# Patient Record
Sex: FEMALE | Race: WHITE | Employment: UNEMPLOYED | URBAN - METROPOLITAN AREA
[De-identification: names, ages, dates, MRNs, and addresses within clinical notes are randomized per-mention and may not be internally consistent; named-entity substitution may affect disease eponyms.]

---

## 2017-01-10 ENCOUNTER — APPOINTMENT (OUTPATIENT)
Dept: LAB | Facility: CLINIC | Age: 82
End: 2017-01-10
Payer: COMMERCIAL

## 2017-01-10 ENCOUNTER — ALLSCRIPTS OFFICE VISIT (OUTPATIENT)
Dept: OTHER | Facility: OTHER | Age: 82
End: 2017-01-10

## 2017-01-10 ENCOUNTER — TRANSCRIBE ORDERS (OUTPATIENT)
Dept: LAB | Facility: CLINIC | Age: 82
End: 2017-01-10

## 2017-01-10 ENCOUNTER — HOSPITAL ENCOUNTER (OUTPATIENT)
Dept: RADIOLOGY | Facility: CLINIC | Age: 82
Discharge: HOME | End: 2017-01-10
Attending: ORTHOPAEDIC SURGERY
Payer: COMMERCIAL

## 2017-01-10 ENCOUNTER — HOSPITAL ENCOUNTER (OUTPATIENT)
Dept: RADIOLOGY | Facility: HOSPITAL | Age: 82
Discharge: HOME | End: 2017-01-10
Attending: ORTHOPAEDIC SURGERY
Payer: COMMERCIAL

## 2017-01-10 DIAGNOSIS — S52.552D OTHER EXTRAARTICULAR FRACTURE OF LOWER END OF LEFT RADIUS, SUBSEQUENT ENCOUNTER FOR CLOSED FRACTURE WITH ROUTINE HEALING: ICD-10-CM

## 2017-01-10 DIAGNOSIS — S52.572A: ICD-10-CM

## 2017-01-10 DIAGNOSIS — Z47.89 ENCOUNTER FOR OTHER ORTHOPEDIC AFTERCARE: ICD-10-CM

## 2017-01-10 DIAGNOSIS — S62.102A CLOSED FRACTURE OF LEFT CARPAL BONE: ICD-10-CM

## 2017-01-10 DIAGNOSIS — S52.572A: Primary | ICD-10-CM

## 2017-01-10 LAB
ANION GAP SERPL CALCULATED.3IONS-SCNC: 11 MMOL/L (ref 4–13)
BASOPHILS # BLD AUTO: 0.03 THOUSANDS/ΜL (ref 0–0.1)
BASOPHILS NFR BLD AUTO: 0 % (ref 0–1)
BUN SERPL-MCNC: 22 MG/DL (ref 5–25)
CALCIUM SERPL-MCNC: 9.4 MG/DL (ref 8.3–10.1)
CHLORIDE SERPL-SCNC: 104 MMOL/L (ref 100–108)
CO2 SERPL-SCNC: 27 MMOL/L (ref 21–32)
CREAT SERPL-MCNC: 0.64 MG/DL (ref 0.6–1.3)
EOSINOPHIL # BLD AUTO: 0.05 THOUSAND/ΜL (ref 0–0.61)
EOSINOPHIL NFR BLD AUTO: 1 % (ref 0–6)
ERYTHROCYTE [DISTWIDTH] IN BLOOD BY AUTOMATED COUNT: 14.3 % (ref 11.6–15.1)
GFR SERPL CREATININE-BSD FRML MDRD: >60 ML/MIN/1.73SQ M
GLUCOSE SERPL-MCNC: 92 MG/DL (ref 65–140)
HCT VFR BLD AUTO: 41.9 % (ref 34.8–46.1)
HGB BLD-MCNC: 13.4 G/DL (ref 11.5–15.4)
LYMPHOCYTES # BLD AUTO: 1.75 THOUSANDS/ΜL (ref 0.6–4.47)
LYMPHOCYTES NFR BLD AUTO: 23 % (ref 14–44)
MCH RBC QN AUTO: 29.4 PG (ref 26.8–34.3)
MCHC RBC AUTO-ENTMCNC: 32 G/DL (ref 31.4–37.4)
MCV RBC AUTO: 92 FL (ref 82–98)
MONOCYTES # BLD AUTO: 0.71 THOUSAND/ΜL (ref 0.17–1.22)
MONOCYTES NFR BLD AUTO: 9 % (ref 4–12)
NEUTROPHILS # BLD AUTO: 5.01 THOUSANDS/ΜL (ref 1.85–7.62)
NEUTS SEG NFR BLD AUTO: 67 % (ref 43–75)
PLATELET # BLD AUTO: 344 THOUSANDS/UL (ref 149–390)
PMV BLD AUTO: 11.3 FL (ref 8.9–12.7)
POTASSIUM SERPL-SCNC: 4.6 MMOL/L (ref 3.5–5.3)
RBC # BLD AUTO: 4.56 MILLION/UL (ref 3.81–5.12)
SODIUM SERPL-SCNC: 142 MMOL/L (ref 136–145)
WBC # BLD AUTO: 7.55 THOUSAND/UL (ref 4.31–10.16)

## 2017-01-10 PROCEDURE — 93005 ELECTROCARDIOGRAM TRACING: CPT

## 2017-01-10 PROCEDURE — 85025 COMPLETE CBC W/AUTO DIFF WBC: CPT

## 2017-01-10 PROCEDURE — 73110 X-RAY EXAM OF WRIST: CPT

## 2017-01-10 PROCEDURE — 80048 BASIC METABOLIC PNL TOTAL CA: CPT

## 2017-01-10 PROCEDURE — 36415 COLL VENOUS BLD VENIPUNCTURE: CPT

## 2017-01-13 ENCOUNTER — ALLSCRIPTS OFFICE VISIT (OUTPATIENT)
Dept: OTHER | Facility: OTHER | Age: 82
End: 2017-01-13

## 2017-01-13 LAB
ATRIAL RATE: 82 BPM
P AXIS: 27 DEGREES
PR INTERVAL: 132 MS
QRS AXIS: 16 DEGREES
QRSD INTERVAL: 72 MS
QT INTERVAL: 382 MS
QTC INTERVAL: 446 MS
T WAVE AXIS: 51 DEGREES
VENTRICULAR RATE: 82 BPM

## 2017-01-16 RX ORDER — GLUCOSAMINE HCL 500 MG
TABLET ORAL
COMMUNITY
End: 2020-11-16

## 2017-01-18 ENCOUNTER — ANESTHESIA EVENT (OUTPATIENT)
Dept: PERIOP | Facility: HOSPITAL | Age: 82
End: 2017-01-18
Payer: COMMERCIAL

## 2017-01-18 ENCOUNTER — ANESTHESIA (OUTPATIENT)
Dept: PERIOP | Facility: HOSPITAL | Age: 82
End: 2017-01-18
Payer: COMMERCIAL

## 2017-01-18 ENCOUNTER — HOSPITAL ENCOUNTER (OUTPATIENT)
Facility: HOSPITAL | Age: 82
Setting detail: OUTPATIENT SURGERY
Discharge: HOME | End: 2017-01-18
Attending: ORTHOPAEDIC SURGERY | Admitting: ORTHOPAEDIC SURGERY
Payer: COMMERCIAL

## 2017-01-18 ENCOUNTER — HOSPITAL ENCOUNTER (OUTPATIENT)
Dept: RADIOLOGY | Facility: HOSPITAL | Age: 82
Setting detail: OUTPATIENT SURGERY
Discharge: HOME | End: 2017-01-18
Payer: COMMERCIAL

## 2017-01-18 VITALS
HEIGHT: 63 IN | WEIGHT: 148 LBS | OXYGEN SATURATION: 95 % | TEMPERATURE: 97.3 F | SYSTOLIC BLOOD PRESSURE: 186 MMHG | HEART RATE: 77 BPM | RESPIRATION RATE: 16 BRPM | DIASTOLIC BLOOD PRESSURE: 74 MMHG | BODY MASS INDEX: 26.22 KG/M2

## 2017-01-18 DIAGNOSIS — T14.8XXA FRACTURE: ICD-10-CM

## 2017-01-18 PROCEDURE — C1713 ANCHOR/SCREW BN/BN,TIS/BN: HCPCS | Performed by: ORTHOPAEDIC SURGERY

## 2017-01-18 PROCEDURE — 73100 X-RAY EXAM OF WRIST: CPT

## 2017-01-18 DEVICE — 2.4MM VA-LCP 2-COL DSTL RAD PL NRW 6H HD/3H SHAFT/LT
Type: IMPLANTABLE DEVICE | Site: WRIST | Status: FUNCTIONAL
Brand: VA-LCP

## 2017-01-18 DEVICE — 2.4MM CORTEX SCREW SLF-TPNG WITH T8 STARDRIVE RECESS 12MM: Type: IMPLANTABLE DEVICE | Site: WRIST | Status: FUNCTIONAL

## 2017-01-18 DEVICE — 2.4MM VA LOCKING SCREW STARDRIVE 12MM: Type: IMPLANTABLE DEVICE | Site: WRIST | Status: FUNCTIONAL

## 2017-01-18 DEVICE — 2.4MM VA LOCKING SCREW STARDRIVE 18MM: Type: IMPLANTABLE DEVICE | Site: WRIST | Status: FUNCTIONAL

## 2017-01-18 DEVICE — 2.4MM VA LOCKING SCREW STARDRIVE 20MM: Type: IMPLANTABLE DEVICE | Site: WRIST | Status: FUNCTIONAL

## 2017-01-18 DEVICE — 2.4MM CORTEX SCREW SLF-TPNG WITH T8 STARDRIVE RECESS 14MM: Type: IMPLANTABLE DEVICE | Site: WRIST | Status: FUNCTIONAL

## 2017-01-18 RX ORDER — FENTANYL CITRATE/PF 50 MCG/ML
25 SYRINGE (ML) INJECTION
Status: DISCONTINUED | OUTPATIENT
Start: 2017-01-18 | End: 2017-01-18 | Stop reason: HOSPADM

## 2017-01-18 RX ORDER — SODIUM CHLORIDE, SODIUM LACTATE, POTASSIUM CHLORIDE, CALCIUM CHLORIDE 600; 310; 30; 20 MG/100ML; MG/100ML; MG/100ML; MG/100ML
50 INJECTION, SOLUTION INTRAVENOUS CONTINUOUS
Status: DISCONTINUED | OUTPATIENT
Start: 2017-01-18 | End: 2017-01-18 | Stop reason: HOSPADM

## 2017-01-18 RX ORDER — BUPIVACAINE HYDROCHLORIDE 2.5 MG/ML
INJECTION, SOLUTION INFILTRATION; PERINEURAL AS NEEDED
Status: DISCONTINUED | OUTPATIENT
Start: 2017-01-18 | End: 2017-01-18 | Stop reason: HOSPADM

## 2017-01-18 RX ORDER — FENTANYL CITRATE 50 UG/ML
INJECTION, SOLUTION INTRAMUSCULAR; INTRAVENOUS AS NEEDED
Status: DISCONTINUED | OUTPATIENT
Start: 2017-01-18 | End: 2017-01-18 | Stop reason: SURG

## 2017-01-18 RX ORDER — HYDROCODONE BITARTRATE AND ACETAMINOPHEN 5; 325 MG/1; MG/1
TABLET ORAL
Status: COMPLETED
Start: 2017-01-18 | End: 2017-01-18

## 2017-01-18 RX ORDER — GLYCOPYRROLATE 0.2 MG/ML
INJECTION INTRAMUSCULAR; INTRAVENOUS AS NEEDED
Status: DISCONTINUED | OUTPATIENT
Start: 2017-01-18 | End: 2017-01-18 | Stop reason: SURG

## 2017-01-18 RX ORDER — HYDROCODONE BITARTRATE AND ACETAMINOPHEN 5; 325 MG/1; MG/1
1 TABLET ORAL EVERY 6 HOURS PRN
Status: DISCONTINUED | OUTPATIENT
Start: 2017-01-18 | End: 2017-01-18 | Stop reason: HOSPADM

## 2017-01-18 RX ORDER — ONDANSETRON 2 MG/ML
4 INJECTION INTRAMUSCULAR; INTRAVENOUS ONCE AS NEEDED
Status: DISCONTINUED | OUTPATIENT
Start: 2017-01-18 | End: 2017-01-18 | Stop reason: HOSPADM

## 2017-01-18 RX ORDER — PROPOFOL 10 MG/ML
INJECTION, EMULSION INTRAVENOUS AS NEEDED
Status: DISCONTINUED | OUTPATIENT
Start: 2017-01-18 | End: 2017-01-18 | Stop reason: SURG

## 2017-01-18 RX ORDER — LIDOCAINE HYDROCHLORIDE 10 MG/ML
INJECTION, SOLUTION INFILTRATION; PERINEURAL AS NEEDED
Status: DISCONTINUED | OUTPATIENT
Start: 2017-01-18 | End: 2017-01-18 | Stop reason: SURG

## 2017-01-18 RX ORDER — HYDROCODONE BITARTRATE AND ACETAMINOPHEN 5; 325 MG/1; MG/1
2 TABLET ORAL EVERY 6 HOURS PRN
Qty: 30 TABLET | Refills: 0 | Status: SHIPPED | OUTPATIENT
Start: 2017-01-18 | End: 2017-01-28

## 2017-01-18 RX ORDER — ONDANSETRON 2 MG/ML
INJECTION INTRAMUSCULAR; INTRAVENOUS AS NEEDED
Status: DISCONTINUED | OUTPATIENT
Start: 2017-01-18 | End: 2017-01-18 | Stop reason: SURG

## 2017-01-18 RX ADMIN — FENTANYL CITRATE 25 MCG: 50 INJECTION INTRAMUSCULAR; INTRAVENOUS at 15:43

## 2017-01-18 RX ADMIN — CEFAZOLIN SODIUM 2000 MG: 2 SOLUTION INTRAVENOUS at 13:57

## 2017-01-18 RX ADMIN — ONDANSETRON 4 MG: 2 INJECTION INTRAMUSCULAR; INTRAVENOUS at 14:04

## 2017-01-18 RX ADMIN — FENTANYL CITRATE 50 MCG: 50 INJECTION, SOLUTION INTRAMUSCULAR; INTRAVENOUS at 14:40

## 2017-01-18 RX ADMIN — GLYCOPYRROLATE 0.1 MG: 0.2 INJECTION INTRAMUSCULAR; INTRAVENOUS at 13:53

## 2017-01-18 RX ADMIN — FENTANYL CITRATE 25 MCG: 50 INJECTION INTRAMUSCULAR; INTRAVENOUS at 15:30

## 2017-01-18 RX ADMIN — HYDROCODONE BITARTRATE AND ACETAMINOPHEN: 5; 325 TABLET ORAL at 16:42

## 2017-01-18 RX ADMIN — PROPOFOL 150 MG: 10 INJECTION, EMULSION INTRAVENOUS at 13:51

## 2017-01-18 RX ADMIN — FENTANYL CITRATE 25 MCG: 50 INJECTION INTRAMUSCULAR; INTRAVENOUS at 15:25

## 2017-01-18 RX ADMIN — FENTANYL CITRATE 50 MCG: 50 INJECTION, SOLUTION INTRAMUSCULAR; INTRAVENOUS at 14:23

## 2017-01-18 RX ADMIN — FENTANYL CITRATE 25 MCG: 50 INJECTION, SOLUTION INTRAMUSCULAR; INTRAVENOUS at 13:51

## 2017-01-18 RX ADMIN — LIDOCAINE HYDROCHLORIDE 40 MG: 10 INJECTION, SOLUTION INFILTRATION; PERINEURAL at 13:51

## 2017-01-18 RX ADMIN — FENTANYL CITRATE 25 MCG: 50 INJECTION, SOLUTION INTRAMUSCULAR; INTRAVENOUS at 13:59

## 2017-01-18 RX ADMIN — FENTANYL CITRATE 50 MCG: 50 INJECTION, SOLUTION INTRAMUSCULAR; INTRAVENOUS at 14:15

## 2017-01-18 RX ADMIN — SODIUM CHLORIDE, SODIUM LACTATE, POTASSIUM CHLORIDE, AND CALCIUM CHLORIDE: .6; .31; .03; .02 INJECTION, SOLUTION INTRAVENOUS at 13:43

## 2017-01-18 RX ADMIN — DEXAMETHASONE SODIUM PHOSPHATE 10 MG: 10 INJECTION INTRAMUSCULAR; INTRAVENOUS at 14:04

## 2017-02-02 ENCOUNTER — ALLSCRIPTS OFFICE VISIT (OUTPATIENT)
Dept: OTHER | Facility: OTHER | Age: 82
End: 2017-02-02

## 2017-02-02 ENCOUNTER — HOSPITAL ENCOUNTER (OUTPATIENT)
Dept: RADIOLOGY | Facility: CLINIC | Age: 82
Discharge: HOME | End: 2017-02-02
Payer: COMMERCIAL

## 2017-02-02 DIAGNOSIS — S52.552D OTHER EXTRAARTICULAR FRACTURE OF LOWER END OF LEFT RADIUS, SUBSEQUENT ENCOUNTER FOR CLOSED FRACTURE WITH ROUTINE HEALING: ICD-10-CM

## 2017-02-02 DIAGNOSIS — S62.102A CLOSED FRACTURE OF LEFT CARPAL BONE: ICD-10-CM

## 2017-02-02 PROCEDURE — 73110 X-RAY EXAM OF WRIST: CPT

## 2017-03-07 ENCOUNTER — GENERIC CONVERSION - ENCOUNTER (OUTPATIENT)
Dept: OTHER | Facility: OTHER | Age: 82
End: 2017-03-07

## 2017-03-13 ENCOUNTER — GENERIC CONVERSION - ENCOUNTER (OUTPATIENT)
Dept: OTHER | Facility: OTHER | Age: 82
End: 2017-03-13

## 2017-03-15 ENCOUNTER — GENERIC CONVERSION - ENCOUNTER (OUTPATIENT)
Dept: OTHER | Facility: OTHER | Age: 82
End: 2017-03-15

## 2017-04-11 ENCOUNTER — HOSPITAL ENCOUNTER (OUTPATIENT)
Dept: RADIOLOGY | Facility: CLINIC | Age: 82
Discharge: HOME | End: 2017-04-11
Payer: COMMERCIAL

## 2017-04-11 ENCOUNTER — ALLSCRIPTS OFFICE VISIT (OUTPATIENT)
Dept: OTHER | Facility: OTHER | Age: 82
End: 2017-04-11

## 2017-04-11 DIAGNOSIS — S62.102A CLOSED FRACTURE OF LEFT CARPAL BONE: ICD-10-CM

## 2017-04-11 PROCEDURE — 73110 X-RAY EXAM OF WRIST: CPT

## 2017-04-19 ENCOUNTER — ALLSCRIPTS OFFICE VISIT (OUTPATIENT)
Dept: OTHER | Facility: OTHER | Age: 82
End: 2017-04-19

## 2017-08-22 ENCOUNTER — ALLSCRIPTS OFFICE VISIT (OUTPATIENT)
Dept: OTHER | Facility: OTHER | Age: 82
End: 2017-08-22

## 2017-11-30 ENCOUNTER — LAB CONVERSION - ENCOUNTER (OUTPATIENT)
Dept: OTHER | Facility: OTHER | Age: 82
End: 2017-11-30

## 2017-11-30 LAB
25(OH)D3 SERPL-MCNC: 31 NG/ML (ref 30–100)
A/G RATIO (HISTORICAL): 1.4 (CALC) (ref 1–2.5)
ALBUMIN SERPL BCP-MCNC: 4 G/DL (ref 3.6–5.1)
ALP SERPL-CCNC: 62 U/L (ref 33–130)
ALT SERPL W P-5'-P-CCNC: 10 U/L (ref 6–29)
AST SERPL W P-5'-P-CCNC: 16 U/L (ref 10–35)
BILIRUB SERPL-MCNC: 0.4 MG/DL (ref 0.2–1.2)
BILIRUBIN DIRECT (HISTORICAL): 0.1 MG/DL
CHOLEST SERPL-MCNC: 212 MG/DL
CHOLEST/HDLC SERPL: 3.2 (CALC)
DEPRECATED RDW RBC AUTO: 17 % (ref 11–15)
GAMMA GLOBULIN (HISTORICAL): 2.8 G/DL (CALC) (ref 1.9–3.7)
HCT VFR BLD AUTO: 36.5 % (ref 35–45)
HDLC SERPL-MCNC: 66 MG/DL
HGB BLD-MCNC: 10.7 G/DL (ref 11.7–15.5)
INDIRECT BILIRUBIN (HISTORICAL): 0.3 MG/DL (CALC) (ref 0.2–1.2)
LDL CHOLESTEROL (HISTORICAL): 124 MG/DL (CALC)
MCH RBC QN AUTO: 22.7 PG (ref 27–33)
MCHC RBC AUTO-ENTMCNC: 29.3 G/DL (ref 32–36)
MCV RBC AUTO: 77.3 FL (ref 80–100)
NON-HDL-CHOL (CHOL-HDL) (HISTORICAL): 146 MG/DL (CALC)
PLATELET # BLD AUTO: 304 THOUSAND/UL (ref 140–400)
PMV BLD AUTO: 12 FL (ref 7.5–12.5)
RBC # BLD AUTO: 4.72 MILLION/UL (ref 3.8–5.1)
TOTAL PROTEIN (HISTORICAL): 6.8 G/DL (ref 6.1–8.1)
TRIGL SERPL-MCNC: 108 MG/DL
WBC # BLD AUTO: 4.6 THOUSAND/UL (ref 3.8–10.8)

## 2017-12-05 ENCOUNTER — ALLSCRIPTS OFFICE VISIT (OUTPATIENT)
Dept: OTHER | Facility: OTHER | Age: 82
End: 2017-12-05

## 2017-12-06 NOTE — PROGRESS NOTES
Assessment    1  Benign essential hypertension (401 1) (I10)   2  Microcytic anemia (280 9) (D50 9)   3  Atherosclerosis 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 (440 21) (I70 219)   4  Vitamin D deficiency (268 9) (E55 9)    Plan  Atherosclerosis 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, Microcyticanemia    · Esomeprazole Magnesium 40 MG Oral Capsule Delayed Release; take 1 capsuledaily   · 1 - Doctor Leonor OLSEN (Vascular Surgery) Co-Management  *  Status: Active  Requestedfor: 55EQU1338  Care Summary provided  : Yes   · (1) CBC/ PLT (NO DIFF); Status:Active; Requested for:15Ctm0927;    · (1) FERRITIN; Status:Active; Requested for:41Tsz3886;    · (1) OCCULT BLOOD, FECAL IMMUNOCHEMICAL TEST; Status:Active; Requestedfor:37Xjo7548;    · Follow-up visit in 6 weeks Evaluation and Treatment  Follow-up  Status: Hold For -Scheduling  Requested for: 19QJG5471  Benign essential hypertension    · Enalapril Maleate 10 MG Oral Tablet; Take 1 tablet daily  GERD without esophagitis    · Famotidine 40 MG Oral Tablet  Hypercholesterolemia    · Simvastatin 20 MG Oral Tablet; TAKE 1 TABLET DAILY  Microcytic anemia    · Hemoccult Screening - POC; Status:Active - Perform Order; Requested for:25Lwm8681;     Discussion/Summary    New onset microcytic anemia, suspect Fe deficient - ? GI Bleed  Today stool negative  Will give take home Hemoccult slides  one of the platelets Meds be put on hold  change Famotidine to esomeprazole 40 mg weak or tired call  Hemoccult in office - Negative  Educational resources provided:   Possible side effects of new medications were reviewed with the patient/guardian today  The treatment plan was reviewed with the patient/guardian  The patient/guardian understands and agrees with the treatment plan     Self Referrals: No      Chief Complaint  pt here for 3 month follow up for hypertension and to review BW      History of Present Illness  Follow up  Review labs   H and H dropped along with MCV/MCH and MCHC  OK a stent in LLE for arterial disease  with vitamin D replacement have patient see Vascular, does patient need both Plavix and Aspirin ? Review of Systems   Constitutional: No fever, no chills, feels well, no tiredness, no recent weight gain or weight loss  Eyes: No complaints of eye pain, no red eyes, no eyesight problems, no discharge, no dry eyes, no itching of eyes  ENT: no complaints of earache, no loss of hearing, no nose bleeds, no nasal discharge, no sore throat, no hoarseness  Cardiovascular: No complaints of slow heart rate, no fast heart rate, no chest pain, no palpitations, no leg claudication, no lower extremity edema,-- no intermittent leg claudication-- and-- no lower extremity edema  Respiratory: No complaints of shortness of breath, no wheezing, no cough, no SOB on exertion, no orthopnea, no PND  Gastrointestinal: No complaints of abdominal pain, no constipation, no nausea or vomiting, no diarrhea, no bloody stools,-- no abdominal pain,-- no nausea,-- no vomiting,-- no constipation,-- no diarrhea-- and-- no blood in stools  Genitourinary: No complaints of dysuria, no incontinence, no pelvic pain, no dysmenorrhea, no vaginal discharge or bleeding  Musculoskeletal: No complaints of arthralgias, no myalgias, no joint swelling or stiffness, no limb pain or swelling  Integumentary: No complaints of skin rash or lesions, no itching, no skin wounds, no breast pain or lump  Psychiatric: Not suicidal, no sleep disturbance, no anxiety or depression, no change in personality, no emotional problems  Endocrine: No complaints of proptosis, no hot flashes, no muscle weakness, no deepening of the voice, no feelings of weakness  Hematologic/Lymphatic: No complaints of swollen glands, no swollen glands in the neck, does not bleed easily, does not bruise easily  Active Problems    1  Abrasion (919 0) (T14 8XXA)   2   Advance directive on file (V49 89) (Z78 9) 3  Aftercare following other surgery of musculoskeletal system (V58 78) (Z47 89)   4  Arm pain, left (729 5) (M79 602)   5  Atherosclerosis 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 (440 21) (I70 219)   6  Benign essential hypertension (401 1) (I10)   7  Borderline hypertension (796 2) (R03 0)   8  Breast cancer screening (V76 10) (Z12 39)   9  Carotid artery stenosis (433 10) (I65 29)   10  Cataract (366 9) (H26 9)   11  Dry skin (701 1) (L85 3)   12  Epistaxis (784 7) (R04 0)   13  GERD without esophagitis (530 81) (K21 9)   14  Glaucoma screening (V80 1) (Z13 5)   15  Hypercholesterolemia (272 0) (E78 00)   16  Hypertriglyceridemia (272 1) (E78 1)   17  Infection of toe (686 9) (L08 9)   18  Ingrown toenail (703 0) (L60 0)   19  Left shoulder pain (719 41) (M25 512)   20  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)   21  Medicare annual wellness visit, initial (V70 0) (Z00 00)   22  Medicare annual wellness visit, subsequent (V70 0) (Z00 00)   23  Need for TD vaccine (V06 5) (Z23)   24  Osteoporosis (733 00) (M81 0)   25  Other closed extra-articular fracture of distal end of left radius with routine healing,  subsequent encounter (V54 12) (S52 552D)   26  Pain, upper back (724 5) (M54 9)   27  Preoperative clearance (V72 84) (Z01 818)   28  Pseudophakia of right eye (V43 1) (Z96 1)   29  Rib pain (786 50) (R07 81)   30  Screening for depression (V79 0) (Z13 89)   31  Screening for genitourinary condition (V81 6) (Z13 89)   32  Screening for neurological condition (V80 09) (Z13 89)   33  Skin lesion of scalp (709 9) (L98 9)   34  Subungual hematoma of toe, left, initial encounter (924 3) (S90 222A)   35  Visit for screening mammogram (V76 12) (Z12 31)   36  Vitamin D deficiency (268 9) (E55 9)   37  Wax in ear (380 4) (H61 20)   38  Wears seat belt (V49 89) (Z78 9)   39  Wrist fracture, left (814 00) (S62 102A)    Past Medical History    1   History of Abdominal pain, RLQ (right lower quadrant) (789 03) (R10 31)   2  History of Ankle swelling (719 07) (M25 473)   3  History of Anorexia (783 0) (R63 0)   4  History of Cough (786 2) (R05)   5  History of Distorted taste (781 1) (R43 2)   6  History of Encounter for screening mammogram for malignant neoplasm of breast (V76 12) (Z12 31)   7  History of acute bronchitis (V12 69) (Z87 09)   8  History of constipation (V12 79) (Z87 19)   9  History of dehydration (V12 29) (Z86 39)   10  History of herpes zoster (V12 09) (Z86 19)   11  History of postmenopausal osteoporosis (V13 59) (Z87 39)   12  History of postmenopausal osteoporosis (V13 59) (Z87 39)   13  History of tendinitis (V13 59) (Z87 39)   14  History of upper respiratory infection (V12 09) (Z87 09)   15  History of urinary frequency (V13 09) (Z87 898)   16  History of urinary incontinence (V13 09) (Z87 898)   17  History of Lymphoma (V10 71)   18  History of Malignant Melanoma Of The Skin (V10 82)   19  History of Pain in ankle joint (719 47) (M25 579)   20  History of Pain of lower leg, unspecified laterality (729 5) (M79 669)   21  History of Pain of upper extremity, unspecified laterality (729 5) (M79 603)   22  History of Pelvic Fracture (808 8)   23  History of Pre-operative cardiovascular examination (V72 81) (Z01 810)   24  History of Urinary retention (788 20) (R33 9)   25  History of Urinary Tract Infection (V13 02)   26  History of Vaginal Hemorrhage (623 8)    Surgical History  1  History of Breast Surgery Enlargement Procedure   2  History of Cataract Surgery   3  History of Hysterectomy   4  History of PTA Femoral-Popliteal   5  History of PTA Femoral-Popliteal Initial Stenosis    Family History  Mother    1  Family history of myocardial infarction (V17 3) (Z82 49)  Father    2  Family history of    3  Family history of congestive heart failure (V17 49) (Z82 49)  Sister    4   Family history of Thyroid Surgery    Social History     · Advance directive on file (V49 89) (Z78 9)   · Being A Social Drinker   · Denied: History of Drug Use   · Never A Smoker  The social history was reviewed and updated today  The social history was reviewed and is unchanged  Current Meds   1  Aspirin 81 MG TABS; 1 TAB DAILY; Therapy: 42Woq6405 to (Last Luis Big)  Requested for: 45Qqq8987 Ordered   2  Calcium TABS; Therapy: (Recorded:16Jan2014) to Recorded   3  Clopidogrel Bisulfate 75 MG Oral Tablet; Take 1 tablet daily; Therapy: 21SGR1283 to (Evaluate:18Feb2018)  Requested for: 22Lye0350; Last Rx:41Dvb9426 Ordered   4  Enalapril Maleate 10 MG Oral Tablet; Take 1 tablet daily; Therapy: 01SDA8082 to (Evaluate:18Feb2018)  Requested for: 50Okn8140; Last Rx:49Eno8045 Ordered   5  Famotidine 40 MG Oral Tablet; TAKE 1 TABLET DAILY; Therapy: 89YDW1516 to (Evaluate:18Feb2018)  Requested for: 13Dpi1951; Last Rx:50Qul3175 Ordered   6  Magnesium 250 MG Oral Tablet; Therapy: 94SDO8057 to Recorded   7  Simvastatin 20 MG Oral Tablet; TAKE 1 TABLET DAILY  Requested for: 42Xpp0505; Last Rx:12Ioe6178 Ordered   8  Vitamin D3 400 UNIT Oral Capsule; Therapy: 57YED6202 to Recorded    Allergies  1  Iodine Crystals   2  Sulfa Drugs  3  IVP Dye    Vitals  Vital Signs    Recorded: 38ZSN9572 11:07AM   Temperature 97 9 F, Oral   Heart Rate 82   Systolic 285, LUE   Diastolic 72, LUE   Height 5 ft 3 5 in   Weight 152 lb    BMI Calculated 26 5   BSA Calculated 1 73   O2 Saturation 96       Physical Exam   Constitutional  General appearance: No acute distress, well appearing and well nourished  Eyes  Conjunctiva and lids: No swelling, erythema or discharge  Pupils and irises: Equal, round and reactive to light  Ears, Nose, Mouth, and Throat  External inspection of ears and nose: Normal    Otoscopic examination: Tympanic membranes translucent with normal light reflex  Canals patent without erythema  Nasal mucosa, septum, and turbinates: Normal without edema or erythema     Oropharynx: Normal with no erythema, edema, exudate or lesions  Pulmonary  Respiratory effort: No increased work of breathing or signs of respiratory distress  Auscultation of lungs: Clear to auscultation  Cardiovascular  Palpation of heart: Normal PMI, no thrills  Auscultation of heart: Normal rate and rhythm, normal S1 and S2, without murmurs  Examination of extremities for edema and/or varicosities: Normal    Carotid pulses: Normal    Abdomen  Abdomen: Non-tender, no masses  Liver and spleen: No hepatomegaly or splenomegaly  Lymphatic  Palpation of lymph nodes in neck: No lymphadenopathy  Musculoskeletal  Gait and station: Normal    Inspection/palpation of joints, bones, and muscles: Normal    Neurologic  Sensation: No sensory loss  Psychiatric  Orientation to person, place, and time: Normal    Mood and affect: Normal          Results/Data  (1) HEPATIC FUNCTION PANEL 37DYZ3107 09:48AM [employer]     Test Name Result Flag Reference   PROTEIN, TOTAL 6 8 g/dL  6 1-8 1   ALBUMIN 4 0 g/dL  3 6-5 1   GLOBULIN 2 8 g/dL (calc)  1 9-3 7   ALBUMIN/GLOBULIN RATIO 1 4 (calc)  1 0-2 5   BILIRUBIN, TOTAL 0 4 mg/dL  0 2-1 2   BILIRUBIN, DIRECT 0 1 mg/dL  < OR = 0 2   BILIRUBIN, INDIRECT 0 3 mg/dL (calc)  0 2-1 2   ALKALINE PHOSPHATASE 62 U/L     AST 16 U/L  10-35   ALT 10 U/L  6-29     (1) LIPID PANEL, FASTING 77KYD5049 09:48AM [employer]     Test Name Result Flag Reference   CHOLESTEROL, TOTAL 212 mg/dL H <200   HDL CHOLESTEROL 66 mg/dL  >49   TRIGLICERIDES 286 mg/dL  <150   LDL-CHOLESTEROL 124 mg/dL (calc) H      Reference range: <100   Desirable range <100 mg/dL for patients with CHD or diabetes and <70 mg/dL for diabetic patients with known heart disease  LDL-C is now calculated using the Tu-Wilburn  calculation, which is a validated novel method providing  better accuracy than the Friedewald equation in the  estimation of LDL-C  Jimbo Felton et al  [place]  0087;078(30): 9830-1791  (http://OpenTrust/faq/ZKA533)   CHOL/HDLC RATIO 3 2 (calc)  <5 0   NON HDL CHOLESTEROL 146 mg/dL (calc) H <130     For patients with diabetes plus 1 major ASCVD risk  factor, treating to a non-HDL-C goal of <100 mg/dL  (LDL-C of <70 mg/dL) is considered a therapeutic  option  (Q) CBC (H/H, RBC, INDICES, WBC, PLT) 06KMS9401 09:48AM Ashley Davila     Test Name Result Flag Reference   WHITE BLOOD CELL COUNT 4 6 Thousand/uL  3 8-10 8   RED BLOOD CELL COUNT 4 72 Million/uL  3 80-5 10   HEMOGLOBIN 10 7 g/dL L 11 7-15 5   HEMATOCRIT 36 5 %  35 0-45 0   MCV 77 3 fL L 80 0-100 0   MCH 22 7 pg L 27 0-33 0   MCHC 29 3 g/dL L 32 0-36 0   RDW 17 0 % H 11 0-15 0   PLATELET COUNT 022 Thousand/uL  140-400   MPV 12 0 fL  7 5-12 5     *(Q) VITAMIN D, 25-HYDROXY, LC/MS/MS 73FGZ4480 09:48AM Ashley Davila     REPORT COMMENT: FASTING:YES     Test Name Result Flag Reference   VITAMIN D, 25-OH, TOTAL 31 ng/mL       Vitamin D Status         25-OH Vitamin D:   Deficiency:                    <20 ng/mL Insufficiency:             20 - 29 ng/mL Optimal:                 > or = 30 ng/mL   For 25-OH Vitamin D testing on patients on  D2-supplementation and patients for whom quantitation  of D2 and D3 fractions is required, the QuestAssureD(TM) 25-OH VIT D, (D2,D3), LC/MS/MS is recommended: order  code 80784 (patients >2yrs)  For more information on this test, go to: http://education  Quippi/faq/IKA617 (This link is being provided for  informational/educational purposes only )       Health Management  Benign essential hypertension   (1) COMPREHENSIVE METABOLIC PANEL; every 1 year; Last 29RYL0276; Next Due: 05XQG6687; Overdue  (1) LIPID PANEL, FASTING; every 1 year; Last 07ETT2820; Next Due: 89Ugd7033; Active  EKG/ECG- POC; every 1 year; Last 91WSN8696; Next Due: 72HQQ7235; Overdue  Breast cancer screening   * MAMMO SCREENING BILATERAL W CAD; every 1 year; Last 42YNF0279; Next Due: 70WBJ3803; Overdue  Cataract   *VB - Eye Exam; every 1 year; Last 06QQR2925;  Next Due: 77YXB1030; Overdue  Glaucoma screening   *VB - Glaucoma Screen; every 1 year; Last 21BVD9026; Next Due: 27NWA6701; Overdue  Hypercholesterolemia   (1) COMPREHENSIVE METABOLIC PANEL; every 1 year; Last 93EPN0389; Next Due: 13QOW6499; Overdue  (1) LIPID PANEL, FASTING; every 1 year; Last 64MGG0282; Next Due: 29Qxn3191; Active  Osteoporosis   Dexa Scan; every 2 years; Last 77QBT2472; Next Due: 49TTC2919; Overdue  Screening for depression   *VB - Fall Risk Assessment  (Dx Z13 89 Screen for Neurologic Disorder); every 1 year; Qsai04Hil1732; Next Due: 51RFH1111; Overdue  *VB - Urinary Incontinence Screen (Dx Z13 89 Screen for UI); every 1 year; Last 67Tby4103; NextDue: 94Wpf2502; Active  *VB-Depression Screening; every 1 year; Last 04Igz0111; Next Due: 15Vcz9090; Active  Screening for genitourinary condition   *VB - Fall Risk Assessment  (Dx Z13 89 Screen for Neurologic Disorder); every 1 year; Pqul66Qyx9797; Next Due: 43PUF6026; Overdue  *VB - Urinary Incontinence Screen (Dx Z13 89 Screen for UI); every 1 year; Last 34Rxd1636; NextDue: 97Flh2411; Active  *VB-Depression Screening; every 1 year; Last 52Mlt3758; Next Due: 61Utu9354; Active  Screening for neurological condition   *VB - Fall Risk Assessment  (Dx Z13 89 Screen for Neurologic Disorder); every 1 year; Hybr29Myh3499; Next Due: 74YIP2715; Overdue  *VB - Urinary Incontinence Screen (Dx Z13 89 Screen for UI); every 1 year; Last 90Pmx5683; NextDue: 71Sob7389; Active  *VB-Depression Screening; every 1 year; Last 50Mjz5799; Next Due: 25Zwp3554; Active  Visit for screening mammogram   Digital Bilateral Screening Mammogram With CAD; every 1 year; Last 36FSQ4555; Next NZW:32CRV3335; Overdue  Health Maintenance   Medicare Annual Wellness Visit; every 1 year; Last 57Dbk2747; Next Due: 28GJU1946;  Overdue    Future Appointments    Date/Time Provider Specialty Site   01/16/2018 11:45 AM Ashwini Coreas DO [facility]       Signatures   Electronically signed by : Margarita Garcia DO; Dec  5 2017  1:04PM EST                       (Author)

## 2018-01-10 NOTE — PROGRESS NOTES
Assessment   1  Left shoulder pain (719 41) (M25 512)  2  Pain, upper back (724 5) (M54 9)  3  Rib pain (786 50) (R07 81)  4  Arm pain, left (729 5) (M79 602)  5  Benign essential hypertension (401 1) (I10)    Plan  Arm pain, left    · Follow-up visit in 1 week Evaluation and Treatment  Follow-up  Status: Hold For -  Scheduling  Requested for: 70BKS7676  Benign essential hypertension    · EKG/ECG- POC; Status:Active; Requested EDQ:29KRI5191;     Discussion/Summary    No heat  Reviewed activity  EKG was negative  Chief Complaint  c/o L arm, states it's still hurting  History of Present Illness  HPI: Throbbing burning pain came back last couple days  Same location  Discomfort started when got up one mornig  Denies any known trauma  No pain at rest, sitting, sleeping etc  Certain movements, tying shoe laces or raising arm causes pain  Tenderness reproduced with palpation of inferior part of deltoid  No worsening of discomfort with exertion  Denies chest pain or SOB  Sit: Rib #1 on left up, anterior, tender with decreased movement on palpation  Muscle energy to rib and upper thorax  Soft tissue to upper arm follow by cupping  Active Problems   1  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, unsp extrm (440 21) (I70 219)  2  Benign essential hypertension (401 1) (I10)  3  Carotid artery stenosis (433 10) (I65 29)  4  Cataract (366 9) (H26 9)  5  GERD without esophagitis (530 81) (K21 9)  6  Glaucoma screening (V80 1) (Z13 5)  7  Hypercholesterolemia (272 0) (E78 0)  8  Hypertriglyceridemia (272 1) (E78 1)  9  Left shoulder pain (719 41) (M25 512)  10  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)  11  Medicare annual wellness visit, initial (V70 0) (Z00 00)  12  Osteoporosis (733 00) (M81 0)  13  Screening for depression (V79 0) (Z13 89)  14  Screening for genitourinary condition (V81 6) (Z13 89)  15  Screening for neurological condition (V80 09) (Z13 89)  16   Visit for screening mammogram (V76 12) (Z12 31)  17  Wax in ear (380 4) (H61 20)    Past Medical History   1  History of Ankle swelling (719 07) (M25 473)  2  History of Anorexia (783 0) (R63 0)  3  History of Cough (786 2) (R05)  4  History of Distorted taste (781 1) (R43 2)  5  History of Encounter for screening mammogram for malignant neoplasm of breast   (V76 12) (Z12 31)  6  History of acute bronchitis (V12 69) (Z87 09)  7  History of constipation (V12 79) (Z87 19)  8  History of dehydration (V12 29) (Z86 39)  9  History of herpes zoster (V12 09) (Z86 19)  10  History of postmenopausal osteoporosis (V13 59) (Z87 39)  11  History of postmenopausal osteoporosis (V13 59) (Z87 39)  12  History of tendinitis (V13 59) (Z87 39)  13  History of upper respiratory infection (V12 09) (Z87 09)  14  History of urinary frequency (V13 09) (Z87 898)  15  History of urinary incontinence (V13 09) (Z87 898)  16  History of Malignant Lymphoma (V10 71)  17  History of Malignant Melanoma Of The Skin (V10 82)  18  History of Pain in ankle joint (719 47) (M25 579)  19  History of Pain of lower leg, unspecified laterality (729 5) (M79 669)  20  History of Pain of upper extremity, unspecified laterality (729 5) (M79 603)  21  History of Pelvic Fracture (808 8)  22  History of Pre-operative cardiovascular examination (V72 81) (Z01 810)  23  History of Urinary retention (788 20) (R33 9)  24  History of Urinary Tract Infection (V13 02)  25  History of Vaginal Hemorrhage (623 8)  26  History of Vitamin D deficiency (268 9) (E55 9)    Family History   1  Family history of myocardial infarction (V17 3) (Z82 49)   2  Family history of   3  Family history of congestive heart failure (V17 49) (Z82 49)   4  Family history of Thyroid Surgery    Social History    · Being A Social Drinker   · Denied: History of Drug Use   · Never A Smoker    Surgical History   1  History of Cataract Surgery  2  History of Hysterectomy  3  History of PTA Femoral-Popliteal  4   History of PTA Femoral-Popliteal Initial Stenosis    Current Meds  1  Aspirin 81 MG Oral Tablet; 1 TAB DAILY; Therapy: 79Dee5659 to (Last Raza Bucco)  Requested for: 88Uut1570 Ordered  2  Calcium TABS; Therapy: (Recorded:16Jan2014) to Recorded  3  Clopidogrel Bisulfate 75 MG Oral Tablet; Take 1 tablet daily; Therapy: 10IEB3897 to (21 234 )  Requested for: 86NVS6417; Last   Rx:13Oct2015 Ordered  4  Enalapril Maleate 10 MG Oral Tablet; Take 1 tablet daily; Therapy: 00YIZ6902 to (21 234 )  Requested for: 37TKT0156; Last   Rx:13Oct2015 Ordered  5  Famotidine 40 MG Oral Tablet; TAKE 1 TABLET DAILY; Therapy: 97QHE9625 to (Evaluate:00Dbo6756)  Requested for: 13Oct2015; Last   Rx:13Oct2015 Ordered  6  Magnesium 250 MG Oral Tablet; Therapy: 76ODI3824 to Recorded  7  Simvastatin 20 MG Oral Tablet; TAKE 1 TABLET DAILY  Requested for: 38FCU7097; Last   Rx:13Oct2015 Ordered    Allergies   1  Iodine Crystals  2  Sulfa Drugs   3  IVP Dye    Vitals   Recorded: 56WQU0004 12:35PM   Temperature 97 5 F   Heart Rate 83   Respiration 16   Systolic 245   Diastolic 78   Height 5 ft 3 5 in   Weight 145 lb    BMI Calculated 25 28   BSA Calculated 1 7   O2 Saturation 97     Physical Exam    Constitutional   General appearance: No acute distress, well appearing and well nourished  Eyes   Conjunctiva and lids: No swelling, erythema or discharge  Ears, Nose, Mouth, and Throat   External inspection of ears and nose: Normal     Musculoskeletal   Gait and station: Normal     Digits and nails: Normal without clubbing or cyanosis  Inspection/palpation of joints, bones, and muscles: Abnormal   See HPI  Skin   Skin and subcutaneous tissue: Normal without rashes or lesions  Neurologic   Sensation: No sensory loss      Psychiatric   Orientation to person, place, and time: Normal     Mood and affect: Normal          Future Appointments    Date/Time Provider Specialty Site   01/27/2016 11:45 AM DO Melisa Gonzalez [facility]   04/15/2016 09:45 AM Linda Whittington DO [facility]     Signatures   Electronically signed by : Piotr Brewster DO; Jan 20 2016  8:28AM EST                       (Author)

## 2018-01-12 VITALS
HEIGHT: 64 IN | BODY MASS INDEX: 26.29 KG/M2 | SYSTOLIC BLOOD PRESSURE: 171 MMHG | HEART RATE: 80 BPM | DIASTOLIC BLOOD PRESSURE: 83 MMHG | WEIGHT: 154 LBS

## 2018-01-12 NOTE — PROGRESS NOTES
Assessment    1  Arm pain, left (729 5) (M79 602)   2  Left shoulder pain (719 41) (M25 512)   3  Pain, upper back (724 5) (M54 9)   4  Rib pain (786 50) (R07 81)    Plan  Arm pain, left    · Follow Up if Not Better Evaluation and Treatment  Follow-up  Status: Complete  Done:  20SOM5327    Discussion/Summary    Discussed injury and overloading the musculoskeletal system  Could of been an event or fatigue etiology  Chief Complaint  1 wk f/u for arm  History of Present Illness  Much better today  Now remembers pushing a heavy cart past December down to the basement  Sit: Rib #1 feels equal  Tissue tight upper thorax and lower cervical to shoulders and deltoid on left    Abduction equal   Soft tissue to above  Remains without symptoms  Active Problems    1  Arm pain, left (729 5) (M79 602)   2  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, unsp extrm (440 21) (I70 219)   3  Benign essential hypertension (401 1) (I10)   4  Carotid artery stenosis (433 10) (I65 29)   5  Cataract (366 9) (H26 9)   6  GERD without esophagitis (530 81) (K21 9)   7  Glaucoma screening (V80 1) (Z13 5)   8  Hypercholesterolemia (272 0) (E78 0)   9  Hypertriglyceridemia (272 1) (E78 1)   10  Left shoulder pain (719 41) (M25 512)   11  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)   12  Medicare annual wellness visit, initial (V70 0) (Z00 00)   13  Osteoporosis (733 00) (M81 0)   14  Pain, upper back (724 5) (M54 9)   15  Rib pain (786 50) (R07 81)   16  Screening for depression (V79 0) (Z13 89)   17  Screening for genitourinary condition (V81 6) (Z13 89)   18  Screening for neurological condition (V80 09) (Z13 89)   19  Visit for screening mammogram (V76 12) (Z12 31)   20  Wax in ear (380 4) (H61 20)    Past Medical History    1  History of Abdominal pain, RLQ (right lower quadrant) (789 03) (R10 31)   2  History of Ankle swelling (719 07) (M25 473)   3  History of Anorexia (783 0) (R63 0)   4  History of Cough (786 2) (R05)   5  History of Distorted taste (781 1) (R43 2)   6  History of Encounter for screening mammogram for malignant neoplasm of breast   (V76 12) (Z12 31)   7  History of acute bronchitis (V12 69) (Z87 09)   8  History of constipation (V12 79) (Z87 19)   9  History of dehydration (V12 29) (Z86 39)   10  History of herpes zoster (V12 09) (Z86 19)   11  History of postmenopausal osteoporosis (V13 59) (Z87 39)   12  History of postmenopausal osteoporosis (V13 59) (Z87 39)   13  History of tendinitis (V13 59) (Z87 39)   14  History of upper respiratory infection (V12 09) (Z87 09)   15  History of urinary frequency (V13 09) (Z87 898)   16  History of urinary incontinence (V13 09) (Z87 898)   17  History of Malignant Lymphoma (V10 71)   18  History of Malignant Melanoma Of The Skin (V10 82)   19  History of Pain in ankle joint (719 47) (M25 579)   20  History of Pain of lower leg, unspecified laterality (729 5) (M79 669)   21  History of Pain of upper extremity, unspecified laterality (729 5) (M79 603)   22  History of Pelvic Fracture (808 8)   23  History of Pre-operative cardiovascular examination (V72 81) (Z01 810)   24  History of Urinary retention (788 20) (R33 9)   25  History of Urinary Tract Infection (V13 02)   26  History of Vaginal Hemorrhage (623 8)   27  History of Vitamin D deficiency (268 9) (E55 9)    Surgical History    1  History of Cataract Surgery   2  History of Hysterectomy   3  History of PTA Femoral-Popliteal   4  History of PTA Femoral-Popliteal Initial Stenosis    Family History    1  Family history of myocardial infarction (V17 3) (Z82 49)    2  Family history of    3  Family history of congestive heart failure (V17 49) (Z82 49)    4  Family history of Thyroid Surgery    Social History    · Being A Social Drinker   · Denied: History of Drug Use   · Never A Smoker    Current Meds   1  Aspirin 81 MG Oral Tablet; 1 TAB DAILY;    Therapy: 85Nal8322 to (Last Liss Telles)  Requested for: 78RAR2879 Ordered   2  Calcium TABS; Therapy: (Recorded:89Ggh9192) to Recorded   3  Clopidogrel Bisulfate 75 MG Oral Tablet; Take 1 tablet daily; Therapy: 77KUG0233 to (77 873 135)  Requested for: 56JCD3919; Last   Rx:13Oct2015 Ordered   4  Enalapril Maleate 10 MG Oral Tablet; Take 1 tablet daily; Therapy: 50PBS6517 to (57 873 135)  Requested for: 13SNC4494; Last   Rx:13Oct2015 Ordered   5  Famotidine 40 MG Oral Tablet; TAKE 1 TABLET DAILY; Therapy: 07TOJ1106 to (Evaluate:35Kmu6802)  Requested for: 13Oct2015; Last   Rx:13Oct2015 Ordered   6  Magnesium 250 MG Oral Tablet; Therapy: 06GON0399 to Recorded   7  Simvastatin 20 MG Oral Tablet; TAKE 1 TABLET DAILY  Requested for: 57HQY2633; Last   Rx:13Oct2015 Ordered    Allergies    1  Iodine Crystals   2  Sulfa Drugs    3  IVP Dye    Vitals  Vital Signs [Data Includes: Current Encounter]    Recorded: 95KCJ9301 10:05AM   Temperature 97 8 F   Heart Rate 82   Respiration 16   Systolic 775   Diastolic 78   Height 5 ft 3 5 in   Weight 146 lb    BMI Calculated 25 46   BSA Calculated 1 7     Physical Exam    Constitutional   General appearance: No acute distress, well appearing and well nourished  Eyes   Conjunctiva and lids: No swelling, erythema or discharge  Ears, Nose, Mouth, and Throat   External inspection of ears and nose: Normal     Pulmonary   Respiratory effort: No increased work of breathing or signs of respiratory distress  Musculoskeletal   Gait and station: Normal     Digits and nails: Normal without clubbing or cyanosis  Inspection/palpation of joints, bones, and muscles: Abnormal   See HPI  Skin   Skin and subcutaneous tissue: Normal without rashes or lesions  Neurologic   Sensation: No sensory loss  Psychiatric   Orientation to person, place, and time: Normal     Mood and affect: Normal          Health Management  Benign essential hypertension   (1) COMPREHENSIVE METABOLIC PANEL; every 1 year; Last 53RSH1400;  Next Due: 42MRI3250; Active  (1) LIPID PANEL, FASTING; every 1 year; Last 82HNC0660; Next Due: 14LMB8998; Active  EKG/ECG- POC; every 1 year; Last 21NZU6797; Next Due: 85JXB3970; Active  Cataract   *VB - Eye Exam; every 1 year; Last 26WOO9980; Next Due: 79KXI3795; Overdue  Glaucoma screening   *VB Glaucoma Screen; every 1 year; Last 64EMW8259; Next Due: 76MAN9708; Near Due  Hypercholesterolemia   (1) COMPREHENSIVE METABOLIC PANEL; every 1 year; Last 41ZGH7988; Next Due: 03FVW0780; Active  (1) LIPID PANEL, FASTING; every 1 year; Last 20AAX9517; Next Due: 57NNW7539; Active  Osteoporosis   Dexa Scan; every 2 years; Last 96PCF1366; Next Due: 82GPC4569; Active  Screening for depression   *VB - Fall Risk Assessment  (Dx V80 09 Screen for Neurologic Disorder); every 1 year; Last  51Uxb0573; Next Due: 59Ytj6297; Active  *VB-Depression Screening; every 1 year; Last 30Eri8034; Next Due: 09Jqt6052; Active  *VB-Urinary Incontinence Screen (Dx V81 6 Screen for UI); every 1 year; Last 46Smg1326; Next  Due: 89Dzk1004; Active  Screening for genitourinary condition   *VB - Fall Risk Assessment  (Dx V80 09 Screen for Neurologic Disorder); every 1 year; Last  94Pck5467; Next Due: 22Nja1785; Active  *VB-Depression Screening; every 1 year; Last 84Sfk2680; Next Due: 00Tjx6805; Active  *VB-Urinary Incontinence Screen (Dx V81 6 Screen for UI); every 1 year; Last 97Hxb2990; Next  Due: 42Wvf1814; Active  Screening for neurological condition   *VB - Fall Risk Assessment  (Dx V80 09 Screen for Neurologic Disorder); every 1 year; Last  38Xov0287; Next Due: 50Gdw5665; Active  *VB-Depression Screening; every 1 year; Last 59Evt6323; Next Due: 75Qje0531; Active  *VB-Urinary Incontinence Screen (Dx V81 6 Screen for UI); every 1 year; Last 95Pxi6754; Next  Due: 05Jnx2146; Active  Visit for screening mammogram   Digital Bilateral Screening Mammogram With CAD; every 1 year; Last 58ERW3598; Next Due:  84EBM5511;  Active  Health Maintenance   Medicare Annual Wellness Visit; every 1 year; Last 74Qrs5806; Next Due: 97QTB2297;  Overdue    Future Appointments    Date/Time Provider Specialty Site   04/15/2016 09:45 AM Sirena Hunt[facility] DO [facility]     Signatures   Electronically signed by : Lionel Guo DO; Feb  3 2016 11:24AM EST                       (Author)

## 2018-01-12 NOTE — MISCELLANEOUS
Message     Date: 14 Dec 2016 4:21 PM   Patient called to say she scratched a pimple on her arm and it will not stop bleeding  She said the nurse in the home she lives in is bandaging it up but patient would like to know if she can hold her plavix or aspirin for a couple days since they are blood thinners  Per Dr Essie Contreras verbal orders, patient should schedule to be seen to have arm checked  I called patient and told her Dr Essie Contreras suggestions and she scheduled for Friday 12/16/2016  VR/vfp        Active Problems    1  Arm pain, left (729 5) (M79 602)   2  Atherosclerosis 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 (440 21)   (I70 219)   3  Benign essential hypertension (401 1) (I10)   4  Borderline hypertension (796 2) (R03 0)   5  Breast cancer screening (V76 10) (Z12 39)   6  Carotid artery stenosis (433 10) (I65 29)   7  Cataract (366 9) (H26 9)   8  Dry skin (701 1) (L85 3)   9  Epistaxis (784 7) (R04 0)   10  GERD without esophagitis (530 81) (K21 9)   11  Glaucoma screening (V80 1) (Z13 5)   12  Hypercholesterolemia (272 0) (E78 00)   13  Hypertriglyceridemia (272 1) (E78 1)   14  Infection of toe (686 9) (L08 9)   15  Ingrown toenail (703 0) (L60 0)   16  Left shoulder pain (719 41) (M25 512)   17  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) (Z85 72)   18  Medicare annual wellness visit, initial (V70 0) (Z00 00)   19  Osteoporosis (733 00) (M81 0)   20  Pain, upper back (724 5) (M54 9)   21  Rib pain (786 50) (R07 81)   22  Screening for depression (V79 0) (Z13 89)   23  Screening for genitourinary condition (V81 6) (Z13 89)   24  Screening for neurological condition (V80 09) (Z13 89)   25  Subungual hematoma of toe, left, initial encounter (924 3) (S90 222A)   26  Visit for screening mammogram (V76 12) (Z12 31)   27  Vitamin D deficiency (268 9) (E55 9)   28  Wax in ear (380 4) (H61 20)    Current Meds   1  Aspirin 81 MG TABS; 1 TAB DAILY;    Therapy: 02Aug2011 to (Last Rx:02Aug2011)  Requested for: 70XJT5545 Ordered   2  Calcium TABS; Therapy: (Recorded:16Jan2014) to Recorded   3  Clopidogrel Bisulfate 75 MG Oral Tablet; Take 1 tablet daily; Therapy: 87FAT5186 to (([phone])  Requested for: 19IAI1342; Last   Rx:11Oct2016 Ordered   4  Enalapril Maleate 10 MG Oral Tablet; Take 1 tablet daily; Therapy: 94RES5443 to (Carie Abraham)  Requested for: 85HDE0396; Last   Rx:13Oct2015 Ordered   5  Famotidine 40 MG Oral Tablet; TAKE 1 TABLET DAILY; Therapy: 50RWO5887 to (Evaluate:49Rhm9844)  Requested for: 88ERA5199; Last   Rx:22Eip0223 Ordered   6  Magnesium 250 MG Oral Tablet; Therapy: 85JIL5348 to Recorded   7  Simvastatin 20 MG Oral Tablet; TAKE 1 TABLET DAILY  Requested for: 95ARM5177; Last   Rx:11Oct2016 Ordered    Allergies    1  Iodine Crystals   2  Sulfa Drugs    3   IVP Dye    Signatures   Electronically signed by : Liya Culp DO; Dec 14 2016  5:19PM EST                       (Author)

## 2018-01-13 VITALS
SYSTOLIC BLOOD PRESSURE: 132 MMHG | HEIGHT: 64 IN | BODY MASS INDEX: 25.44 KG/M2 | HEART RATE: 97 BPM | OXYGEN SATURATION: 96 % | DIASTOLIC BLOOD PRESSURE: 64 MMHG | WEIGHT: 149 LBS | TEMPERATURE: 98.2 F

## 2018-01-13 VITALS
BODY MASS INDEX: 25.1 KG/M2 | HEART RATE: 108 BPM | SYSTOLIC BLOOD PRESSURE: 165 MMHG | HEIGHT: 64 IN | DIASTOLIC BLOOD PRESSURE: 77 MMHG | WEIGHT: 147 LBS

## 2018-01-13 VITALS
TEMPERATURE: 97.9 F | DIASTOLIC BLOOD PRESSURE: 70 MMHG | HEART RATE: 79 BPM | SYSTOLIC BLOOD PRESSURE: 140 MMHG | HEIGHT: 64 IN | BODY MASS INDEX: 25.61 KG/M2 | WEIGHT: 150 LBS

## 2018-01-14 VITALS
SYSTOLIC BLOOD PRESSURE: 130 MMHG | DIASTOLIC BLOOD PRESSURE: 66 MMHG | WEIGHT: 150 LBS | HEIGHT: 64 IN | OXYGEN SATURATION: 96 % | RESPIRATION RATE: 16 BRPM | TEMPERATURE: 97.7 F | HEART RATE: 86 BPM | BODY MASS INDEX: 25.61 KG/M2

## 2018-01-14 VITALS
SYSTOLIC BLOOD PRESSURE: 159 MMHG | DIASTOLIC BLOOD PRESSURE: 81 MMHG | HEART RATE: 120 BPM | HEIGHT: 64 IN | WEIGHT: 150 LBS | BODY MASS INDEX: 25.61 KG/M2

## 2018-01-16 ENCOUNTER — LAB CONVERSION - ENCOUNTER (OUTPATIENT)
Dept: OTHER | Facility: OTHER | Age: 83
End: 2018-01-16

## 2018-01-16 ENCOUNTER — ALLSCRIPTS OFFICE VISIT (OUTPATIENT)
Dept: OTHER | Facility: OTHER | Age: 83
End: 2018-01-16

## 2018-01-16 LAB
DEPRECATED RDW RBC AUTO: 16.5 % (ref 11–15)
FERRITIN SERPL-MCNC: 8 NG/ML (ref 20–288)
HCT VFR BLD AUTO: 36.2 % (ref 35–45)
HGB BLD-MCNC: 10.7 G/DL (ref 11.7–15.5)
MCH RBC QN AUTO: 22.4 PG (ref 27–33)
MCHC RBC AUTO-ENTMCNC: 29.6 G/DL (ref 32–36)
MCV RBC AUTO: 75.7 FL (ref 80–100)
PLATELET # BLD AUTO: 399 THOUSAND/UL (ref 140–400)
PMV BLD AUTO: 11.7 FL (ref 7.5–12.5)
RBC # BLD AUTO: 4.78 MILLION/UL (ref 3.8–5.1)
WBC # BLD AUTO: 5.5 THOUSAND/UL (ref 3.8–10.8)

## 2018-01-17 NOTE — PROGRESS NOTES
Assessment   1  Iron deficiency anemia (280 9) (D50 9)   2  Benign essential hypertension (401 1) (I10)   3  Atherosclerosis 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 (440 21)     (I70 219)   4  GERD without esophagitis (530 81) (K21 9)    Plan   Iron deficiency anemia    · (1) CBC/ PLT (NO DIFF); Status:Active; Requested BFK:[id number];    · Follow-up visit in 3 months Evaluation and Treatment  Follow-up  Status: Hold For -    Scheduling  Requested for: 22MWT6903    Discussion/Summary      Start Fe supplement daily  Will turn stool black  If constipated go to QOD and can add a stool softener  nutrition, fruits, vegetables, fiber, stool softeners  need to get and OTC Fe supplement  Possible side effects of new medications were reviewed with the patient/guardian today  The treatment plan was reviewed with the patient/guardian  The patient/guardian understands and agrees with the treatment plan       Self Referrals: No      Chief Complaint   pt here for 6 week follow up to review Fit test and Review BW      History of Present Illness   Follow up anemia, new onset  Review labs, H/H stable, Ferritin low  Suspect a leak somewhere in GI tract  On Plavix and Aspirin for PVD  Recent started on PPI in place of H2 blocker, Famotidine  appt with Vascular early February  feels fine  start an Fe OTC supplement  any claudication with walking  now will cut dosing to QOD for each Aspirin and Plavix, eg: Aspirin - MWFS, Plavix - T,Th, Saturday  Any claudication symptoms call or RTO  Review of Systems        Constitutional: No fever, no chills, feels well, no tiredness, no recent weight gain or weight loss  Eyes: No complaints of eye pain, no red eyes, no eyesight problems, no discharge, no dry eyes, no itching of eyes  ENT: no complaints of earache, no loss of hearing, no nose bleeds, no nasal discharge, no sore throat, no hoarseness        Cardiovascular: No complaints of slow heart rate, no fast heart rate, no chest pain, no palpitations, no leg claudication, no lower extremity edema  Respiratory: No complaints of shortness of breath, no wheezing, no cough, no SOB on exertion, no orthopnea, no PND  Gastrointestinal: No complaints of abdominal pain, no constipation, no nausea or vomiting, no diarrhea, no bloody stools  Genitourinary: No complaints of dysuria, no incontinence, no pelvic pain, no dysmenorrhea, no vaginal discharge or bleeding  Musculoskeletal: No complaints of arthralgias, no myalgias, no joint swelling or stiffness, no limb pain or swelling  Integumentary: No complaints of skin rash or lesions, no itching, no skin wounds, no breast pain or lump  Neurological: No complaints of headache, no confusion, no convulsions, no numbness, no dizziness or fainting, no tingling, no limb weakness, no difficulty walking  Psychiatric: Not suicidal, no sleep disturbance, no anxiety or depression, no change in personality, no emotional problems  Endocrine: No complaints of proptosis, no hot flashes, no muscle weakness, no deepening of the voice, no feelings of weakness  Hematologic/Lymphatic: No complaints of swollen glands, no swollen glands in the neck, does not bleed easily, does not bruise easily  Active Problems   1  Abrasion (919 0) (T14 8XXA)   2  Advance directive on file (V49 89) (Z78 9)   3  Aftercare following other surgery of musculoskeletal system (V58 78) (Z47 89)   4  Arm pain, left (729 5) (M79 602)   5  Atherosclerosis 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 (440 21)     (I70 219)   6  Benign essential hypertension (401 1) (I10)   7  Borderline hypertension (796 2) (R03 0)   8  Breast cancer screening (V76 10) (Z12 39)   9  Carotid artery stenosis (433 10) (I65 29)   10  Cataract (366 9) (H26 9)   11  Dry skin (701 1) (L85 3)   12  Epistaxis (784 7) (R04 0)   13  GERD without esophagitis (530 81) (K21 9)   14   Glaucoma screening (V80 1) (Z13 5)   15  Hypercholesterolemia (272 0) (E78 00)   16  Hypertriglyceridemia (272 1) (E78 1)   17  Infection of toe (686 9) (L08 9)   18  Ingrown toenail (703 0) (L60 0)   19  Left shoulder pain (719 41) (M25 512)   20  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)   21  Medicare annual wellness visit, initial (V70 0) (Z00 00)   22  Medicare annual wellness visit, subsequent (V70 0) (Z00 00)   23  Microcytic anemia (280 9) (D50 9)   24  Need for TD vaccine (V06 5) (Z23)   25  Osteoporosis (733 00) (M81 0)   26  Other closed extra-articular fracture of distal end of left radius with routine healing,      subsequent encounter (V54 12) (S52 552D)   27  Pain, upper back (724 5) (M54 9)   28  Preoperative clearance (V72 84) (Z01 818)   29  Pseudophakia of right eye (V43 1) (Z96 1)   30  Rib pain (786 50) (R07 81)   31  Screening for depression (V79 0) (Z13 89)   32  Screening for genitourinary condition (V81 6) (Z13 89)   33  Screening for neurological condition (V80 09) (Z13 89)   34  Skin lesion of scalp (709 9) (L98 9)   35  Subungual hematoma of toe, left, initial encounter (924 3) (S90 222A)   36  Visit for screening mammogram (V76 12) (Z12 31)   37  Vitamin D deficiency (268 9) (E55 9)   38  Wax in ear (380 4) (H61 20)   39  Wears seat belt (V49 89) (Z78 9)   40  Wrist fracture, left (814 00) (S62 102A)    Past Medical History   1  History of Abdominal pain, RLQ (right lower quadrant) (789 03) (R10 31)   2  History of Ankle swelling (719 07) (M25 473)   3  History of Anorexia (783 0) (R63 0)   4  History of Cough (786 2) (R05)   5  History of Distorted taste (781 1) (R43 2)   6  History of Encounter for screening mammogram for malignant neoplasm of breast     (V76 12) (Z12 31)   7  History of acute bronchitis (V12 69) (Z87 09)   8  History of constipation (V12 79) (Z87 19)   9  History of dehydration (V12 29) (Z86 39)   10  History of herpes zoster (V12 09) (Z86 19)   11   History of postmenopausal osteoporosis (V13 59) (Z87 39)   12  History of postmenopausal osteoporosis (V13 59) (Z87 39)   13  History of tendinitis (V13 59) (Z87 39)   14  History of upper respiratory infection (V12 09) (Z87 09)   15  History of urinary frequency (V13 09) (Z87 898)   16  History of urinary incontinence (V13 09) (Z87 898)   17  History of Lymphoma (V10 71)   18  History of Malignant Melanoma Of The Skin (V10 82)   19  History of Pain in ankle joint (719 47) (M25 579)   20  History of Pain of lower leg, unspecified laterality (729 5) (M79 669)   21  History of Pain of upper extremity, unspecified laterality (729 5) (M79 603)   22  History of Pelvic Fracture (808 8)   23  History of Pre-operative cardiovascular examination (V72 81) (Z01 810)   24  History of Urinary retention (788 20) (R33 9)   25  History of Urinary Tract Infection (V13 02)   26  History of Vaginal Hemorrhage (623 8)    Surgical History   1  History of Breast Surgery Enlargement Procedure   2  History of Cataract Surgery   3  History of Hysterectomy   4  History of PTA Femoral-Popliteal   5  History of PTA Femoral-Popliteal Initial Stenosis    Family History   Mother    1  Family history of myocardial infarction (V17 3) (Z82 49)  Father    2  Family history of    3  Family history of congestive heart failure (V17 49) (Z82 49)  Sister    4  Family history of Thyroid Surgery    Social History    · Advance directive on file (X60 76) (Z73 8)   · Being A Social Drinker   · Denied: History of Drug Use   · Never A Smoker    Current Meds    1  Aspirin 81 MG TABS; 1 TAB DAILY; Therapy: 23Cxe0437 to (Last Aida Sake)  Requested for: 50Ato6414 Ordered   2  Calcium TABS; Therapy: (Recorded:2014) to Recorded   3  Clopidogrel Bisulfate 75 MG Oral Tablet; Take 1 tablet daily; Therapy: 03JPO4332 to (Evaluate:2018)  Requested for: 94Rbj0646; Last     Rx:41Eot4754 Ordered   4  Enalapril Maleate 10 MG Oral Tablet; Take 1 tablet daily;      Therapy: 41FGB1015 to (Evaluate:03Jun2018)  Requested for: 21XWB7250; Last     Rx:06Fxo2552 Ordered   5  Esomeprazole Magnesium 40 MG Oral Capsule Delayed Release; take 1 capsule daily; Therapy: 14YPA6697 to (Samuel Andino)  Requested for: 36LGX1984; Last     Rx:21Vms7549 Ordered   6  Ferralet 90 90-1 MG Oral Tablet; Take 1 tablet daily; Therapy: 38XDF5154 to (Evaluate:75Cgz1245) Recorded   7  Magnesium 250 MG Oral Tablet; Therapy: 26CWR9949 to Recorded   8  Simvastatin 20 MG Oral Tablet; TAKE 1 TABLET DAILY  Requested for: 47SBL4664; Last     Rx:58Upl8426 Ordered   9  Vitamin D3 400 UNIT Oral Capsule; Therapy: 60ZVR9487 to Recorded     The medication list was reviewed and updated today  Allergies   1  Iodine Crystals   2  Sulfa Drugs  3  IVP Dye    Vitals   Vital Signs    Recorded: 03HZR3274 11:52AM   Temperature 98 F, Oral   Heart Rate 991   Systolic 443, LUE   Diastolic 80, LUE   Height 5 ft 3 5 in   Weight 151 lb    BMI Calculated 26 33   BSA Calculated 1 73   O2 Saturation 96     Physical Exam        Constitutional      General appearance: No acute distress, well appearing and well nourished  Eyes      Conjunctiva and lids: No swelling, erythema or discharge  -- good color  Pupils and irises: Equal, round and reactive to light  Ears, Nose, Mouth, and Throat      External inspection of ears and nose: Normal        Otoscopic examination: Tympanic membranes translucent with normal light reflex  Canals patent without erythema  Nasal mucosa, septum, and turbinates: Normal without edema or erythema  Oropharynx: Normal with no erythema, edema, exudate or lesions  Pulmonary      Respiratory effort: No increased work of breathing or signs of respiratory distress  Auscultation of lungs: Clear to auscultation  Cardiovascular      Auscultation of heart: Normal rate and rhythm, normal S1 and S2, without murmurs         Examination of extremities for edema and/or varicosities: Normal        Lymphatic      Palpation of lymph nodes in neck: No lymphadenopathy  Musculoskeletal      Gait and station: Normal        Digits and nails: Normal without clubbing or cyanosis  Neurologic      Sensation: No sensory loss  Psychiatric      Orientation to person, place, and time: Normal        Mood and affect: Normal           Results/Data   (Q) CBC (H/H, RBC, INDICES, WBC, PLT) 19MSS3812 10:24AM Ioana Nam      Test Name Result Flag Reference   WHITE BLOOD CELL COUNT 5 5 Thousand/uL  3 8-10 8   RED BLOOD CELL COUNT 4 78 Million/uL  3 80-5 10   HEMOGLOBIN 10 7 g/dL L 11 7-15 5   HEMATOCRIT 36 2 %  35 0-45 0   MCV 75 7 fL L 80 0-100 0   MCH 22 4 pg L 27 0-33 0   MCHC 29 6 g/dL L 32 0-36 0   RDW 16 5 % H 11 0-15 0   PLATELET COUNT 848 Thousand/uL  140-400   MPV 11 7 fL  7 5-12 5      (1) OP MARY FERRITIN 02JIH1846 10:24AM Ioana Nam   REPORT COMMENT:     COLLECTION KIT GIVEN TO PATIENT  PATIENT ADVISED TO RETURN  Test Name Result Flag Reference   FERRITIN 8 ng/mL L       Health Management   Benign essential hypertension   (1) COMPREHENSIVE METABOLIC PANEL; every 1 year; Last 75UVB3072; Next Due: 06KIL6124; Overdue  (1) LIPID PANEL, FASTING; every 1 year; Last 50JZE2295; Next Due: 29Nov2018; Active  EKG/ECG- POC; every 1 year; Last 08PFD4112; Next Due: 55GNC1156; Overdue  Breast cancer screening   * MAMMO SCREENING BILATERAL W CAD; every 1 year; Last 16WTZ5060; Next Due: 65OIU9624; Overdue  Cataract   *VB - Eye Exam; every 1 year; Last 97AGI0909; Next Due: 54HUV6890; Overdue  Glaucoma screening   *VB - Glaucoma Screen; every 1 year; Last 92FAD9932; Next Due: 96MWQ7975; Overdue  Hypercholesterolemia   (1) COMPREHENSIVE METABOLIC PANEL; every 1 year; Last 55DGD7099; Next Due: 53INT0576; Overdue  (1) LIPID PANEL, FASTING; every 1 year; Last 67IYV6069; Next Due: 60Ebe4854; Active  Osteoporosis   Dexa Scan; every 2 years; Last 58CZD2087;  Next Due: 53ERX0091; Overdue  Screening for depression   *VB - Fall Risk Assessment  (Dx Z13 89 Screen for Neurologic Disorder); every 1 year; Last    14Apr2015; Next Due: 77SOE8454; Overdue  *VB - Urinary Incontinence Screen (Dx Z13 89 Screen for UI); every 1 year; Last 22Aug2017; Next    Due: 30Mqu1232; Active  *VB-Depression Screening; every 1 year; Last 14Apr2015; Next Due: 41Doq6079; Active  Screening for genitourinary condition   *VB - Fall Risk Assessment  (Dx Z13 89 Screen for Neurologic Disorder); every 1 year; Last    14Apr2015; Next Due: 31HCB4999; Overdue  *VB - Urinary Incontinence Screen (Dx Z13 89 Screen for UI); every 1 year; Last 22Aug2017; Next    Due: 69Wrl6457; Active  *VB-Depression Screening; every 1 year; Last 14Apr2015; Next Due: 38Krz3443; Active  Screening for neurological condition   *VB - Fall Risk Assessment  (Dx Z13 89 Screen for Neurologic Disorder); every 1 year; Last    14Apr2015; Next Due: 81WMN8926; Overdue  *VB - Urinary Incontinence Screen (Dx Z13 89 Screen for UI); every 1 year; Last 22Aug2017; Next    Due: 50Tfm1921; Active  *VB-Depression Screening; every 1 year; Last 14Apr2015; Next Due: 09Mvz7022; Active  Visit for screening mammogram   Digital Bilateral Screening Mammogram With CAD; every 1 year; Last 01ILQ8618; Next Due:    08CCT3553; Overdue  Health Maintenance   Medicare Annual Wellness Visit; every 1 year; Last 35Zjh8352; Next Due: 90FAP3434;  Overdue    Future Appointments      Date/Time Provider Specialty Site   02/01/2018 11:15 AM Doctor, Reynaldo Ying MD Vascular Surgery [address] [place] [address]     Signatures    Electronically signed by : Yolanda Gillespie DO; Jan 16 2018  1:47PM EST                       (Author)

## 2018-01-22 VITALS
TEMPERATURE: 97.9 F | WEIGHT: 152 LBS | SYSTOLIC BLOOD PRESSURE: 148 MMHG | DIASTOLIC BLOOD PRESSURE: 72 MMHG | HEIGHT: 64 IN | BODY MASS INDEX: 25.95 KG/M2 | HEART RATE: 82 BPM | OXYGEN SATURATION: 96 %

## 2018-01-23 VITALS
BODY MASS INDEX: 25.78 KG/M2 | DIASTOLIC BLOOD PRESSURE: 80 MMHG | SYSTOLIC BLOOD PRESSURE: 146 MMHG | HEIGHT: 64 IN | WEIGHT: 151 LBS | HEART RATE: 107 BPM | TEMPERATURE: 98 F | OXYGEN SATURATION: 96 %

## 2018-02-01 ENCOUNTER — OFFICE VISIT (OUTPATIENT)
Dept: VASCULAR SURGERY | Facility: CLINIC | Age: 83
End: 2018-02-01
Payer: COMMERCIAL

## 2018-02-01 VITALS
DIASTOLIC BLOOD PRESSURE: 90 MMHG | BODY MASS INDEX: 25.78 KG/M2 | SYSTOLIC BLOOD PRESSURE: 160 MMHG | WEIGHT: 151 LBS | HEART RATE: 80 BPM | RESPIRATION RATE: 16 BRPM | HEIGHT: 64 IN

## 2018-02-01 DIAGNOSIS — I65.23 ASYMPTOMATIC BILATERAL CAROTID ARTERY STENOSIS: ICD-10-CM

## 2018-02-01 DIAGNOSIS — I10 BENIGN ESSENTIAL HYPERTENSION: ICD-10-CM

## 2018-02-01 DIAGNOSIS — I65.23 ASYMPTOMATIC CAROTID ARTERY STENOSIS, BILATERAL: ICD-10-CM

## 2018-02-01 DIAGNOSIS — E78.00 HYPERCHOLESTEROLEMIA: ICD-10-CM

## 2018-02-01 DIAGNOSIS — I70.213 ATHEROSCLEROSIS OF NATIVE ARTERY OF BOTH LOWER EXTREMITIES WITH INTERMITTENT CLAUDICATION (HCC): Primary | ICD-10-CM

## 2018-02-01 PROBLEM — D50.9 IRON DEFICIENCY ANEMIA: Status: ACTIVE | Noted: 2018-01-16

## 2018-02-01 PROBLEM — D50.9 MICROCYTIC ANEMIA: Status: ACTIVE | Noted: 2017-12-05

## 2018-02-01 PROBLEM — S62.102A WRIST FRACTURE, LEFT: Status: ACTIVE | Noted: 2017-01-04

## 2018-02-01 PROBLEM — S62.102A WRIST FRACTURE, LEFT: Status: RESOLVED | Noted: 2017-01-04 | Resolved: 2018-02-01

## 2018-02-01 PROCEDURE — 99214 OFFICE O/P EST MOD 30 MIN: CPT | Performed by: SURGERY

## 2018-02-01 RX ORDER — BIMATOPROST 0.01 %
1 DROPS OPHTHALMIC (EYE)
COMMUNITY
Start: 2018-01-09 | End: 2020-11-16

## 2018-02-01 RX ORDER — SIMVASTATIN 20 MG
1 TABLET ORAL DAILY
COMMUNITY
End: 2018-06-18 | Stop reason: SDUPTHER

## 2018-02-01 RX ORDER — CLOPIDOGREL BISULFATE 75 MG/1
1 TABLET ORAL DAILY
COMMUNITY
Start: 2014-10-28 | End: 2018-02-01 | Stop reason: HOSPADM

## 2018-02-01 RX ORDER — IRON, FOLIC ACID, CYANOCOBALAMIN, ASCORBIC ACID, AND DOCUSATE SODIUM 90; 1; 12; 120; 50 MG/1; MG/1; UG/1; MG/1; MG/1
1 TABLET, FILM COATED ORAL DAILY
COMMUNITY
Start: 2018-01-16 | End: 2018-08-21

## 2018-02-01 RX ORDER — FLUTICASONE PROPIONATE 50 MCG
SPRAY, SUSPENSION (ML) NASAL
COMMUNITY
Start: 2017-12-23 | End: 2020-11-16

## 2018-02-01 RX ORDER — FAMOTIDINE 40 MG/1
1 TABLET, FILM COATED ORAL DAILY
COMMUNITY
Start: 2013-08-09 | End: 2018-07-02 | Stop reason: SDUPTHER

## 2018-02-01 RX ORDER — ENALAPRIL MALEATE 10 MG/1
1 TABLET ORAL DAILY
COMMUNITY
Start: 2011-11-16 | End: 2018-08-17 | Stop reason: SDUPTHER

## 2018-02-01 NOTE — PROGRESS NOTES
Assessment/Plan:     Problem List Items Addressed This Visit        Cardiovascular and Mediastinum    Atherosclerosis 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 ([facility] [address] ) - Primary     -Hx L SFA stent (2012), mild-moderate in-stent stenosis on CATINA 11/2016, no recent imaging    -Will check CATINA  Denies limiting claudication/rest pain  -No hx of cardiac stents or CVA  Patient instructed to stop Plavix  Take aspirin 81mg daily   -Continue statin  -If claudication symptoms remain unchanged will plan for annual CATINA  -Remain physically active with regular walking  -f/u in 1 year         Relevant Orders    VAS lower limb arterial duplex, complete bilateral                Asymptomatic bilateral carotid artery stenosis     -Stop Plavix, ASA 81mg daily  Continue statin therapy  -Blood pressure within normal limits  -No recent imaging will check CV duplex now  -Routine monitoring with annual CV duplex  -f/u in 1 year                              Other Visit Diagnoses     Asymptomatic carotid artery stenosis, bilateral        Relevant Orders    VAS carotid complete study   Benign essential hypertension   Relevant Medications   enalapril (VASOTEC) 10 mg tablet  Other   Hypercholesterolemia   Relevant Medications   simvastatin (ZOCOR) 20 mg tablet               Subjective: "My doctor told me to come here "     Patient ID: Nikolai Hines is a 80 y o  female  HPI   Patient has hx of PAD w/ L SFA stent and SIMÓN, no recent testing  CV duplex and CATINA done 5/2016  She returns to the office today for recheck at urging of her PCP, but is unsure why as there has been no change in her symptoms  PCP note from Dec 2017 reviewed with note of anemia, referred back to vascular office to discuss discontinuation of aspirin or Plavix  She denies any lifestyle limiting pain to the legs with ambulation  Denies any rest pain  She lives at [facility] and remains active, participating in planned activities    She denies any TIA/CVA symptoms, no amaurosis fugax, lateralizing weakness or speech disturbances  She has no hx of coronary stents or CVA; at her prior OV in 2016 it was advised that she discuss need for Plavix with her PCP, as not necessary from vascular standpoint  She reports that in December 2017 her PCP advised her to begin alternating aspirin and Plavix, as opposed to taking them both daily  Patient Active Problem List   Diagnosis    Atherosclerosis 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 (HCC)    Benign essential hypertension    Asymptomatic bilateral carotid artery stenosis    Cataract    GERD without esophagitis    Hypercholesterolemia    Hypertriglyceridemia    Iron deficiency anemia    Lymphoma in remission ([facility] [address] )    Microcytic anemia    Osteoporosis    Vitamin D deficiency       Past Surgical History:   Procedure Laterality Date    HI OPEN RX DISTAL RADIUS FX, INTRA-ARTICULAR, 3+ FRAG Left 1/18/2017    Procedure: DISTAL RADIUS OPEN REDUCTION W/ INTERNAL FIXATION ;  Surgeon: Brielle Saavedra MD;  Location:  MAIN OR;  Service: Orthopedics       No family history on file  Social History     Social History    Marital status:      Spouse name: N/A    Number of children: N/A    Years of education: N/A     Occupational History    Not on file       Social History Main Topics    Smoking status: Never Smoker    Smokeless tobacco: Never Used    Alcohol use No    Drug use: No    Sexual activity: Not on file     Other Topics Concern    Not on file     Social History Narrative    No narrative on file       Allergies   Allergen Reactions    Contrast  [Iodinated Diagnostic Agents]      Other reaction(s): "I get very cold"    Iodine      Other reaction(s): Chills  Reaction Date: 02Aug2011;     Omnipaque [Iohexol]     Sulfa Antibiotics Vomiting         Current Outpatient Prescriptions:     aspirin 81 MG tablet, Take 1 tablet by mouth daily, Disp: , Rfl:    calcium-vitamin D 250-100 MG-UNIT per tablet, Take 1 tablet by mouth daily, Disp: , Rfl:     Cholecalciferol (VITAMIN D3) 3000 UNITS TABS, Take by mouth, Disp: , Rfl:     clopidogrel (PLAVIX) 75 mg tablet, Take 1 tablet by mouth daily, Disp: , Rfl:     enalapril (VASOTEC) 10 mg tablet, Take 1 tablet by mouth daily, Disp: , Rfl:     famotidine (PEPCID) 40 MG tablet, Take 1 tablet by mouth daily, Disp: , Rfl:     Fe Cbn-Fe Gluc-FA-B12-C-DSS (FERRALET 90) 90-1 MG TABS, Take 1 tablet by mouth daily, Disp: , Rfl:     fluticasone (FLONASE) 50 mcg/act nasal spray, , Disp: , Rfl:     LUMIGAN 0 01 % ophthalmic drops, , Disp: , Rfl:     Magnesium Oxide 250 MG TABS, Take 1 tablet by mouth daily, Disp: , Rfl:     simvastatin (ZOCOR) 20 mg tablet, Take 20 mg by mouth daily at bedtime, Disp: , Rfl:     aspirin 81 MG tablet, Take 81 mg by mouth daily, Disp: , Rfl:     clopidogrel (PLAVIX) 75 mg tablet, Take 75 mg by mouth daily, Disp: , Rfl:     enalapril (VASOTEC) 10 mg tablet, Take 10 mg by mouth daily, Disp: , Rfl:     famotidine (PEPCID) 40 MG tablet, Take 40 mg by mouth daily, Disp: , Rfl:     naproxen (NAPROSYN) 500 mg tablet, Take 1 tablet by mouth 2 (two) times a day with meals for 7 days, Disp: 14 tablet, Rfl: 0    simvastatin (ZOCOR) 20 mg tablet, Take 1 tablet by mouth daily, Disp: , Rfl:     The following portions of the patient's history were reviewed and updated as appropriate: allergies, current medications, past family history, past medical history, past social history, past surgical history and problem list     Review of Systems   Constitutional: Negative for chills, fatigue and fever  HENT: Negative for hearing loss  Eyes: Negative for pain  Cardiovascular: Negative for chest pain and leg swelling  Gastrointestinal: Negative for diarrhea, nausea and vomiting  Musculoskeletal: Negative for back pain and joint swelling     Neurological: Negative for dizziness, syncope, facial asymmetry, speech difficulty, weakness, light-headedness and headaches  All other systems reviewed and are negative  Objective:     Physical Exam     Physical Exam   Constitutional: Appears well-developed and well-nourished  HENT:   Head: Normocephalic and atraumatic  Eyes: Conjunctivae are normal    Neck: Neck supple  No carotid bruit heard  Cardiovascular:   RRR, S1, S2 normal, no murmur appreciated  L ankle +1 edema, no edema RLE  Pulmonary/Chest: Effort normal    Abdominal: Soft, non-tender, no masses  Neurological: A&Ox3  Skin: Skin is warm and dry  Psychiatric: She has a normal mood and affect       Pulse exam:  Femoral: Right: 2+ Left: 2+  Popliteal: Right: 1+ Left: non-palpable  DP: Right: non-palpable Left: non-palpable  PT: Right: non-palpable Left: non-palpable    GEORGIA Kenyon  2/1/2018  [facility]: [phone]

## 2018-02-01 NOTE — ASSESSMENT & PLAN NOTE
-Stop Plavix, ASA 81mg daily    Continue statin therapy  -Blood pressure within normal limits  -No recent imaging will check CV duplex now  -Routine monitoring with annual CV duplex  -f/u in 1 year

## 2018-02-01 NOTE — PATIENT INSTRUCTIONS
1  Peripheral arterial disease with history of left leg stent  -We will check an arterial study of your legs   -Stop Plavix, take a baby aspirin daily  -Remain physically active and walk regularly  -If you develop pain to the legs notify the office, otherwise we will plan for a repeat arterial study of your legs in 1 year and see you back in the office at that time    2   Carotid artery stenosis  -CV duplex now  -Continue aspirin and statin  -Followup in 1 year

## 2018-02-01 NOTE — ASSESSMENT & PLAN NOTE
-Hx L SFA stent (2012), mild-moderate in-stent stenosis on CATINA 11/2016, no recent imaging    -Will check CATINA  Denies limiting claudication/rest pain  -No hx of cardiac stents or CVA  Patient instructed to stop Plavix    Take aspirin 81mg daily  -Continue statin  -If claudication symptoms remain unchanged will plan for annual CATINA  -Remain physically active with regular walking  -f/u in 1 year

## 2018-02-06 ENCOUNTER — HOSPITAL ENCOUNTER (OUTPATIENT)
Dept: NON INVASIVE DIAGNOSTICS | Facility: CLINIC | Age: 83
Discharge: HOME | End: 2018-02-06
Payer: COMMERCIAL

## 2018-02-06 DIAGNOSIS — I70.213 ATHEROSCLEROSIS OF NATIVE ARTERY OF BOTH LOWER EXTREMITIES WITH INTERMITTENT CLAUDICATION (HCC): ICD-10-CM

## 2018-02-06 DIAGNOSIS — I65.23 ASYMPTOMATIC CAROTID ARTERY STENOSIS, BILATERAL: ICD-10-CM

## 2018-02-06 PROCEDURE — 93925 LOWER EXTREMITY STUDY: CPT | Performed by: SURGERY

## 2018-02-06 PROCEDURE — 93923 UPR/LXTR ART STDY 3+ LVLS: CPT

## 2018-02-06 PROCEDURE — 93880 EXTRACRANIAL BILAT STUDY: CPT | Performed by: SURGERY

## 2018-02-06 PROCEDURE — 93925 LOWER EXTREMITY STUDY: CPT

## 2018-02-06 PROCEDURE — 93922 UPR/L XTREMITY ART 2 LEVELS: CPT | Performed by: SURGERY

## 2018-02-06 PROCEDURE — 93880 EXTRACRANIAL BILAT STUDY: CPT

## 2018-04-17 ENCOUNTER — OFFICE VISIT (OUTPATIENT)
Dept: FAMILY MEDICINE CLINIC | Facility: CLINIC | Age: 83
End: 2018-04-17
Payer: COMMERCIAL

## 2018-04-17 VITALS
TEMPERATURE: 98 F | OXYGEN SATURATION: 97 % | SYSTOLIC BLOOD PRESSURE: 132 MMHG | HEART RATE: 92 BPM | HEIGHT: 64 IN | BODY MASS INDEX: 26.5 KG/M2 | DIASTOLIC BLOOD PRESSURE: 76 MMHG | WEIGHT: 155.2 LBS

## 2018-04-17 DIAGNOSIS — I10 ESSENTIAL HYPERTENSION: ICD-10-CM

## 2018-04-17 DIAGNOSIS — D50.0 IRON DEFICIENCY ANEMIA DUE TO CHRONIC BLOOD LOSS: Primary | ICD-10-CM

## 2018-04-17 DIAGNOSIS — E78.00 HYPERCHOLESTEREMIA: ICD-10-CM

## 2018-04-17 DIAGNOSIS — I70.219 ATHEROSCLEROSIS OF NATIVE ARTERY OF EXTREMITY WITH INTERMITTENT CLAUDICATION, UNSPECIFIED EXTREMITY (HCC): ICD-10-CM

## 2018-04-17 PROCEDURE — 99214 OFFICE O/P EST MOD 30 MIN: CPT | Performed by: FAMILY MEDICINE

## 2018-04-17 NOTE — PROGRESS NOTES
Assessment/Plan: patient here today for follow up for hypertensions     No problem-specific Assessment & Plan notes found for this encounter  1  Iron deficiency anemia due to chronic blood loss  CBC and Platelet    Ferritin    Basic metabolic panel   2  Atherosclerosis 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, unspecified extremity ([address] )     3  Essential hypertension     4  Hypercholesteremia            There are no diagnoses linked to this encounter  Subjective:      Patient ID: Juju Galan is a 80 y o  female  Follow up  No complaints  Legs feeling good  Continues with alternating Aspirin and Plavix  Easy bruising of skin has not been occurring  Has appointment with eye Dr next week  Legs feel good with present activity level, no changes  The following portions of the patient's history were reviewed and updated as appropriate: allergies, current medications, past family history, past medical history, past social history, past surgical history and problem list     Review of Systems   Constitutional: Negative  HENT: Negative  Respiratory: Negative  Cardiovascular: Negative  Gastrointestinal: Negative  Genitourinary: Negative  Musculoskeletal: Positive for arthralgias  Skin: Negative  Neurological: Negative  Psychiatric/Behavioral: Negative  Objective:      /76 (BP Location: Left arm, Patient Position: Sitting, Cuff Size: Standard)   Pulse 92   Temp 98 °F (36 7 °C) (Oral)   Ht 5' 4" (1 626 m)   Wt 70 4 kg (155 lb 3 2 oz)   LMP  (LMP Unknown)   SpO2 97%   BMI 26 64 kg/m²          Physical Exam   Constitutional: She is oriented to person, place, and time  She appears well-developed and well-nourished  HENT:   Head: Normocephalic and atraumatic  Right Ear: External ear normal    Left Ear: External ear normal    Eyes: Conjunctivae are normal    Cardiovascular: Normal rate, regular rhythm and normal heart sounds  Pulmonary/Chest: Effort normal and breath sounds normal    Neurological: She is alert and oriented to person, place, and time  Skin: Skin is warm and dry  Psychiatric: She has a normal mood and affect   Her behavior is normal  Judgment and thought content normal

## 2018-05-05 LAB
BUN SERPL-MCNC: 19 MG/DL (ref 7–25)
BUN/CREAT SERPL: NORMAL (CALC) (ref 6–22)
CALCIUM SERPL-MCNC: 9.4 MG/DL (ref 8.6–10.4)
CHLORIDE SERPL-SCNC: 104 MMOL/L (ref 98–110)
CO2 SERPL-SCNC: 27 MMOL/L (ref 20–31)
CREAT SERPL-MCNC: 0.69 MG/DL (ref 0.6–0.88)
ERYTHROCYTE [DISTWIDTH] IN BLOOD BY AUTOMATED COUNT: 16.3 % (ref 11–15)
FERRITIN SERPL-MCNC: 8 NG/ML (ref 20–288)
GLUCOSE SERPL-MCNC: 76 MG/DL (ref 65–139)
HCT VFR BLD AUTO: 36.8 % (ref 35–45)
HGB BLD-MCNC: 11.1 G/DL (ref 11.7–15.5)
MCH RBC QN AUTO: 23.8 PG (ref 27–33)
MCHC RBC AUTO-ENTMCNC: 30.2 G/DL (ref 32–36)
MCV RBC AUTO: 78.8 FL (ref 80–100)
PLATELET # BLD AUTO: 319 THOUSAND/UL (ref 140–400)
PMV BLD REES-ECKER: 10.9 FL (ref 7.5–12.5)
POTASSIUM SERPL-SCNC: 4.5 MMOL/L (ref 3.5–5.3)
RBC # BLD AUTO: 4.67 MILLION/UL (ref 3.8–5.1)
SL AMB EGFR AFRICAN AMERICAN: 89 ML/MIN/1.73M2
SL AMB EGFR NON AFRICAN AMERICAN: 77 ML/MIN/1.73M2
SODIUM SERPL-SCNC: 141 MMOL/L (ref 135–146)
WBC # BLD AUTO: 5.3 THOUSAND/UL (ref 3.8–10.8)

## 2018-05-16 ENCOUNTER — OFFICE VISIT (OUTPATIENT)
Dept: FAMILY MEDICINE CLINIC | Facility: CLINIC | Age: 83
End: 2018-05-16
Payer: COMMERCIAL

## 2018-05-16 VITALS
TEMPERATURE: 98.4 F | HEART RATE: 78 BPM | BODY MASS INDEX: 26.29 KG/M2 | HEIGHT: 64 IN | DIASTOLIC BLOOD PRESSURE: 62 MMHG | OXYGEN SATURATION: 97 % | WEIGHT: 154 LBS | SYSTOLIC BLOOD PRESSURE: 134 MMHG

## 2018-05-16 DIAGNOSIS — S46.919A MUSCLE STRAIN OF UPPER EXTREMITY, INITIAL ENCOUNTER: ICD-10-CM

## 2018-05-16 DIAGNOSIS — D50.0 IRON DEFICIENCY ANEMIA DUE TO CHRONIC BLOOD LOSS: ICD-10-CM

## 2018-05-16 DIAGNOSIS — M54.9 UPPER BACK PAIN ON RIGHT SIDE: ICD-10-CM

## 2018-05-16 DIAGNOSIS — H81.10 BENIGN PAROXYSMAL POSITIONAL VERTIGO, UNSPECIFIED LATERALITY: ICD-10-CM

## 2018-05-16 DIAGNOSIS — I10 BENIGN ESSENTIAL HYPERTENSION: Primary | ICD-10-CM

## 2018-05-16 DIAGNOSIS — M99.08 SEGMENTAL AND SOMATIC DYSFUNCTION OF RIB CAGE: ICD-10-CM

## 2018-05-16 DIAGNOSIS — M25.511 ACUTE PAIN OF RIGHT SHOULDER: ICD-10-CM

## 2018-05-16 DIAGNOSIS — M99.02 SOMATIC DYSFUNCTION OF THORACIC REGION: ICD-10-CM

## 2018-05-16 DIAGNOSIS — S23.41XA RIB SPRAIN, INITIAL ENCOUNTER: ICD-10-CM

## 2018-05-16 DIAGNOSIS — M99.07 UPPER EXTREMITY SOMATIC DYSFUNCTION: ICD-10-CM

## 2018-05-16 DIAGNOSIS — S29.012A STRAIN OF MUSCLE AND TENDON OF BACK WALL OF THORAX, INITIAL ENCOUNTER: ICD-10-CM

## 2018-05-16 PROCEDURE — 99214 OFFICE O/P EST MOD 30 MIN: CPT | Performed by: FAMILY MEDICINE

## 2018-05-16 PROCEDURE — 98926 OSTEOPATH MANJ 3-4 REGIONS: CPT | Performed by: FAMILY MEDICINE

## 2018-05-16 NOTE — PATIENT INSTRUCTIONS
Unfold upper thorax, first rib and stretched right UE  Recheck abduction, better and equal   Start Multiple vitamin with Fe  No heat to arm  Water for hydration

## 2018-05-16 NOTE — PROGRESS NOTES
Assessment/Plan: patient here today for follow up for Iron deficiency     No problem-specific Assessment & Plan notes found for this encounter  1  Benign essential hypertension     2  Iron deficiency anemia due to chronic blood loss     3  Benign paroxysmal positional vertigo, unspecified laterality     4  Acute pain of right shoulder     5  Upper back pain on right side     6  Rib sprain, initial encounter     7  Segmental and somatic dysfunction of rib cage     8  Somatic dysfunction of thoracic region     9  Strain of muscle and tendon of back wall of thorax, initial encounter     10  Muscle strain of upper extremity, initial encounter     11  Upper extremity somatic dysfunction          There are no diagnoses linked to this encounter  Subjective:      Patient ID: Gina Villafuerte is a 80 y o  female  Follow up review labs  CBC improving, MCV and Hemoglobin  Alternates Plavix and baby aspirin different days  Ambulation has been good, walks daily  Reflux controlled  Continues with BP med  Not taking Fe supplement  Right arm limited abduction to 90 degrees, deltoid discomfort restricts  Tight right CT junction  The following portions of the patient's history were reviewed and updated as appropriate: allergies, current medications, past family history, past medical history, past social history, past surgical history and problem list     Review of Systems   Constitutional: Negative  HENT: Negative  Respiratory: Negative  Cardiovascular: Negative  Gastrointestinal: Negative  Musculoskeletal: Positive for neck stiffness  Right lateral deltoid hurts, leeps on left side only  n Abduction right arm < left arm  Skin: Negative  Neurological: Positive for dizziness  Vertigo when lays on right side  Psychiatric/Behavioral: Negative            Objective:      /62 (BP Location: Left arm, Patient Position: Sitting, Cuff Size: Standard)   Pulse 78   Temp 98 4 °F (36 9 °C) (Oral)   Ht 5' 4" (1 626 m)   Wt 69 9 kg (154 lb)   LMP  (LMP Unknown)   SpO2 97%   BMI 26 43 kg/m²          Physical Exam   Constitutional: She is oriented to person, place, and time  She appears well-developed  HENT:   Head: Normocephalic and atraumatic  Right Ear: External ear normal    Left Ear: External ear normal    Mouth/Throat: Oropharynx is clear and moist    Eyes: Conjunctivae are normal  Pupils are equal, round, and reactive to light  Cardiovascular: Normal rate and regular rhythm  Pulmonary/Chest: Effort normal and breath sounds normal    Musculoskeletal:   Rib #1 right down and anterior  Tender lateral right upper arm  T1 SB right, rotated left  Neurological: She is alert and oriented to person, place, and time  Skin: Skin is warm and dry  Psychiatric: She has a normal mood and affect   Her behavior is normal  Judgment and thought content normal

## 2018-06-08 DIAGNOSIS — R12 HEART BURN: Primary | ICD-10-CM

## 2018-06-08 RX ORDER — ESOMEPRAZOLE MAGNESIUM 40 MG/1
40 CAPSULE, DELAYED RELEASE ORAL DAILY
Qty: 30 CAPSULE | Refills: 5 | Status: SHIPPED | OUTPATIENT
Start: 2018-06-08 | End: 2018-12-10 | Stop reason: SDUPTHER

## 2018-06-08 RX ORDER — ESOMEPRAZOLE MAGNESIUM 40 MG/1
40 CAPSULE, DELAYED RELEASE ORAL DAILY
Refills: 5 | COMMUNITY
Start: 2018-05-04 | End: 2018-06-08 | Stop reason: SDUPTHER

## 2018-06-12 ENCOUNTER — OFFICE VISIT (OUTPATIENT)
Dept: FAMILY MEDICINE CLINIC | Facility: CLINIC | Age: 83
End: 2018-06-12
Payer: COMMERCIAL

## 2018-06-12 VITALS
HEIGHT: 64 IN | DIASTOLIC BLOOD PRESSURE: 86 MMHG | OXYGEN SATURATION: 97 % | HEART RATE: 88 BPM | SYSTOLIC BLOOD PRESSURE: 142 MMHG | WEIGHT: 155.4 LBS | BODY MASS INDEX: 26.53 KG/M2 | TEMPERATURE: 98 F

## 2018-06-12 DIAGNOSIS — K21.9 GERD WITHOUT ESOPHAGITIS: ICD-10-CM

## 2018-06-12 DIAGNOSIS — D50.9 MICROCYTIC ANEMIA: ICD-10-CM

## 2018-06-12 DIAGNOSIS — E78.00 HYPERCHOLESTEROLEMIA: ICD-10-CM

## 2018-06-12 DIAGNOSIS — I10 BENIGN ESSENTIAL HYPERTENSION: Primary | ICD-10-CM

## 2018-06-12 PROCEDURE — 99214 OFFICE O/P EST MOD 30 MIN: CPT | Performed by: FAMILY MEDICINE

## 2018-06-12 NOTE — PROGRESS NOTES
Assessment/Plan: patient here today for follow up for hypertension     No problem-specific Assessment & Plan notes found for this encounter  1  Benign essential hypertension     2  Microcytic anemia  CBC and Platelet   3  Hypercholesterolemia     4  GERD without esophagitis            There are no diagnoses linked to this encounter  Subjective:      Patient ID: Alexis Cali is a 80 y o  female  Follow up, doing very well  Swims regularly  Walks daily without difficulty  No reflux, continues with chol Med  The following portions of the patient's history were reviewed and updated as appropriate: allergies, current medications, past family history, past medical history, past social history, past surgical history and problem list     Review of Systems   Constitutional: Negative  HENT: Negative  Respiratory: Negative  Cardiovascular: Negative  Gastrointestinal: Negative  Genitourinary: Negative  Musculoskeletal: Negative  Skin: Negative  Neurological: Negative  Psychiatric/Behavioral: Negative  Objective:      /86 (BP Location: Left arm, Patient Position: Sitting, Cuff Size: Standard)   Pulse 88   Temp 98 °F (36 7 °C) (Oral)   Ht 5' 4" (1 626 m)   Wt 70 5 kg (155 lb 6 4 oz)   SpO2 97%   BMI 26 67 kg/m²          Physical Exam   Constitutional: She appears well-developed and well-nourished  HENT:   Head: Normocephalic and atraumatic  Cardiovascular: Normal rate, regular rhythm and normal heart sounds  Pulmonary/Chest: Effort normal and breath sounds normal    Neurological: She is alert  Skin: Skin is warm and dry  Psychiatric: She has a normal mood and affect   Her behavior is normal  Judgment and thought content normal

## 2018-06-18 DIAGNOSIS — E78.00 HYPERCHOLESTEREMIA: Primary | ICD-10-CM

## 2018-06-18 RX ORDER — SIMVASTATIN 20 MG
20 TABLET ORAL DAILY
Qty: 90 TABLET | Refills: 1 | Status: SHIPPED | OUTPATIENT
Start: 2018-06-18 | End: 2018-12-10 | Stop reason: SDUPTHER

## 2018-06-29 ENCOUNTER — TELEPHONE (OUTPATIENT)
Dept: FAMILY MEDICINE CLINIC | Facility: CLINIC | Age: 83
End: 2018-06-29

## 2018-06-29 NOTE — TELEPHONE ENCOUNTER
Patient called and asked for refill for Famotidine but her chart shows that Nexium was refilled on 6/8/18  It seems like patient has been taking both prescriptions  Please verify this

## 2018-07-02 DIAGNOSIS — R12 HEART BURN: ICD-10-CM

## 2018-07-02 RX ORDER — FAMOTIDINE 40 MG/1
40 TABLET, FILM COATED ORAL DAILY
Qty: 30 TABLET | Refills: 5 | Status: SHIPPED | OUTPATIENT
Start: 2018-07-02 | End: 2018-12-10 | Stop reason: SDUPTHER

## 2018-08-09 LAB
BASOPHILS # BLD AUTO: 43 CELLS/UL (ref 0–200)
BASOPHILS NFR BLD AUTO: 0.6 %
EOSINOPHIL # BLD AUTO: 94 CELLS/UL (ref 15–500)
EOSINOPHIL NFR BLD AUTO: 1.3 %
ERYTHROCYTE [DISTWIDTH] IN BLOOD BY AUTOMATED COUNT: 16.6 % (ref 11–15)
HCT VFR BLD AUTO: 39.8 % (ref 35–45)
HGB BLD-MCNC: 12 G/DL (ref 11.7–15.5)
LYMPHOCYTES # BLD AUTO: 1217 CELLS/UL (ref 850–3900)
LYMPHOCYTES NFR BLD AUTO: 16.9 %
MCH RBC QN AUTO: 24.8 PG (ref 27–33)
MCHC RBC AUTO-ENTMCNC: 30.2 G/DL (ref 32–36)
MCV RBC AUTO: 82.2 FL (ref 80–100)
MONOCYTES # BLD AUTO: 922 CELLS/UL (ref 200–950)
MONOCYTES NFR BLD AUTO: 12.8 %
NEUTROPHILS # BLD AUTO: 4925 CELLS/UL (ref 1500–7800)
NEUTROPHILS NFR BLD AUTO: 68.4 %
PLATELET # BLD AUTO: 326 THOUSAND/UL (ref 140–400)
PMV BLD REES-ECKER: 10.8 FL (ref 7.5–12.5)
RBC # BLD AUTO: 4.84 MILLION/UL (ref 3.8–5.1)
WBC # BLD AUTO: 7.2 THOUSAND/UL (ref 3.8–10.8)

## 2018-08-17 DIAGNOSIS — I10 ESSENTIAL HYPERTENSION: Primary | ICD-10-CM

## 2018-08-17 RX ORDER — ENALAPRIL MALEATE 10 MG/1
10 TABLET ORAL DAILY
Qty: 30 TABLET | Refills: 5 | Status: SHIPPED | OUTPATIENT
Start: 2018-08-17 | End: 2018-12-10 | Stop reason: SDUPTHER

## 2018-08-21 ENCOUNTER — OFFICE VISIT (OUTPATIENT)
Dept: FAMILY MEDICINE CLINIC | Facility: CLINIC | Age: 83
End: 2018-08-21
Payer: COMMERCIAL

## 2018-08-21 VITALS
TEMPERATURE: 98.2 F | OXYGEN SATURATION: 96 % | SYSTOLIC BLOOD PRESSURE: 136 MMHG | DIASTOLIC BLOOD PRESSURE: 78 MMHG | BODY MASS INDEX: 26.67 KG/M2 | HEART RATE: 80 BPM | WEIGHT: 156.2 LBS | HEIGHT: 64 IN

## 2018-08-21 DIAGNOSIS — K21.9 GERD WITHOUT ESOPHAGITIS: Primary | ICD-10-CM

## 2018-08-21 DIAGNOSIS — I10 BENIGN ESSENTIAL HYPERTENSION: ICD-10-CM

## 2018-08-21 DIAGNOSIS — I70.219 ATHEROSCLEROSIS OF NATIVE ARTERY OF EXTREMITY WITH INTERMITTENT CLAUDICATION, UNSPECIFIED EXTREMITY (HCC): ICD-10-CM

## 2018-08-21 DIAGNOSIS — D50.9 MICROCYTIC ANEMIA: ICD-10-CM

## 2018-08-21 PROCEDURE — 99214 OFFICE O/P EST MOD 30 MIN: CPT | Performed by: FAMILY MEDICINE

## 2018-08-21 PROCEDURE — 1160F RVW MEDS BY RX/DR IN RCRD: CPT | Performed by: FAMILY MEDICINE

## 2018-08-21 PROCEDURE — 1036F TOBACCO NON-USER: CPT | Performed by: FAMILY MEDICINE

## 2018-08-21 PROCEDURE — 3725F SCREEN DEPRESSION PERFORMED: CPT | Performed by: FAMILY MEDICINE

## 2018-08-21 NOTE — PROGRESS NOTES
Assessment/Plan: patient here today for follow up for hypertension     No problem-specific Assessment & Plan notes found for this encounter  Diagnoses and all orders for this visit:    GERD without esophagitis    Atherosclerosis 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, unspecified extremity ([address] )    Benign essential hypertension    Microcytic anemia          Subjective:      Patient ID: Romi Peterson is a 80 y o  female  Follow up, review labs  H and H improving  Feelin g good  No NSAID's  Start Multi vitamin with Fe, take 3 times a week  No reflux or leg pains  Gets daily walks  The following portions of the patient's history were reviewed and updated as appropriate: allergies, current medications, past family history, past medical history, past social history, past surgical history and problem list     Review of Systems   Constitutional: Negative  HENT: Negative  Eyes: Negative  Respiratory: Negative  Cardiovascular: Negative  Gastrointestinal: Negative  Genitourinary: Negative  Musculoskeletal: Negative  Skin: Negative  Neurological: Negative  Psychiatric/Behavioral: Negative  Objective:      /78 (BP Location: Left arm, Patient Position: Sitting, Cuff Size: Standard)   Pulse 80   Temp 98 2 °F (36 8 °C) (Oral)   Ht 5' 4" (1 626 m)   Wt 70 9 kg (156 lb 3 2 oz)   SpO2 96%   BMI 26 81 kg/m²          Physical Exam   Constitutional: She is oriented to person, place, and time  She appears well-developed and well-nourished  HENT:   Head: Normocephalic and atraumatic  Right Ear: External ear normal    Left Ear: External ear normal    Nose: Nose normal    Mouth/Throat: Oropharynx is clear and moist    Eyes: Conjunctivae and EOM are normal  Pupils are equal, round, and reactive to light  Neck: Normal range of motion  Neck supple  Cardiovascular: Normal rate, regular rhythm, normal heart sounds and intact distal pulses  Pulmonary/Chest: Effort normal and breath sounds normal    Abdominal: Soft  Bowel sounds are normal    Neurological: She is alert and oriented to person, place, and time  Skin: Skin is warm and dry  Psychiatric: She has a normal mood and affect   Her behavior is normal  Judgment and thought content normal

## 2018-12-10 ENCOUNTER — OFFICE VISIT (OUTPATIENT)
Dept: FAMILY MEDICINE CLINIC | Facility: CLINIC | Age: 83
End: 2018-12-10
Payer: COMMERCIAL

## 2018-12-10 VITALS
HEIGHT: 64 IN | HEART RATE: 95 BPM | TEMPERATURE: 98.6 F | OXYGEN SATURATION: 96 % | SYSTOLIC BLOOD PRESSURE: 136 MMHG | WEIGHT: 158.2 LBS | DIASTOLIC BLOOD PRESSURE: 78 MMHG | BODY MASS INDEX: 27.01 KG/M2

## 2018-12-10 DIAGNOSIS — I70.219 ATHEROSCLEROSIS OF NATIVE ARTERY OF EXTREMITY WITH INTERMITTENT CLAUDICATION, UNSPECIFIED EXTREMITY (HCC): ICD-10-CM

## 2018-12-10 DIAGNOSIS — I10 ESSENTIAL HYPERTENSION: ICD-10-CM

## 2018-12-10 DIAGNOSIS — M81.0 AGE-RELATED OSTEOPOROSIS WITHOUT CURRENT PATHOLOGICAL FRACTURE: ICD-10-CM

## 2018-12-10 DIAGNOSIS — R12 HEART BURN: ICD-10-CM

## 2018-12-10 DIAGNOSIS — E78.00 HYPERCHOLESTEREMIA: ICD-10-CM

## 2018-12-10 DIAGNOSIS — D50.0 IRON DEFICIENCY ANEMIA DUE TO CHRONIC BLOOD LOSS: Primary | ICD-10-CM

## 2018-12-10 PROCEDURE — 1036F TOBACCO NON-USER: CPT | Performed by: FAMILY MEDICINE

## 2018-12-10 PROCEDURE — 99214 OFFICE O/P EST MOD 30 MIN: CPT | Performed by: FAMILY MEDICINE

## 2018-12-10 PROCEDURE — 1160F RVW MEDS BY RX/DR IN RCRD: CPT | Performed by: FAMILY MEDICINE

## 2018-12-10 RX ORDER — ENALAPRIL MALEATE 10 MG/1
10 TABLET ORAL DAILY
Qty: 30 TABLET | Refills: 5 | Status: SHIPPED | OUTPATIENT
Start: 2018-12-10 | End: 2019-04-15 | Stop reason: SDUPTHER

## 2018-12-10 RX ORDER — FAMOTIDINE 40 MG/1
40 TABLET, FILM COATED ORAL DAILY
Qty: 30 TABLET | Refills: 5 | Status: SHIPPED | OUTPATIENT
Start: 2018-12-10 | End: 2019-04-15 | Stop reason: SDUPTHER

## 2018-12-10 RX ORDER — SIMVASTATIN 20 MG
20 TABLET ORAL DAILY
Qty: 30 TABLET | Refills: 5 | Status: SHIPPED | OUTPATIENT
Start: 2018-12-10 | End: 2019-04-15 | Stop reason: SDUPTHER

## 2018-12-10 RX ORDER — ESOMEPRAZOLE MAGNESIUM 40 MG/1
40 CAPSULE, DELAYED RELEASE ORAL DAILY
Qty: 30 CAPSULE | Refills: 5 | Status: SHIPPED | OUTPATIENT
Start: 2018-12-10 | End: 2019-11-13

## 2018-12-10 NOTE — PATIENT INSTRUCTIONS
Check and make sure has Fe in multi Vit  Discussed nutrition and activity level over the winter months  Daily walking

## 2018-12-10 NOTE — PROGRESS NOTES
Assessment/Plan: patient here today for follow up for hypertension     No problem-specific Assessment & Plan notes found for this encounter  There are no diagnoses linked to this encounter  Subjective:      Patient ID: Mendel Portal is a 80 y o  female  Follow up  Not taking Plavix  Continues with baby aspirin  History of Fe deficient anemia  Continues with Multiple vitamin  Not taking any NSAID's  Diet has kiki consistent  Weight up 3 lbs since past summer  The following portions of the patient's history were reviewed and updated as appropriate: allergies, current medications, past family history, past medical history, past social history, past surgical history and problem list     Current Outpatient Prescriptions:     aspirin 81 MG tablet, Take 1 tablet by mouth daily, Disp: , Rfl:     calcium-vitamin D 250-100 MG-UNIT per tablet, Take 1 tablet by mouth daily, Disp: , Rfl:     Cholecalciferol (VITAMIN D3) 3000 UNITS TABS, Take by mouth, Disp: , Rfl:     enalapril (VASOTEC) 10 mg tablet, Take 1 tablet (10 mg total) by mouth daily for 180 days, Disp: 30 tablet, Rfl: 5    esomeprazole (NexIUM) 40 MG capsule, Take 1 capsule (40 mg total) by mouth daily for 30 days, Disp: 30 capsule, Rfl: 5    famotidine (PEPCID) 40 MG tablet, Take 1 tablet (40 mg total) by mouth daily, Disp: 30 tablet, Rfl: 5    LUMIGAN 0 01 % ophthalmic drops, , Disp: , Rfl:     Magnesium Oxide 250 MG TABS, Take 1 tablet by mouth daily, Disp: , Rfl:     simvastatin (ZOCOR) 20 mg tablet, Take 1 tablet (20 mg total) by mouth daily, Disp: 90 tablet, Rfl: 1    fluticasone (FLONASE) 50 mcg/act nasal spray, , Disp: , Rfl:    Allergies   Allergen Reactions    Contrast  [Iodinated Diagnostic Agents]      Other reaction(s): "I get very cold"    Iodine      Other reaction(s): Chills  Reaction Date: 02Aug2011;     Omnipaque [Iohexol]     Sulfa Antibiotics Vomiting       Review of Systems   Constitutional: Negative  HENT: Negative  Eyes: Negative  Respiratory: Negative  Cardiovascular: Negative  Gastrointestinal: Negative  Genitourinary: Negative  Musculoskeletal: Negative  Skin: Negative  Neurological: Negative  Psychiatric/Behavioral: Negative  Objective:      /78 (BP Location: Left arm, Patient Position: Sitting, Cuff Size: Standard)   Pulse 95   Temp 98 6 °F (37 °C) (Oral)   Ht 5' 4" (1 626 m)   Wt 71 8 kg (158 lb 3 2 oz)   SpO2 96%   BMI 27 15 kg/m²          Physical Exam   Constitutional: She is oriented to person, place, and time  She appears well-developed and well-nourished  HENT:   Head: Normocephalic and atraumatic  Right Ear: External ear normal    Left Ear: External ear normal    Nose: Nose normal    Mouth/Throat: Oropharynx is clear and moist    Eyes: Pupils are equal, round, and reactive to light  Conjunctivae and EOM are normal    Neck: Normal range of motion  Neck supple  Cardiovascular: Normal rate, regular rhythm and normal heart sounds  Pulmonary/Chest: Effort normal and breath sounds normal    Abdominal: Soft  Neurological: She is alert and oriented to person, place, and time  Skin: Skin is warm and dry  Psychiatric: She has a normal mood and affect   Her behavior is normal  Judgment and thought content normal

## 2019-01-12 LAB
BUN SERPL-MCNC: 21 MG/DL (ref 7–25)
BUN/CREAT SERPL: NORMAL (CALC) (ref 6–22)
CALCIUM SERPL-MCNC: 10 MG/DL (ref 8.6–10.4)
CHLORIDE SERPL-SCNC: 103 MMOL/L (ref 98–110)
CO2 SERPL-SCNC: 32 MMOL/L (ref 20–32)
CREAT SERPL-MCNC: 0.66 MG/DL (ref 0.6–0.88)
ERYTHROCYTE [DISTWIDTH] IN BLOOD BY AUTOMATED COUNT: 13.5 % (ref 11–15)
GLUCOSE SERPL-MCNC: 90 MG/DL (ref 65–139)
HCT VFR BLD AUTO: 42 % (ref 35–45)
HGB BLD-MCNC: 13.9 G/DL (ref 11.7–15.5)
MCH RBC QN AUTO: 29.1 PG (ref 27–33)
MCHC RBC AUTO-ENTMCNC: 33.1 G/DL (ref 32–36)
MCV RBC AUTO: 87.9 FL (ref 80–100)
PLATELET # BLD AUTO: 278 THOUSAND/UL (ref 140–400)
PMV BLD REES-ECKER: 11.5 FL (ref 7.5–12.5)
POTASSIUM SERPL-SCNC: 4.8 MMOL/L (ref 3.5–5.3)
RBC # BLD AUTO: 4.78 MILLION/UL (ref 3.8–5.1)
SL AMB EGFR AFRICAN AMERICAN: 90 ML/MIN/1.73M2
SL AMB EGFR NON AFRICAN AMERICAN: 78 ML/MIN/1.73M2
SODIUM SERPL-SCNC: 143 MMOL/L (ref 135–146)
WBC # BLD AUTO: 5.6 THOUSAND/UL (ref 3.8–10.8)

## 2019-01-16 ENCOUNTER — TELEPHONE (OUTPATIENT)
Dept: FAMILY MEDICINE CLINIC | Facility: CLINIC | Age: 84
End: 2019-01-16

## 2019-01-16 NOTE — TELEPHONE ENCOUNTER
Per Dr Kurtis Obregon instructions, patient told levels of lab results done 01/11/2019 are improved from previous lab test  She stated she understood

## 2019-01-24 ENCOUNTER — HOSPITAL ENCOUNTER (OUTPATIENT)
Dept: RADIOLOGY | Age: 84
Discharge: HOME | End: 2019-01-24
Payer: COMMERCIAL

## 2019-01-24 DIAGNOSIS — M81.0 AGE-RELATED OSTEOPOROSIS WITHOUT CURRENT PATHOLOGICAL FRACTURE: ICD-10-CM

## 2019-01-24 PROCEDURE — 77080 DXA BONE DENSITY AXIAL: CPT

## 2019-02-27 DIAGNOSIS — I65.23 BILATERAL CAROTID ARTERY STENOSIS: Primary | ICD-10-CM

## 2019-03-14 ENCOUNTER — HOSPITAL ENCOUNTER (OUTPATIENT)
Dept: NON INVASIVE DIAGNOSTICS | Facility: CLINIC | Age: 84
Discharge: HOME | End: 2019-03-14
Payer: COMMERCIAL

## 2019-03-14 DIAGNOSIS — I65.23 BILATERAL CAROTID ARTERY STENOSIS: ICD-10-CM

## 2019-03-14 PROCEDURE — 93880 EXTRACRANIAL BILAT STUDY: CPT

## 2019-03-14 PROCEDURE — 93880 EXTRACRANIAL BILAT STUDY: CPT | Performed by: SURGERY

## 2019-04-15 ENCOUNTER — OFFICE VISIT (OUTPATIENT)
Dept: FAMILY MEDICINE CLINIC | Facility: CLINIC | Age: 84
End: 2019-04-15
Payer: COMMERCIAL

## 2019-04-15 VITALS
TEMPERATURE: 97.7 F | BODY MASS INDEX: 27.11 KG/M2 | HEIGHT: 64 IN | OXYGEN SATURATION: 95 % | SYSTOLIC BLOOD PRESSURE: 132 MMHG | DIASTOLIC BLOOD PRESSURE: 74 MMHG | WEIGHT: 158.8 LBS | HEART RATE: 94 BPM

## 2019-04-15 DIAGNOSIS — E78.00 HYPERCHOLESTEREMIA: ICD-10-CM

## 2019-04-15 DIAGNOSIS — I10 ESSENTIAL HYPERTENSION: ICD-10-CM

## 2019-04-15 DIAGNOSIS — R12 HEART BURN: ICD-10-CM

## 2019-04-15 DIAGNOSIS — I73.9 PVD (PERIPHERAL VASCULAR DISEASE) (HCC): ICD-10-CM

## 2019-04-15 DIAGNOSIS — Z00.00 MEDICARE ANNUAL WELLNESS VISIT, SUBSEQUENT: Primary | ICD-10-CM

## 2019-04-15 PROCEDURE — 99214 OFFICE O/P EST MOD 30 MIN: CPT | Performed by: FAMILY MEDICINE

## 2019-04-15 PROCEDURE — G0439 PPPS, SUBSEQ VISIT: HCPCS | Performed by: FAMILY MEDICINE

## 2019-04-15 RX ORDER — ENALAPRIL MALEATE 10 MG/1
10 TABLET ORAL DAILY
Qty: 30 TABLET | Refills: 5 | Status: SHIPPED | OUTPATIENT
Start: 2019-04-15 | End: 2019-10-21

## 2019-04-15 RX ORDER — FAMOTIDINE 40 MG/1
40 TABLET, FILM COATED ORAL DAILY
Qty: 30 TABLET | Refills: 5 | Status: SHIPPED | OUTPATIENT
Start: 2019-04-15 | End: 2020-07-27 | Stop reason: SDDI

## 2019-04-15 RX ORDER — SIMVASTATIN 20 MG
20 TABLET ORAL DAILY
Qty: 30 TABLET | Refills: 5 | Status: SHIPPED | OUTPATIENT
Start: 2019-04-15 | End: 2020-07-27 | Stop reason: SDUPTHER

## 2019-04-25 ENCOUNTER — OFFICE VISIT (OUTPATIENT)
Dept: VASCULAR SURGERY | Facility: CLINIC | Age: 84
End: 2019-04-25
Payer: COMMERCIAL

## 2019-04-25 VITALS
SYSTOLIC BLOOD PRESSURE: 118 MMHG | WEIGHT: 160 LBS | HEIGHT: 63 IN | TEMPERATURE: 96.7 F | HEART RATE: 77 BPM | BODY MASS INDEX: 28.35 KG/M2 | DIASTOLIC BLOOD PRESSURE: 70 MMHG

## 2019-04-25 DIAGNOSIS — I73.9 PAD (PERIPHERAL ARTERY DISEASE) (HCC): Primary | ICD-10-CM

## 2019-04-25 PROCEDURE — 99213 OFFICE O/P EST LOW 20 MIN: CPT | Performed by: NURSE PRACTITIONER

## 2019-05-09 ENCOUNTER — HOSPITAL ENCOUNTER (OUTPATIENT)
Dept: NON INVASIVE DIAGNOSTICS | Facility: CLINIC | Age: 84
Discharge: HOME | End: 2019-05-09
Payer: COMMERCIAL

## 2019-05-09 DIAGNOSIS — I73.9 PAD (PERIPHERAL ARTERY DISEASE) (HCC): ICD-10-CM

## 2019-05-09 PROCEDURE — 93922 UPR/L XTREMITY ART 2 LEVELS: CPT | Performed by: SURGERY

## 2019-05-09 PROCEDURE — 93925 LOWER EXTREMITY STUDY: CPT | Performed by: SURGERY

## 2019-05-09 PROCEDURE — 93923 UPR/LXTR ART STDY 3+ LVLS: CPT

## 2019-05-09 PROCEDURE — 93925 LOWER EXTREMITY STUDY: CPT

## 2019-05-10 ENCOUNTER — TELEPHONE (OUTPATIENT)
Dept: VASCULAR SURGERY | Facility: CLINIC | Age: 84
End: 2019-05-10

## 2019-05-20 ENCOUNTER — APPOINTMENT (EMERGENCY)
Dept: RADIOLOGY | Facility: HOSPITAL | Age: 84
DRG: 494 | End: 2019-05-20
Payer: COMMERCIAL

## 2019-05-20 ENCOUNTER — HOSPITAL ENCOUNTER (INPATIENT)
Facility: HOSPITAL | Age: 84
LOS: 3 days | Discharge: NURSING, REHAB OR LONG-TERM CARE | DRG: 494 | End: 2019-05-23
Attending: EMERGENCY MEDICINE | Admitting: INTERNAL MEDICINE
Payer: COMMERCIAL

## 2019-05-20 ENCOUNTER — ANESTHESIA EVENT (INPATIENT)
Dept: PERIOP | Facility: HOSPITAL | Age: 84
DRG: 494 | End: 2019-05-20
Payer: COMMERCIAL

## 2019-05-20 DIAGNOSIS — S82.891A CLOSED FRACTURE DISLOCATION OF RIGHT ANKLE, INITIAL ENCOUNTER: Primary | ICD-10-CM
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
## 2019-05-20 DIAGNOSIS — S82.891A: ICD-10-CM

## 2019-05-20 LAB
ABO GROUP BLD: NORMAL
ANION GAP SERPL CALCULATED.3IONS-SCNC: 6 MMOL/L (ref 4–13)
APTT PPP: 25 SECONDS (ref 26–38)
BASOPHILS # BLD AUTO: 0.03 THOUSANDS/ΜL (ref 0–0.1)
BASOPHILS NFR BLD AUTO: 0 % (ref 0–1)
BLD GP AB SCN SERPL QL: NEGATIVE
BUN SERPL-MCNC: 25 MG/DL (ref 5–25)
CALCIUM SERPL-MCNC: 8.8 MG/DL (ref 8.3–10.1)
CHLORIDE SERPL-SCNC: 107 MMOL/L (ref 100–108)
CO2 SERPL-SCNC: 25 MMOL/L (ref 21–32)
CREAT SERPL-MCNC: 0.87 MG/DL (ref 0.6–1.3)
EOSINOPHIL # BLD AUTO: 0.08 THOUSAND/ΜL (ref 0–0.61)
EOSINOPHIL NFR BLD AUTO: 1 % (ref 0–6)
ERYTHROCYTE [DISTWIDTH] IN BLOOD BY AUTOMATED COUNT: 14.6 % (ref 11.6–15.1)
GFR SERPL CREATININE-BSD FRML MDRD: 58 ML/MIN/1.73SQ M
GLUCOSE SERPL-MCNC: 124 MG/DL (ref 65–140)
HCT VFR BLD AUTO: 42.1 % (ref 34.8–46.1)
HGB BLD-MCNC: 13.5 G/DL (ref 11.5–15.4)
IMM GRANULOCYTES # BLD AUTO: 0.03 THOUSAND/UL (ref 0–0.2)
IMM GRANULOCYTES NFR BLD AUTO: 0 % (ref 0–2)
INR PPP: 0.92 (ref 0.86–1.17)
LYMPHOCYTES # BLD AUTO: 1.28 THOUSANDS/ΜL (ref 0.6–4.47)
LYMPHOCYTES NFR BLD AUTO: 19 % (ref 14–44)
MCH RBC QN AUTO: 28.7 PG (ref 26.8–34.3)
MCHC RBC AUTO-ENTMCNC: 32.1 G/DL (ref 31.4–37.4)
MCV RBC AUTO: 90 FL (ref 82–98)
MONOCYTES # BLD AUTO: 0.78 THOUSAND/ΜL (ref 0.17–1.22)
MONOCYTES NFR BLD AUTO: 11 % (ref 4–12)
NEUTROPHILS # BLD AUTO: 4.69 THOUSANDS/ΜL (ref 1.85–7.62)
NEUTS SEG NFR BLD AUTO: 69 % (ref 43–75)
NRBC BLD AUTO-RTO: 0 /100 WBCS
PLATELET # BLD AUTO: 276 THOUSANDS/UL (ref 149–390)
PMV BLD AUTO: 11 FL (ref 8.9–12.7)
POTASSIUM SERPL-SCNC: 4.2 MMOL/L (ref 3.5–5.3)
PROTHROMBIN TIME: 12.5 SECONDS (ref 11.8–14.2)
RBC # BLD AUTO: 4.7 MILLION/UL (ref 3.81–5.12)
RH BLD: POSITIVE
SODIUM SERPL-SCNC: 138 MMOL/L (ref 136–145)
SPECIMEN EXPIRATION DATE: NORMAL
WBC # BLD AUTO: 6.89 THOUSAND/UL (ref 4.31–10.16)

## 2019-05-20 PROCEDURE — 27840 TREAT ANKLE DISLOCATION: CPT | Performed by: EMERGENCY MEDICINE

## 2019-05-20 PROCEDURE — 0QSJXZZ REPOSITION RIGHT FIBULA, EXTERNAL APPROACH: ICD-10-PCS | Performed by: EMERGENCY MEDICINE

## 2019-05-20 PROCEDURE — 73590 X-RAY EXAM OF LOWER LEG: CPT

## 2019-05-20 PROCEDURE — 85610 PROTHROMBIN TIME: CPT | Performed by: ORTHOPAEDIC SURGERY

## 2019-05-20 PROCEDURE — 86850 RBC ANTIBODY SCREEN: CPT | Performed by: ORTHOPAEDIC SURGERY

## 2019-05-20 PROCEDURE — 85025 COMPLETE CBC W/AUTO DIFF WBC: CPT | Performed by: EMERGENCY MEDICINE

## 2019-05-20 PROCEDURE — 99223 1ST HOSP IP/OBS HIGH 75: CPT | Performed by: INTERNAL MEDICINE

## 2019-05-20 PROCEDURE — 71045 X-RAY EXAM CHEST 1 VIEW: CPT

## 2019-05-20 PROCEDURE — 80048 BASIC METABOLIC PNL TOTAL CA: CPT | Performed by: EMERGENCY MEDICINE

## 2019-05-20 PROCEDURE — 73610 X-RAY EXAM OF ANKLE: CPT

## 2019-05-20 PROCEDURE — 86901 BLOOD TYPING SEROLOGIC RH(D): CPT | Performed by: ORTHOPAEDIC SURGERY

## 2019-05-20 PROCEDURE — 36415 COLL VENOUS BLD VENIPUNCTURE: CPT | Performed by: EMERGENCY MEDICINE

## 2019-05-20 PROCEDURE — 86900 BLOOD TYPING SEROLOGIC ABO: CPT | Performed by: ORTHOPAEDIC SURGERY

## 2019-05-20 PROCEDURE — 99285 EMERGENCY DEPT VISIT HI MDM: CPT

## 2019-05-20 PROCEDURE — 93005 ELECTROCARDIOGRAM TRACING: CPT

## 2019-05-20 PROCEDURE — 99285 EMERGENCY DEPT VISIT HI MDM: CPT | Performed by: EMERGENCY MEDICINE

## 2019-05-20 PROCEDURE — 85730 THROMBOPLASTIN TIME PARTIAL: CPT | Performed by: ORTHOPAEDIC SURGERY

## 2019-05-20 PROCEDURE — 70450 CT HEAD/BRAIN W/O DYE: CPT

## 2019-05-20 PROCEDURE — 96374 THER/PROPH/DIAG INJ IV PUSH: CPT

## 2019-05-20 RX ORDER — LIDOCAINE HYDROCHLORIDE 10 MG/ML
20 INJECTION, SOLUTION EPIDURAL; INFILTRATION; INTRACAUDAL; PERINEURAL ONCE
Status: COMPLETED | OUTPATIENT
Start: 2019-05-20 | End: 2019-05-20

## 2019-05-20 RX ORDER — ACETAMINOPHEN 325 MG/1
650 TABLET ORAL ONCE
Status: COMPLETED | OUTPATIENT
Start: 2019-05-20 | End: 2019-05-20

## 2019-05-20 RX ORDER — ACETAMINOPHEN 325 MG/1
975 TABLET ORAL EVERY 8 HOURS SCHEDULED
Status: DISCONTINUED | OUTPATIENT
Start: 2019-05-20 | End: 2019-05-23 | Stop reason: HOSPADM

## 2019-05-20 RX ORDER — FAMOTIDINE 20 MG/1
40 TABLET, FILM COATED ORAL DAILY
Status: DISCONTINUED | OUTPATIENT
Start: 2019-05-21 | End: 2019-05-23 | Stop reason: HOSPADM

## 2019-05-20 RX ORDER — MELATONIN
3000 DAILY
Status: DISCONTINUED | OUTPATIENT
Start: 2019-05-21 | End: 2019-05-23 | Stop reason: HOSPADM

## 2019-05-20 RX ORDER — OXYCODONE HYDROCHLORIDE 5 MG/1
5 TABLET ORAL EVERY 4 HOURS PRN
Status: DISCONTINUED | OUTPATIENT
Start: 2019-05-20 | End: 2019-05-23 | Stop reason: HOSPADM

## 2019-05-20 RX ORDER — OXYCODONE HYDROCHLORIDE 5 MG/1
2.5 TABLET ORAL EVERY 4 HOURS PRN
Status: DISCONTINUED | OUTPATIENT
Start: 2019-05-20 | End: 2019-05-23 | Stop reason: HOSPADM

## 2019-05-20 RX ORDER — PRAVASTATIN SODIUM 40 MG
40 TABLET ORAL
Status: DISCONTINUED | OUTPATIENT
Start: 2019-05-21 | End: 2019-05-23 | Stop reason: HOSPADM

## 2019-05-20 RX ORDER — FENTANYL CITRATE 50 UG/ML
1 INJECTION, SOLUTION INTRAMUSCULAR; INTRAVENOUS ONCE
Status: COMPLETED | OUTPATIENT
Start: 2019-05-20 | End: 2019-05-20

## 2019-05-20 RX ORDER — FENTANYL CITRATE 50 UG/ML
25 INJECTION, SOLUTION INTRAMUSCULAR; INTRAVENOUS ONCE
Status: COMPLETED | OUTPATIENT
Start: 2019-05-20 | End: 2019-05-20

## 2019-05-20 RX ORDER — CLOPIDOGREL BISULFATE 75 MG/1
75 TABLET ORAL DAILY
COMMUNITY
End: 2020-11-16

## 2019-05-20 RX ORDER — HYDROMORPHONE HCL/PF 1 MG/ML
0.2 SYRINGE (ML) INJECTION EVERY 4 HOURS PRN
Status: DISCONTINUED | OUTPATIENT
Start: 2019-05-20 | End: 2019-05-23 | Stop reason: HOSPADM

## 2019-05-20 RX ORDER — ASPIRIN 81 MG/1
81 TABLET, CHEWABLE ORAL 3 TIMES WEEKLY
Status: DISCONTINUED | OUTPATIENT
Start: 2019-05-22 | End: 2019-05-23 | Stop reason: HOSPADM

## 2019-05-20 RX ORDER — B-COMPLEX WITH VITAMIN C
1 TABLET ORAL
Status: DISCONTINUED | OUTPATIENT
Start: 2019-05-21 | End: 2019-05-23 | Stop reason: HOSPADM

## 2019-05-20 RX ORDER — ONDANSETRON 2 MG/ML
4 INJECTION INTRAMUSCULAR; INTRAVENOUS EVERY 6 HOURS PRN
Status: DISCONTINUED | OUTPATIENT
Start: 2019-05-20 | End: 2019-05-21

## 2019-05-20 RX ADMIN — ACETAMINOPHEN 975 MG: 325 TABLET ORAL at 22:34

## 2019-05-20 RX ADMIN — ACETAMINOPHEN 650 MG: 325 TABLET ORAL at 18:39

## 2019-05-20 RX ADMIN — LIDOCAINE HYDROCHLORIDE 20 ML: 10 INJECTION, SOLUTION EPIDURAL; INFILTRATION; INTRACAUDAL; PERINEURAL at 18:39

## 2019-05-20 RX ADMIN — FENTANYL CITRATE 25 MCG: 50 INJECTION, SOLUTION INTRAMUSCULAR; INTRAVENOUS at 19:18

## 2019-05-20 NOTE — ED PROVIDER NOTES
History  Chief Complaint   Patient presents with   Riley Thorne Fall     pt presents to ED via EMS after fall in apartment  pt states "i got up in a hurry because someone knocked at my door and I was half asleep, my ankle just twisted " obvious deformity noted to RLE ankle  no other injuries noted  pt states she fell on her bottom, denies any other pain    Ankle Injury - Major     Patient is a 26-year-old female with a past medical history significant for hypertension, hyperlipidemia on aspirin and Plavix who presents after a trip and fall resulting in deformity of the right ankle  Patient reports that she got up too quickly and accidentally mis-stepped resulting in rolling her right ankle  Fell onto right foot and knee  Denies head strike/ LOC  Complains of pain to the right ankle, 10 out of 10 in intensity, non-radiating, constant, sharp in nature, worse with attempting to move it  Denies neck pain, back pain, numbness, tingling, loss of bowel/bladder control  Prior to Admission Medications   Prescriptions Last Dose Informant Patient Reported? Taking?    Cholecalciferol (VITAMIN D3) 3000 UNITS TABS  Self Yes Yes   Sig: Take by mouth   LUMIGAN 0 01 % ophthalmic drops  Self Yes No   Magnesium Oxide 250 MG TABS  Self Yes Yes   Sig: Take 1 tablet by mouth daily   aspirin 81 MG tablet  Self No Yes   Sig: Take 1 tablet (81 mg total) by mouth 3 (three) times a week   calcium-vitamin D 250-100 MG-UNIT per tablet  Self Yes Yes   Sig: Take 1 tablet by mouth daily   clopidogrel (PLAVIX) 75 mg tablet   Yes Yes   Sig: Take 75 mg by mouth daily   enalapril (VASOTEC) 10 mg tablet  Self No Yes   Sig: Take 1 tablet (10 mg total) by mouth daily for 180 days   esomeprazole (NexIUM) 40 MG capsule  Self No No   Sig: Take 1 capsule (40 mg total) by mouth daily for 30 days   famotidine (PEPCID) 40 MG tablet  Self No Yes   Sig: Take 1 tablet (40 mg total) by mouth daily   fluticasone (FLONASE) 50 mcg/act nasal spray  Self Yes No simvastatin (ZOCOR) 20 mg tablet  Self No Yes   Sig: Take 1 tablet (20 mg total) by mouth daily      Facility-Administered Medications: None       Past Medical History:   Diagnosis Date    Anorexia     last assessed: 8/9/2013    GERD (gastroesophageal reflux disease)     Herpes zoster     last assessed: 9/11/2013    Hypertension     Lymphoma ([facility] [address] )     resolved: 1997    Malignant melanoma of skin ([facility] [address] )     resolved: 2015    [facility] )        Past Surgical History:   Procedure Laterality Date    BREAST SURGERY      enlargement procedure    BYPASS FEMORAL-POPLITEAL Left     onset: 8/31/2012    CATARACT EXTRACTION Right 06/19/2013    HYSTERECTOMY      resolved: 1970    OTHER SURGICAL HISTORY  05/30/2013    PTA Femoral-popliteal initial stenosis left Description: with mechanical thrombectomy, PTA and stent     NE OPEN RX DISTAL RADIUS FX, INTRA-ARTICULAR, 3+ FRAG Left 1/18/2017    Procedure: DISTAL RADIUS OPEN REDUCTION W/ INTERNAL FIXATION ;  Surgeon: Mackenzie Short MD;  Location: [facility];  Service: Orthopedics       Family History   Problem Relation Age of Onset    Heart attack Mother     Heart failure Father     Other Sister         thyroid surgery     I have reviewed and agree with the history as documented  Social History     Tobacco Use    Smoking status: Never Smoker    Smokeless tobacco: Never Used   Substance Use Topics    Alcohol use: No     Comment: Per allscripts - social drinker     Drug use: No        Review of Systems   Constitutional: Negative for chills and fever  HENT: Negative for congestion and rhinorrhea  Eyes: Negative for photophobia and visual disturbance  Respiratory: Negative for cough and shortness of breath  Cardiovascular: Negative for chest pain and palpitations  Gastrointestinal: Negative for abdominal pain, constipation, diarrhea, nausea and vomiting  Genitourinary: Negative for dysuria, flank pain and hematuria     Musculoskeletal: Positive for gait problem and joint swelling  Negative for back pain and neck pain  Right ankle pain   Skin: Negative for rash and wound  Neurological: Negative for dizziness, syncope, weakness, light-headedness, numbness and headaches  Physical Exam  ED Triage Vitals [05/20/19 1726]   Temperature Pulse Respirations Blood Pressure SpO2   97 6 °F (36 4 °C) 84 18 162/79 94 %      Temp Source Heart Rate Source Patient Position - Orthostatic VS BP Location FiO2 (%)   Oral Monitor Lying Right arm --      Pain Score       Worst Possible Pain             Orthostatic Vital Signs  Vitals:    05/20/19 1726 05/20/19 1843 05/20/19 1915 05/20/19 2015   BP: 162/79 (!) 161/101 156/80 149/83   Pulse: 84 82 80 82   Patient Position - Orthostatic VS: Lying Lying Lying Lying       Physical Exam   Constitutional: She is oriented to person, place, and time  She appears well-developed and well-nourished  No distress  HENT:   Head: Normocephalic and atraumatic  Right Ear: External ear normal    Left Ear: External ear normal    Nose: Nose normal    Mouth/Throat: Oropharynx is clear and moist    Eyes: Pupils are equal, round, and reactive to light  Conjunctivae and EOM are normal    Neck: Normal range of motion  Neck supple  Cardiovascular: Normal rate, regular rhythm, normal heart sounds and intact distal pulses  Exam reveals no gallop and no friction rub  No murmur heard  Pulmonary/Chest: Effort normal and breath sounds normal  No stridor  No respiratory distress  She has no wheezes  She has no rales  She exhibits no tenderness  Abdominal: Soft  Bowel sounds are normal  She exhibits no distension  There is no tenderness  There is no rebound and no guarding  Genitourinary:   Genitourinary Comments: No perianal hematoma   Musculoskeletal: She exhibits no edema  Right hip: Normal  She exhibits normal range of motion, normal strength, no tenderness, no bony tenderness and no swelling          Right knee: Normal  She exhibits normal range of motion, no swelling, no effusion, no ecchymosis, no deformity, no laceration, no erythema and normal alignment  No tenderness found  No medial joint line, no lateral joint line, no MCL and no LCL tenderness noted  Right ankle: She exhibits decreased range of motion, swelling, ecchymosis and deformity  She exhibits no laceration and normal pulse  Tenderness  Medial malleolus tenderness found  No lateral malleolus, no head of 5th metatarsal and no proximal fibula tenderness found  Achilles tendon normal         Right foot: There is decreased range of motion and tenderness  There is no bony tenderness, no swelling, no deformity and no laceration  No c-t-l-spine tenderness, step-offs, deformities   Pelvis stable    Neurological: She is alert and oriented to person, place, and time  GCS eye subscore is 4  GCS verbal subscore is 5  GCS motor subscore is 6  Skin: Skin is warm and dry  No rash noted  She is not diaphoretic  No erythema  No pallor  Psychiatric: She has a normal mood and affect  Her behavior is normal    Nursing note and vitals reviewed        ED Medications  Medications   fentanyl citrate (PF) (FOR EMS ONLY) 100 mcg/2 mL injection 100 mcg (0 mcg Does not apply Given to EMS 5/20/19 1751)   acetaminophen (TYLENOL) tablet 650 mg (650 mg Oral Given 5/20/19 1839)   lidocaine (PF) (XYLOCAINE-MPF) 1 % injection 20 mL (20 mL Infiltration Given by Other 5/20/19 1839)   fentanyl citrate (PF) 100 MCG/2ML 25 mcg (25 mcg Intravenous Given 5/20/19 1918)       Diagnostic Studies  Results Reviewed     Procedure Component Value Units Date/Time    APTT [[id number]]  (Abnormal) Collected:  05/20/19 2033    Lab Status:  Final result Specimen:  Blood from Arm, Left Updated:  05/20/19 2058     PTT 25 seconds     Protime-INR [[id number]]  (Normal) Collected:  05/20/19 2033    Lab Status:  Final result Specimen:  Blood from Arm, Left Updated:  05/20/19 2058     Protime 12 5 seconds INR 3 99    Basic metabolic panel [[id number]] Collected:  05/20/19 1801    Lab Status:  Final result Specimen:  Blood from Hand, Right Updated:  05/20/19 1841     Sodium 138 mmol/L      Potassium 4 2 mmol/L      Chloride 107 mmol/L      CO2 25 mmol/L      ANION GAP 6 mmol/L      BUN 25 mg/dL      Creatinine 0 87 mg/dL      Glucose 124 mg/dL      Calcium 8 8 mg/dL      eGFR 58 ml/min/1 73sq m     Narrative:       [place] guidelines for Chronic Kidney Disease (CKD):     Stage 1 with normal or high GFR (GFR > 90 mL/min/1 73 square meters)    Stage 2 Mild CKD (GFR = 60-89 mL/min/1 73 square meters)    Stage 3A Moderate CKD (GFR = 45-59 mL/min/1 73 square meters)    Stage 3B Moderate CKD (GFR = 30-44 mL/min/1 73 square meters)    Stage 4 Severe CKD (GFR = 15-29 mL/min/1 73 square meters)    Stage 5 End Stage CKD (GFR <15 mL/min/1 73 square meters)  Note: GFR calculation is accurate only with a steady state creatinine    CBC and differential [[id number]] Collected:  05/20/19 1801    Lab Status:  Final result Specimen:  Blood from Hand, Right Updated:  05/20/19 1812     WBC 6 89 Thousand/uL      RBC 4 70 Million/uL      Hemoglobin 13 5 g/dL      Hematocrit 42 1 %      MCV 90 fL      MCH 28 7 pg      MCHC 32 1 g/dL      RDW 14 6 %      MPV 11 0 fL      Platelets 304 Thousands/uL      nRBC 0 /100 WBCs      Neutrophils Relative 69 %      Immat GRANS % 0 %      Lymphocytes Relative 19 %      Monocytes Relative 11 %      Eosinophils Relative 1 %      Basophils Relative 0 %      Neutrophils Absolute 4 69 Thousands/µL      Immature Grans Absolute 0 03 Thousand/uL      Lymphocytes Absolute 1 28 Thousands/µL      Monocytes Absolute 0 78 Thousand/µL      Eosinophils Absolute 0 08 Thousand/µL      Basophils Absolute 0 03 Thousands/µL                  XR ankle 3+ vw right   ED Interpretation by Elon Merlin, DO (05/20 2121)   Post reduction alignment much improved as interpreted by me independently CT head without contrast   Final Result by Flor Temple MD (05/20 7424)      No acute intracranial abnormality                    Workstation performed: [id number]         XR ankle 3+ views RIGHT   ED Interpretation by Mike Almanza DO (05/20 2129)   Abnormal   Distal tibia and fibula comminuted impacted fracture and dislocation as interpreted by me independently       XR tibia fibula 2 views RIGHT   ED Interpretation by Mike Almanza DO (05/20 2129)   Abnormal   Distal tibia and fibula comminuted impacted fracture and dislocation as interpreted by me independently       XR chest 1 view    (Results Pending)         Procedures  Orthopedic Injury  Date/Time: 5/20/2019 7:29 PM  Performed by: Mike Almanza DO  Authorized by: Mike Almanza DO     Patient Location:  ED  Other Assisting Provider: Yes (comment) (Dr Rosa Herr)    Verbal consent obtained?: Yes    Written consent obtained?: Yes    Risks and benefits: Risks, benefits and alternatives were discussed    Consent given by:  Patient  Patient states understanding of procedure being performed: Yes    Patient's understanding of procedure matches consent: Yes    Patient identity confirmed:  Verbally with patient  Time out: Immediately prior to the procedure a time out was called    Injury location:  Ankle  Location details:  Right ankle  Injury type:  Fracture-dislocation  Neurovascular status: Neurovascularly intact    Distal perfusion: normal    Neurological function: normal    Range of motion: reduced    Local anesthesia used?: Yes    General anesthesia used?: No    Anesthesia:  Hematoma block  Local anesthetic:  Lidocaine 1% without epinephrine  Anesthetic total (ml):  10  Manipulation performed?: Yes    Skeletal traction used?: Yes    Reduction successful?: Yes    Confirmation: Reduction confirmed by x-ray    Immobilization:  Splint  Splint type:  Short leg  Supplies used:  Ortho-Glass  Neurovascular status: Neurovascularly intact    Distal perfusion: normal    Neurological function: normal    Range of motion: improved    Patient tolerance:  Patient tolerated the procedure well with no immediate complications          Phone Consults  ED Phone Contact    ED Course                               MDM  Number of Diagnoses or Management Options  Closed fracture dislocation of right ankle, initial encounter:   Fall from standing, initial encounter:   Diagnosis management comments: Assessment and plan:   Mechanical fall with isolated right ankle deformity  XR to assess for fracture, CTH to rule out intracranial hemorrhage, pain control  Reassess  Disposition  Final diagnoses:   Closed fracture dislocation of right ankle, initial encounter   Fall from standing, initial encounter     Time reflects when diagnosis was documented in both MDM as applicable and the Disposition within this note     Time User Action Codes Description Comment    5/20/2019  8:06 PM Rogelio Weathers [[id number]] Closed fracture dislocation of right ankle, initial encounter     5/20/2019  8:58 PM Renae Ball [[id number]] Fall from standing, initial encounter       ED Disposition     ED Disposition Condition Date/Time Comment    Admit Stable Mon May 20, 2019  8:58 PM Case was discussed with Dr Magen Mendoza and the patient's admission status was agreed to be Admission Status: inpatient status to the service of Dr Magen Mendoza   Follow-up Information    None         Patient's Medications   Discharge Prescriptions    No medications on file     No discharge procedures on file  ED Provider  Attending physically available and evaluated Angela Nam I managed the patient along with the ED Attending      Electronically Signed by         Lenin Cavazos DO  05/20/19 6757

## 2019-05-20 NOTE — ED ATTENDING ATTESTATION
Marzena Simeon MD, saw and evaluated the patient  I have discussed the patient with the resident/non-physician practitioner and agree with the resident's/non-physician practitioner's findings, Plan of Care, and MDM as documented in the resident's/non-physician practitioner's note, except where noted  All available labs and Radiology studies were reviewed  I was present for key portions of any procedure(s) performed by the resident/non-physician practitioner and I was immediately available to provide assistance  At this point I agree with the current assessment done in the Emergency Department  I have conducted an independent evaluation of this patient a history and physical is as follows:    OA: 81 y/o f with R ankle pain  Pt states that she was running to grab the door when she tripped and fell onto her R side and buttock  Patient denies hitting her head or LOC  However she is on aspirin and Plavix given she has a cardiac history  No neck pain or back pain  No chest pain or shortness of breath  Denies any preceding syncopal symptoms and simply stated that she slipped on the way to the door  She was unable to ambulate up on her own and had to use her Life Alert button for assistance  On arrival to the emergency department patient is hypertensive but otherwise vital signs stable  HEENT is normocephalic and atraumatic  Pupils equal round reactive light  No hemotympanum  No midline tenderness to palpation over CT or L-spine  Heart is regular rate  Lungs clear  Abdomen is soft  Nontender  Intact distal pulses  Patient with obvious deformity of right lower extremity with ecchymosis and swelling and tenderness to palpation most significantly over medial malleolus  Patient is able to wiggle her toes  She is able to feel me when I touch her foot  She has intact pulses and capillary refill is intact  Awake alert oriented and appropriate  Assessment and plan concern for ankle fracture  X-ray findings car patient has a fracture dislocation  Will also CT head given patient is on anticoagulationGiven age and comorbidities will attempt to do a hematoma block  Patient was given a small amount of pain medication in association with this  Will check basic blood work  Ortho consult  Pt lives alone, will require admission    Portions of the record may have been created with voice recognition software  Occasional wrong word or sound-a-like" substitutions may have occurred due to the inherent limitations of voice recognition software  Review chart carefully and recognize, using context, where substitutions have occurred    Critical Care Time  Procedures

## 2019-05-21 ENCOUNTER — ANESTHESIA (INPATIENT)
Dept: PERIOP | Facility: HOSPITAL | Age: 84
DRG: 494 | End: 2019-05-21
Payer: COMMERCIAL

## 2019-05-21 ENCOUNTER — APPOINTMENT (INPATIENT)
Dept: RADIOLOGY | Facility: HOSPITAL | Age: 84
DRG: 494 | End: 2019-05-21
Payer: COMMERCIAL

## 2019-05-21 LAB
ANION GAP SERPL CALCULATED.3IONS-SCNC: 8 MMOL/L (ref 4–13)
ATRIAL RATE: 81 BPM
BUN SERPL-MCNC: 24 MG/DL (ref 5–25)
CALCIUM SERPL-MCNC: 8.7 MG/DL (ref 8.3–10.1)
CHLORIDE SERPL-SCNC: 108 MMOL/L (ref 100–108)
CO2 SERPL-SCNC: 26 MMOL/L (ref 21–32)
CREAT SERPL-MCNC: 0.62 MG/DL (ref 0.6–1.3)
ERYTHROCYTE [DISTWIDTH] IN BLOOD BY AUTOMATED COUNT: 14.9 % (ref 11.6–15.1)
GFR SERPL CREATININE-BSD FRML MDRD: 79 ML/MIN/1.73SQ M
GLUCOSE SERPL-MCNC: 105 MG/DL (ref 65–140)
HCT VFR BLD AUTO: 38.4 % (ref 34.8–46.1)
HGB BLD-MCNC: 12.4 G/DL (ref 11.5–15.4)
MCH RBC QN AUTO: 29.2 PG (ref 26.8–34.3)
MCHC RBC AUTO-ENTMCNC: 32.3 G/DL (ref 31.4–37.4)
MCV RBC AUTO: 91 FL (ref 82–98)
P AXIS: 57 DEGREES
PLATELET # BLD AUTO: 234 THOUSANDS/UL (ref 149–390)
PMV BLD AUTO: 11.5 FL (ref 8.9–12.7)
POTASSIUM SERPL-SCNC: 3.8 MMOL/L (ref 3.5–5.3)
PR INTERVAL: 148 MS
PTH-INTACT SERPL-MCNC: 48 PG/ML (ref 18.4–80.1)
QRS AXIS: 48 DEGREES
QRSD INTERVAL: 76 MS
QT INTERVAL: 360 MS
QTC INTERVAL: 418 MS
RBC # BLD AUTO: 4.24 MILLION/UL (ref 3.81–5.12)
SODIUM SERPL-SCNC: 142 MMOL/L (ref 136–145)
T WAVE AXIS: 70 DEGREES
TSH SERPL DL<=0.05 MIU/L-ACNC: 1.19 UIU/ML (ref 0.36–3.74)
VENTRICULAR RATE: 81 BPM
WBC # BLD AUTO: 7.49 THOUSAND/UL (ref 4.31–10.16)

## 2019-05-21 PROCEDURE — C1713 ANCHOR/SCREW BN/BN,TIS/BN: HCPCS | Performed by: ORTHOPAEDIC SURGERY

## 2019-05-21 PROCEDURE — 84165 PROTEIN E-PHORESIS SERUM: CPT | Performed by: INTERNAL MEDICINE

## 2019-05-21 PROCEDURE — 0QSG04Z REPOSITION RIGHT TIBIA WITH INTERNAL FIXATION DEVICE, OPEN APPROACH: ICD-10-PCS | Performed by: ORTHOPAEDIC SURGERY

## 2019-05-21 PROCEDURE — 84165 PROTEIN E-PHORESIS SERUM: CPT | Performed by: PATHOLOGY

## 2019-05-21 PROCEDURE — 73610 X-RAY EXAM OF ANKLE: CPT

## 2019-05-21 PROCEDURE — 93010 ELECTROCARDIOGRAM REPORT: CPT | Performed by: INTERNAL MEDICINE

## 2019-05-21 PROCEDURE — 85027 COMPLETE CBC AUTOMATED: CPT | Performed by: INTERNAL MEDICINE

## 2019-05-21 PROCEDURE — 80048 BASIC METABOLIC PNL TOTAL CA: CPT | Performed by: INTERNAL MEDICINE

## 2019-05-21 PROCEDURE — 82306 VITAMIN D 25 HYDROXY: CPT | Performed by: INTERNAL MEDICINE

## 2019-05-21 PROCEDURE — 99232 SBSQ HOSP IP/OBS MODERATE 35: CPT | Performed by: PHYSICIAN ASSISTANT

## 2019-05-21 PROCEDURE — 83970 ASSAY OF PARATHORMONE: CPT | Performed by: INTERNAL MEDICINE

## 2019-05-21 PROCEDURE — NC001 PR NO CHARGE: Performed by: ORTHOPAEDIC SURGERY

## 2019-05-21 PROCEDURE — 84166 PROTEIN E-PHORESIS/URINE/CSF: CPT | Performed by: INTERNAL MEDICINE

## 2019-05-21 PROCEDURE — 99223 1ST HOSP IP/OBS HIGH 75: CPT | Performed by: ORTHOPAEDIC SURGERY

## 2019-05-21 PROCEDURE — 0QSJ04Z REPOSITION RIGHT FIBULA WITH INTERNAL FIXATION DEVICE, OPEN APPROACH: ICD-10-PCS | Performed by: ORTHOPAEDIC SURGERY

## 2019-05-21 PROCEDURE — 84166 PROTEIN E-PHORESIS/URINE/CSF: CPT | Performed by: PATHOLOGY

## 2019-05-21 PROCEDURE — 84443 ASSAY THYROID STIM HORMONE: CPT | Performed by: INTERNAL MEDICINE

## 2019-05-21 PROCEDURE — 27814 TREATMENT OF ANKLE FRACTURE: CPT | Performed by: ORTHOPAEDIC SURGERY

## 2019-05-21 DEVICE — 4.0MM CANNULATED SCREW SHORT THREAD/46MM
Type: IMPLANTABLE DEVICE | Site: ANKLE | Status: NON-FUNCTIONAL
Removed: 2019-06-04

## 2019-05-21 DEVICE — 2.7MM VA-LCP LATERAL DISTAL FIBULA PLATE/5 HOLES/RIGHT
Type: IMPLANTABLE DEVICE | Site: ANKLE | Status: FUNCTIONAL
Brand: VA-LCP

## 2019-05-21 DEVICE — 2.7MM CORTEX SCREW SLF-TPNG WITH T8 STARDRIVE RECESS 14MM: Type: IMPLANTABLE DEVICE | Site: ANKLE | Status: FUNCTIONAL

## 2019-05-21 DEVICE — 2.7MM VA LCKNG SCREW SLF-TPNG WITH T8 STARDRIVE RECESS 16MM: Type: IMPLANTABLE DEVICE | Site: ANKLE | Status: FUNCTIONAL

## 2019-05-21 DEVICE — 2.7MM VA LCKNG SCREW SLF-TPNG WITH T8 STARDRIVE RECESS 18MM: Type: IMPLANTABLE DEVICE | Site: ANKLE | Status: FUNCTIONAL

## 2019-05-21 RX ORDER — FENTANYL CITRATE 50 UG/ML
INJECTION, SOLUTION INTRAMUSCULAR; INTRAVENOUS AS NEEDED
Status: DISCONTINUED | OUTPATIENT
Start: 2019-05-21 | End: 2019-05-21 | Stop reason: SURG

## 2019-05-21 RX ORDER — ONDANSETRON 2 MG/ML
4 INJECTION INTRAMUSCULAR; INTRAVENOUS ONCE AS NEEDED
Status: DISCONTINUED | OUTPATIENT
Start: 2019-05-21 | End: 2019-05-21 | Stop reason: HOSPADM

## 2019-05-21 RX ORDER — DEXAMETHASONE SODIUM PHOSPHATE 10 MG/ML
INJECTION, SOLUTION INTRAMUSCULAR; INTRAVENOUS AS NEEDED
Status: DISCONTINUED | OUTPATIENT
Start: 2019-05-21 | End: 2019-05-21 | Stop reason: SURG

## 2019-05-21 RX ORDER — LABETALOL 20 MG/4 ML (5 MG/ML) INTRAVENOUS SYRINGE
AS NEEDED
Status: DISCONTINUED | OUTPATIENT
Start: 2019-05-21 | End: 2019-05-21 | Stop reason: SURG

## 2019-05-21 RX ORDER — LISINOPRIL 10 MG/1
10 TABLET ORAL DAILY
Status: DISCONTINUED | OUTPATIENT
Start: 2019-05-22 | End: 2019-05-23 | Stop reason: HOSPADM

## 2019-05-21 RX ORDER — CEFAZOLIN SODIUM 2 G/50ML
2000 SOLUTION INTRAVENOUS ONCE
Status: COMPLETED | OUTPATIENT
Start: 2019-05-21 | End: 2019-05-21

## 2019-05-21 RX ORDER — HYDROMORPHONE HCL/PF 1 MG/ML
0.5 SYRINGE (ML) INJECTION
Status: DISCONTINUED | OUTPATIENT
Start: 2019-05-21 | End: 2019-05-21 | Stop reason: HOSPADM

## 2019-05-21 RX ORDER — PROPOFOL 10 MG/ML
INJECTION, EMULSION INTRAVENOUS CONTINUOUS PRN
Status: DISCONTINUED | OUTPATIENT
Start: 2019-05-21 | End: 2019-05-21 | Stop reason: SURG

## 2019-05-21 RX ORDER — CEFAZOLIN SODIUM 2 G/50ML
2000 SOLUTION INTRAVENOUS EVERY 8 HOURS
Status: COMPLETED | OUTPATIENT
Start: 2019-05-21 | End: 2019-05-22

## 2019-05-21 RX ORDER — HYDRALAZINE HYDROCHLORIDE 20 MG/ML
5 INJECTION INTRAMUSCULAR; INTRAVENOUS
Status: DISCONTINUED | OUTPATIENT
Start: 2019-05-21 | End: 2019-05-21 | Stop reason: HOSPADM

## 2019-05-21 RX ORDER — OXYCODONE HYDROCHLORIDE 5 MG/1
2.5 TABLET ORAL EVERY 6 HOURS PRN
Qty: 20 TABLET | Refills: 0 | Status: SHIPPED | OUTPATIENT
Start: 2019-05-21 | End: 2019-05-23 | Stop reason: SDUPTHER

## 2019-05-21 RX ORDER — HYDRALAZINE HYDROCHLORIDE 20 MG/ML
5 INJECTION INTRAMUSCULAR; INTRAVENOUS EVERY 6 HOURS PRN
Status: DISCONTINUED | OUTPATIENT
Start: 2019-05-21 | End: 2019-05-23 | Stop reason: HOSPADM

## 2019-05-21 RX ORDER — LIDOCAINE HYDROCHLORIDE 10 MG/ML
INJECTION, SOLUTION INFILTRATION; PERINEURAL AS NEEDED
Status: DISCONTINUED | OUTPATIENT
Start: 2019-05-21 | End: 2019-05-21 | Stop reason: SURG

## 2019-05-21 RX ORDER — SODIUM CHLORIDE, SODIUM LACTATE, POTASSIUM CHLORIDE, CALCIUM CHLORIDE 600; 310; 30; 20 MG/100ML; MG/100ML; MG/100ML; MG/100ML
INJECTION, SOLUTION INTRAVENOUS CONTINUOUS PRN
Status: DISCONTINUED | OUTPATIENT
Start: 2019-05-21 | End: 2019-05-21 | Stop reason: SURG

## 2019-05-21 RX ORDER — ONDANSETRON 2 MG/ML
INJECTION INTRAMUSCULAR; INTRAVENOUS AS NEEDED
Status: DISCONTINUED | OUTPATIENT
Start: 2019-05-21 | End: 2019-05-21 | Stop reason: SURG

## 2019-05-21 RX ORDER — PROPOFOL 10 MG/ML
INJECTION, EMULSION INTRAVENOUS AS NEEDED
Status: DISCONTINUED | OUTPATIENT
Start: 2019-05-21 | End: 2019-05-21 | Stop reason: SURG

## 2019-05-21 RX ORDER — ONDANSETRON 2 MG/ML
4 INJECTION INTRAMUSCULAR; INTRAVENOUS EVERY 6 HOURS PRN
Status: DISCONTINUED | OUTPATIENT
Start: 2019-05-21 | End: 2019-05-23 | Stop reason: HOSPADM

## 2019-05-21 RX ADMIN — LABETALOL HYDROCHLORIDE 10 MG: 5 INJECTION, SOLUTION INTRAVENOUS at 13:15

## 2019-05-21 RX ADMIN — FENTANYL CITRATE 50 MCG: 50 INJECTION, SOLUTION INTRAMUSCULAR; INTRAVENOUS at 13:49

## 2019-05-21 RX ADMIN — PROPOFOL 50 MG: 10 INJECTION, EMULSION INTRAVENOUS at 11:47

## 2019-05-21 RX ADMIN — FENTANYL CITRATE 25 MCG: 50 INJECTION, SOLUTION INTRAMUSCULAR; INTRAVENOUS at 11:46

## 2019-05-21 RX ADMIN — FENTANYL CITRATE 25 MCG: 50 INJECTION, SOLUTION INTRAMUSCULAR; INTRAVENOUS at 12:54

## 2019-05-21 RX ADMIN — SODIUM CHLORIDE, SODIUM LACTATE, POTASSIUM CHLORIDE, AND CALCIUM CHLORIDE: .6; .31; .03; .02 INJECTION, SOLUTION INTRAVENOUS at 11:31

## 2019-05-21 RX ADMIN — PROPOFOL 50 MG: 10 INJECTION, EMULSION INTRAVENOUS at 11:48

## 2019-05-21 RX ADMIN — OXYCODONE HYDROCHLORIDE 5 MG: 5 TABLET ORAL at 15:21

## 2019-05-21 RX ADMIN — ONDANSETRON 4 MG: 2 INJECTION INTRAMUSCULAR; INTRAVENOUS at 12:08

## 2019-05-21 RX ADMIN — HYDROMORPHONE HYDROCHLORIDE 0.5 MG: 1 INJECTION, SOLUTION INTRAMUSCULAR; INTRAVENOUS; SUBCUTANEOUS at 14:10

## 2019-05-21 RX ADMIN — FENTANYL CITRATE 25 MCG: 50 INJECTION, SOLUTION INTRAMUSCULAR; INTRAVENOUS at 13:07

## 2019-05-21 RX ADMIN — ACETAMINOPHEN 975 MG: 325 TABLET ORAL at 21:25

## 2019-05-21 RX ADMIN — FENTANYL CITRATE 25 MCG: 50 INJECTION, SOLUTION INTRAMUSCULAR; INTRAVENOUS at 11:36

## 2019-05-21 RX ADMIN — DEXAMETHASONE SODIUM PHOSPHATE 4 MG: 10 INJECTION, SOLUTION INTRAMUSCULAR; INTRAVENOUS at 12:08

## 2019-05-21 RX ADMIN — ACETAMINOPHEN 975 MG: 325 TABLET ORAL at 04:51

## 2019-05-21 RX ADMIN — HYDRALAZINE HYDROCHLORIDE 5 MG: 20 INJECTION INTRAMUSCULAR; INTRAVENOUS at 14:10

## 2019-05-21 RX ADMIN — LIDOCAINE HYDROCHLORIDE 50 MG: 10 INJECTION, SOLUTION INFILTRATION; PERINEURAL at 11:46

## 2019-05-21 RX ADMIN — FENTANYL CITRATE 50 MCG: 50 INJECTION, SOLUTION INTRAMUSCULAR; INTRAVENOUS at 13:39

## 2019-05-21 RX ADMIN — BIMATOPROST 1 DROP: 0.1 SOLUTION/ DROPS OPHTHALMIC at 21:26

## 2019-05-21 RX ADMIN — CEFAZOLIN SODIUM 2000 MG: 2 SOLUTION INTRAVENOUS at 11:45

## 2019-05-21 RX ADMIN — FENTANYL CITRATE 25 MCG: 50 INJECTION, SOLUTION INTRAMUSCULAR; INTRAVENOUS at 12:05

## 2019-05-21 RX ADMIN — FENTANYL CITRATE 25 MCG: 50 INJECTION, SOLUTION INTRAMUSCULAR; INTRAVENOUS at 12:01

## 2019-05-21 RX ADMIN — FENTANYL CITRATE 50 MCG: 50 INJECTION, SOLUTION INTRAMUSCULAR; INTRAVENOUS at 12:18

## 2019-05-21 RX ADMIN — CEFAZOLIN SODIUM 2000 MG: 2 SOLUTION INTRAVENOUS at 21:25

## 2019-05-21 RX ADMIN — PROPOFOL 100 MG: 10 INJECTION, EMULSION INTRAVENOUS at 11:46

## 2019-05-21 RX ADMIN — HYDRALAZINE HYDROCHLORIDE 5 MG: 20 INJECTION INTRAMUSCULAR; INTRAVENOUS at 08:26

## 2019-05-21 RX ADMIN — PROPOFOL 100 MCG/KG/MIN: 10 INJECTION, EMULSION INTRAVENOUS at 11:49

## 2019-05-21 RX ADMIN — PRAVASTATIN SODIUM 40 MG: 40 TABLET ORAL at 17:22

## 2019-05-21 NOTE — UTILIZATION REVIEW
Initial Clinical Review    Admission: Date/Time/Statement: 5/20/19 @ 2100   Orders Placed This Encounter   Procedures    Inpatient Admission     Standing Status:   Standing     Number of Occurrences:   1     Order Specific Question:   Admitting Physician     Answer:   Piyush Rodriguez [[id number]]     Order Specific Question:   Level of Care     Answer:   Med Surg [[id number]]     Order Specific Question:   Estimated length of stay     Answer:   More than 2 Midnights     Order Specific Question:   Certification     Answer:   I certify that inpatient services are medically necessary for this patient for a duration of greater than two midnights  See H&P and MD Progress Notes for additional information about the patient's course of treatment  ED: Date/Time/Mode of Arrival:   ED Arrival Information     Expected Arrival Acuity Means of Arrival Escorted By Service Admission Type    - 5/20/2019 17:22 Emergent Ambulance [facility] Hospitalist Emergency    Arrival Complaint    -        Chief Complaint:   Chief Complaint   Patient presents with   Lexie Rideau Fall     pt presents to ED via EMS after fall in apartment  pt states "i got up in a hurry because someone knocked at my door and I was half asleep, my ankle just twisted " obvious deformity noted to RLE ankle  no other injuries noted  pt states she fell on her bottom, denies any other pain    Ankle Injury - Major     Assessment/Plan:   89y Female to ED presents with right ankle fracture S/P mechanical fall  Today she tripped and fell while attempting to answer the door at independent living facility  facility  Denied loss of consciousness, presyncope/syncope, or dizziness prior to the fall  Reported immediate pain and apparent deformity to right ankle, and was found by visitor to home    Found during ED evaluation to have right bimalleolar ankle fracture dislocation and underwent closed reduction and splinting in   Past medical history significant for hypertension, PAD, hyperlipidemia, osteoporosis, GERD    Pt is being admitted with Closed fracture dislocation of right ankle joint/ Hypertension  For ORIF right ankle tentatively tomorrow, NPO at midnight  Pain control  Orthopedic Surgery consult  Hold enalapril in anticipation for OR  ED Vital Signs:   ED Triage Vitals [05/20/19 1726]   Temperature Pulse Respirations Blood Pressure SpO2   97 6 °F (36 4 °C) 84 18 162/79 94 %      Temp Source Heart Rate Source Patient Position - Orthostatic VS BP Location FiO2 (%)   Oral Monitor Lying Right arm --      Pain Score       Worst Possible Pain        Wt Readings from Last 1 Encounters:   05/20/19 61 2 kg (135 lb)     Vital Signs (abnormal):   Date/Time  Temp  Pulse  Resp  BP  SpO2  O2 Device  Patient Position - Orthostatic VS   05/21/19 0927    96    169/77  94 %    Lying   05/21/19 0707  97 5 °F (36 4 °C)  92  18  196/83  Abnormal   95 %       05/21/19 0214        174/88  Abnormal          05/21/19 0212  98 1 °F (36 7 °C)  78  18    95 %       05/20/19 2218    88    180/94  Abnormal   97 %       05/20/19 2200            None (Room air)     05/20/19 2015    82  18  149/83  96 %  Nasal cannula  Lying     Pertinent Labs/Diagnostic Test Results:   Xray Right Ankle - Comminuted fracture dislocation at the ankle mortise involving the malleoli    CT Head - No acute intracranial abnormality  CXR - No acute cardiopulmonary disease  Ref   Range 5/20/2019 18:01 5/20/2019 20:33 5/21/2019 04:40   Sodium Latest Ref Range: 136 - 145 mmol/L 138  142   Potassium Latest Ref Range: 3 5 - 5 3 mmol/L 4 2  3 8   Chloride Latest Ref Range: 100 - 108 mmol/L 107  108   CO2 Latest Ref Range: 21 - 32 mmol/L 25  26   Anion Gap Latest Ref Range: 4 - 13 mmol/L 6  8   BUN Latest Ref Range: 5 - 25 mg/dL 25  24   Creatinine Latest Ref Range: 0 60 - 1 30 mg/dL 0 87  0 62   Glucose, Random Latest Ref Range: 65 - 140 mg/dL 124  105   Calcium Latest Ref Range: 8 3 - 10 1 mg/dL 8 8  8 7 eGFR Latest Units: ml/min/1 73sq m 58  79   WBC Latest Ref Range: 4 31 - 10 16 Thousand/uL 6 89  7 49   Red Blood Cell Count Latest Ref Range: 3 81 - 5 12 Million/uL 4 70  4 24   Hemoglobin Latest Ref Range: 11 5 - 15 4 g/dL 13 5  12 4   HCT Latest Ref Range: 34 8 - 46 1 % 42 1  38 4   Platelet Count Latest Ref Range: 149 - 390 Thousands/uL 276  234   Protime Latest Ref Range: 11 8 - 14 2 seconds  12 5    INR Latest Ref Range: 0 86 - 1 17   0 92    PTT Latest Ref Range: 26 - 38 seconds  25 (L)    PARATHYROID HORMONE Latest Ref Range: 18 4 - 80 1 pg/mL   48 0   TSH 3RD GENERATON Latest Ref Range: 0 358 - 3 740 uIU/mL   1 190   ABO Grouping Unknown  O    Rh Factor Unknown  Positive    Antibody Screen Unknown  Negative    Specimen Expiration Date Unknown  [id number]      ED Treatment:   Medication Administration from 05/20/2019 1722 to 05/20/2019 2202       Date/Time Order Dose Route Action     05/20/2019 1839 acetaminophen (TYLENOL) tablet 650 mg 650 mg Oral Given     05/20/2019 1839 lidocaine (PF) (XYLOCAINE-MPF) 1 % injection 20 mL 20 mL Infiltration Given by Other     05/20/2019 1918 fentanyl citrate (PF) 100 MCG/2ML 25 mcg 25 mcg Intravenous Given        Past Medical/Surgical History:    Active Ambulatory Problems     Diagnosis Date Noted    Atherosclerosis 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 ([facility] ) 04/24/2013    Benign essential hypertension 06/27/2012    Asymptomatic bilateral carotid artery stenosis 07/17/2014    Cataract 03/26/2013    GERD without esophagitis 08/09/2013    Hypercholesterolemia 09/21/2012    Hypertriglyceridemia 10/13/2015    Iron deficiency anemia 01/16/2018    [facility] 02/25/2014    Microcytic anemia 12/05/2017    Osteoporosis 06/27/2012    Vitamin D deficiency 07/23/2013    PAD (peripheral artery disease) ([facility] ) 04/25/2019     Resolved Ambulatory Problems     Diagnosis Date Noted    Wrist fracture, left 01/04/2017     Past Medical History:   Diagnosis Date    Anorexia     GERD (gastroesophageal reflux disease)     Herpes zoster     Hypertension     [facility] )     Malignant melanoma of skin ([facility] )     Pelvic fracture (HCC)      Admitting Diagnosis: Injury, unspecified, initial encounter [T14 90XA]  Unspecified multiple injuries, initial encounter [T07  XXXA]  Closed fracture dislocation of right ankle, initial encounter [S82 891A]     Age/Sex: 80 y o  female     Admission Orders:  NPO; Sips with meds  5/21 OR -  OPEN REDUCTION W/ INTERNAL FIXATION (ORIF) ANKLE (Right Ankle)    Orthopedic Surgery cons  PT/OT eval and trat    Scheduled Meds:   Current Facility-Administered Medications:  [MAR Hold] acetaminophen 975 mg Oral Q8H [address]   [START ON 5/22/2019] aspirin 81 mg Oral Once per day on Mon Wed Fri   bimatoprost 1 drop Both Eyes HS   calcium carbonate-vitamin D 1 tablet Oral Daily With Breakfast   cholecalciferol 3,000 Units Oral Daily   famotidine 40 mg Oral Daily   magnesium oxide 400 mg Oral Daily   pravastatin 40 mg Oral Daily With Dinner     Continuous Infusions:     None    PRN Meds:  Hydralazine Iv x1

## 2019-05-21 NOTE — PROGRESS NOTES
Progress Note - Power Greenwood 4/10/1928, 80 y o  female MRN: [id number]  Unit/Bed#: CW3 339-01 Encounter: [id number]  Primary Care Provider: Dejuan Stinson DO   Date and time admitted to hospital: 5/20/2019  5:22 PM    * Closed fracture dislocation of right ankle joint  Assessment & Plan  · Post op day #0 reduction-fixation bilateral malleolar fracture, left-sided  · Patient appears to be tolerating procedure well  No chest pain or difficulty breathing  She is maintained on nasal cannula at this time with pure wick in place  No bowel movement yet  States her pain is somewhat well controlled but does have pain of lower extremity  · Will monitor CBC, O2 sat, vital signs  · Medical history reviewed, CXR appears without acute cardiopulmonary disease, EKG nonischemic  Overall patient is low risk for low-moderate risk procedure  Patient is acceptable risk to proceed to OR at the discretion of Orthopedic surgery service  · Advise correction of postoperative anemia as indicated  · Pain control as per geriatric pain management order set  · Remainder of management as per Orthopedic surgery service    Osteoporosis  Assessment & Plan  · Check vitamin-D panel, TSH, PTH, SPEP/UPEP  · Continue vitamin-D and calcium supplementation    GERD without esophagitis  Assessment & Plan  · Controlled, continue Pepcid    Benign essential hypertension  Assessment & Plan  · Elevated, but suspect pain playing a role into this  · Pain control as above  · Can restart enalapril which was held in anticipation of OR  · Will add p r n  IV medication if needed      VTE Pharmacologic Prophylaxis:   Pharmacologic: Enoxaparin (Lovenox)  Mechanical VTE Prophylaxis in Place: Yes    Patient Centered Rounds: I have performed bedside rounds with nursing staff today  Discussions with Specialists or Other Care Team Provider:  Nursing    Education and Discussions with Family / Patient:  Discussed with patient    Time Spent for Care: 20 minutes  More than 50% of total time spent on counseling and coordination of care as described above  Current Length of Stay: 1 day(s)    Current Patient Status: Inpatient   Certification Statement: The patient will continue to require additional inpatient hospital stay due to Postop day 0, pending postop course    Discharge Plan:  Pending postop course, hopefully within 48 hours    Code Status: Level 2 - DNAR: but accepts endotracheal intubation      Subjective:   Patient desires stress  She denies cough, shortness breath, palpitations, difficulty breathing  She denies abdominal pain  Has not had bowel movement however just came from surgery  Believes the procedure went well  Mild pain of lower extremity    Objective:     Vitals:   Temp (24hrs), Av 5 °F (36 4 °C), Min:96 8 °F (36 °C), Max:98 1 °F (36 7 °C)    Temp:  [96 8 °F (36 °C)-98 1 °F (36 7 °C)] 97 5 °F (36 4 °C)  HR:  [68-96] 79  Resp:  [12-18] 12  BP: (149-196)/() 165/77  SpO2:  [90 %-98 %] 97 %  Body mass index is 23 17 kg/m²  Input and Output Summary (last 24 hours): Intake/Output Summary (Last 24 hours) at 2019 1638  Last data filed at 2019 1345  Gross per 24 hour   Intake 1020 ml   Output 475 ml   Net 545 ml       Physical Exam:     Physical Exam   Constitutional: She is oriented to person, place, and time  She appears well-developed and well-nourished  No distress  Pleasant, fully conversational    HENT:   Head: Normocephalic and atraumatic  Eyes: Right eye exhibits no discharge  Left eye exhibits no discharge  No scleral icterus  Cardiovascular: Normal rate and regular rhythm  Exam reveals no gallop and no friction rub  No murmur heard  Pulmonary/Chest: Effort normal and breath sounds normal  No respiratory distress  She has no wheezes  She has no rales  Abdominal: Soft  Bowel sounds are normal  She exhibits no distension  There is no tenderness     Genitourinary:   Genitourinary Comments: Pure wick in place draining clear urine   Neurological: She is alert and oriented to person, place, and time  Skin: Skin is warm and dry  Nursing note and vitals reviewed  Additional Data:     Labs:    Results from last 7 days   Lab Units 05/21/19  0440 05/20/19  1801   WBC Thousand/uL 7 49 6 89   HEMOGLOBIN g/dL 12 4 13 5   HEMATOCRIT % 38 4 42 1   PLATELETS Thousands/uL 234 276   NEUTROS PCT %  --  69   LYMPHS PCT %  --  19   MONOS PCT %  --  11   EOS PCT %  --  1     Results from last 7 days   Lab Units 05/21/19  0440   SODIUM mmol/L 142   POTASSIUM mmol/L 3 8   CHLORIDE mmol/L 108   CO2 mmol/L 26   BUN mg/dL 24   CREATININE mg/dL 0 62   ANION GAP mmol/L 8   CALCIUM mg/dL 8 7   GLUCOSE RANDOM mg/dL 105     Results from last 7 days   Lab Units 05/20/19  2033   INR  0 92                       * I Have Reviewed All Lab Data Listed Above  * Additional Pertinent Lab Tests Reviewed:  All [facility] Admission Reviewed      Recent Cultures (last 7 days):           Last 24 Hours Medication List:     Current Facility-Administered Medications:  acetaminophen 975 mg Oral [place], [place]   [START ON 5/22/2019] aspirin 81 mg Oral Once per day on Mon Wed Fri Rabia Hernandez PA-C   bimatoprost 1 drop Both Eyes HS Rabia Hernandez PA-C   calcium carbonate-vitamin D 1 tablet Oral Daily With Breakfast Rabia Hernandez PA-C   cefazolin 2,000 mg Intravenous [address] [place], [place]   cholecalciferol 3,000 Units Oral Daily [place], [place]   [START ON 5/22/2019] enoxaparin 40 mg Subcutaneous Daily Rabia Hernandez PA-C   famotidine 40 mg Oral Daily [place], [place]   hydrALAZINE 5 mg Intravenous Q6H PRN Rabia Hernandez PA-C   HYDROmorphone 0 2 mg Intravenous Q4H PRN Rabia Hernandez PA-C   lactated ringers 500 mL Intravenous Once PRN Rabia Hernandez PA-C   And       lactated ringers 500 mL Intravenous Once PRN Rabia Hernandez PA-C   magnesium oxide 400 mg Oral Daily [place], [place] ondansetron 4 mg Intravenous Q6H PRN Krystyna Park, NATALIE   oxyCODONE 2 5 mg Oral Q4H PRN Krystyna Park, NATALIE   oxyCODONE 5 mg Oral Q4H PRN Krystyna Park, ANTALIE   pravastatin 40 mg Oral Daily With [employer], NATALIE   sodium chloride 500 mL Intravenous Once PRN Krystyna Park PA-C   And       sodium chloride 500 mL Intravenous Once PRN Krystyna Park, NATALIE        Today, Patient Was Seen By: Brigitte Givens PA-C    ** Please Note: Dictation voice to text software may have been used in the creation of this document   **

## 2019-05-21 NOTE — CONSULTS
Orthopedics   Angela Nam 80 y o  female MRN: [id number]  Unit/Bed#: X ray      Chief Complaint:   right ankle pain    HPI:  80 y  o female status post mechanical fall complaining of right ankle pain and inability to bear weight  Patient tripped and fell at her independent living facility  She felt immediate pain and deformity to her right ankle  Pain is well localized to right ankle, made worse with direct palpation attempted ambulation, improves at rest   Denies any numbness or tingling  Review Of Systems:   · Skin:  Closed  · Neuro: See HPI  · Musculoskeletal: See HPI  · 14 point review of systems negative except as stated above     Past Medical History:   Past Medical History:   Diagnosis Date    Anorexia     last assessed: 8/9/2013    GERD (gastroesophageal reflux disease)     Herpes zoster     last assessed: 9/11/2013    Hypertension     [facility] )     resolved: 1997    Malignant melanoma of skin ([facility] )     resolved: 2015    [facility]        Past Surgical History:   Past Surgical History:   Procedure Laterality Date    BREAST SURGERY      enlargement procedure    BYPASS FEMORAL-POPLITEAL Left     onset: 8/31/2012    CATARACT EXTRACTION Right 06/19/2013    HYSTERECTOMY      resolved: 1970    OTHER SURGICAL HISTORY  05/30/2013    PTA Femoral-popliteal initial stenosis left Description: with mechanical thrombectomy, PTA and stent     TN OPEN RX DISTAL RADIUS FX, INTRA-ARTICULAR, 3+ FRAG Left 1/18/2017    Procedure: DISTAL RADIUS OPEN REDUCTION W/ INTERNAL FIXATION ;  Surgeon: Jamir Zurita MD;  Location: [facility];  Service: Orthopedics       Family History:  Family history reviewed and non-contributory  Family History   Problem Relation Age of Onset    Heart attack Mother     Heart failure Father     Other Sister         thyroid surgery       Social History:  Social History     Socioeconomic History    Marital status:       Spouse name: None    Number of children: None    Years of education: None    Highest education level: None   Occupational History    None   Social Needs    Financial resource strain: None    Food insecurity:     Worry: None     Inability: None    Transportation needs:     Medical: None     Non-medical: None   Tobacco Use    Smoking status: Never Smoker    Smokeless tobacco: Never Used   Substance and Sexual Activity    Alcohol use: No     Comment: Per allscripts - social drinker     Drug use: No    Sexual activity: None   Lifestyle    Physical activity:     Days per week: None     Minutes per session: None    Stress: None   Relationships    Social connections:     Talks on phone: None     Gets together: None     Attends [employer] service: None     Active member of club or organization: None     Attends meetings of clubs or organizations: None     Relationship status: None    Intimate partner violence:     Fear of current or ex partner: None     Emotionally abused: None     Physically abused: None     Forced sexual activity: None   Other Topics Concern    None   Social History Narrative    Advance directive on file        Allergies:    Allergies   Allergen Reactions    Contrast  [Iodinated Diagnostic Agents]      Other reaction(s): "I get very cold"    Iodine      Other reaction(s): Chills  Reaction Date: 02Aug2011;     Omnipaque [Iohexol]     Sulfa Antibiotics Vomiting           Labs:  0   Lab Value Date/Time    HCT 42 1 05/20/2019 1801    HCT 42 0 01/11/2019 1052    HCT 39 8 08/08/2018 1059    HCT 36 8 05/04/2018 1047    HCT 36 2 01/15/2018 1024    HCT 36 5 11/29/2017 0948    HCT 41 9 01/10/2017 1646    HCT 39 3 10/14/2016 0859    HGB 13 5 05/20/2019 1801    HGB 13 9 01/11/2019 1052    HGB 12 0 08/08/2018 1059    HGB 11 1 (L) 05/04/2018 1047    HGB 10 7 (L) 01/15/2018 1024    HGB 10 7 (L) 11/29/2017 0948    HGB 13 4 01/10/2017 1646    HGB 12 8 10/14/2016 0859    INR 0 86 12/29/2016 1639    WBC 6 89 05/20/2019 1801    WBC 5 6 01/11/2019 1052    WBC 7 2 08/08/2018 1059    WBC 5 3 05/04/2018 1047    WBC 5 5 01/15/2018 1024    WBC 4 6 11/29/2017 0948    WBC 7 55 01/10/2017 1646    WBC 4 6 10/14/2016 0859       Meds:  No current facility-administered medications for this encounter  Current Outpatient Medications:     aspirin 81 MG tablet, Take 1 tablet (81 mg total) by mouth 3 (three) times a week, Disp: , Rfl:     calcium-vitamin D 250-100 MG-UNIT per tablet, Take 1 tablet by mouth daily, Disp: , Rfl:     Cholecalciferol (VITAMIN D3) 3000 UNITS TABS, Take by mouth, Disp: , Rfl:     clopidogrel (PLAVIX) 75 mg tablet, Take 75 mg by mouth daily, Disp: , Rfl:     enalapril (VASOTEC) 10 mg tablet, Take 1 tablet (10 mg total) by mouth daily for 180 days, Disp: 30 tablet, Rfl: 5    famotidine (PEPCID) 40 MG tablet, Take 1 tablet (40 mg total) by mouth daily, Disp: 30 tablet, Rfl: 5    Magnesium Oxide 250 MG TABS, Take 1 tablet by mouth daily, Disp: , Rfl:     simvastatin (ZOCOR) 20 mg tablet, Take 1 tablet (20 mg total) by mouth daily, Disp: 30 tablet, Rfl: 5    esomeprazole (NexIUM) 40 MG capsule, Take 1 capsule (40 mg total) by mouth daily for 30 days, Disp: 30 capsule, Rfl: 5    fluticasone (FLONASE) 50 mcg/act nasal spray, , Disp: , Rfl:     LUMIGAN 0 01 % ophthalmic drops, , Disp: , Rfl:     Blood Culture:   No results found for: BLOODCX    Wound Culture:   No results found for: WOUNDCULT    Ins and Outs:  No intake/output data recorded  Physical Exam:   /80 (BP Location: Right arm)   Pulse 80   Temp 97 6 °F (36 4 °C) (Oral)   Resp 18   Ht 5' 4" (1 626 m)   Wt 61 2 kg (135 lb)   SpO2 94%   BMI 23 17 kg/m²   Gen: Alert and oriented to person, place, time  HEENT: EOMI, eyes clear, moist mucus membranes, hearing intact  Respiratory: Bilateral chest rise   No audible wheezing found  Cardiovascular:  No palpable arrhythmia  Abdomen: soft nontender/nondistended  Musculoskeletal: right lower extremity  · Skin intact, no erythema, no ecchymosis  · Tenderness palpation over medial and lateral ankle  · Positive EHL, FHL  · Painful ankle range of motion  · Obvious deformity to ankle  · Toes warm and pink x5  · Palpable dorsalis pedis    Radiology:   I personally reviewed the films  X-rays of right ankle reveal displaced ankle fracture dislocation bimalleolar ankle fracture    Procedure: Ankle reduction and splint application    A hematoma block was given with 20cc on 1% lidocaine without epi  Once adequate anesthesia was obtained a gentle closed reduction maneuver was performed and pt was placed in a well padded AO splint  Pt tolerated the procedure well and was neurovascularly intact both pre and post procedure  Post reduction orthogonal x rays showed appropriate reduction of the talus in the ankle mortise      _*_*_*_*_*_*_*_*_*_*_*_*_*_*_*_*_*_*_*_*_*_*_*_*_*_*_*_*_*_*_*_*_*_*_*_*_*_*_*_*_*    Assessment:  80 y  o female with right bimalleolar ankle fracture dislocation status post closed reduction and splinting    Plan:   · NWB right lower extremity in AO splint  · To OR for ORIF of right ankle fracture  · Analgesics for pain  · Informed consent obtained  · NPO at midnight  · Pre op labs in ED  · Medicine team for clearance to OR  · Dispo: Ortho will follow      Jh Rivas MD

## 2019-05-21 NOTE — SOCIAL WORK
Pt was in the OR today  CM called dtr, Deborah Habermann (cell [phone]) to complete opening assessment  DtrNaomi reported the following:    Emergency Contact: Daughter, Deborah Habermann  POA/LW: Patrice Shaffer  Level of assist with ADL's PTA: IND at  apartments  House or Apt: Apt  HEAVEN to enter: None  BATH on 1st floor: Yes  DME: Cane  VNA: Hx however dtr unable to recall the agency  SNF/Rehab: None    Transportation: [facility] per daughter  Help at home: Dtr stated [facility] can provide assist if needed  Dtr lives one hour away  Pharmacy/Rx Coverage: [facility]  Name of PCP: Dr Danial Goldsmith tx for MH/SA: None    Pets or Dependents in the home: None    Anticipated D/C Plan: Dtr anticipates pt to return to  [facility]  CM reviewed d/c planning process including the following: identifying help at home, patient preference for d/c planning needs, Discharge Lounge, [facility] Meds to Bed program, availability of treatment team to discuss questions or concerns patient and/or family may have regarding understanding medications and recognizing signs and symptoms once discharged  CM also encouraged patient to follow up with all recommended appointments after discharge  Patient advised of importance for patient and family to participate in managing patients medical well being

## 2019-05-21 NOTE — OP NOTE
OPERATIVE REPORT  PATIENT NAME: Hussein Pelletier    :  4/10/1928  MRN: [id number]  Pt Location:  OR ROOM 18    SURGERY DATE: 2019    Surgeon(s) and Role:     * Jose Arceo MD - Primary     * Donnie Hernandez MD - [address]orm[address], NATALIE - Assisting    Preop Diagnosis:  Closed fracture dislocation of right ankle, initial encounter [I94 351A]    Post-Op Diagnosis Codes:     * Closed fracture dislocation of right ankle, initial encounter [C52 901A]    Procedure:  Open reduction fixation bimalleolar ankle fracture    Specimen(s):  * No specimens in log *    Estimated Blood Loss:   100 mL    Drains:  * No LDAs found *    Anesthesia Type:   General    Operative Indications:  Closed fracture dislocation of right ankle, initial encounter [F24 705X]      Operative Findings:  Unstable fracture dislocation right ankle    Complications:   None    Procedure and Technique:  Open reduction internal bimalleolar fixation, right ankle  Patient was correctly identified by both the anesthesia department and myself  The right lower extremity was marked  She was taken to the operating room with general anesthesia was induced via LMA  She was positioned supine onto radiolucent table ensuring that all bony prominences and neurovascular structures were adequately padded and protected  Tourniquet was applied to the right lower extremity  A bump was placed underneath the right hip  IV antibiotics were administered preoperatively  SCD was attached the contralateral lower extremity  The right leg was then prepped and draped in the usual sterile fashion  A time-out was performed  The lower extremity was elevated and exsanguinated with the use of an Esmarch  The tourniquet was inflated to a pressure of 300 mm of mercury  A 10 blade was then used to create a 6 cm longitudinal incision overlying the lateral malleolus  Distally dissection was carried sharply to the level of the bone    Proximally the soft tissues were dissected with the use of Metzenbaum scissors  The superficial peroneal nerve was not identified within the wound exposure  The fracture site was exposed  Self-retaining retractors were placed  A bone reduction forceps was then used to hold the fracture in place in the correct alignment and position  The bone was found to be osteoporotic  Lag screw was drilled from anterior to posterior and a 22 mm lag screw was applied  A Slingbox distal fibular locking plate 5 hole was selected  This was provisionally held in place with the use of K-wires  A total of 4 locking screws were placed distal to the fracture site and a total of 4 nonlocking cortical screws were placed proximal to the fracture site  AP and lateral imaging demonstrated appropriate length and reduction/fixation of the fibula  Attention was then paid to the medial mallet  A 2 cm curvilinear incision was created over the medial malleolus  The saphenous vein was protected  Fracture site was identified  Reduction was performed and held in place with a bone hook  A K-wire was passed through the fracture site  Measurement was taken  A 44 mm short thread cannulated screw 4 0 was then passed over the K-wire  Final AP and lateral imaging demonstrated appropriate bimalleolar fixation  Stress view was performed and demonstrated no significant clear space widening or diastasis of the syndesmosis  Copious irrigation was performed to the entire wound  The subcutaneous layers were closed over the hardware with the use of 0 Vicryl followed by 2 Vicryl sutures  The skin layers were closed with 3 O nylon suture in interrupted fashion  Tourniquet was let down  Sterile dressings were applied to the wound  A well-molded AO splint was applied  Anesthesia was reversed  Patient was taken to recovery room       I was present for the entire procedure    Patient Disposition:  PACU      Implants: Synthes Distal Fibula 5 hole locking plate, 4 0 cannulated short thread screw 44mm    SIGNATURE: Crow Jain MD  DATE: May 21, 2019  TIME: 1:31 PM

## 2019-05-21 NOTE — ASSESSMENT & PLAN NOTE
· Elevated, but suspect pain playing a role into this  · Pain control as above  · Can restart enalapril which was held in anticipation of OR  · Will add p r n  IV medication if needed

## 2019-05-21 NOTE — OCCUPATIONAL THERAPY NOTE
Occupational Therapy         Patient Name: Tamiko Milian  USXSS'Z Date: 5/21/2019    Pt for OR today for ORIF of ankle fx - will defer and address OT needs post op    Vince Alvarez, OT

## 2019-05-21 NOTE — ASSESSMENT & PLAN NOTE
· Elevated, but suspect pain playing a role into this  · Pain control as above  · Hold enalapril in anticipation for OR  · Will add p r n  IV medication if needed

## 2019-05-21 NOTE — ASSESSMENT & PLAN NOTE
· Post op day #0 reduction-fixation bilateral malleolar fracture, left-sided  · Patient appears to be tolerating procedure well  No chest pain or difficulty breathing  She is maintained on nasal cannula at this time with pure wick in place  No bowel movement yet  States her pain is somewhat well controlled but does have pain of lower extremity  · Will monitor CBC, O2 sat, vital signs  · Medical history reviewed, CXR appears without acute cardiopulmonary disease, EKG nonischemic  Overall patient is low risk for low-moderate risk procedure  Patient is acceptable risk to proceed to OR at the discretion of Orthopedic surgery service      · Advise correction of postoperative anemia as indicated  · Pain control as per geriatric pain management order set  · Remainder of management as per Orthopedic surgery service

## 2019-05-21 NOTE — PLAN OF CARE
Problem: Potential for Falls  Goal: Patient will remain free of falls  Description  INTERVENTIONS:  - Assess patient frequently for physical needs  -  Identify cognitive and physical deficits and behaviors that affect risk of falls  -  [place] fall precautions as indicated by assessment   - Educate patient/family on patient safety including physical limitations  - Instruct patient to call for assistance with activity based on assessment  - Modify environment to reduce risk of injury  - Consider OT/PT consult to assist with strengthening/mobility  Outcome: Progressing     Problem: Nutrition/Hydration-ADULT  Goal: Nutrient/Hydration intake appropriate for improving, restoring or maintaining nutritional needs  Description  Monitor and assess patient's nutrition/hydration status for malnutrition (ex- brittle hair, bruises, dry skin, pale skin and conjunctiva, muscle wasting, smooth red tongue, and disorientation)  Collaborate with interdisciplinary team and initiate plan and interventions as ordered  Monitor patient's weight and dietary intake as ordered or per policy  Utilize nutrition screening tool and intervene per policy  Determine patient's food preferences and provide high-protein, high-caloric foods as appropriate       INTERVENTIONS:  - Monitor oral intake, urinary output, labs, and treatment plans  - Assess nutrition and hydration status and recommend course of action  - Evaluate amount of meals eaten  - Assist patient with eating if necessary   - Allow adequate time for meals  - Recommend/ encourage appropriate diets, oral nutritional supplements, and vitamin/mineral supplements  - Order, calculate, and assess calorie counts as needed  - Recommend, monitor, and adjust tube feedings and TPN/PPN based on assessed needs  - Assess need for intravenous fluids  - Provide specific nutrition/hydration education as appropriate  - Include patient/family/caregiver in decisions related to nutrition  Outcome: Progressing     Problem: PAIN - ADULT  Goal: Verbalizes/displays adequate comfort level or baseline comfort level  Description  Interventions:  - Encourage patient to monitor pain and request assistance  - Assess pain using appropriate pain scale  - Administer analgesics based on type and severity of pain and evaluate response  - Implement non-pharmacological measures as appropriate and evaluate response  - Consider cultural and social influences on pain and pain management  - Notify physician/advanced practitioner if interventions unsuccessful or patient reports new pain  Outcome: Progressing     Problem: INFECTION - ADULT  Goal: Absence or prevention of progression during hospitalization  Description  INTERVENTIONS:  - Assess and monitor for signs and symptoms of infection  - Monitor lab/diagnostic results  - Monitor all insertion sites, i e  indwelling lines, tubes, and drains  - Monitor endotracheal (as able) and nasal secretions for changes in amount and color  - [place] appropriate cooling/warming therapies per order  - Administer medications as ordered  - Instruct and encourage patient and family to use good hand hygiene technique  - Identify and instruct in appropriate isolation precautions for identified infection/condition  Outcome: Progressing  Goal: Absence of fever/infection during neutropenic period  Description  INTERVENTIONS:  - Monitor WBC  - Implement neutropenic guidelines  Outcome: Progressing     Problem: SAFETY ADULT  Goal: Patient will remain free of falls  Description  INTERVENTIONS:  - Assess patient frequently for physical needs  -  Identify cognitive and physical deficits and behaviors that affect risk of falls    -  [place] fall precautions as indicated by assessment   - Educate patient/family on patient safety including physical limitations  - Instruct patient to call for assistance with activity based on assessment  - Modify environment to reduce risk of injury  - Consider OT/PT consult to assist with strengthening/mobility  Outcome: Progressing  Goal: Maintain or return to baseline ADL function  Description  INTERVENTIONS:  -  Assess patient's ability to carry out ADLs; assess patient's baseline for ADL function and identify physical deficits which impact ability to perform ADLs (bathing, care of mouth/teeth, toileting, grooming, dressing, etc )  - Assess/evaluate cause of self-care deficits   - Assess range of motion  - Assess patient's mobility; develop plan if impaired  - Assess patient's need for assistive devices and provide as appropriate  - Encourage maximum independence but intervene and supervise when necessary  ¯ Involve family in performance of ADLs  ¯ Assess for home care needs following discharge   ¯ Request OT consult to assist with ADL evaluation and planning for discharge  ¯ Provide patient education as appropriate  Outcome: Progressing  Goal: Maintain or return mobility status to optimal level  Description  INTERVENTIONS:  - Assess patient's baseline mobility status (ambulation, transfers, stairs, etc )    - Identify cognitive and physical deficits and behaviors that affect mobility  - Identify mobility aids required to assist with transfers and/or ambulation (gait belt, sit-to-stand, lift, walker, cane, etc )  - [place] fall precautions as indicated by assessment  - Record patient progress and toleration of activity level on Mobility SBAR; progress patient to next Phase/Stage  - Instruct patient to call for assistance with activity based on assessment  - Request Rehabilitation consult to assist with strengthening/weightbearing, etc   Outcome: Progressing     Problem: DISCHARGE PLANNING  Goal: Discharge to home or other facility with appropriate resources  Description  INTERVENTIONS:  - Identify barriers to discharge w/patient and caregiver  - Arrange for needed discharge resources and transportation as appropriate  - Identify discharge learning needs (meds, wound care, etc )  - Arrange for interpretive services to assist at discharge as needed  - Refer to Case Management Department for coordinating discharge planning if the patient needs post-hospital services based on physician/advanced practitioner order or complex needs related to functional status, cognitive ability, or social support system  Outcome: Progressing     Problem: Knowledge Deficit  Goal: Patient/family/caregiver demonstrates understanding of disease process, treatment plan, medications, and discharge instructions  Description  Complete learning assessment and assess knowledge base    Interventions:  - Provide teaching at level of understanding  - Provide teaching via preferred learning methods  Outcome: Progressing

## 2019-05-21 NOTE — ASSESSMENT & PLAN NOTE
· In setting of mechanical fall, closed reduction completed in ED  · For ORIF right ankle tentatively tomorrow, NPO at midnight  · Medical history reviewed, CXR appears without acute cardiopulmonary disease, EKG nonischemic  Overall patient is low risk for low-moderate risk procedure  Patient is acceptable risk to proceed to OR at the discretion of Orthopedic surgery service      · Advise correction of postoperative anemia as indicated  · Pain control as per geriatric pain management order set  · Remainder of management as per Orthopedic surgery service

## 2019-05-21 NOTE — PROGRESS NOTES
Subjective: No acute events overnight  No acute distress    Pain well controlled    Right lower extremity  Splint opened, skin intact   Motor & sensation grossly intact to foot and toes   Foot warm and well perfused     Assessment: 81 yo F with right displaced bimalleolar ankle fracture    Plan:  NWB RLE in AO splint  To OR today for ORIF of right ankle  NPO  Pain control  DVT ppx: Mechanical  Hold chemoPPx for OR  PT/OT post-op  Dispo: OR today     Objective:  Lab Results   Component Value Date/Time    WBC 7 49 05/21/2019 04:40 AM    WBC 5 5 01/15/2018 10:24 AM    HGB 12 4 05/21/2019 04:40 AM    HGB 10 7 (L) 01/15/2018 10:24 AM       Vitals:    05/21/19 0214   BP: (!) 174/88   Pulse:    Resp:    Temp:    SpO2:

## 2019-05-21 NOTE — PLAN OF CARE
Problem: Potential for Falls  Goal: Patient will remain free of falls  Description  INTERVENTIONS:  - Assess patient frequently for physical needs  -  Identify cognitive and physical deficits and behaviors that affect risk of falls  -  [place] fall precautions as indicated by assessment   - Educate patient/family on patient safety including physical limitations  - Instruct patient to call for assistance with activity based on assessment  - Modify environment to reduce risk of injury  - Consider OT/PT consult to assist with strengthening/mobility  Outcome: Progressing     Problem: Nutrition/Hydration-ADULT  Goal: Nutrient/Hydration intake appropriate for improving, restoring or maintaining nutritional needs  Description  Monitor and assess patient's nutrition/hydration status for malnutrition (ex- brittle hair, bruises, dry skin, pale skin and conjunctiva, muscle wasting, smooth red tongue, and disorientation)  Collaborate with interdisciplinary team and initiate plan and interventions as ordered  Monitor patient's weight and dietary intake as ordered or per policy  Utilize nutrition screening tool and intervene per policy  Determine patient's food preferences and provide high-protein, high-caloric foods as appropriate       INTERVENTIONS:  - Monitor oral intake, urinary output, labs, and treatment plans  - Assess nutrition and hydration status and recommend course of action  - Evaluate amount of meals eaten  - Assist patient with eating if necessary   - Allow adequate time for meals  - Recommend/ encourage appropriate diets, oral nutritional supplements, and vitamin/mineral supplements  - Order, calculate, and assess calorie counts as needed  - Recommend, monitor, and adjust tube feedings and TPN/PPN based on assessed needs  - Assess need for intravenous fluids  - Provide specific nutrition/hydration education as appropriate  - Include patient/family/caregiver in decisions related to nutrition  Outcome: Progressing     Problem: PAIN - ADULT  Goal: Verbalizes/displays adequate comfort level or baseline comfort level  Description  Interventions:  - Encourage patient to monitor pain and request assistance  - Assess pain using appropriate pain scale  - Administer analgesics based on type and severity of pain and evaluate response  - Implement non-pharmacological measures as appropriate and evaluate response  - Consider cultural and social influences on pain and pain management  - Notify physician/advanced practitioner if interventions unsuccessful or patient reports new pain  Outcome: Progressing     Problem: INFECTION - ADULT  Goal: Absence or prevention of progression during hospitalization  Description  INTERVENTIONS:  - Assess and monitor for signs and symptoms of infection  - Monitor lab/diagnostic results  - Monitor all insertion sites, i e  indwelling lines, tubes, and drains  - Monitor endotracheal (as able) and nasal secretions for changes in amount and color  - [place] appropriate cooling/warming therapies per order  - Administer medications as ordered  - Instruct and encourage patient and family to use good hand hygiene technique  - Identify and instruct in appropriate isolation precautions for identified infection/condition  Outcome: Progressing  Goal: Absence of fever/infection during neutropenic period  Description  INTERVENTIONS:  - Monitor WBC  - Implement neutropenic guidelines  Outcome: Progressing     Problem: SAFETY ADULT  Goal: Patient will remain free of falls  Description  INTERVENTIONS:  - Assess patient frequently for physical needs  -  Identify cognitive and physical deficits and behaviors that affect risk of falls    -  [place] fall precautions as indicated by assessment   - Educate patient/family on patient safety including physical limitations  - Instruct patient to call for assistance with activity based on assessment  - Modify environment to reduce risk of injury  - Consider OT/PT consult to assist with strengthening/mobility  Outcome: Progressing  Goal: Maintain or return to baseline ADL function  Description  INTERVENTIONS:  -  Assess patient's ability to carry out ADLs; assess patient's baseline for ADL function and identify physical deficits which impact ability to perform ADLs (bathing, care of mouth/teeth, toileting, grooming, dressing, etc )  - Assess/evaluate cause of self-care deficits   - Assess range of motion  - Assess patient's mobility; develop plan if impaired  - Assess patient's need for assistive devices and provide as appropriate  - Encourage maximum independence but intervene and supervise when necessary  ¯ Involve family in performance of ADLs  ¯ Assess for home care needs following discharge   ¯ Request OT consult to assist with ADL evaluation and planning for discharge  ¯ Provide patient education as appropriate  Outcome: Progressing  Goal: Maintain or return mobility status to optimal level  Description  INTERVENTIONS:  - Assess patient's baseline mobility status (ambulation, transfers, stairs, etc )    - Identify cognitive and physical deficits and behaviors that affect mobility  - Identify mobility aids required to assist with transfers and/or ambulation (gait belt, sit-to-stand, lift, walker, cane, etc )  - [place] fall precautions as indicated by assessment  - Record patient progress and toleration of activity level on Mobility SBAR; progress patient to next Phase/Stage  - Instruct patient to call for assistance with activity based on assessment  - Request Rehabilitation consult to assist with strengthening/weightbearing, etc   Outcome: Progressing     Problem: DISCHARGE PLANNING  Goal: Discharge to home or other facility with appropriate resources  Description  INTERVENTIONS:  - Identify barriers to discharge w/patient and caregiver  - Arrange for needed discharge resources and transportation as appropriate  - Identify discharge learning needs (meds, wound care, etc )  - Arrange for interpretive services to assist at discharge as needed  - Refer to Case Management Department for coordinating discharge planning if the patient needs post-hospital services based on physician/advanced practitioner order or complex needs related to functional status, cognitive ability, or social support system  Outcome: Progressing     Problem: Knowledge Deficit  Goal: Patient/family/caregiver demonstrates understanding of disease process, treatment plan, medications, and discharge instructions  Description  Complete learning assessment and assess knowledge base    Interventions:  - Provide teaching at level of understanding  - Provide teaching via preferred learning methods  Outcome: Progressing     Problem: Prexisting or High Potential for Compromised Skin Integrity  Goal: Skin integrity is maintained or improved  Description  INTERVENTIONS:  - Identify patients at risk for skin breakdown  - Assess and monitor skin integrity  - Assess and monitor nutrition and hydration status  - Monitor labs (i e  albumin)  - Assess for incontinence   - Turn and reposition patient  - Assist with mobility/ambulation  - Relieve pressure over bony prominences  - Avoid friction and shearing  - Provide appropriate hygiene as needed including keeping skin clean and dry  - Evaluate need for skin moisturizer/barrier cream  - Collaborate with interdisciplinary team (i e  Nutrition, Rehabilitation, etc )   - Patient/family teaching  Outcome: Progressing

## 2019-05-21 NOTE — H&P
H&P- Deb Lund 4/10/1928, 80 y o  female MRN: [id number]    Unit/Bed#: 3 339-01 Encounter: [id number]    Primary Care Provider: Yvette Galo DO   Date and time admitted to hospital: 5/20/2019  5:22 PM        * Closed fracture dislocation of right ankle joint  Assessment & Plan  · In setting of mechanical fall, closed reduction completed in ED  · For ORIF right ankle tentatively tomorrow, NPO at midnight  · Medical history reviewed, CXR appears without acute cardiopulmonary disease, EKG nonischemic  Overall patient is low risk for low-moderate risk procedure  Patient is acceptable risk to proceed to OR at the discretion of Orthopedic surgery service  · Advise correction of postoperative anemia as indicated  · Pain control as per geriatric pain management order set  · Remainder of management as per Orthopedic surgery service    Benign essential hypertension  Assessment & Plan  · Elevated, but suspect pain playing a role into this  · Pain control as above  · Hold enalapril in anticipation for OR  · Will add p r n  IV medication if needed    Osteoporosis  Assessment & Plan  · Check vitamin-D panel, TSH, PTH, SPEP/UPEP  · Continue vitamin-D and calcium supplementation    GERD without esophagitis  Assessment & Plan  · Controlled, continue Pepcid        VTE Prophylaxis: Pharmacologic VTE Prophylaxis contraindicated due to Pending surgical procedure  / sequential compression device   Code Status: Level 2 - DNAR: but accepts endotracheal intubation   POLST: POLST form is not discussed and not completed at this time  Anticipated Length of Stay:  Patient will be admitted on an Inpatient basis with an anticipated length of stay of  greater than 2 midnights  Justification for Hospital Stay: Please see detailed plans noted above  Chief Complaint:     Right ankle fracture  History of Present Illness:  Deb Lund is a 80 y o  female who presents with right ankle fracture S/P mechanical fall    She has a past medical history significant for hypertension, PAD, hyperlipidemia, osteoporosis, GERD  Was in her normal state of health until today when patient tripped and fell while attempting to answer the door at independent living facility  Denied loss of consciousness, presyncope/syncope, or dizziness prior to the fall  Reported immediate pain and apparent deformity to right ankle, and was found by visitor to home  Found during ED evaluation to have right bimalleolar ankle fracture dislocation and underwent closed reduction and splinting in ED  Currently reports ankle pain 8/10, denies any numbness/tingling or weakness  Also denies any fevers/chills, chest pain/pressure, shortness of breath, abdominal pain/nausea, or diaphoresis      Review of Systems:    Constitutional:  Denies fever or chills   Eyes:  Denies change in visual acuity   HENT:  Denies nasal congestion or sore throat   Respiratory:  Denies cough or shortness of breath   Cardiovascular:  Denies chest pain or edema   GI:  Denies abdominal pain, nausea, vomiting, bloody stools or diarrhea   :  Denies dysuria   Musculoskeletal:  Denies back pain but endorses right ankle pain  Integument:  Denies rash   Neurologic:  Denies headache, focal weakness or sensory changes   Endocrine:  Denies polyuria or polydipsia   Lymphatic:  Denies swollen glands   Psychiatric:  Denies depression or anxiety     Past Medical and Surgical History:   Past Medical History:   Diagnosis Date    Anorexia     last assessed: 8/9/2013    GERD (gastroesophageal reflux disease)     Herpes zoster     last assessed: 9/11/2013    Hypertension     Lymphoma ([facility] [address] )     resolved: 1997    Malignant melanoma of skin ([facility] [address] )     resolved: 2015    Pelvic fracture ([facility] [address] )      Past Surgical History:   Procedure Laterality Date    BREAST SURGERY      enlargement procedure    BYPASS FEMORAL-POPLITEAL Left     onset: 8/31/2012    CATARACT EXTRACTION Right 06/19/2013    HYSTERECTOMY resolved: 1970    OTHER SURGICAL HISTORY  05/30/2013    PTA Femoral-popliteal initial stenosis left Description: with mechanical thrombectomy, PTA and stent     SD OPEN RX DISTAL RADIUS FX, INTRA-ARTICULAR, 3+ FRAG Left 1/18/2017    Procedure: DISTAL RADIUS OPEN REDUCTION W/ INTERNAL FIXATION ;  Surgeon: Nataliya Dos Santos MD;  [facility];  Service: Orthopedics       Meds/Allergies:  Medications Prior to Admission   Medication    aspirin 81 MG tablet    calcium-vitamin D 250-100 MG-UNIT per tablet    Cholecalciferol (VITAMIN D3) 3000 UNITS TABS    clopidogrel (PLAVIX) 75 mg tablet    enalapril (VASOTEC) 10 mg tablet    simvastatin (ZOCOR) 20 mg tablet    esomeprazole (NexIUM) 40 MG capsule    famotidine (PEPCID) 40 MG tablet    fluticasone (FLONASE) 50 mcg/act nasal spray    LUMIGAN 0 01 % ophthalmic drops    Magnesium Oxide 250 MG TABS       Allergies: Allergies   Allergen Reactions    Contrast  [Iodinated Diagnostic Agents]      Other reaction(s): "I get very cold"    Iodine      Other reaction(s): Chills  Reaction Date: 02Aug2011;     Omnipaque [Iohexol]     Sulfa Antibiotics Vomiting     History:  Marital Status:       Substance Use History:   Social History     Substance and Sexual Activity   Alcohol Use Yes    Alcohol/week: 0 6 oz    Types: 1 Glasses of wine per week    Frequency: 2-4 times a month    Drinks per session: 1 or 2    Binge frequency: Never    Comment: Per allscripts - social drinker      Social History     Tobacco Use   Smoking Status Never Smoker   Smokeless Tobacco Never Used     Social History     Substance and Sexual Activity   Drug Use No       Family History:  Family History   Problem Relation Age of Onset    Heart attack Mother     Heart failure Father     Other Sister         thyroid surgery       Physical Exam:     Vitals:   Blood Pressure: (!) 180/94 (05/20/19 2218)  Pulse: 88 (05/20/19 2218)  Temperature: 97 5 °F (36 4 °C) (05/20/19 2300)  Temp Source: Oral (05/20/19 2300)  Respirations: 18 (05/20/19 2015)  Height: 5' 4" (162 6 cm) (05/20/19 2234)  Weight - Scale: 61 2 kg (135 lb) (05/20/19 2234)  SpO2: 97 % (05/20/19 2218)    Constitutional:  Well developed, well nourished, no acute distress, non-toxic appearance   Eyes:  PERRL, conjunctiva normal   HENT:  Atraumatic, external ears normal, nose normal, oropharynx moist, no pharyngeal exudates  Neck- normal range of motion, no tenderness, supple   Respiratory:  No respiratory distress, normal breath sounds, no rales, no wheezing   Cardiovascular:  Normal rate, normal rhythm, no murmurs, no gallops, no rubs   GI:  Soft, nondistended, normal bowel sounds, nontender, no organomegaly, no mass, no rebound, no guarding   :  No costovertebral angle tenderness   Musculoskeletal:  No edema, right ankle in splint  Back- no tenderness  Integument:  Well hydrated, no rash   Lymphatic:  No lymphadenopathy noted   Neurologic:  Alert &awake, communicative, CN 2-12 normal, normal motor function, normal sensory function, no focal deficits noted   Psychiatric:  Speech and behavior appropriate       Lab Results: I have personally reviewed pertinent reports  Results from last 7 days   Lab Units 05/20/19  1801   WBC Thousand/uL 6 89   HEMOGLOBIN g/dL 13 5   HEMATOCRIT % 42 1   PLATELETS Thousands/uL 276   NEUTROS PCT % 69   LYMPHS PCT % 19   MONOS PCT % 11   EOS PCT % 1     Results from last 7 days   Lab Units 05/20/19  1801   POTASSIUM mmol/L 4 2   CHLORIDE mmol/L 107   CO2 mmol/L 25   BUN mg/dL 25   CREATININE mg/dL 0 87   CALCIUM mg/dL 8 8     Results from last 7 days   Lab Units 05/20/19  2033   INR  0 92       EKG: Normal sinus rhythm    Imaging: I have personally reviewed pertinent reports  and I have personally reviewed pertinent films in PACS    Ct Head Without Contrast    Result Date: 5/20/2019  Narrative: CT BRAIN - WITHOUT CONTRAST INDICATION:   trauma/ fall   COMPARISON:  CT brain 12/29/2016 TECHNIQUE:  CT examination of the brain was performed  In addition to axial images, coronal 2D reformatted images were created and submitted for interpretation  Radiation dose length product (DLP) for this visit:  967 mGy-cm   This examination, like all CT scans performed in the [facility], was performed utilizing techniques to minimize radiation dose exposure, including the use of iterative reconstruction and automated exposure control  IMAGE QUALITY:  Diagnostic  FINDINGS: PARENCHYMA: Decreased attenuation is noted in periventricular and subcortical white matter demonstrating an appearance that is statistically most likely to represent mild microangiopathic change; this appearance is similar when compared to most recent prior examination  No CT signs of acute infarction  No intracranial mass, mass effect or midline shift  No acute parenchymal hemorrhage  VENTRICLES AND EXTRA-AXIAL SPACES:  Normal for the patient's age  VISUALIZED ORBITS AND PARANASAL SINUSES:  Unremarkable  CALVARIUM AND EXTRACRANIAL SOFT TISSUES:  Minimal frontal scalp soft tissue swelling  No fracture  Impression: No acute intracranial abnormality  Workstation performed: [id number]     CXR:  Personally reviewed, no acute cardiopulmonary disease visualized; no pulmonary congestion, effusions, infiltrate, or pneumothorax visualized  Formal radiologist read is pending  ** Please Note: Dragon 360 Dictation voice to text software was used in the creation of this document   **

## 2019-05-22 LAB
ALBUMIN SERPL ELPH-MCNC: 3.33 G/DL (ref 3.5–5)
ALBUMIN SERPL ELPH-MCNC: 54.6 % (ref 52–65)
ALBUMIN UR ELPH-MCNC: 100 %
ALPHA1 GLOB MFR UR ELPH: 0 %
ALPHA1 GLOB SERPL ELPH-MCNC: 0.33 G/DL (ref 0.1–0.4)
ALPHA1 GLOB SERPL ELPH-MCNC: 5.4 % (ref 2.5–5)
ALPHA2 GLOB MFR UR ELPH: 0 %
ALPHA2 GLOB SERPL ELPH-MCNC: 0.88 G/DL (ref 0.4–1.2)
ALPHA2 GLOB SERPL ELPH-MCNC: 14.5 % (ref 7–13)
ANION GAP SERPL CALCULATED.3IONS-SCNC: 4 MMOL/L (ref 4–13)
B-GLOBULIN MFR UR ELPH: 0 %
BETA GLOB ABNORMAL SERPL ELPH-MCNC: 0.46 G/DL (ref 0.4–0.8)
BETA1 GLOB SERPL ELPH-MCNC: 7.6 % (ref 5–13)
BETA2 GLOB SERPL ELPH-MCNC: 6 % (ref 2–8)
BETA2+GAMMA GLOB SERPL ELPH-MCNC: 0.37 G/DL (ref 0.2–0.5)
BUN SERPL-MCNC: 19 MG/DL (ref 5–25)
CALCIUM SERPL-MCNC: 8.9 MG/DL (ref 8.3–10.1)
CHLORIDE SERPL-SCNC: 105 MMOL/L (ref 100–108)
CO2 SERPL-SCNC: 28 MMOL/L (ref 21–32)
CREAT SERPL-MCNC: 0.56 MG/DL (ref 0.6–1.3)
ERYTHROCYTE [DISTWIDTH] IN BLOOD BY AUTOMATED COUNT: 15 % (ref 11.6–15.1)
GAMMA GLOB ABNORMAL SERPL ELPH-MCNC: 0.73 G/DL (ref 0.5–1.6)
GAMMA GLOB MFR UR ELPH: 0 %
GAMMA GLOB SERPL ELPH-MCNC: 11.9 % (ref 12–22)
GFR SERPL CREATININE-BSD FRML MDRD: 82 ML/MIN/1.73SQ M
GLUCOSE SERPL-MCNC: 123 MG/DL (ref 65–140)
HCT VFR BLD AUTO: 40.3 % (ref 34.8–46.1)
HGB BLD-MCNC: 12.7 G/DL (ref 11.5–15.4)
IGG/ALB SER: 1.2 {RATIO} (ref 1.1–1.8)
MCH RBC QN AUTO: 28.6 PG (ref 26.8–34.3)
MCHC RBC AUTO-ENTMCNC: 31.5 G/DL (ref 31.4–37.4)
MCV RBC AUTO: 91 FL (ref 82–98)
PLATELET # BLD AUTO: 267 THOUSANDS/UL (ref 149–390)
PMV BLD AUTO: 11.4 FL (ref 8.9–12.7)
POTASSIUM SERPL-SCNC: 4.8 MMOL/L (ref 3.5–5.3)
PROT PATTERN SERPL ELPH-IMP: ABNORMAL
PROT PATTERN UR ELPH-IMP: ABNORMAL
PROT SERPL-MCNC: 6.1 G/DL (ref 6.4–8.2)
PROT UR-MCNC: 22 MG/DL
RBC # BLD AUTO: 4.44 MILLION/UL (ref 3.81–5.12)
SODIUM SERPL-SCNC: 137 MMOL/L (ref 136–145)
WBC # BLD AUTO: 7.98 THOUSAND/UL (ref 4.31–10.16)

## 2019-05-22 PROCEDURE — 97163 PT EVAL HIGH COMPLEX 45 MIN: CPT

## 2019-05-22 PROCEDURE — 85027 COMPLETE CBC AUTOMATED: CPT | Performed by: PHYSICIAN ASSISTANT

## 2019-05-22 PROCEDURE — 97535 SELF CARE MNGMENT TRAINING: CPT

## 2019-05-22 PROCEDURE — G8988 SELF CARE GOAL STATUS: HCPCS

## 2019-05-22 PROCEDURE — G8987 SELF CARE CURRENT STATUS: HCPCS

## 2019-05-22 PROCEDURE — 97167 OT EVAL HIGH COMPLEX 60 MIN: CPT

## 2019-05-22 PROCEDURE — G8978 MOBILITY CURRENT STATUS: HCPCS

## 2019-05-22 PROCEDURE — 99024 POSTOP FOLLOW-UP VISIT: CPT | Performed by: PHYSICIAN ASSISTANT

## 2019-05-22 PROCEDURE — 80048 BASIC METABOLIC PNL TOTAL CA: CPT | Performed by: PHYSICIAN ASSISTANT

## 2019-05-22 PROCEDURE — 99232 SBSQ HOSP IP/OBS MODERATE 35: CPT | Performed by: PHYSICIAN ASSISTANT

## 2019-05-22 PROCEDURE — G8979 MOBILITY GOAL STATUS: HCPCS

## 2019-05-22 RX ORDER — POLYETHYLENE GLYCOL 3350 17 G/17G
17 POWDER, FOR SOLUTION ORAL DAILY
Status: DISCONTINUED | OUTPATIENT
Start: 2019-05-22 | End: 2019-05-23 | Stop reason: HOSPADM

## 2019-05-22 RX ADMIN — ACETAMINOPHEN 975 MG: 325 TABLET ORAL at 16:06

## 2019-05-22 RX ADMIN — VITAMIN D, TAB 1000IU (100/BT) 3000 UNITS: 25 TAB at 08:31

## 2019-05-22 RX ADMIN — BIMATOPROST 1 DROP: 0.1 SOLUTION/ DROPS OPHTHALMIC at 21:50

## 2019-05-22 RX ADMIN — FAMOTIDINE 40 MG: 20 TABLET ORAL at 08:31

## 2019-05-22 RX ADMIN — ACETAMINOPHEN 975 MG: 325 TABLET ORAL at 05:36

## 2019-05-22 RX ADMIN — CEFAZOLIN SODIUM 2000 MG: 2 SOLUTION INTRAVENOUS at 05:36

## 2019-05-22 RX ADMIN — CALCIUM CARBONATE-VITAMIN D TAB 500 MG-200 UNIT 1 TABLET: 500-200 TAB at 08:31

## 2019-05-22 RX ADMIN — ASPIRIN 81 MG 81 MG: 81 TABLET ORAL at 08:31

## 2019-05-22 RX ADMIN — ENOXAPARIN SODIUM 40 MG: 40 INJECTION SUBCUTANEOUS at 08:32

## 2019-05-22 RX ADMIN — LISINOPRIL 10 MG: 10 TABLET ORAL at 08:32

## 2019-05-22 RX ADMIN — POLYETHYLENE GLYCOL 3350 17 G: 17 POWDER, FOR SOLUTION ORAL at 12:54

## 2019-05-22 RX ADMIN — ACETAMINOPHEN 975 MG: 325 TABLET ORAL at 21:50

## 2019-05-22 RX ADMIN — MAGNESIUM OXIDE TAB 400 MG (241.3 MG ELEMENTAL MG) 400 MG: 400 (241.3 MG) TAB at 08:32

## 2019-05-22 RX ADMIN — PRAVASTATIN SODIUM 40 MG: 40 TABLET ORAL at 16:06

## 2019-05-22 NOTE — PROGRESS NOTES
Progress Note - Esthela Eugene 4/10/1928, 80 y o  female MRN: [id number]  Unit/Bed#: CW3 339-01 Encounter: [id number]  Primary Care Provider: Refugio Pastor DO   Date and time admitted to hospital: 5/20/2019  5:22 PM      * Closed fracture dislocation of right ankle joint  Assessment & Plan  · Post op day #1 reduction-fixation bilateral malleolar fracture, left-sided  · Patient appears to be tolerating procedure well  No chest pain or difficulty breathing  She is maintained on nasal cannula at this time with pure wick in place  No bowel movement yet  States her pain is somewhat well controlled but does have pain of lower extremity  · Will monitor CBC, O2 sat, vital signs  · Medical history reviewed, CXR appears without acute cardiopulmonary disease, EKG nonischemic  Overall patient is low risk for low-moderate risk procedure  Patient is acceptable risk to proceed to OR at the discretion of Orthopedic surgery service  · Advise correction of postoperative anemia as indicated  · Pain control as per geriatric pain management order set  · Remainder of management as per Orthopedic surgery service  · Hope to discharge to  pending auth    Osteoporosis  Assessment & Plan  · Check vitamin-D panel, TSH, PTH, SPEP/UPEP  · Continue vitamin-D and calcium supplementation    GERD without esophagitis  Assessment & Plan  · Controlled, continue Pepcid    Benign essential hypertension  Assessment & Plan  · Elevated, but suspect pain playing a role into this  · Pain control as above  · Can restart enalapril which was held in anticipation of OR  · Will add p r n  IV medication if needed      VTE Pharmacologic Prophylaxis:   Pharmacologic: Enoxaparin (Lovenox)  Mechanical VTE Prophylaxis in Place: Yes    Patient Centered Rounds: I have performed bedside rounds with nursing staff today      Discussions with Specialists or Other Care Team Provider:  Case management, nursing    Education and Discussions with Family / Patient: Discussed with patient    Time Spent for Care: 30 minutes  More than 50% of total time spent on counseling and coordination of care as described above  Current Length of Stay: 2 day(s)    Current Patient Status: Inpatient   Certification Statement: The patient will continue to require additional inpatient hospital stay due to auth to return to     Discharge Plan:  Hopefully within 24 hours    Code Status: Level 2 - DNAR: but accepts endotracheal intubation      Subjective:   Patient feels well, no complaints, appear to return home  She has not had a bowel movement yet however is willing to try MiraLax  No abdominal pain  No difficulty breathing  Objective:     Vitals:   Temp (24hrs), Av 5 °F (36 4 °C), Min:97 3 °F (36 3 °C), Max:97 9 °F (36 6 °C)    Temp:  [97 3 °F (36 3 °C)-97 9 °F (36 6 °C)] 97 9 °F (36 6 °C)  HR:  [79-96] 82  Resp:  [12-18] 18  BP: (132-165)/(61-85) 151/66  SpO2:  [96 %-97 %] 96 %  Body mass index is 23 17 kg/m²  Input and Output Summary (last 24 hours): Intake/Output Summary (Last 24 hours) at 2019 1554  Last data filed at 2019 1255  Gross per 24 hour   Intake 516 ml   Output 450 ml   Net 66 ml       Physical Exam:     Physical Exam   Constitutional: She is oriented to person, place, and time  She appears well-developed and well-nourished  No distress  HENT:   Head: Normocephalic and atraumatic  Eyes: Right eye exhibits no discharge  Left eye exhibits no discharge  No scleral icterus  Cardiovascular: Normal rate and regular rhythm  Exam reveals no gallop and no friction rub  No murmur heard  Pulmonary/Chest: Effort normal and breath sounds normal  No respiratory distress  She has no wheezes  She has no rales  Abdominal: Soft  Bowel sounds are normal  She exhibits no distension  There is no tenderness  Neurological: She is alert and oriented to person, place, and time  Skin: Skin is warm and dry     Splint to right lower extremity   Nursing note and vitals reviewed  Additional Data:     Labs:    Results from last 7 days   Lab Units 05/22/19  0455  05/20/19  1801   WBC Thousand/uL 7 98   < > 6 89   HEMOGLOBIN g/dL 12 7   < > 13 5   HEMATOCRIT % 40 3   < > 42 1   PLATELETS Thousands/uL 267   < > 276   NEUTROS PCT %  --   --  69   LYMPHS PCT %  --   --  19   MONOS PCT %  --   --  11   EOS PCT %  --   --  1    < > = values in this interval not displayed  Results from last 7 days   Lab Units 05/22/19  0455   SODIUM mmol/L 137   POTASSIUM mmol/L 4 8   CHLORIDE mmol/L 105   CO2 mmol/L 28   BUN mg/dL 19   CREATININE mg/dL 0 56*   ANION GAP mmol/L 4   CALCIUM mg/dL 8 9   GLUCOSE RANDOM mg/dL 123     Results from last 7 days   Lab Units 05/20/19  2033   INR  0 92                       * I Have Reviewed All Lab Data Listed Above  * Additional Pertinent Lab Tests Reviewed:  [facility]      Recent Cultures (last 7 days):           Last 24 Hours Medication List:     Current Facility-Administered Medications:  acetaminophen 975 mg Oral [facility] [employer], Eve Chew   aspirin 81 mg Oral Once per day on Mon Wed Fri [employer], PA-C   bimatoprost 1 drop Both Eyes HS [employer], PA-C   calcium carbonate-vitamin D 1 tablet Oral Daily With Breakfast [employer], Eve Chew   cholecalciferol 3,000 Units Oral Daily Deanne Hanson, PA-C   enoxaparin 40 mg Subcutaneous Daily [employer], PA-C   famotidine 40 mg Oral Daily [employer], PA-C   hydrALAZINE 5 mg Intravenous Q6H PRN [employer], PA-C   HYDROmorphone 0 2 mg Intravenous Q4H PRN [employer], PA-C   lisinopril 10 mg Oral Daily Brit Teran, PA-BARON   magnesium oxide 400 mg Oral Daily [employer], PA-C   ondansetron 4 mg Intravenous Q6H PRN [employer], PA-C   oxyCODONE 2 5 mg Oral Q4H PRN [employer], PA-C   oxyCODONE 5 mg Oral Q4H PRN [employer], PA-C   polyethylene glycol 17 g Oral Daily Brit Teran PA-C pravastatin 40 mg Oral Daily With [address] NATALIE Howell        Today, Patient Was Seen By: Anyi Cordero PA-C    ** Please Note: Dictation voice to text software may have been used in the creation of this document   **

## 2019-05-22 NOTE — SOCIAL WORK
Therapy recommended rehab and pt chose [facility] since she resides at [facility]  CM made referral   [facility] will evaluate today and submit for auth if approved  [facility] stated that they need PT/OT eval notes prior to submitting for auth  CM notified PT/OT

## 2019-05-22 NOTE — PHYSICAL THERAPY NOTE
Physical Therapy Evaluation     Patient's Name: Piotr Bullock    Admitting Diagnosis  Injury, unspecified, initial encounter Jennifer Crew  Unspecified multiple injuries, initial encounter [T07  XXXA]  Closed fracture dislocation of right ankle, initial encounter [[id number]]    Problem List  Patient Active Problem List   Diagnosis    Atherosclerosis 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 (HCC)    Benign essential hypertension    Asymptomatic bilateral carotid artery stenosis    Cataract    GERD without esophagitis    Hypercholesterolemia    Hypertriglyceridemia    Iron deficiency anemia    [facility] [address]    Microcytic anemia    Osteoporosis    Vitamin D deficiency    PAD (peripheral artery disease) ([facility] [address]    Closed fracture dislocation of right ankle joint       Past Medical History  Past Medical History:   Diagnosis Date    Anorexia     last assessed: 8/9/2013    GERD (gastroesophageal reflux disease)     Herpes zoster     last assessed: 9/11/2013    Hypertension     Lymphoma ([facility] [address] )     resolved: 1997    Malignant melanoma of skin ([facility] [address] )     resolved: 2015    Pelvic fracture ([facility] [address] )        Past Surgical History  Past Surgical History:   Procedure Laterality Date    BREAST SURGERY      enlargement procedure    BYPASS FEMORAL-POPLITEAL Left     onset: 8/31/2012    CATARACT EXTRACTION Right 06/19/2013    HYSTERECTOMY      resolved: 1970    ORIF TIBIA & FIBULA FRACTURES Right 5/21/2019    Procedure: OPEN REDUCTION W/ INTERNAL FIXATION (ORIF) ANKLE;  Surgeon: Crow Jain MD;  [facility];  Service: Orthopedics    OTHER SURGICAL HISTORY  05/30/2013    PTA Femoral-popliteal initial stenosis left Description: with mechanical thrombectomy, PTA and stent     ND OPEN RX DISTAL RADIUS FX, INTRA-ARTICULAR, 3+ FRAG Left 1/18/2017    Procedure: DISTAL RADIUS OPEN REDUCTION W/ INTERNAL FIXATION ;  Surgeon: Wallace Mcdowell MD;  [facility];  Service: Orthopedics          05/22/19 1400   Note Type   Note type Eval/Treat   Pain Assessment   Pain Assessment 0-10   Pain Score 3   Pain Type Acute pain   Pain Location Ankle; Foot   Pain Orientation Right   Hospital Pain Intervention(s) Ambulation/increased activity;Repositioned   Response to Interventions tolerated   Home Living   Type of Home Other (Comment)  (I living at )   Home Layout One level;Performs ADLs on one level   [address]   Prior Function   Level of [place] Independent with ADLs and functional mobility   Lives With Family   ADL Assistance Independent   IADLs Needs assistance   Falls in the last 6 months 1 to 4   Vocational Retired   Restrictions/Precautions   Wells Susy Bearing Precautions Per Order Yes   RLE Wells Susy Bearing Per Order NWB   Braces or Orthoses Splint   Other Precautions Cognitive; Chair Alarm; Bed Alarm;WBS; Telemetry;Pain; Fall Risk   General   Family/Caregiver Present Yes   Cognition   Orientation Level Oriented X4   RLE Assessment   RLE Assessment   (grossly 3+/5 at hip and knee, ankle splinted post sx)   LLE Assessment   LLE Assessment WFL   Coordination   Movements are Fluid and Coordinated 0   Coordination and Movement Description increased anxiety with movement   Bed Mobility   Supine to Sit 3  Moderate assistance   Additional items Increased time required;Assist x 1   Transfers   Sit to Stand 3  Moderate assistance   Additional items Assist x 2   Stand to Sit 3  Moderate assistance   Additional items Assist x 2   Ambulation/Elevation   Gait pattern Not appropriate; Not tested   Balance   Static Sitting Fair   Dynamic Standing Poor   Ambulatory Poor +   Endurance Deficit   Endurance Deficit Yes   Endurance Deficit Description limited by pain, fatigue, and NWB restrictions   Activity Tolerance   Activity Tolerance Patient limited by fatigue;Patient limited by pain   Medical Staff Made Aware Spoke to OT for D/C planning   Assessment   Prognosis Good   Problem List Decreased strength;Decreased endurance; Impaired balance;Decreased mobility; Decreased coordination;Decreased safety awareness;Orthopedic restrictions;Pain   Assessment Pt is 80 y o  female seen for PT evaluation s/p admit to [facility] on 5/20/2019 w/ Closed fracture dislocation of right ankle joint  PT consulted to assess pt's functional mobility and d/c needs  Order placed for PT eval and tx, w/ NWB R LE order  Comorbidities affecting pt's physical performance at time of assessment include: pain  PTA, pt was ambulates community distances and elevations and lives in I living facility  Personal factors affecting pt at time of IE include: ambulating w/ assistive device, positive fall history, anxiety, unable to perform physical activity, inability to perform IADLs and inability to perform ADLs  Please find objective findings from PT assessment regarding body systems outlined above with impairments and limitations including weakness, impaired balance, decreased endurance, gait deviations, pain, decreased activity tolerance, decreased functional mobility tolerance, fall risk and orthopedic restrictions  Pt required increased time and A to complete bed mobility with NWB painful RLE  Tolerated sitting EOB with fair balance  REquired multiple instructions for hand placement and technique for sit<>stand transfers  Tolerated standing aprox  25sec prior to fatigue  Demonstrated good UE strength to complete 3 lateral scoots EOB for pre slide board activity  The following objective measures performed on IE also reveal limitations: Barthel Index: 40/100  Pt's clinical presentation is currently unstable/unpredictable seen in pt's presentation of pain  Pt to benefit from continued PT tx to address deficits as defined above and maximize level of functional independent mobility and consistency  From PT/mobility standpoint, recommendation at time of d/c would be IP rehab pending progress in order to facilitate return to PLOF     Goals Patient Goals To move better   [facility] Expiration Date 06/03/19   Short Term Goal #1 1  Complete bed mobility I to decrease need for caregiver support in apartment  2  Complete slide board transfer Mod I to decrease risk of fall while progressing stand pivot tolerance  3  Complete sit<>stand with S and appropriate hand placement to decrease risk of falls  4  Complete stand pivot with S and NWB precautions  5  I with W/C mobility x 150' to complete community distance mobility with S     Plan   Treatment/Interventions Spoke to nursing;Bed mobility;Gait training;Patient/family training;LE strengthening/ROM; Functional transfer training;OT   PT Frequency   (4-6x/wk)   Recommendation   Recommendation Other (Comment)  (inpt rehab)   Equipment Recommended   (TBD)   PT - OK to Discharge Yes  (to rehab when medically stable )   Barthel Index   Feeding 10   Bathing 0   Grooming Score 0   Dressing Score 5   Bladder Score 5   Bowels Score 10   Toilet Use Score 5   Transfers (Bed/Chair) Score 5   Mobility (Level Surface) Score 0   Stairs Score 0   Barthel Index Score 40         Danyell Rausch, PT

## 2019-05-22 NOTE — ASSESSMENT & PLAN NOTE
· Post op day #1 reduction-fixation bilateral malleolar fracture, left-sided  · Patient appears to be tolerating procedure well  No chest pain or difficulty breathing  She is maintained on nasal cannula at this time with pure wick in place  No bowel movement yet  States her pain is somewhat well controlled but does have pain of lower extremity  · Will monitor CBC, O2 sat, vital signs  · Medical history reviewed, CXR appears without acute cardiopulmonary disease, EKG nonischemic  Overall patient is low risk for low-moderate risk procedure  Patient is acceptable risk to proceed to OR at the discretion of Orthopedic surgery service      · Advise correction of postoperative anemia as indicated  · Pain control as per geriatric pain management order set  · Remainder of management as per Orthopedic surgery service  · Hope to discharge to  pending auth

## 2019-05-22 NOTE — OCCUPATIONAL THERAPY NOTE
Occupational Therapy Evaluation      Juju Galan    5/22/2019    Patient Active Problem List   Diagnosis    Atherosclerosis 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 (HCC)    Benign essential hypertension    Asymptomatic bilateral carotid artery stenosis    Cataract    GERD without esophagitis    Hypercholesterolemia    Hypertriglyceridemia    Iron deficiency anemia    [facility]oma in remission ([facility]    Microcytic anemia    Osteoporosis    Vitamin D deficiency    PAD (peripheral artery disease) ([facility]    Closed fracture dislocation of right ankle joint       Past Medical History:   Diagnosis Date    Anorexia     last assessed: 8/9/2013    GERD (gastroesophageal reflux disease)     Herpes zoster     last assessed: 9/11/2013    Hypertension     Lymphoma ([facility] )     resolved: 1997    Malignant melanoma of skin ([facility] )     resolved: 2015    Pelvic fracture ([facility] )        Past Surgical History:   Procedure Laterality Date    BREAST SURGERY      enlargement procedure    BYPASS FEMORAL-POPLITEAL Left     onset: 8/31/2012    CATARACT EXTRACTION Right 06/19/2013    HYSTERECTOMY      resolved: 1970    ORIF TIBIA & FIBULA FRACTURES Right 5/21/2019    Procedure: OPEN REDUCTION W/ INTERNAL FIXATION (ORIF) ANKLE;  Surgeon: Freddy Smith MD;  [facility];  Service: Orthopedics    OTHER SURGICAL HISTORY  05/30/2013    PTA Femoral-popliteal initial stenosis left Description: with mechanical thrombectomy, PTA and stent     HI OPEN RX DISTAL RADIUS FX, INTRA-ARTICULAR, 3+ FRAG Left 1/18/2017    Procedure: DISTAL RADIUS OPEN REDUCTION W/ INTERNAL FIXATION ;  Surgeon: Lisa Lima MD;  [facility];  Service: Orthopedics        05/22/19 0830   Note Type   Note type Eval/Treat   Restrictions/Precautions   Weight Bearing Precautions Per Order Yes   RLE Weight Bearing Per Order NWB  (per ortho)   Braces or Orthoses Splint  (RLE)   Other Precautions Cognitive; Chair Alarm;Multiple lines;Telemetry; Fall Risk;Pain   Pain Assessment   Pain Assessment 0-10   Pain Score 3   Pain Type Acute pain;Surgical pain   Pain Location Ankle; Foot   Pain Orientation Right   Patient's Stated Pain Goal No pain   Hospital Pain Intervention(s) Ambulation/increased activity; Distraction; Emotional support;Repositioned   Response to Interventions Tolerated   Home Living   Type of Home Other (Comment)  ([address] [address] )   Home Layout One level;Performs ADLs on one level   Bathroom Shower/Tub Tub/shower unit   Bathroom Toilet Standard   Bathroom Equipment   (Denies)   [address] [facility]   Additional Comments Pt reports utilizing SPC as primary AD for mobility    Prior Function   Level of [place] Independent with ADLs and functional mobility; Needs assistance with IADLs   Lives With Facility staff   Receives Help From Other (Comment)  (facility staff)   ADL Assistance Independent   IADLs Needs assistance   Falls in the last 6 months 1 to 4  (1 fall leading to current admit, per Pt)   Vocational Retired   Lifestyle   Autonomy Pt reports being I w/ all ADLS and requires A from facility staff w/ IADLS and (-) drives PTA   Reciprocal Relationships Pt lives alone in her apartment  Receives A from facility staff  Service to Others Pt is retired   Intrinsic Gratification Pt reports enjoying staying [address] ([address]) X   Patient Behaviors/Mood Anxious; Fearful;Cooperative;Pleasant   Subjective   Subjective "I'm so scared to stand"   ADL   Where Assessed Edge of bed   Eating Assistance 5  Supervision/Setup   Grooming Assistance 5  Supervision/Setup   UB Bathing Assistance 4  Minimal Assistance   LB Bathing Assistance 3  Moderate Assistance   [address] 4  [address] 1  Total [address]  1  Total Assistance   Bed Mobility   Supine to Sit 3  Moderate assistance Additional items Assist x 1; Increased time required;LE management;Verbal cues; Bedrails   Sit to Supine Unable to assess   Additional Comments Pt laying supine in bed upon OT arrival  Pt seated in chair w/ chair alarm on and all needs in reach s/p OT session  Transfers   Sit to Stand 3  Moderate assistance   Additional items Assist x 1; Increased time required;Verbal cues;Armrests   Stand to Sit 3  Moderate assistance   Additional items Assist x 1; Increased time required;Verbal cues;Armrests   Stand pivot 3  Moderate assistance   Additional items Assist x 1; Increased time required;Verbal cues  (EOB>chair w/ RW)   Additional Comments Transfers completed w/ RW  VC and TC required for safe/proper hand placement and to maintain RLE NWB during transfers  Functional Mobility   Additional Comments Unable/unsafe to assess at this time  Balance   Static Sitting Fair   Dynamic Sitting Poor +   Static Standing Poor   Dynamic Standing Poor   Ambulatory Poor   Activity Tolerance   Activity Tolerance Patient limited by fatigue;Patient limited by pain;Treatment limited secondary to medical complications (Comment)  (anxiety)   Medical Staff Made Aware MIKALA Moy; CM   Nurse Made Aware MIKALA Moy cleared Pt for OT eval   RUE Assessment   RUE Assessment WFL   LUE Assessment   LUE Assessment WFL   Hand Function   Gross Motor Coordination Functional   Fine Motor Coordination Functional   Sensation   Light Touch No apparent deficits   Vision-Basic Assessment   Current Vision Wears glasses all the time   Cognition   Overall Cognitive Status [facility]   Arousal/Participation Alert; Cooperative;Lethargic   Attention Attends with cues to redirect   Orientation Level Oriented X4   Memory Decreased recall of precautions   Following Commands Follows one step commands with increased time or repetition   Comments Pt is pleasant and cooperative to participate in therapy this day  VC required for safety awareness during functional tasks   VC and TC required to maintain NWB RLE during transfers and mobility  Pt highly anxious t/o therapy session, reporting she is fearful of standing  Extensive emotional support provided and educated on diaphragmatic breathing techniques to decrease anxiety  Assessment   Limitation Decreased ADL status; Decreased UE strength;Decreased Safe judgement during ADL;Decreased endurance;Decreased high-level ADLs   Prognosis Fair   Assessment Pt is a 79 yo female seen for OT eval s/p adm to [facility] w/ R ankle fracture s/p mechanical fall dx'd w/ closed fracture dislocation of R ankle joint  Pt s/p ORIF R ankle on 5/21/19 w/ NWB RLE orders, per ortho  Comorbidities include a h/o HTN, GERD, osteoporosis, atherosclerosis of native artery of extremity w/ intermittent claudication, asymptomatic bilateral carotid artery stenosis, cataract, hypercholesterolemia, hypertriglyceridemia, lymphoma in remission, PAD  Pt with active OT orders and up with assistance  orders  Pt is retired and resides at [facility]  Pt was I w/  ADLS and required A from facility staff w/ IADLS, does not drive, & required use of DME PTA, including SPC as primary AD  Pt is currently demonstrating the following occupational deficits: Min A UB bathing/dressing, Mod A LB bathing/dressing and toileting, Mod A sit<>stand and Mod A SPT w/ RW  These deficits that are impacting pt's baseline areas of occupation are a result of the following impairments: pain, endurance, activity tolerance, functional mobility, balance, functional standing tolerance, unsupportive home environment, decreased I w/ ADLS/IADLS, strength and decreased safety awareness  The following Occupational Performance Areas to address include: grooming, bathing/shower, toilet hygiene, dressing, health maintenance, functional mobility and clothing management  Pt scored overall 40/100 on the Barthel Index   Based on the aforementioned OT evaluation, functional performance deficits, and assessments, pt has been identified as a high complexity evaluation  Recommend STR upon D/C  Pt to continue to benefit from acute immediate OT services to address the following goals 3-5x/week to  w/in 7-10 days:    Goals   Patient Goals To increase mobility    LTG Time Frame 7-10   Long Term Goal #1 Refer to goals below   Plan   Treatment Interventions ADL retraining;Functional transfer training;UE strengthening/ROM; Endurance training;Patient/family training; Compensatory technique education; Activityengagement; Energy conservation   Goal Expiration Date 19   Treatment Day 1   OT Frequency 3-5x/wk   Additional Treatment Session   Start Time 815   End Time 830   Treatment Assessment Patient participated in Skilled OT session this date with interventions consisting of Energy Conservation techniques, deep breathing technique, safety awareness and fall prevention techniques and  Pt education and emotional support*   Patient agreeable to OT treatment session, upon arrival patient was found supine in bed  Pt expressed that she is fearful and anxious of falling  OT provided extensive emotional support and Pt education for diaphragmatic breathing techniques to decrease anxiety  Patient requiring verbal cues for safety, verbal cues for correct technique, verbal cues for pacing thru activity steps, cognitive assistance to anticipate next step and frequent rest periods  Patient continues to be functioning below baseline level, occupational performance remains limited secondary to factors listed above and increased risk for falls and injury  From OT standpoint, recommendation at time of d/c would be Short Term Rehab  Patient to benefit from continued Occupational Therapy treatment while in the hospital to address deficits as defined above and maximize level of functional independence with ADLs and functional mobility      Recommendation   OT Discharge Recommendation Short Term Rehab   OT - OK to Discharge Yes  (when medically cleared)   Barthel Index   Feeding 10   Bathing 0   Grooming Score 0   Dressing Score 5   Bladder Score 5   Bowels Score 10   Toilet Use Score 5   Transfers (Bed/Chair) Score 5   Mobility (Level Surface) Score 0   Stairs Score 0   Barthel Index Score 40   Modified [place] Scale   Modified Finn Scale 4       GOALS    1) Pt will improve activity tolerance to G for min 30 min txment sessions for increase engagement in functional tasks    2) Pt will complete UB/LB dressing/self care w/ mod I using adaptive device and DME as needed    3) Pt will complete bathing w/ Mod I w/ use of AE and DME as needed    4) Pt will complete toileting w/ mod I w/ G hygiene/thoroughness using DME as needed    5) Pt will improve functional transfers to Mod I on/off all surfaces using DME as needed w/ G balance/safety     6) Pt will improve functional mobility during ADL/IADL/leisure tasks to Mod I using DME as needed w/ G balance/safety     7) Pt will participate in simulated IADL management task to increase independence to Mod I w/ G safety and endurance    8) Pt will be attentive 100% of the time during ongoing cognitive assessment w/ G participation to assist w/ safe d/c planning/recommendations    9) Pt will demonstrate G carryover of pt/caregiver education and training as appropriate w/o cues w/ good tolerance to increase safety during functional tasks    10) Pt will demonstrate 100% carryover of energy conservation techniques t/o functional I/ADL/leisure tasks w/o cues s/p skilled education to increase endurance during functional tasks         Trell Sparks MS, OTR/L

## 2019-05-22 NOTE — PROGRESS NOTES
Orthopedics   Jeremy Figueroa 80 y o  female MRN: [id number]  Unit/Bed#: CW3 339-01    Subjective:  80 y  o female post operative day 1 status post right Ankle ORIF  Pt doing well  Pain controlled      Labs:  0   Lab Value Date/Time    HCT 40 3 05/22/2019 0455    HCT 38 4 05/21/2019 0440    HCT 42 1 05/20/2019 1801    HCT 36 2 01/15/2018 1024    HCT 36 5 11/29/2017 0948    HCT 39 3 10/14/2016 0859    HGB 12 7 05/22/2019 0455    HGB 12 4 05/21/2019 0440    HGB 13 5 05/20/2019 1801    HGB 10 7 (L) 01/15/2018 1024    HGB 10 7 (L) 11/29/2017 0948    HGB 12 8 10/14/2016 0859    INR 0 92 05/20/2019 2033    WBC 7 98 05/22/2019 0455    WBC 7 49 05/21/2019 0440    WBC 6 89 05/20/2019 1801    WBC 5 5 01/15/2018 1024    WBC 4 6 11/29/2017 0948    WBC 4 6 10/14/2016 0859       Meds:    Current Facility-Administered Medications:     acetaminophen (TYLENOL) tablet 975 mg, 975 mg, Oral, Q8H [address], Catarina Madison PA-C, 975 mg at 05/22/19 0537    aspirin chewable tablet 81 mg, 81 mg, Oral, Once per day on Mon Wed Fri, Catarina Madison PA-C    bimatoprost (LUMIGAN) 0 01 % ophthalmic solution 1 drop, 1 drop, Both Eyes, HS, Catarina Madison PA-C, 1 drop at 05/21/19 2126    calcium carbonate-vitamin D (OSCAL-D) 500 mg-200 units per tablet 1 tablet, 1 tablet, Oral, Daily With Breakfast, Catarina Madison PA-C, Stopped at 05/21/19 5636    cholecalciferol (VITAMIN D3) tablet 3,000 Units, 3,000 Units, Oral, Daily, Catarina Madison PA-C, Stopped at 05/21/19 6132    enoxaparin (LOVENOX) subcutaneous injection 40 mg, 40 mg, Subcutaneous, Daily, Catarina Media, PA-C    famotidine (PEPCID) tablet 40 mg, 40 mg, Oral, Daily, Catarina Madison PA-C, Stopped at 05/21/19 0121    hydrALAZINE (APRESOLINE) injection 5 mg, 5 mg, Intravenous, Q6H PRN, Catarina Madison PA-C, 5 mg at 05/21/19 9987    HYDROmorphone (DILAUDID) injection 0 2 mg, 0 2 mg, Intravenous, Q4H PRN, Catarina Madison PA-C    lactated ringers bolus 500 mL, 500 mL, Intravenous, Once PRN **AND** lactated ringers bolus 500 mL, 500 mL, Intravenous, Once PRN, Mary Ann Fragmin, PA-C    lisinopril (ZESTRIL) tablet 10 mg, 10 mg, Oral, Daily, Brit Teran PA-C    magnesium oxide (MAG-OX) tablet 400 mg, 400 mg, Oral, Daily, Mary Ann Fragmarii, PA-C, Stopped at 05/21/19 1832    ondansetron [facility] injection 4 mg, 4 mg, Intravenous, Q6H PRN, Mary Ann Fragmin, PA-C    oxyCODONE (ROXICODONE) IR tablet 2 5 mg, 2 5 mg, Oral, Q4H PRN, Mary Ann Fragmin, PA-C    oxyCODONE (ROXICODONE) IR tablet 5 mg, 5 mg, Oral, Q4H PRN, Mary Ann Fragmin, PA-C, 5 mg at 05/21/19 1521    pravastatin (PRAVACHOL) tablet 40 mg, 40 mg, Oral, Daily With Jossy Deck, PA-C, 40 mg at 05/21/19 1722    sodium chloride 0 9 % bolus 500 mL, 500 mL, Intravenous, Once PRN **AND** sodium chloride 0 9 % bolus 500 mL, 500 mL, Intravenous, Once PRN, Mary Ann Fragmin, PA-C    Blood Culture:   No results found for: BLOODCX    Wound Culture:   No results found for: WOUNDCULT    Ins and Outs:  I/O last 24 hours: In: 1120 [P O :220; I V :900]  Out: 625 [Urine:525; Blood:100]          Physical:  Vitals:    05/21/19 2352   BP: 132/61   Pulse: 90   Resp: 18   Temp: (!) 97 3 °F (36 3 °C)   SpO2: 96%     right lower extremity  · Dressings clean Dry Intact  · 4/5 strength to EHL/FHL  · Sensation intact L1-S1  · +2 DP Pulse    _*_*_*_*_*_*_*_*_*_*_*_*_*_*_*_*_*_*_*_*_*_*_*_*_*_*_*_*_*_*_*_*_*_*_*_*_*_*_*_*_*    Assessment:   Post operative day 1 status post right Ankle ORIF  Doing well      Plan:  · Nonweight bearing right lower extremity  · Up out of bed  · Ice, Elevation to affected extremity  · Analgesics  · DVT prophylaxis  · Dispo: [facility] for discharge from ortho perspective   · Patient lives at [address]  · Will continue to assess for acute blood loss anemia    Chucky Momin PA-C

## 2019-05-22 NOTE — PLAN OF CARE
Problem: PHYSICAL THERAPY ADULT  Goal: Performs mobility at highest level of function for planned discharge setting  See evaluation for individualized goals  Description  Treatment/Interventions: Spoke to nursing, Bed mobility, Gait training, Patient/family training, LE strengthening/ROM, Functional transfer training, OT  Equipment Recommended: (TBD)       See flowsheet documentation for full assessment, interventions and recommendations  Note:   Prognosis: Good  Problem List: Decreased strength, Decreased endurance, Impaired balance, Decreased mobility, Decreased coordination, Decreased safety awareness, Orthopedic restrictions, Pain  Assessment: Pt is 80 y o  female seen for PT evaluation s/p admit to [facility] on 5/20/2019 w/ Closed fracture dislocation of right ankle joint  PT consulted to assess pt's functional mobility and d/c needs  Order placed for PT eval and tx, w/ NWB R LE order  Comorbidities affecting pt's physical performance at time of assessment include: pain  PTA, pt was ambulates community distances and elevations and lives in I living facility  Personal factors affecting pt at time of IE include: ambulating w/ assistive device, positive fall history, anxiety, unable to perform physical activity, inability to perform IADLs and inability to perform ADLs  Please find objective findings from PT assessment regarding body systems outlined above with impairments and limitations including weakness, impaired balance, decreased endurance, gait deviations, pain, decreased activity tolerance, decreased functional mobility tolerance, fall risk and orthopedic restrictions  Pt required increased time and A to complete bed mobility with NWB painful RLE  Tolerated sitting EOB with fair balance  REquired multiple instructions for hand placement and technique for sit<>stand transfers  Tolerated standing aprox  25sec prior to fatigue   Demonstrated good UE strength to complete 3 lateral scoots EOB for pre slide board activity  The following objective measures performed on IE also reveal limitations: Barthel Index: 40/100  Pt's clinical presentation is currently unstable/unpredictable seen in pt's presentation of pain  Pt to benefit from continued PT tx to address deficits as defined above and maximize level of functional independent mobility and consistency  From PT/mobility standpoint, recommendation at time of d/c would be IP rehab pending progress in order to facilitate return to PLOF  Recommendation: Other (Comment)(inpt rehab)     PT - OK to Discharge: Yes(to rehab when medically stable )    See flowsheet documentation for full assessment

## 2019-05-22 NOTE — PLAN OF CARE
Problem: OCCUPATIONAL THERAPY ADULT  Goal: Performs self-care activities at highest level of function for planned discharge setting  See evaluation for individualized goals  Description  Treatment Interventions: ADL retraining, Functional transfer training, UE strengthening/ROM, Endurance training, Patient/family training, Compensatory technique education, Activityengagement, Energy conservation          See flowsheet documentation for full assessment, interventions and recommendations  Outcome: Progressing  Note:   Limitation: Decreased ADL status, Decreased UE strength, Decreased Safe judgement during ADL, Decreased endurance, Decreased high-level ADLs  Prognosis: Fair  Assessment: Pt is a 79 yo female seen for OT eval s/p adm to [facility] w/ R ankle fracture s/p mechanical fall dx'd w/ closed fracture dislocation of R ankle joint  Pt s/p ORIF R ankle on 5/21/19 w/ NWB RLE orders, per ortho  Comorbidities include a h/o HTN, GERD, osteoporosis, atherosclerosis of native artery of extremity w/ intermittent claudication, asymptomatic bilateral carotid artery stenosis, cataract, hypercholesterolemia, hypertriglyceridemia, lymphoma in remission, PAD  Pt with active OT orders and up with assistance  orders  Pt is retired and resides at [facility]  Pt was I w/  ADLS and required A from facility staff w/ IADLS, does not drive, & required use of DME PTA, including SPC as primary AD  Pt is currently demonstrating the following occupational deficits: Min A UB bathing/dressing, Mod A LB bathing/dressing and toileting, Mod A sit<>stand and Mod A SPT w/ RW  These deficits that are impacting pt's baseline areas of occupation are a result of the following impairments: pain, endurance, activity tolerance, functional mobility, balance, functional standing tolerance, unsupportive home environment, decreased I w/ ADLS/IADLS, strength and decreased safety awareness   The following Occupational Performance Areas to address include: grooming, bathing/shower, toilet hygiene, dressing, health maintenance, functional mobility and clothing management  Pt scored overall 40/100 on the Barthel Index  Based on the aforementioned OT evaluation, functional performance deficits, and assessments, pt has been identified as a high complexity evaluation  Recommend STR upon D/C   Pt to continue to benefit from acute immediate OT services to address the following goals 3-5x/week to  w/in 7-10 days:      OT Discharge Recommendation: Short Term Rehab  OT - OK to Discharge: Yes(when medically cleared)

## 2019-05-23 VITALS
OXYGEN SATURATION: 96 % | DIASTOLIC BLOOD PRESSURE: 76 MMHG | WEIGHT: 135 LBS | HEART RATE: 86 BPM | TEMPERATURE: 98.2 F | HEIGHT: 64 IN | RESPIRATION RATE: 18 BRPM | SYSTOLIC BLOOD PRESSURE: 159 MMHG | BODY MASS INDEX: 23.05 KG/M2

## 2019-05-23 LAB
ANION GAP SERPL CALCULATED.3IONS-SCNC: 2 MMOL/L (ref 4–13)
BUN SERPL-MCNC: 30 MG/DL (ref 5–25)
CALCIUM SERPL-MCNC: 8.4 MG/DL (ref 8.3–10.1)
CHLORIDE SERPL-SCNC: 107 MMOL/L (ref 100–108)
CO2 SERPL-SCNC: 28 MMOL/L (ref 21–32)
CREAT SERPL-MCNC: 0.78 MG/DL (ref 0.6–1.3)
ERYTHROCYTE [DISTWIDTH] IN BLOOD BY AUTOMATED COUNT: 15.2 % (ref 11.6–15.1)
GFR SERPL CREATININE-BSD FRML MDRD: 67 ML/MIN/1.73SQ M
GLUCOSE SERPL-MCNC: 103 MG/DL (ref 65–140)
HCT VFR BLD AUTO: 36.8 % (ref 34.8–46.1)
HGB BLD-MCNC: 11.8 G/DL (ref 11.5–15.4)
MCH RBC QN AUTO: 29 PG (ref 26.8–34.3)
MCHC RBC AUTO-ENTMCNC: 32.1 G/DL (ref 31.4–37.4)
MCV RBC AUTO: 90 FL (ref 82–98)
PLATELET # BLD AUTO: 249 THOUSANDS/UL (ref 149–390)
PMV BLD AUTO: 11.2 FL (ref 8.9–12.7)
POTASSIUM SERPL-SCNC: 4 MMOL/L (ref 3.5–5.3)
RBC # BLD AUTO: 4.07 MILLION/UL (ref 3.81–5.12)
SODIUM SERPL-SCNC: 137 MMOL/L (ref 136–145)
WBC # BLD AUTO: 8.81 THOUSAND/UL (ref 4.31–10.16)

## 2019-05-23 PROCEDURE — 97530 THERAPEUTIC ACTIVITIES: CPT

## 2019-05-23 PROCEDURE — 99024 POSTOP FOLLOW-UP VISIT: CPT | Performed by: PHYSICIAN ASSISTANT

## 2019-05-23 PROCEDURE — 80048 BASIC METABOLIC PNL TOTAL CA: CPT | Performed by: PHYSICIAN ASSISTANT

## 2019-05-23 PROCEDURE — 99239 HOSP IP/OBS DSCHRG MGMT >30: CPT | Performed by: PHYSICIAN ASSISTANT

## 2019-05-23 PROCEDURE — 85027 COMPLETE CBC AUTOMATED: CPT | Performed by: PHYSICIAN ASSISTANT

## 2019-05-23 PROCEDURE — 97535 SELF CARE MNGMENT TRAINING: CPT

## 2019-05-23 RX ORDER — OXYCODONE HYDROCHLORIDE 5 MG/1
2.5 TABLET ORAL EVERY 6 HOURS PRN
Qty: 20 TABLET | Refills: 0 | Status: SHIPPED | OUTPATIENT
Start: 2019-05-23 | End: 2019-06-07 | Stop reason: HOSPADM

## 2019-05-23 RX ADMIN — ACETAMINOPHEN 975 MG: 325 TABLET ORAL at 05:30

## 2019-05-23 RX ADMIN — CALCIUM CARBONATE-VITAMIN D TAB 500 MG-200 UNIT 1 TABLET: 500-200 TAB at 08:49

## 2019-05-23 RX ADMIN — MAGNESIUM OXIDE TAB 400 MG (241.3 MG ELEMENTAL MG) 400 MG: 400 (241.3 MG) TAB at 08:51

## 2019-05-23 RX ADMIN — LISINOPRIL 10 MG: 10 TABLET ORAL at 08:50

## 2019-05-23 RX ADMIN — POLYETHYLENE GLYCOL 3350 17 G: 17 POWDER, FOR SOLUTION ORAL at 08:50

## 2019-05-23 RX ADMIN — VITAMIN D, TAB 1000IU (100/BT) 3000 UNITS: 25 TAB at 08:49

## 2019-05-23 RX ADMIN — FAMOTIDINE 40 MG: 20 TABLET ORAL at 08:50

## 2019-05-23 RX ADMIN — ENOXAPARIN SODIUM 40 MG: 40 INJECTION SUBCUTANEOUS at 08:50

## 2019-05-23 NOTE — PLAN OF CARE
Problem: OCCUPATIONAL THERAPY ADULT  Goal: Performs self-care activities at highest level of function for planned discharge setting  See evaluation for individualized goals  Description  Treatment Interventions: ADL retraining, Functional transfer training, UE strengthening/ROM, Endurance training, Patient/family training, Compensatory technique education, Activityengagement, Energy conservation          See flowsheet documentation for full assessment, interventions and recommendations  Outcome: Progressing  Note:   Limitation: Decreased ADL status, Decreased UE strength, Decreased Safe judgement during ADL, Decreased endurance, Decreased high-level ADLs  Prognosis: Fair  Assessment: Pt was seen this date for OT tx session focusign on self care tasks, transfer training, toileting, energy conservation techniques, coping skills, and overall activity tolerance  Pt presents seated in chair, compeltes sit to stand pregression, tranfers at documented asssit level above  Continues to requires significant assist with all self care tasks  Emotional support and increased eduaction provided throguhout tx session to decrease anxious like behaviors and maximze safety during fucntonal tranfers  Pt is able to maintain NWB throguhout however with very little standing toelrance and c o  increased pain fatigue and discomfort  Pt seated in chair at end of session with all needs in reach and chair alarm on  Would benefit from continued OT tx to imrpove overall funcotinal abilities  Continue to follow with current POC       OT Discharge Recommendation: Short Term Rehab  OT - OK to Discharge: Yes(when medically cleared)  DAMON Nelson

## 2019-05-23 NOTE — ASSESSMENT & PLAN NOTE
· Post op day #2 reduction-fixation bilateral malleolar fracture, left-sided  · Patient appears to be tolerating procedure well  No chest pain or difficulty breathing  She is maintained on nasal cannula at this time with pure wick in place  Moving bowels  States her pain is well controlled  · Will monitor CBC, O2 sat, vital signs  · Medical history reviewed, CXR appears without acute cardiopulmonary disease, EKG nonischemic  Overall patient is low risk for low-moderate risk procedure  Patient is acceptable risk to proceed to OR at the discretion of Orthopedic surgery service      · Advise correction of postoperative anemia as indicated  · Pain control as per geriatric pain management order set  · Scripts provided by Orthopedic service for DVT prophylaxis and pain control  · Patient to follow up  · Discharge to  for continued care

## 2019-05-23 NOTE — ASSESSMENT & PLAN NOTE
· Elevated, but suspect pain playing a role into this  · Pain control as above  · Can restart enalapril which was held in anticipation of OR

## 2019-05-23 NOTE — SOCIAL WORK
[facility] provided AUTH# [id number] and can accept pt today  CM notified pt's nurse and physician  CM spoke with pt and she chose [facility] for transport and is aware of costs  CM spoke with Linda [place] from [facility] to arrange [address] at 5:45pm   All parties aware and in agreement  Sari Miranda from [facility] requested earlier transport and arranged [address] for 12noon  CM notified pt's nurse and physician  CM also called pt's dtr, Chirag Spring and was able to update son-in-law regarding discharge  All parties in agreement and CM canceled [facility] [address]  CM completed Facility Contacts

## 2019-05-23 NOTE — NURSING NOTE
Report has been called into receiving SNF  Two prescriptions have been included in discharge packet

## 2019-05-23 NOTE — OCCUPATIONAL THERAPY NOTE
Occupational Therapy Treatment Note:     05/23/19 5397   Restrictions/Precautions   Weight Bearing Precautions Per Order Yes   RLE Weight Bearing Per Order NWB   Braces or Orthoses Splint  (RLE)   Other Precautions Cognitive; Chair Alarm;WBS;Fall Risk;Pain   Pain Assessment   Pain Score 2   Pain Type Acute pain   Pain Location Ankle   Pain Orientation Right   ADL   Where Assessed Chair   Grooming Assistance 5  Supervision/Setup   Grooming Deficit Wash/dry hands; Wash/dry face;Setup; Increased time to complete;Brushing hair   Grooming Comments in seated   UB Bathing Comments declined   LB Bathing Comments declined   UB Dressing Assistance 5  Supervision/Setup   UB Dressing Deficit Pull around back;Pull down in back   UB Dressing Comments dons sweater   LB Dressing Assistance 2  Maximal Assistance   LB Dressing Deficit Don/doff L sock   LB Dressing Comments attempts seaed on BSC, unsuccessful attempt requires max A for same   Toileting Assistance  3  Moderate Assistance   Toileting Deficit Clothing management up   Toileting Comments Due to NWB RLE pt requires mod A for clothing managment in stance pre and post toileting, able to complete hygiene while seated on commode   Functional Standing Tolerance   Time < 1 min   Activity static standing   Comments RW level   Bed Mobility   Additional Comments Pt seated OOB upon presentation and at end of session    Transfers   Sit to Stand 3  Moderate assistance   Additional items Assist x 1; Increased time required;Verbal cues   Stand to Sit 3  Moderate assistance   Additional items Assist x 1; Increased time required;Verbal cues   Stand pivot 3  Moderate assistance   Additional items Assist x 1; Increased time required;Verbal cues   Additional Comments RW level , increased time and VC required noted difficulity with pivoting on LLE, pt c o  increased pain and discomfort and fatigue   Functional Mobility   Additional Comments not appropriate at thsi time   Toilet Transfers   Toilet Transfer From Rolling walker   Toilet Transfer Type To and from   Toilet Transfer to Standard bedside commode   Toilet Transfer Technique Stand pivot   Toilet Transfers Moderate assistance   Toilet Transfers Comments increased time, pt expresses fear of falling, increased VC and encouragment required throughout   Cognition   Overall Cognitive Status [facility]   Arousal/Participation Alert; Cooperative;Lethargic   Attention Attends with cues to redirect   Orientation Level Oriented X4   Memory Decreased recall of precautions   Following Commands Follows one step commands with increased time or repetition   Comments Pt with anxious like behaviors during tranfer tasks requires increased emotional support throughout, poor carryover of tranfer taining skills continues to require increased VC and TC for safe tranfer techniques  Activity Tolerance   Activity Tolerance Patient limited by pain   Medical Staff Made Aware NSG aware   Assessment   Assessment Pt was seen this date for OT tx session focusign on self care tasks, transfer training, toileting, energy conservation techniques, coping skills, and overall activity tolerance  Pt presents seated in chair, compeltes sit to stand pregression, tranfers at documented asssit level above  Continues to requires significant assist with all self care tasks  Emotional support and increased eduaction provided throguhout tx session to decrease anxious like behaviors and maximze safety during fucntonal tranfers  Pt is able to maintain NWB throguhout however with very little standing toelrance and c o  increased pain fatigue and discomfort  Pt seated in chair at end of session with all needs in reach and chair alarm on  Would benefit from continued OT tx to imrpove overall funcotinal abilities  Continue to follow with current POC     Plan   Treatment Interventions ADL retraining   Goal Expiration Date 06/01/19   Treatment Day 2   OT Frequency 3-5x/wk   Recommendation   OT Discharge Recommendation Short Term Rehab       [facility]

## 2019-05-23 NOTE — DISCHARGE SUMMARY
Discharge- Tamiko [facility] 4/10/1928, 80 y o  female MRN: [id number]  Unit/Bed#: CW3 339-01 Encounter: [id number]  Primary Care Provider: Julianna Olmos DO   Date and time admitted to hospital: 5/20/2019  5:22 PM    * Closed fracture dislocation of right ankle joint  Assessment & Plan  · Post op day #2 reduction-fixation bilateral malleolar fracture, left-sided  · Patient appears to be tolerating procedure well  No chest pain or difficulty breathing  She is maintained on nasal cannula at this time with pure wick in place  Moving bowels  States her pain is well controlled  · Will monitor CBC, O2 sat, vital signs  · Medical history reviewed, CXR appears without acute cardiopulmonary disease, EKG nonischemic  Overall patient is low risk for low-moderate risk procedure  Patient is acceptable risk to proceed to OR at the discretion of Orthopedic surgery service      · Advise correction of postoperative anemia as indicated  · Pain control as per geriatric pain management order set  · Scripts provided by Orthopedic service for DVT prophylaxis and pain control  · Patient to follow up  · Discharge to  for continued care    Osteoporosis  Assessment & Plan  · Continue vitamin-D and calcium supplementation    GERD without esophagitis  Assessment & Plan  · Controlled, continue Pepcid    Benign essential hypertension  Assessment & Plan  · Elevated, but suspect pain playing a role into this  · Pain control as above  · Can restart enalapril which was held in anticipation of OR        Discharging Physician / Practitioner: Gema Key PA-C  PCP: Julianna Olmos DO  Admission Date:   Admission Orders (From admission, onward)    Ordered        05/20/19 2100  Inpatient Admission  Once             Discharge Date: 05/23/19    Resolved Problems  Date Reviewed: 5/23/2019    None          Consultations During Hospital Stay:  · Orthopedics    Procedures Performed:   · Left-sided malleolar ORIF    Significant Findings / Test Results:   · None    Incidental Findings:   · None     Test Results Pending at Discharge (will require follow up): · None     Outpatient Tests Requested:  · None    Complications:  None    Reason for Admission: Right ankle fracture     Hospital Course:     Gina Villafuerte is a 80 y o  female patient with HTN and osteoporosis who originally presented to the hospital on 5/20/2019 due to right ankle pain  Patient reported that she tripped and fell at her independent living facility and could not rise due to ankle pain  She denied loss of consciousness, syncope, dizziness prior to the fall  Emergency department x-rays performed finding right bimalleolar ankle fracture dislocation  She received closed reduction and splinting in the ED and was admitted to the hospital for orthopedics consult  Orthopedics assessed patient, planned for ORIF which was performed successfully on 5/21  Patient maintain hemoglobin levels after the procedure  Her vital signs were stable throughout  She progressed well with physical therapy and occupational therapy who deemed her appropriate for discharge to skilled nursing facility versus independent living  MS assessed patient finding her appropriate to return  Please see above list of diagnoses and related plan for additional information  Condition at Discharge: good     Discharge Day Visit / Exam:     Subjective:  Patient feels well  Believes her right lower extremity pain is well controlled  Denies difficulty breathing, chest pain, cough  Denies abdominal pain  His with bowel movements well  Has good appetite  Brigid Travis to return to [facility]      Vitals: Blood Pressure: 159/76 (05/23/19 0721)  Pulse: 86 (05/23/19 0721)  Temperature: 98 2 °F (36 8 °C) (05/23/19 0721)  Temp Source: Oral (05/21/19 1605)  Respirations: 18 (05/23/19 0721)  Height: 5' 4" (162 6 cm) (05/20/19 2234)  Weight - Scale: 61 2 kg (135 lb) (05/20/19 2234)  SpO2: 96 % (05/21/19 2352)  Exam: Physical Exam   Constitutional: She is oriented to person, place, and time  She appears well-developed and well-nourished  No distress  Pleasant, alert and oriented  Fully conversational   No acute distress  HENT:   Head: Normocephalic and atraumatic  Eyes: Right eye exhibits no discharge  Left eye exhibits no discharge  No scleral icterus  Cardiovascular: Normal rate and regular rhythm  Exam reveals no gallop and no friction rub  No murmur heard  Pulmonary/Chest: Effort normal and breath sounds normal  No respiratory distress  She has no wheezes  She has no rales  Abdominal: Soft  Bowel sounds are normal  She exhibits no distension  There is no tenderness  Neurological: She is alert and oriented to person, place, and time  Skin: Skin is warm and dry  Cast applied to right lower extremity   Nursing note and vitals reviewed  Discussion with Family:  Discussed with patient    Discharge instructions/Information to patient and family:   See after visit summary for information provided to patient and family  Provisions for Follow-Up Care:  See after visit summary for information related to follow-up care and any pertinent home health orders  Disposition:     Assisted Living Facility at [address] [place] [address] SNF:   · Not Applicable to this Patient - Not Applicable to this Patient    Planned Readmission:  None     Discharge Statement:  I spent 40 minutes discharging the patient  This time was spent on the day of discharge  I had direct contact with the patient on the day of discharge  Greater than 50% of the total time was spent examining patient, answering all patient questions, arranging and discussing plan of care with patient as well as directly providing post-discharge instructions  Additional time then spent on discharge activities      Discharge Medications:  See after visit summary for reconciled discharge medications provided to patient and family        ** Please Note: This note has been constructed using a voice recognition system **

## 2019-05-24 ENCOUNTER — NURSING HOME VISIT (OUTPATIENT)
Dept: GERIATRICS | Facility: OTHER | Age: 84
End: 2019-05-24
Payer: COMMERCIAL

## 2019-05-24 DIAGNOSIS — S82.891S CLOSED FRACTURE DISLOCATION OF RIGHT ANKLE, SEQUELA: ICD-10-CM

## 2019-05-24 DIAGNOSIS — J30.9 ALLERGIC RHINITIS, UNSPECIFIED SEASONALITY, UNSPECIFIED TRIGGER: ICD-10-CM

## 2019-05-24 DIAGNOSIS — I73.9 PAD (PERIPHERAL ARTERY DISEASE) (HCC): Primary | ICD-10-CM

## 2019-05-24 DIAGNOSIS — R71.0 HEMOGLOBIN DROP: ICD-10-CM

## 2019-05-24 DIAGNOSIS — K21.9 GERD WITHOUT ESOPHAGITIS: ICD-10-CM

## 2019-05-24 DIAGNOSIS — R26.2 AMBULATORY DYSFUNCTION: ICD-10-CM

## 2019-05-24 DIAGNOSIS — I10 BENIGN ESSENTIAL HYPERTENSION: ICD-10-CM

## 2019-05-24 DIAGNOSIS — I73.9 PAD (PERIPHERAL ARTERY DISEASE) (HCC): ICD-10-CM

## 2019-05-24 DIAGNOSIS — M80.00XS AGE-RELATED OSTEOPOROSIS WITH CURRENT PATHOLOGICAL FRACTURE, SEQUELA: ICD-10-CM

## 2019-05-24 DIAGNOSIS — R53.81 DEBILITY: Primary | ICD-10-CM

## 2019-05-24 PROCEDURE — 99306 1ST NF CARE HIGH MDM 50: CPT | Performed by: FAMILY MEDICINE

## 2019-05-25 LAB
25(OH)D2 SERPL-MCNC: <1 NG/ML
25(OH)D3 SERPL-MCNC: 31 NG/ML
25(OH)D3+25(OH)D2 SERPL-MCNC: 31 NG/ML

## 2019-05-27 ENCOUNTER — NURSING HOME VISIT (OUTPATIENT)
Dept: GERIATRICS | Facility: OTHER | Age: 84
End: 2019-05-27
Payer: COMMERCIAL

## 2019-05-27 DIAGNOSIS — R71.0 HEMOGLOBIN DROP: ICD-10-CM

## 2019-05-27 DIAGNOSIS — R26.2 AMBULATORY DYSFUNCTION: ICD-10-CM

## 2019-05-27 DIAGNOSIS — S82.891S CLOSED FRACTURE DISLOCATION OF RIGHT ANKLE, SEQUELA: ICD-10-CM

## 2019-05-27 DIAGNOSIS — R53.81 DEBILITY: ICD-10-CM

## 2019-05-27 DIAGNOSIS — I10 BENIGN ESSENTIAL HYPERTENSION: ICD-10-CM

## 2019-05-27 DIAGNOSIS — R53.81 PHYSICAL DECONDITIONING: Primary | ICD-10-CM

## 2019-05-27 PROCEDURE — 99309 SBSQ NF CARE MODERATE MDM 30: CPT | Performed by: NURSE PRACTITIONER

## 2019-05-30 ENCOUNTER — NURSING HOME VISIT (OUTPATIENT)
Dept: GERIATRICS | Facility: OTHER | Age: 84
End: 2019-05-30
Payer: COMMERCIAL

## 2019-05-30 DIAGNOSIS — R26.2 AMBULATORY DYSFUNCTION: ICD-10-CM

## 2019-05-30 DIAGNOSIS — R53.81 PHYSICAL DECONDITIONING: Primary | ICD-10-CM

## 2019-05-30 DIAGNOSIS — R71.0 HEMOGLOBIN DROP: ICD-10-CM

## 2019-05-30 DIAGNOSIS — S82.891S CLOSED FRACTURE DISLOCATION OF RIGHT ANKLE, SEQUELA: ICD-10-CM

## 2019-05-30 PROCEDURE — 99309 SBSQ NF CARE MODERATE MDM 30: CPT | Performed by: NURSE PRACTITIONER

## 2019-06-03 ENCOUNTER — TELEPHONE (OUTPATIENT)
Dept: OBGYN CLINIC | Facility: HOSPITAL | Age: 84
End: 2019-06-03

## 2019-06-03 ENCOUNTER — ANESTHESIA EVENT (INPATIENT)
Dept: PERIOP | Facility: HOSPITAL | Age: 84
DRG: 496 | End: 2019-06-03
Payer: COMMERCIAL

## 2019-06-03 ENCOUNTER — HOSPITAL ENCOUNTER (INPATIENT)
Facility: HOSPITAL | Age: 84
LOS: 4 days | Discharge: NURSING, REHAB OR LONG-TERM CARE | DRG: 496 | End: 2019-06-07
Attending: ORTHOPAEDIC SURGERY | Admitting: ORTHOPAEDIC SURGERY
Payer: COMMERCIAL

## 2019-06-03 ENCOUNTER — APPOINTMENT (INPATIENT)
Dept: RADIOLOGY | Facility: HOSPITAL | Age: 84
DRG: 496 | End: 2019-06-03
Payer: COMMERCIAL

## 2019-06-03 ENCOUNTER — OFFICE VISIT (OUTPATIENT)
Dept: OBGYN CLINIC | Facility: HOSPITAL | Age: 84
End: 2019-06-03

## 2019-06-03 ENCOUNTER — HOSPITAL ENCOUNTER (OUTPATIENT)
Dept: RADIOLOGY | Facility: HOSPITAL | Age: 84
Discharge: HOME | DRG: 496 | End: 2019-06-03
Payer: COMMERCIAL

## 2019-06-03 VITALS
SYSTOLIC BLOOD PRESSURE: 147 MMHG | DIASTOLIC BLOOD PRESSURE: 80 MMHG | HEART RATE: 88 BPM | HEIGHT: 64 IN | BODY MASS INDEX: 23.17 KG/M2

## 2019-06-03 DIAGNOSIS — Z97.8 ORTHOPEDIC HARDWARE PRESENT: ICD-10-CM

## 2019-06-03 DIAGNOSIS — Z48.89 AFTERCARE FOLLOWING SURGERY: Primary | ICD-10-CM

## 2019-06-03 DIAGNOSIS — R26.2 AMBULATORY DYSFUNCTION: ICD-10-CM

## 2019-06-03 DIAGNOSIS — S82.891S CLOSED FRACTURE DISLOCATION OF RIGHT ANKLE, SEQUELA: ICD-10-CM

## 2019-06-03 DIAGNOSIS — Z48.89 AFTERCARE FOLLOWING SURGERY: ICD-10-CM

## 2019-06-03 DIAGNOSIS — Z97.8 ORTHOPEDIC HARDWARE PRESENT: Primary | ICD-10-CM

## 2019-06-03 DIAGNOSIS — Z01.818 PREOPERATIVE CLEARANCE: ICD-10-CM

## 2019-06-03 LAB
ABO GROUP BLD: NORMAL
ALBUMIN SERPL BCP-MCNC: 3.4 G/DL (ref 3.5–5)
ALP SERPL-CCNC: 94 U/L (ref 46–116)
ALT SERPL W P-5'-P-CCNC: 29 U/L (ref 12–78)
ANION GAP SERPL CALCULATED.3IONS-SCNC: 6 MMOL/L (ref 4–13)
APTT PPP: 28 SECONDS (ref 26–38)
AST SERPL W P-5'-P-CCNC: 21 U/L (ref 5–45)
BILIRUB SERPL-MCNC: 0.5 MG/DL (ref 0.2–1)
BLD GP AB SCN SERPL QL: NEGATIVE
BUN SERPL-MCNC: 19 MG/DL (ref 5–25)
CALCIUM SERPL-MCNC: 9.1 MG/DL (ref 8.3–10.1)
CHLORIDE SERPL-SCNC: 103 MMOL/L (ref 100–108)
CO2 SERPL-SCNC: 26 MMOL/L (ref 21–32)
CREAT SERPL-MCNC: 0.58 MG/DL (ref 0.6–1.3)
ERYTHROCYTE [DISTWIDTH] IN BLOOD BY AUTOMATED COUNT: 15.2 % (ref 11.6–15.1)
GFR SERPL CREATININE-BSD FRML MDRD: 81 ML/MIN/1.73SQ M
GLUCOSE SERPL-MCNC: 97 MG/DL (ref 65–140)
HCT VFR BLD AUTO: 40.5 % (ref 34.8–46.1)
HGB BLD-MCNC: 12.9 G/DL (ref 11.5–15.4)
INR PPP: 0.93 (ref 0.86–1.17)
MCH RBC QN AUTO: 29.3 PG (ref 26.8–34.3)
MCHC RBC AUTO-ENTMCNC: 31.9 G/DL (ref 31.4–37.4)
MCV RBC AUTO: 92 FL (ref 82–98)
PLATELET # BLD AUTO: 412 THOUSANDS/UL (ref 149–390)
PMV BLD AUTO: 11 FL (ref 8.9–12.7)
POTASSIUM SERPL-SCNC: 4.3 MMOL/L (ref 3.5–5.3)
PROT SERPL-MCNC: 7.9 G/DL (ref 6.4–8.2)
PROTHROMBIN TIME: 12.6 SECONDS (ref 11.8–14.2)
RBC # BLD AUTO: 4.41 MILLION/UL (ref 3.81–5.12)
RH BLD: POSITIVE
SODIUM SERPL-SCNC: 135 MMOL/L (ref 136–145)
SPECIMEN EXPIRATION DATE: NORMAL
WBC # BLD AUTO: 8.54 THOUSAND/UL (ref 4.31–10.16)

## 2019-06-03 PROCEDURE — 86850 RBC ANTIBODY SCREEN: CPT | Performed by: ORTHOPAEDIC SURGERY

## 2019-06-03 PROCEDURE — 80053 COMPREHEN METABOLIC PANEL: CPT | Performed by: PHYSICIAN ASSISTANT

## 2019-06-03 PROCEDURE — NC001 PR NO CHARGE: Performed by: PHYSICIAN ASSISTANT

## 2019-06-03 PROCEDURE — 73610 X-RAY EXAM OF ANKLE: CPT

## 2019-06-03 PROCEDURE — 99024 POSTOP FOLLOW-UP VISIT: CPT | Performed by: PHYSICIAN ASSISTANT

## 2019-06-03 PROCEDURE — 85027 COMPLETE CBC AUTOMATED: CPT | Performed by: PHYSICIAN ASSISTANT

## 2019-06-03 PROCEDURE — 86900 BLOOD TYPING SEROLOGIC ABO: CPT | Performed by: ORTHOPAEDIC SURGERY

## 2019-06-03 PROCEDURE — 85610 PROTHROMBIN TIME: CPT | Performed by: PHYSICIAN ASSISTANT

## 2019-06-03 PROCEDURE — 86901 BLOOD TYPING SEROLOGIC RH(D): CPT | Performed by: ORTHOPAEDIC SURGERY

## 2019-06-03 PROCEDURE — 85730 THROMBOPLASTIN TIME PARTIAL: CPT | Performed by: PHYSICIAN ASSISTANT

## 2019-06-03 PROCEDURE — 71046 X-RAY EXAM CHEST 2 VIEWS: CPT

## 2019-06-03 RX ORDER — SODIUM CHLORIDE, SODIUM LACTATE, POTASSIUM CHLORIDE, CALCIUM CHLORIDE 600; 310; 30; 20 MG/100ML; MG/100ML; MG/100ML; MG/100ML
75 INJECTION, SOLUTION INTRAVENOUS CONTINUOUS
Status: DISCONTINUED | OUTPATIENT
Start: 2019-06-04 | End: 2019-06-07 | Stop reason: HOSPADM

## 2019-06-03 RX ORDER — CEFAZOLIN SODIUM 2 G/50ML
2000 SOLUTION INTRAVENOUS ONCE
Status: DISCONTINUED | OUTPATIENT
Start: 2019-06-03 | End: 2019-06-03

## 2019-06-03 RX ORDER — CHLORHEXIDINE GLUCONATE 0.12 MG/ML
15 RINSE ORAL ONCE
Status: CANCELLED | OUTPATIENT
Start: 2019-06-03 | End: 2019-06-03

## 2019-06-03 RX ORDER — LISINOPRIL 20 MG/1
20 TABLET ORAL DAILY
Status: DISCONTINUED | OUTPATIENT
Start: 2019-06-04 | End: 2019-06-07 | Stop reason: HOSPADM

## 2019-06-03 RX ORDER — OXYCODONE HYDROCHLORIDE 5 MG/1
5 TABLET ORAL EVERY 4 HOURS PRN
Status: DISCONTINUED | OUTPATIENT
Start: 2019-06-03 | End: 2019-06-07 | Stop reason: HOSPADM

## 2019-06-03 RX ORDER — CHLORHEXIDINE GLUCONATE 0.12 MG/ML
15 RINSE ORAL ONCE
Status: DISCONTINUED | OUTPATIENT
Start: 2019-06-03 | End: 2019-06-04 | Stop reason: HOSPADM

## 2019-06-03 RX ORDER — ACETAMINOPHEN 325 MG/1
650 TABLET ORAL EVERY 6 HOURS PRN
Status: DISCONTINUED | OUTPATIENT
Start: 2019-06-03 | End: 2019-06-07 | Stop reason: HOSPADM

## 2019-06-03 RX ORDER — TRAMADOL HYDROCHLORIDE 50 MG/1
50 TABLET ORAL EVERY 6 HOURS PRN
Status: DISCONTINUED | OUTPATIENT
Start: 2019-06-03 | End: 2019-06-07 | Stop reason: HOSPADM

## 2019-06-03 RX ORDER — FLUTICASONE PROPIONATE 50 MCG
1 SPRAY, SUSPENSION (ML) NASAL DAILY
Status: DISCONTINUED | OUTPATIENT
Start: 2019-06-04 | End: 2019-06-07 | Stop reason: HOSPADM

## 2019-06-03 RX ORDER — PANTOPRAZOLE SODIUM 40 MG/1
40 TABLET, DELAYED RELEASE ORAL
Status: DISCONTINUED | OUTPATIENT
Start: 2019-06-04 | End: 2019-06-07 | Stop reason: HOSPADM

## 2019-06-03 RX ORDER — PRAVASTATIN SODIUM 40 MG
40 TABLET ORAL
Status: DISCONTINUED | OUTPATIENT
Start: 2019-06-03 | End: 2019-06-07 | Stop reason: HOSPADM

## 2019-06-03 RX ORDER — CEFAZOLIN SODIUM 2 G/50ML
2000 SOLUTION INTRAVENOUS ONCE
Status: CANCELLED | OUTPATIENT
Start: 2019-06-03 | End: 2019-06-03

## 2019-06-03 RX ORDER — ONDANSETRON 2 MG/ML
4 INJECTION INTRAMUSCULAR; INTRAVENOUS EVERY 6 HOURS PRN
Status: DISCONTINUED | OUTPATIENT
Start: 2019-06-03 | End: 2019-06-07 | Stop reason: HOSPADM

## 2019-06-03 RX ORDER — CEFAZOLIN SODIUM 2 G/50ML
2000 SOLUTION INTRAVENOUS ONCE
Status: DISCONTINUED | OUTPATIENT
Start: 2019-06-04 | End: 2019-06-04 | Stop reason: SDUPTHER

## 2019-06-03 RX ADMIN — PRAVASTATIN SODIUM 40 MG: 40 TABLET ORAL at 17:18

## 2019-06-03 RX ADMIN — OXYCODONE HYDROCHLORIDE 5 MG: 5 TABLET ORAL at 22:40

## 2019-06-03 RX ADMIN — BIMATOPROST 1 DROP: 0.1 SOLUTION/ DROPS OPHTHALMIC at 21:34

## 2019-06-03 RX ADMIN — ACETAMINOPHEN 650 MG: 325 TABLET ORAL at 22:40

## 2019-06-04 ENCOUNTER — APPOINTMENT (INPATIENT)
Dept: RADIOLOGY | Facility: HOSPITAL | Age: 84
DRG: 496 | End: 2019-06-04
Payer: COMMERCIAL

## 2019-06-04 ENCOUNTER — ANESTHESIA (INPATIENT)
Dept: PERIOP | Facility: HOSPITAL | Age: 84
DRG: 496 | End: 2019-06-04
Payer: COMMERCIAL

## 2019-06-04 PROCEDURE — 27829 TREAT LOWER LEG JOINT: CPT | Performed by: ORTHOPAEDIC SURGERY

## 2019-06-04 PROCEDURE — 27829 TREAT LOWER LEG JOINT: CPT | Performed by: PHYSICIAN ASSISTANT

## 2019-06-04 PROCEDURE — C1713 ANCHOR/SCREW BN/BN,TIS/BN: HCPCS | Performed by: ORTHOPAEDIC SURGERY

## 2019-06-04 PROCEDURE — 99024 POSTOP FOLLOW-UP VISIT: CPT | Performed by: PHYSICIAN ASSISTANT

## 2019-06-04 PROCEDURE — 0QWG04Z REVISION OF INTERNAL FIXATION DEVICE IN RIGHT TIBIA, OPEN APPROACH: ICD-10-PCS | Performed by: ORTHOPAEDIC SURGERY

## 2019-06-04 PROCEDURE — 27814 TREATMENT OF ANKLE FRACTURE: CPT | Performed by: ORTHOPAEDIC SURGERY

## 2019-06-04 PROCEDURE — 73610 X-RAY EXAM OF ANKLE: CPT

## 2019-06-04 PROCEDURE — 27814 TREATMENT OF ANKLE FRACTURE: CPT | Performed by: PHYSICIAN ASSISTANT

## 2019-06-04 DEVICE — 3.5MM CORTEX SCREW SELF-TAPPING 28MM: Type: IMPLANTABLE DEVICE | Site: ANKLE | Status: FUNCTIONAL

## 2019-06-04 DEVICE — 3.5MM LCP HOOK PLATE 3 HOLE STERILE
Type: IMPLANTABLE DEVICE | Site: ANKLE | Status: FUNCTIONAL
Brand: LCP

## 2019-06-04 DEVICE — 3.5MM CORTEX SCREW SELF-TAPPING 38MM: Type: IMPLANTABLE DEVICE | Site: ANKLE | Status: FUNCTIONAL

## 2019-06-04 DEVICE — 4.0MM CANCELLOUS BONE SCREW FULLY THREADED/50MM: Type: IMPLANTABLE DEVICE | Site: ANKLE | Status: FUNCTIONAL

## 2019-06-04 DEVICE — 3.5MM CORTEX SCREW SELF-TAPPING 14MM: Type: IMPLANTABLE DEVICE | Site: ANKLE | Status: FUNCTIONAL

## 2019-06-04 DEVICE — 3.5MM CORTEX SCREW SELF-TAPPING 48MM: Type: IMPLANTABLE DEVICE | Site: ANKLE | Status: FUNCTIONAL

## 2019-06-04 RX ORDER — HYDROMORPHONE HCL/PF 1 MG/ML
0.5 SYRINGE (ML) INJECTION
Status: DISCONTINUED | OUTPATIENT
Start: 2019-06-04 | End: 2019-06-04 | Stop reason: HOSPADM

## 2019-06-04 RX ORDER — FENTANYL CITRATE/PF 50 MCG/ML
25 SYRINGE (ML) INJECTION
Status: DISCONTINUED | OUTPATIENT
Start: 2019-06-04 | End: 2019-06-04 | Stop reason: HOSPADM

## 2019-06-04 RX ORDER — CEFAZOLIN SODIUM 2 G/50ML
2000 SOLUTION INTRAVENOUS
Status: DISCONTINUED | OUTPATIENT
Start: 2019-06-04 | End: 2019-06-04 | Stop reason: SDUPTHER

## 2019-06-04 RX ORDER — PROPOFOL 10 MG/ML
INJECTION, EMULSION INTRAVENOUS AS NEEDED
Status: DISCONTINUED | OUTPATIENT
Start: 2019-06-04 | End: 2019-06-04 | Stop reason: SURG

## 2019-06-04 RX ORDER — MAGNESIUM HYDROXIDE 1200 MG/15ML
LIQUID ORAL AS NEEDED
Status: DISCONTINUED | OUTPATIENT
Start: 2019-06-04 | End: 2019-06-04 | Stop reason: HOSPADM

## 2019-06-04 RX ORDER — CEFAZOLIN SODIUM 2 G/50ML
2000 SOLUTION INTRAVENOUS EVERY 8 HOURS
Status: COMPLETED | OUTPATIENT
Start: 2019-06-05 | End: 2019-06-06

## 2019-06-04 RX ORDER — OXYCODONE HYDROCHLORIDE 5 MG/1
2.5 TABLET ORAL EVERY 6 HOURS PRN
Qty: 15 TABLET | Refills: 0
Start: 2019-06-04 | End: 2019-11-13

## 2019-06-04 RX ORDER — GLYCOPYRROLATE 0.2 MG/ML
INJECTION INTRAMUSCULAR; INTRAVENOUS AS NEEDED
Status: DISCONTINUED | OUTPATIENT
Start: 2019-06-04 | End: 2019-06-04 | Stop reason: SURG

## 2019-06-04 RX ORDER — CALCIUM CARBONATE/VITAMIN D3 250-3.125
1 TABLET ORAL DAILY
Status: DISCONTINUED | OUTPATIENT
Start: 2019-06-04 | End: 2019-06-07 | Stop reason: HOSPADM

## 2019-06-04 RX ORDER — FENTANYL CITRATE 50 UG/ML
INJECTION, SOLUTION INTRAMUSCULAR; INTRAVENOUS AS NEEDED
Status: DISCONTINUED | OUTPATIENT
Start: 2019-06-04 | End: 2019-06-04 | Stop reason: SURG

## 2019-06-04 RX ORDER — SODIUM CHLORIDE, SODIUM LACTATE, POTASSIUM CHLORIDE, CALCIUM CHLORIDE 600; 310; 30; 20 MG/100ML; MG/100ML; MG/100ML; MG/100ML
75 INJECTION, SOLUTION INTRAVENOUS CONTINUOUS
Status: DISPENSED | OUTPATIENT
Start: 2019-06-04 | End: 2019-06-05

## 2019-06-04 RX ORDER — CLOPIDOGREL BISULFATE 75 MG/1
75 TABLET ORAL DAILY
Status: DISCONTINUED | OUTPATIENT
Start: 2019-06-05 | End: 2019-06-07 | Stop reason: HOSPADM

## 2019-06-04 RX ORDER — ASPIRIN 81 MG/1
81 TABLET, CHEWABLE ORAL DAILY
Status: DISCONTINUED | OUTPATIENT
Start: 2019-06-05 | End: 2019-06-07 | Stop reason: HOSPADM

## 2019-06-04 RX ORDER — ONDANSETRON 2 MG/ML
INJECTION INTRAMUSCULAR; INTRAVENOUS AS NEEDED
Status: DISCONTINUED | OUTPATIENT
Start: 2019-06-04 | End: 2019-06-04 | Stop reason: SURG

## 2019-06-04 RX ORDER — LIDOCAINE HYDROCHLORIDE 10 MG/ML
INJECTION, SOLUTION INFILTRATION; PERINEURAL AS NEEDED
Status: DISCONTINUED | OUTPATIENT
Start: 2019-06-04 | End: 2019-06-04 | Stop reason: SURG

## 2019-06-04 RX ORDER — CEFAZOLIN SODIUM 2 G/50ML
SOLUTION INTRAVENOUS AS NEEDED
Status: DISCONTINUED | OUTPATIENT
Start: 2019-06-04 | End: 2019-06-04 | Stop reason: SURG

## 2019-06-04 RX ORDER — SODIUM CHLORIDE, SODIUM LACTATE, POTASSIUM CHLORIDE, CALCIUM CHLORIDE 600; 310; 30; 20 MG/100ML; MG/100ML; MG/100ML; MG/100ML
100 INJECTION, SOLUTION INTRAVENOUS CONTINUOUS
Status: DISCONTINUED | OUTPATIENT
Start: 2019-06-04 | End: 2019-06-07 | Stop reason: HOSPADM

## 2019-06-04 RX ORDER — KETAMINE HCL IN NACL, ISO-OSM 100MG/10ML
SYRINGE (ML) INJECTION AS NEEDED
Status: DISCONTINUED | OUTPATIENT
Start: 2019-06-04 | End: 2019-06-04 | Stop reason: SURG

## 2019-06-04 RX ORDER — HYDROMORPHONE HCL/PF 1 MG/ML
SYRINGE (ML) INJECTION AS NEEDED
Status: DISCONTINUED | OUTPATIENT
Start: 2019-06-04 | End: 2019-06-04 | Stop reason: SURG

## 2019-06-04 RX ORDER — ONDANSETRON 2 MG/ML
4 INJECTION INTRAMUSCULAR; INTRAVENOUS ONCE AS NEEDED
Status: DISCONTINUED | OUTPATIENT
Start: 2019-06-04 | End: 2019-06-04 | Stop reason: HOSPADM

## 2019-06-04 RX ORDER — METOCLOPRAMIDE HYDROCHLORIDE 5 MG/ML
10 INJECTION INTRAMUSCULAR; INTRAVENOUS ONCE AS NEEDED
Status: DISCONTINUED | OUTPATIENT
Start: 2019-06-04 | End: 2019-06-04 | Stop reason: HOSPADM

## 2019-06-04 RX ADMIN — SODIUM CHLORIDE, SODIUM LACTATE, POTASSIUM CHLORIDE, AND CALCIUM CHLORIDE 75 ML/HR: .6; .31; .03; .02 INJECTION, SOLUTION INTRAVENOUS at 00:04

## 2019-06-04 RX ADMIN — CEFAZOLIN SODIUM 2000 MG: 2 SOLUTION INTRAVENOUS at 23:38

## 2019-06-04 RX ADMIN — FENTANYL CITRATE 25 MCG: 50 INJECTION, SOLUTION INTRAMUSCULAR; INTRAVENOUS at 16:13

## 2019-06-04 RX ADMIN — FENTANYL CITRATE 50 MCG: 50 INJECTION, SOLUTION INTRAMUSCULAR; INTRAVENOUS at 15:50

## 2019-06-04 RX ADMIN — FENTANYL CITRATE 25 MCG: 50 INJECTION, SOLUTION INTRAMUSCULAR; INTRAVENOUS at 17:20

## 2019-06-04 RX ADMIN — GLYCOPYRROLATE 0.1 MG: 0.2 INJECTION, SOLUTION INTRAMUSCULAR; INTRAVENOUS at 15:58

## 2019-06-04 RX ADMIN — FENTANYL CITRATE 25 MCG: 50 INJECTION, SOLUTION INTRAMUSCULAR; INTRAVENOUS at 17:38

## 2019-06-04 RX ADMIN — ONDANSETRON 4 MG: 2 INJECTION INTRAMUSCULAR; INTRAVENOUS at 15:50

## 2019-06-04 RX ADMIN — PANTOPRAZOLE SODIUM 40 MG: 40 TABLET, DELAYED RELEASE ORAL at 06:08

## 2019-06-04 RX ADMIN — MAGNESIUM OXIDE TAB 400 MG (241.3 MG ELEMENTAL MG) 200 MG: 400 (241.3 MG) TAB at 08:07

## 2019-06-04 RX ADMIN — FENTANYL CITRATE 25 MCG: 50 INJECTION, SOLUTION INTRAMUSCULAR; INTRAVENOUS at 16:07

## 2019-06-04 RX ADMIN — HYDROMORPHONE HYDROCHLORIDE 0.5 MG: 1 INJECTION, SOLUTION INTRAMUSCULAR; INTRAVENOUS; SUBCUTANEOUS at 17:06

## 2019-06-04 RX ADMIN — CEFAZOLIN SODIUM 2000 MG: 2 SOLUTION INTRAVENOUS at 15:48

## 2019-06-04 RX ADMIN — LIDOCAINE HYDROCHLORIDE 40 MG: 10 INJECTION, SOLUTION INFILTRATION; PERINEURAL at 15:50

## 2019-06-04 RX ADMIN — BIMATOPROST 1 DROP: 0.1 SOLUTION/ DROPS OPHTHALMIC at 21:25

## 2019-06-04 RX ADMIN — OXYCODONE HYDROCHLORIDE 5 MG: 5 TABLET ORAL at 21:25

## 2019-06-04 RX ADMIN — ACETAMINOPHEN 650 MG: 325 TABLET ORAL at 21:25

## 2019-06-04 RX ADMIN — PROPOFOL 50 MG: 10 INJECTION, EMULSION INTRAVENOUS at 15:52

## 2019-06-04 RX ADMIN — TRAMADOL HYDROCHLORIDE 50 MG: 50 TABLET, COATED ORAL at 19:23

## 2019-06-04 RX ADMIN — Medication 30 MG: at 15:50

## 2019-06-04 RX ADMIN — SODIUM CHLORIDE, SODIUM LACTATE, POTASSIUM CHLORIDE, AND CALCIUM CHLORIDE 75 ML/HR: .6; .31; .03; .02 INJECTION, SOLUTION INTRAVENOUS at 17:15

## 2019-06-04 RX ADMIN — LISINOPRIL 20 MG: 20 TABLET ORAL at 08:07

## 2019-06-04 RX ADMIN — PROPOFOL 100 MG: 10 INJECTION, EMULSION INTRAVENOUS at 15:50

## 2019-06-04 RX ADMIN — SODIUM CHLORIDE, SODIUM LACTATE, POTASSIUM CHLORIDE, AND CALCIUM CHLORIDE: .6; .31; .03; .02 INJECTION, SOLUTION INTRAVENOUS at 15:33

## 2019-06-04 RX ADMIN — SODIUM CHLORIDE, SODIUM LACTATE, POTASSIUM CHLORIDE, AND CALCIUM CHLORIDE 100 ML/HR: .6; .31; .03; .02 INJECTION, SOLUTION INTRAVENOUS at 18:16

## 2019-06-04 RX ADMIN — PHENYLEPHRINE HYDROCHLORIDE 20 MCG/MIN: 10 INJECTION INTRAVENOUS at 15:59

## 2019-06-05 LAB
ANION GAP SERPL CALCULATED.3IONS-SCNC: 5 MMOL/L (ref 4–13)
BUN SERPL-MCNC: 13 MG/DL (ref 5–25)
CALCIUM SERPL-MCNC: 8.3 MG/DL (ref 8.3–10.1)
CHLORIDE SERPL-SCNC: 106 MMOL/L (ref 100–108)
CO2 SERPL-SCNC: 29 MMOL/L (ref 21–32)
CREAT SERPL-MCNC: 0.5 MG/DL (ref 0.6–1.3)
ERYTHROCYTE [DISTWIDTH] IN BLOOD BY AUTOMATED COUNT: 14.9 % (ref 11.6–15.1)
GFR SERPL CREATININE-BSD FRML MDRD: 85 ML/MIN/1.73SQ M
GLUCOSE SERPL-MCNC: 89 MG/DL (ref 65–140)
HCT VFR BLD AUTO: 34.9 % (ref 34.8–46.1)
HGB BLD-MCNC: 10.9 G/DL (ref 11.5–15.4)
MCH RBC QN AUTO: 29 PG (ref 26.8–34.3)
MCHC RBC AUTO-ENTMCNC: 31.2 G/DL (ref 31.4–37.4)
MCV RBC AUTO: 93 FL (ref 82–98)
PLATELET # BLD AUTO: 305 THOUSANDS/UL (ref 149–390)
PMV BLD AUTO: 11 FL (ref 8.9–12.7)
POTASSIUM SERPL-SCNC: 3.9 MMOL/L (ref 3.5–5.3)
RBC # BLD AUTO: 3.76 MILLION/UL (ref 3.81–5.12)
SODIUM SERPL-SCNC: 140 MMOL/L (ref 136–145)
WBC # BLD AUTO: 7.3 THOUSAND/UL (ref 4.31–10.16)

## 2019-06-05 PROCEDURE — 97163 PT EVAL HIGH COMPLEX 45 MIN: CPT

## 2019-06-05 PROCEDURE — G8987 SELF CARE CURRENT STATUS: HCPCS

## 2019-06-05 PROCEDURE — G8988 SELF CARE GOAL STATUS: HCPCS

## 2019-06-05 PROCEDURE — 85027 COMPLETE CBC AUTOMATED: CPT | Performed by: PHYSICIAN ASSISTANT

## 2019-06-05 PROCEDURE — G8978 MOBILITY CURRENT STATUS: HCPCS

## 2019-06-05 PROCEDURE — 99024 POSTOP FOLLOW-UP VISIT: CPT | Performed by: PHYSICIAN ASSISTANT

## 2019-06-05 PROCEDURE — 97167 OT EVAL HIGH COMPLEX 60 MIN: CPT

## 2019-06-05 PROCEDURE — G8979 MOBILITY GOAL STATUS: HCPCS

## 2019-06-05 PROCEDURE — 80048 BASIC METABOLIC PNL TOTAL CA: CPT | Performed by: PHYSICIAN ASSISTANT

## 2019-06-05 RX ADMIN — CALCIUM CARBONATE-CHOLECALCIFEROL TAB 250 MG-125 UNIT 1 TABLET: 250-125 TAB at 09:29

## 2019-06-05 RX ADMIN — BIMATOPROST 1 DROP: 0.1 SOLUTION/ DROPS OPHTHALMIC at 21:13

## 2019-06-05 RX ADMIN — CLOPIDOGREL BISULFATE 75 MG: 75 TABLET ORAL at 10:26

## 2019-06-05 RX ADMIN — LISINOPRIL 20 MG: 20 TABLET ORAL at 10:26

## 2019-06-05 RX ADMIN — FLUTICASONE PROPIONATE 1 SPRAY: 50 SPRAY, METERED NASAL at 09:30

## 2019-06-05 RX ADMIN — OXYCODONE HYDROCHLORIDE 5 MG: 5 TABLET ORAL at 10:26

## 2019-06-05 RX ADMIN — OXYCODONE HYDROCHLORIDE 5 MG: 5 TABLET ORAL at 03:46

## 2019-06-05 RX ADMIN — MAGNESIUM OXIDE TAB 400 MG (241.3 MG ELEMENTAL MG) 200 MG: 400 (241.3 MG) TAB at 09:29

## 2019-06-05 RX ADMIN — SODIUM CHLORIDE, SODIUM LACTATE, POTASSIUM CHLORIDE, AND CALCIUM CHLORIDE 100 ML/HR: .6; .31; .03; .02 INJECTION, SOLUTION INTRAVENOUS at 02:12

## 2019-06-05 RX ADMIN — ACETAMINOPHEN 650 MG: 325 TABLET ORAL at 12:45

## 2019-06-05 RX ADMIN — PANTOPRAZOLE SODIUM 40 MG: 40 TABLET, DELAYED RELEASE ORAL at 05:59

## 2019-06-05 RX ADMIN — ASPIRIN 81 MG 81 MG: 81 TABLET ORAL at 10:26

## 2019-06-05 RX ADMIN — OXYCODONE HYDROCHLORIDE 5 MG: 5 TABLET ORAL at 15:13

## 2019-06-05 RX ADMIN — CEFAZOLIN SODIUM 2000 MG: 2 SOLUTION INTRAVENOUS at 10:26

## 2019-06-05 RX ADMIN — ENOXAPARIN SODIUM 40 MG: 40 INJECTION SUBCUTANEOUS at 10:27

## 2019-06-05 RX ADMIN — TRAMADOL HYDROCHLORIDE 50 MG: 50 TABLET, COATED ORAL at 12:44

## 2019-06-05 RX ADMIN — ACETAMINOPHEN 650 MG: 325 TABLET ORAL at 21:13

## 2019-06-06 LAB
ANION GAP SERPL CALCULATED.3IONS-SCNC: 6 MMOL/L (ref 4–13)
BUN SERPL-MCNC: 16 MG/DL (ref 5–25)
CALCIUM SERPL-MCNC: 8.4 MG/DL (ref 8.3–10.1)
CHLORIDE SERPL-SCNC: 105 MMOL/L (ref 100–108)
CO2 SERPL-SCNC: 29 MMOL/L (ref 21–32)
CREAT SERPL-MCNC: 0.5 MG/DL (ref 0.6–1.3)
ERYTHROCYTE [DISTWIDTH] IN BLOOD BY AUTOMATED COUNT: 14.8 % (ref 11.6–15.1)
GFR SERPL CREATININE-BSD FRML MDRD: 85 ML/MIN/1.73SQ M
GLUCOSE SERPL-MCNC: 97 MG/DL (ref 65–140)
HCT VFR BLD AUTO: 33 % (ref 34.8–46.1)
HGB BLD-MCNC: 10.3 G/DL (ref 11.5–15.4)
MCH RBC QN AUTO: 28.9 PG (ref 26.8–34.3)
MCHC RBC AUTO-ENTMCNC: 31.2 G/DL (ref 31.4–37.4)
MCV RBC AUTO: 92 FL (ref 82–98)
PLATELET # BLD AUTO: 316 THOUSANDS/UL (ref 149–390)
PMV BLD AUTO: 11.3 FL (ref 8.9–12.7)
POTASSIUM SERPL-SCNC: 3.7 MMOL/L (ref 3.5–5.3)
RBC # BLD AUTO: 3.57 MILLION/UL (ref 3.81–5.12)
SODIUM SERPL-SCNC: 140 MMOL/L (ref 136–145)
WBC # BLD AUTO: 6.19 THOUSAND/UL (ref 4.31–10.16)

## 2019-06-06 PROCEDURE — 97530 THERAPEUTIC ACTIVITIES: CPT

## 2019-06-06 PROCEDURE — 85027 COMPLETE CBC AUTOMATED: CPT | Performed by: PHYSICIAN ASSISTANT

## 2019-06-06 PROCEDURE — 80048 BASIC METABOLIC PNL TOTAL CA: CPT | Performed by: PHYSICIAN ASSISTANT

## 2019-06-06 PROCEDURE — NC001 PR NO CHARGE: Performed by: ORTHOPAEDIC SURGERY

## 2019-06-06 PROCEDURE — 97535 SELF CARE MNGMENT TRAINING: CPT

## 2019-06-06 PROCEDURE — 99024 POSTOP FOLLOW-UP VISIT: CPT | Performed by: PHYSICIAN ASSISTANT

## 2019-06-06 RX ORDER — POLYETHYLENE GLYCOL 3350 17 G/17G
17 POWDER, FOR SOLUTION ORAL
Status: DISCONTINUED | OUTPATIENT
Start: 2019-06-06 | End: 2019-06-07

## 2019-06-06 RX ADMIN — MAGNESIUM OXIDE TAB 400 MG (241.3 MG ELEMENTAL MG) 200 MG: 400 (241.3 MG) TAB at 08:26

## 2019-06-06 RX ADMIN — TRAMADOL HYDROCHLORIDE 50 MG: 50 TABLET, COATED ORAL at 21:49

## 2019-06-06 RX ADMIN — POLYETHYLENE GLYCOL 3350 17 G: 17 POWDER, FOR SOLUTION ORAL at 13:00

## 2019-06-06 RX ADMIN — POLYETHYLENE GLYCOL 3350 17 G: 17 POWDER, FOR SOLUTION ORAL at 21:25

## 2019-06-06 RX ADMIN — POLYETHYLENE GLYCOL 3350 17 G: 17 POWDER, FOR SOLUTION ORAL at 17:34

## 2019-06-06 RX ADMIN — ACETAMINOPHEN 650 MG: 325 TABLET ORAL at 21:49

## 2019-06-06 RX ADMIN — CLOPIDOGREL BISULFATE 75 MG: 75 TABLET ORAL at 08:25

## 2019-06-06 RX ADMIN — PANTOPRAZOLE SODIUM 40 MG: 40 TABLET, DELAYED RELEASE ORAL at 06:01

## 2019-06-06 RX ADMIN — ASPIRIN 81 MG 81 MG: 81 TABLET ORAL at 08:25

## 2019-06-06 RX ADMIN — ENOXAPARIN SODIUM 40 MG: 40 INJECTION SUBCUTANEOUS at 08:25

## 2019-06-06 RX ADMIN — LISINOPRIL 20 MG: 20 TABLET ORAL at 08:25

## 2019-06-06 RX ADMIN — PRAVASTATIN SODIUM 40 MG: 40 TABLET ORAL at 17:00

## 2019-06-06 RX ADMIN — CALCIUM CARBONATE-CHOLECALCIFEROL TAB 250 MG-125 UNIT 1 TABLET: 250-125 TAB at 08:26

## 2019-06-06 RX ADMIN — FLUTICASONE PROPIONATE 1 SPRAY: 50 SPRAY, METERED NASAL at 08:26

## 2019-06-06 RX ADMIN — POLYETHYLENE GLYCOL 3350 17 G: 17 POWDER, FOR SOLUTION ORAL at 15:08

## 2019-06-06 RX ADMIN — BIMATOPROST 1 DROP: 0.1 SOLUTION/ DROPS OPHTHALMIC at 21:25

## 2019-06-07 VITALS
HEART RATE: 76 BPM | HEIGHT: 64 IN | TEMPERATURE: 97.3 F | SYSTOLIC BLOOD PRESSURE: 120 MMHG | DIASTOLIC BLOOD PRESSURE: 83 MMHG | WEIGHT: 158.51 LBS | BODY MASS INDEX: 27.06 KG/M2 | OXYGEN SATURATION: 96 % | RESPIRATION RATE: 18 BRPM

## 2019-06-07 LAB
ANION GAP SERPL CALCULATED.3IONS-SCNC: 3 MMOL/L (ref 4–13)
BUN SERPL-MCNC: 15 MG/DL (ref 5–25)
CALCIUM SERPL-MCNC: 8.7 MG/DL (ref 8.3–10.1)
CHLORIDE SERPL-SCNC: 105 MMOL/L (ref 100–108)
CO2 SERPL-SCNC: 31 MMOL/L (ref 21–32)
CREAT SERPL-MCNC: 0.53 MG/DL (ref 0.6–1.3)
ERYTHROCYTE [DISTWIDTH] IN BLOOD BY AUTOMATED COUNT: 14.9 % (ref 11.6–15.1)
GFR SERPL CREATININE-BSD FRML MDRD: 83 ML/MIN/1.73SQ M
GLUCOSE SERPL-MCNC: 87 MG/DL (ref 65–140)
HCT VFR BLD AUTO: 34.4 % (ref 34.8–46.1)
HGB BLD-MCNC: 10.7 G/DL (ref 11.5–15.4)
MCH RBC QN AUTO: 28.8 PG (ref 26.8–34.3)
MCHC RBC AUTO-ENTMCNC: 31.1 G/DL (ref 31.4–37.4)
MCV RBC AUTO: 93 FL (ref 82–98)
PLATELET # BLD AUTO: 304 THOUSANDS/UL (ref 149–390)
PMV BLD AUTO: 11.2 FL (ref 8.9–12.7)
POTASSIUM SERPL-SCNC: 3.7 MMOL/L (ref 3.5–5.3)
RBC # BLD AUTO: 3.71 MILLION/UL (ref 3.81–5.12)
SODIUM SERPL-SCNC: 139 MMOL/L (ref 136–145)
WBC # BLD AUTO: 5.24 THOUSAND/UL (ref 4.31–10.16)

## 2019-06-07 PROCEDURE — 99024 POSTOP FOLLOW-UP VISIT: CPT | Performed by: PHYSICIAN ASSISTANT

## 2019-06-07 PROCEDURE — 85027 COMPLETE CBC AUTOMATED: CPT | Performed by: PHYSICIAN ASSISTANT

## 2019-06-07 PROCEDURE — 80048 BASIC METABOLIC PNL TOTAL CA: CPT | Performed by: PHYSICIAN ASSISTANT

## 2019-06-07 RX ADMIN — CLOPIDOGREL BISULFATE 75 MG: 75 TABLET ORAL at 08:20

## 2019-06-07 RX ADMIN — ENOXAPARIN SODIUM 40 MG: 40 INJECTION SUBCUTANEOUS at 08:19

## 2019-06-07 RX ADMIN — FLUTICASONE PROPIONATE 1 SPRAY: 50 SPRAY, METERED NASAL at 08:21

## 2019-06-07 RX ADMIN — LISINOPRIL 20 MG: 20 TABLET ORAL at 08:20

## 2019-06-07 RX ADMIN — POLYETHYLENE GLYCOL 3350 17 G: 17 POWDER, FOR SOLUTION ORAL at 03:18

## 2019-06-07 RX ADMIN — CALCIUM CARBONATE-CHOLECALCIFEROL TAB 250 MG-125 UNIT 1 TABLET: 250-125 TAB at 08:20

## 2019-06-07 RX ADMIN — PANTOPRAZOLE SODIUM 40 MG: 40 TABLET, DELAYED RELEASE ORAL at 06:24

## 2019-06-07 RX ADMIN — POLYETHYLENE GLYCOL 3350 17 G: 17 POWDER, FOR SOLUTION ORAL at 01:00

## 2019-06-07 RX ADMIN — ASPIRIN 81 MG 81 MG: 81 TABLET ORAL at 08:20

## 2019-06-07 RX ADMIN — MAGNESIUM OXIDE TAB 400 MG (241.3 MG ELEMENTAL MG) 200 MG: 400 (241.3 MG) TAB at 08:20

## 2019-06-10 ENCOUNTER — NURSING HOME VISIT (OUTPATIENT)
Dept: GERIATRICS | Facility: OTHER | Age: 84
End: 2019-06-10
Payer: COMMERCIAL

## 2019-06-10 ENCOUNTER — TRANSITIONAL CARE MANAGEMENT (OUTPATIENT)
Dept: FAMILY MEDICINE CLINIC | Facility: CLINIC | Age: 84
End: 2019-06-10

## 2019-06-10 DIAGNOSIS — S82.891S CLOSED FRACTURE DISLOCATION OF RIGHT ANKLE, SEQUELA: Primary | ICD-10-CM

## 2019-06-10 DIAGNOSIS — R26.2 AMBULATORY DYSFUNCTION: ICD-10-CM

## 2019-06-10 DIAGNOSIS — I73.9 PAD (PERIPHERAL ARTERY DISEASE) (HCC): ICD-10-CM

## 2019-06-10 DIAGNOSIS — I10 HTN (HYPERTENSION): ICD-10-CM

## 2019-06-10 DIAGNOSIS — R53.81 PHYSICAL DECONDITIONING: ICD-10-CM

## 2019-06-10 PROCEDURE — 99309 SBSQ NF CARE MODERATE MDM 30: CPT | Performed by: NURSE PRACTITIONER

## 2019-06-12 ENCOUNTER — NURSING HOME VISIT (OUTPATIENT)
Dept: GERIATRICS | Facility: OTHER | Age: 84
End: 2019-06-12
Payer: COMMERCIAL

## 2019-06-12 DIAGNOSIS — R52 ACUTE PAIN: ICD-10-CM

## 2019-06-12 DIAGNOSIS — I10 ESSENTIAL HYPERTENSION: ICD-10-CM

## 2019-06-12 DIAGNOSIS — I73.9 PAD (PERIPHERAL ARTERY DISEASE) (HCC): ICD-10-CM

## 2019-06-12 DIAGNOSIS — R26.2 AMBULATORY DYSFUNCTION: ICD-10-CM

## 2019-06-12 DIAGNOSIS — R53.81 PHYSICAL DECONDITIONING: Primary | ICD-10-CM

## 2019-06-12 DIAGNOSIS — S82.891D CLOSED FRACTURE DISLOCATION OF RIGHT ANKLE WITH ROUTINE HEALING, SUBSEQUENT ENCOUNTER: ICD-10-CM

## 2019-06-12 PROCEDURE — 99309 SBSQ NF CARE MODERATE MDM 30: CPT | Performed by: NURSE PRACTITIONER

## 2019-06-18 ENCOUNTER — NURSING HOME VISIT (OUTPATIENT)
Dept: GERIATRICS | Facility: OTHER | Age: 84
End: 2019-06-18
Payer: COMMERCIAL

## 2019-06-18 DIAGNOSIS — Z48.89 AFTERCARE FOLLOWING SURGERY: ICD-10-CM

## 2019-06-18 DIAGNOSIS — S82.891D CLOSED FRACTURE DISLOCATION OF RIGHT ANKLE WITH ROUTINE HEALING, SUBSEQUENT ENCOUNTER: ICD-10-CM

## 2019-06-18 DIAGNOSIS — R53.81 PHYSICAL DECONDITIONING: Primary | ICD-10-CM

## 2019-06-18 DIAGNOSIS — I73.9 PAD (PERIPHERAL ARTERY DISEASE) (HCC): ICD-10-CM

## 2019-06-18 DIAGNOSIS — R26.2 AMBULATORY DYSFUNCTION: ICD-10-CM

## 2019-06-18 DIAGNOSIS — I10 ESSENTIAL HYPERTENSION: ICD-10-CM

## 2019-06-18 PROCEDURE — 99309 SBSQ NF CARE MODERATE MDM 30: CPT | Performed by: NURSE PRACTITIONER

## 2019-06-19 ENCOUNTER — APPOINTMENT (OUTPATIENT)
Dept: RADIOLOGY | Facility: AMBULARY SURGERY CENTER | Age: 84
End: 2019-06-19
Payer: COMMERCIAL

## 2019-06-19 ENCOUNTER — OFFICE VISIT (OUTPATIENT)
Dept: OBGYN CLINIC | Facility: CLINIC | Age: 84
End: 2019-06-19

## 2019-06-19 ENCOUNTER — NURSING HOME VISIT (OUTPATIENT)
Dept: GERIATRICS | Facility: OTHER | Age: 84
End: 2019-06-19
Payer: COMMERCIAL

## 2019-06-19 VITALS
SYSTOLIC BLOOD PRESSURE: 132 MMHG | HEART RATE: 81 BPM | WEIGHT: 158 LBS | DIASTOLIC BLOOD PRESSURE: 79 MMHG | BODY MASS INDEX: 26.98 KG/M2 | HEIGHT: 64 IN

## 2019-06-19 DIAGNOSIS — Z48.89 AFTERCARE FOLLOWING SURGERY: ICD-10-CM

## 2019-06-19 DIAGNOSIS — R53.81 PHYSICAL DECONDITIONING: Primary | ICD-10-CM

## 2019-06-19 DIAGNOSIS — I10 ESSENTIAL HYPERTENSION: ICD-10-CM

## 2019-06-19 DIAGNOSIS — Z98.890 S/P ORIF (OPEN REDUCTION INTERNAL FIXATION) FRACTURE: Primary | ICD-10-CM

## 2019-06-19 DIAGNOSIS — Z87.81 S/P ORIF (OPEN REDUCTION INTERNAL FIXATION) FRACTURE: Primary | ICD-10-CM

## 2019-06-19 DIAGNOSIS — I73.9 PAD (PERIPHERAL ARTERY DISEASE) (HCC): ICD-10-CM

## 2019-06-19 DIAGNOSIS — R26.2 AMBULATORY DYSFUNCTION: ICD-10-CM

## 2019-06-19 DIAGNOSIS — S82.891D CLOSED FRACTURE DISLOCATION OF RIGHT ANKLE WITH ROUTINE HEALING, SUBSEQUENT ENCOUNTER: ICD-10-CM

## 2019-06-19 DIAGNOSIS — R52 ACUTE PAIN: ICD-10-CM

## 2019-06-19 PROCEDURE — 99309 SBSQ NF CARE MODERATE MDM 30: CPT | Performed by: NURSE PRACTITIONER

## 2019-06-19 PROCEDURE — 73610 X-RAY EXAM OF ANKLE: CPT

## 2019-06-19 PROCEDURE — 99024 POSTOP FOLLOW-UP VISIT: CPT | Performed by: ORTHOPAEDIC SURGERY

## 2019-06-19 RX ORDER — CEPHALEXIN 500 MG/1
500 CAPSULE ORAL EVERY 6 HOURS SCHEDULED
Qty: 40 CAPSULE | Refills: 0 | Status: SHIPPED | OUTPATIENT
Start: 2019-06-19 | End: 2019-06-29

## 2019-06-24 ENCOUNTER — NURSING HOME VISIT (OUTPATIENT)
Dept: GERIATRICS | Facility: OTHER | Age: 84
End: 2019-06-24
Payer: COMMERCIAL

## 2019-06-24 DIAGNOSIS — R53.81 PHYSICAL DECONDITIONING: Primary | ICD-10-CM

## 2019-06-24 DIAGNOSIS — Z87.81 STATUS POST ORIF OF FRACTURE OF ANKLE: ICD-10-CM

## 2019-06-24 DIAGNOSIS — Z48.89 AFTERCARE FOLLOWING SURGERY: ICD-10-CM

## 2019-06-24 DIAGNOSIS — Z98.890 STATUS POST ORIF OF FRACTURE OF ANKLE: ICD-10-CM

## 2019-06-24 DIAGNOSIS — I10 ESSENTIAL HYPERTENSION: ICD-10-CM

## 2019-06-24 DIAGNOSIS — R26.2 AMBULATORY DYSFUNCTION: ICD-10-CM

## 2019-06-24 PROCEDURE — 99309 SBSQ NF CARE MODERATE MDM 30: CPT | Performed by: NURSE PRACTITIONER

## 2019-06-26 ENCOUNTER — NURSING HOME VISIT (OUTPATIENT)
Dept: GERIATRICS | Facility: OTHER | Age: 84
End: 2019-06-26
Payer: COMMERCIAL

## 2019-06-26 DIAGNOSIS — S82.891D CLOSED FRACTURE DISLOCATION OF RIGHT ANKLE WITH ROUTINE HEALING, SUBSEQUENT ENCOUNTER: ICD-10-CM

## 2019-06-26 DIAGNOSIS — R52 ACUTE PAIN: ICD-10-CM

## 2019-06-26 DIAGNOSIS — R53.81 PHYSICAL DECONDITIONING: Primary | ICD-10-CM

## 2019-06-26 DIAGNOSIS — R26.2 AMBULATORY DYSFUNCTION: ICD-10-CM

## 2019-06-26 DIAGNOSIS — I10 ESSENTIAL HYPERTENSION: ICD-10-CM

## 2019-06-26 DIAGNOSIS — D50.0 IRON DEFICIENCY ANEMIA DUE TO CHRONIC BLOOD LOSS: ICD-10-CM

## 2019-06-26 DIAGNOSIS — I73.9 PAD (PERIPHERAL ARTERY DISEASE) (HCC): ICD-10-CM

## 2019-06-26 PROCEDURE — 99309 SBSQ NF CARE MODERATE MDM 30: CPT | Performed by: NURSE PRACTITIONER

## 2019-07-03 ENCOUNTER — APPOINTMENT (OUTPATIENT)
Dept: RADIOLOGY | Facility: AMBULARY SURGERY CENTER | Age: 84
End: 2019-07-03
Payer: COMMERCIAL

## 2019-07-03 ENCOUNTER — NURSING HOME VISIT (OUTPATIENT)
Dept: GERIATRICS | Facility: OTHER | Age: 84
End: 2019-07-03
Payer: COMMERCIAL

## 2019-07-03 ENCOUNTER — HOSPITAL ENCOUNTER (OUTPATIENT)
Dept: RADIOLOGY | Facility: HOSPITAL | Age: 84
Discharge: HOME | End: 2019-07-03
Attending: ORTHOPAEDIC SURGERY

## 2019-07-03 ENCOUNTER — OFFICE VISIT (OUTPATIENT)
Dept: OBGYN CLINIC | Facility: CLINIC | Age: 84
End: 2019-07-03

## 2019-07-03 VITALS
DIASTOLIC BLOOD PRESSURE: 84 MMHG | SYSTOLIC BLOOD PRESSURE: 136 MMHG | HEIGHT: 64 IN | BODY MASS INDEX: 26.98 KG/M2 | HEART RATE: 76 BPM | WEIGHT: 158 LBS

## 2019-07-03 DIAGNOSIS — I10 HTN (HYPERTENSION): ICD-10-CM

## 2019-07-03 DIAGNOSIS — R26.2 AMBULATORY DYSFUNCTION: ICD-10-CM

## 2019-07-03 DIAGNOSIS — R53.81 PHYSICAL DECONDITIONING: Primary | ICD-10-CM

## 2019-07-03 DIAGNOSIS — S82.891D CLOSED FRACTURE DISLOCATION OF RIGHT ANKLE WITH ROUTINE HEALING, SUBSEQUENT ENCOUNTER: ICD-10-CM

## 2019-07-03 DIAGNOSIS — R53.81 DEBILITY: ICD-10-CM

## 2019-07-03 DIAGNOSIS — Z48.89 AFTERCARE FOLLOWING SURGERY: ICD-10-CM

## 2019-07-03 DIAGNOSIS — S82.891D CLOSED FRACTURE DISLOCATION OF RIGHT ANKLE WITH ROUTINE HEALING, SUBSEQUENT ENCOUNTER: Primary | ICD-10-CM

## 2019-07-03 DIAGNOSIS — I73.9 PAD (PERIPHERAL ARTERY DISEASE) (HCC): ICD-10-CM

## 2019-07-03 PROCEDURE — 99024 POSTOP FOLLOW-UP VISIT: CPT | Performed by: ORTHOPAEDIC SURGERY

## 2019-07-03 PROCEDURE — 73610 X-RAY EXAM OF ANKLE: CPT

## 2019-07-03 PROCEDURE — 99309 SBSQ NF CARE MODERATE MDM 30: CPT | Performed by: NURSE PRACTITIONER

## 2019-07-03 NOTE — PROGRESS NOTES
Assessment/Plan:      Patient seen and examined with Dr Shaneka Ayers  She is roughly one month status post revision ORIF right ankle surgery on 6/4/2019  Medial incision is without signs of infection to dehiscence  X-rays today demonstrate stable alignment compared to previous  She is to maintain nonweightbearing status to the right lower extremity and Cam boot  Continue gentle range-of-motion exercises ankle joint  No further dressings needed for her lower extremity wounds  Follow-up in four weeks for re-evaluation new x-rays of the right ankle  Problem List Items Addressed This Visit        Musculoskeletal and Integument    Closed fracture dislocation of right ankle joint - Primary       Other    Aftercare following surgery            Subjective:      Patient ID: Ana María Lopez is a 80 y o  female  HPI     The patient is a 80-year-old female presents for postop visit  She is roughly one month status post revision ORIF right ankle surgery on 6/4/2019  Today, she states overall she is doing well and notes no new issues since her last visit  She has been compliant with nonweightbearing status  She denies any pain her right ankle with range of motion exercises  She denies any fever chills or issues with her medial or lateral incisions  No numbness or tingling  She offers no complaints at today's visit  The following portions of the patient's history were reviewed and updated as appropriate: allergies, current medications, past family history, past medical history, past social history, past surgical history and problem list     Review of Systems      Objective:      /84   Pulse 76   Ht 5' 4" (1 626 m)   BMI 27 12 kg/m²          Physical Exam      No acute distress  Patient examined in the seated position in wheelchair  Lateral incision is clean dry intact, no surrounding erythema wound dehiscence or drainage  There is scabbing evident    Medial incision is clean dry intact with minimal erythema, no drainage, no vadim wound dehiscence  There is scabbing evident  There is minimal edema of the right lower extremity  No calf tenderness    She is able to plantar and dorsiflex the ankle joint  +EHL/FHL

## 2019-07-03 NOTE — PROGRESS NOTES
[facility]   [address] [place] [address], [place], [address]  STR Note    Chief Complaint/Reason for visit: STR Follow up- Deconditioning; Ambulatory dysfunction; Post revision ORIF medial malleolus on the right 06/04; HTN; PAD  History of Present Illness: 79-year-old female seen and examined for follow up  She was in bed at time of visit in no distress  She states that she is feeling fine without any pain and appetite is good  Remains NWB RLE as per Dr Migel Ritchie  Denies SOB, CP, H/A, dizziness, abd pain, N/V/D or constipation  Past Medical History: unchanged from history and physical  Past Medical History:   Diagnosis Date    Anorexia     last assessed: 8/9/2013    GERD (gastroesophageal reflux disease)     Herpes zoster     last assessed: 9/11/2013    Hypertension     [facility] )     resolved: 1997    Malignant melanoma of skin ([facility] )     resolved: 2015    Pelvic fracture ([facility] )      Family History: unchanged from history and physical  Social History: unchanged from history and physical  Resident Since: 05/23/2019  Review of systems:   Constitutional: Negative for appetite change, chills, diaphoresis and fatigue  HENT: Negative     Eyes: Negative     Respiratory: Negative     Cardiovascular: Negative     Gastrointestinal: Negative     Genitourinary: Negative     Musculoskeletal: Positive for gait problem  Neurological: Negative for dizziness, syncope, weakness, light-headedness and headaches  Psychiatric/Behavioral: Negative  All other systems reviewed and are negative      Medications: No changes made  Allergies: Reviewed and unchanged  Consults reviewed:PT, OT and Other  Labs/Diagnostics (reviewed by this provider): Copy in Chart    Imaging Reviewed: XR results from Right ankle from Dr Raiza Gannon notes    Physical Exam    Weight:  Temp: 97 1 BP: 167/96 Pulse: 76 Resp: 14 O2 Sat: 96% RA  Constitutional: Well-nourished and Normocephalic  Orientation:Person, Place, Day, Date, Month and Year     Physical Exam:  Constitutional: She is oriented to person, place, and time  She appears well-developed and well-nourished  No distress  HENT:   Head: Atraumatic  Mouth/Throat: Oropharynx is clear and moist  No oropharyngeal exudate  Eyes: Pupils are equal, round, and reactive to light  EOM are normal  Right eye exhibits no discharge  Left eye exhibits no discharge  Neck: Normal range of motion  Neck supple  No JVD present  No tracheal deviation present  Cardiovascular: Normal rate, regular rhythm, normal heart sounds and intact distal pulses  Exam reveals no gallop and no friction rub  Trace non-pitting edema noted LLE  No murmur heard  Pulmonary/Chest: Effort normal and breath sounds normal  No respiratory distress  Abdominal: Soft  Bowel sounds are normal  She exhibits no distension  There is no tenderness  There is no guarding  Musculoskeletal: Able to move all extemitites  NWB RLE  Right cam boot on  Neurovascular status intact  Able to move all toes  Lymphadenopathy:     She has no cervical adenopathy  Neurological: She is alert and oriented to person, place, and time  Skin: Skin is warm and dry  She is not diaphoretic  Psychiatric: She has a normal mood and affect  Her behavior is normal  Thought content normal    Nursing notes ad vital signs reviewed  Assessment/Plan:  80year-old with:     Post revision ORIF medial malleolus on the right 06/04  -was placed on keflex po by Dr Tom Freire due to medial erythema until 06/29-completed  -was seen by Dr Tom Freire this am and X-rays today demonstrate stable alignment compared to previous  Surgical wound without ss infection or dehiscence  -no further dressings required  -patient is to continue nonweightbearing status to the right lower extremity and Cam boot   Placed order for nursing to remove Cam boot Q S to assess skin integrity  -continue gentle range-of-motion exercises ankle joint  -follow-up in four weeks for re-evaluation new x-rays of the right ankle     Deconditioning/Debility  -continue care and support at STR with ALDs, PT, OT  -fall precatuions  -provide nutritional support     Ambulatory dysfunction  -continue plan as above  -patient has a fear of falling per PT during ambulation     Benign essential hypertension  -BPs reviewed and trending 120s-150s with an occasional isolated SBP of 160s  -continue enalapril 10 mg po daily     PAD  -stable  -continue ASA and clopidogrel  -f/u with vascular     Alter Wall 79, CRNP  7/3/283319:58 AM

## 2019-07-10 ENCOUNTER — NURSING HOME VISIT (OUTPATIENT)
Dept: GERIATRICS | Facility: OTHER | Age: 84
End: 2019-07-10
Payer: COMMERCIAL

## 2019-07-10 ENCOUNTER — TELEPHONE (OUTPATIENT)
Dept: OBGYN CLINIC | Facility: HOSPITAL | Age: 84
End: 2019-07-10

## 2019-07-10 DIAGNOSIS — S82.891D CLOSED FRACTURE DISLOCATION OF RIGHT ANKLE WITH ROUTINE HEALING, SUBSEQUENT ENCOUNTER: ICD-10-CM

## 2019-07-10 DIAGNOSIS — R53.81 DEBILITY: Primary | ICD-10-CM

## 2019-07-10 DIAGNOSIS — I10 ESSENTIAL HYPERTENSION: ICD-10-CM

## 2019-07-10 DIAGNOSIS — R26.2 AMBULATORY DYSFUNCTION: ICD-10-CM

## 2019-07-10 PROCEDURE — 99309 SBSQ NF CARE MODERATE MDM 30: CPT | Performed by: NURSE PRACTITIONER

## 2019-07-10 NOTE — PROGRESS NOTES
[facility]   [address] [facility], [facility] [address]  STR Note    Chief Complaint/Reason for visit: STR Follow up- Debility; Post revision ORIF medial malleolus on the right 06/04; Ambulatory dysfunction; HTN  History of Present Illness: 19-year-old female seen and examined for follow up  She was OOB in chair in no distress  Voiced no concerns  Patient has NWB order RLE but tends to bear weight as per PT  Appetite is good  Denies SOB, CP, H/A, dizziness, abd pain, N/V/D or constipation  Past Medical History: unchanged from history and physical  Past Medical History:   Diagnosis Date    Anorexia     last assessed: 8/9/2013    GERD (gastroesophageal reflux disease)     Herpes zoster     last assessed: 9/11/2013    Hypertension     Lymphoma ([facility] )     resolved: 1997    Malignant melanoma of skin ([facility] )     resolved: 2015    [facility]      Family History: unchanged from history and physical  Social History: unchanged from history and physical  Resident Since: 05/23/2019  Review of systems:   Constitutional: Negative for appetite change, chills, diaphoresis and fatigue  HENT: Negative     Eyes: Negative     Respiratory: Negative     Cardiovascular: Negative     Gastrointestinal: Negative     Genitourinary: Negative     Musculoskeletal: Positive for gait problem  Neurological: Negative for dizziness, syncope, weakness, light-headedness and headaches  Psychiatric/Behavioral: Negative  All other systems reviewed and are negative  Medications: No changes made  Allergies: Reviewed and unchanged  Consults reviewed:PT, OT and Other  Labs/Diagnostics (reviewed by this provider): Copy in Chart  No recent  Imaging Reviewed: None today    Physical Exam    Weight:  Temp: 98 9 BP: 147/74 Pulse: 78 Resp: 18 O2 Sat: 96% RA  Constitutional: Well-nourished and Normocephalic  Orientation:Person, Place, Day, Date, Month and Year     Physical Exam:  Constitutional: She is oriented to person, place, and time  She appears well-developed and well-nourished  No distress  HENT:   Head: Atraumatic  Mouth/Throat: Oropharynx is clear and moist  No oropharyngeal exudate  Eyes: Pupils are equal, round, and reactive to light  EOM are normal  Right eye exhibits no discharge  Left eye exhibits no discharge  Neck: Normal range of motion  Neck supple  No JVD present  No tracheal deviation present  Cardiovascular: Normal rate, regular rhythm, normal heart sounds and intact distal pulses  Exam reveals no gallop and no friction rub  Trace non-pitting edema noted LLE  No murmur heard  Pulmonary/Chest: Effort normal and breath sounds normal  No respiratory distress  Abdominal: Soft  Bowel sounds are normal  She exhibits no distension  There is no tenderness  There is no guarding  Musculoskeletal: Able to move all extemitites  NWB RLE  Right cam boot on  Neurovascular status intact  Able to move all toes  Lymphadenopathy: She has no cervical adenopathy  Neurological: She is alert and oriented to person, place, and time  Skin: Skin is warm and dry  She is not diaphoretic  Psychiatric: She has a normal mood and affect  Her behavior is normal  Thought content normal    Nursing notes and vital signs reviewed  Assessment/Plan:  80year-old with:     Post revision ORIF medial malleolus on the right 06/04  -was seen by Dr Erin Cruz on 07/03 and X-rays today demonstrate stable alignment compared to previous  Surgical wound without ss infection or dehiscence  -patient is to continue nonweightbearing status to the right lower extremity and Cam boot   Placed order for nursing to remove Cam boot Q S to assess skin integrity  -continue gentle range-of-motion exercises ankle joint  -follow-up in 3 weeks for re-evaluation new x-rays of the right ankle     Debility due to above  -continue care and support at [facility] with ALDs, PT, OT  -fall precatuions  -provide nutritional support     Ambulatory dysfunction  -continue plan as above  -patient has a fear of falling per PT during ambulation  -PT Progress notes: Squat pivot transfers x WC TO Chair x RLE NWB x MIN A> Patient continues to put weight down and she often Replies "i cannot help it  " WC forward propulsion 125 ft x 2 x CS  Patient stated upon entrance to Room "I am so tired today and very exhausted  " STS in parallel bars x MIN A RLE NWB> Gait training in Parallel bars x RLE NWB x 2 steps MOD a and 3 Steps MIN A with improved compliance of her NWB  Wc follow as provided   Patient demonstrates increased wincing and c/o "I can't do this - i cannot lift my leg and keep the weight off"     Benign essential hypertension  -BPs reviewed and are stable and acceptable  -continue enalapril 10 mg po daily     PAD  -stable  -continue ASA and clopidogrel  -f/u with vascular    [address]  4/94/32221:65 AM

## 2019-07-10 NOTE — TELEPHONE ENCOUNTER
I spoke with Teresa Flores she is going to have transportation to callback to set up appointment for patient

## 2019-07-10 NOTE — TELEPHONE ENCOUNTER
Caller: Najma Patricia ext [phone]  Dr Andrea Bucio          Requesting a verbal order to remove suture please advise thanks

## 2019-07-10 NOTE — TELEPHONE ENCOUNTER
Patient can come to our office for that   We would rather do that  Please make appt for MOnday with Zada Dakin PA  Thanks,  GS

## 2019-07-12 ENCOUNTER — NURSING HOME VISIT (OUTPATIENT)
Dept: GERIATRICS | Facility: OTHER | Age: 84
End: 2019-07-12
Payer: COMMERCIAL

## 2019-07-12 DIAGNOSIS — S82.891D CLOSED FRACTURE DISLOCATION OF RIGHT ANKLE WITH ROUTINE HEALING, SUBSEQUENT ENCOUNTER: ICD-10-CM

## 2019-07-12 DIAGNOSIS — I10 ESSENTIAL HYPERTENSION: ICD-10-CM

## 2019-07-12 DIAGNOSIS — R26.2 AMBULATORY DYSFUNCTION: ICD-10-CM

## 2019-07-12 DIAGNOSIS — R53.81 DEBILITY: Primary | ICD-10-CM

## 2019-07-12 PROCEDURE — 99309 SBSQ NF CARE MODERATE MDM 30: CPT | Performed by: NURSE PRACTITIONER

## 2019-07-12 NOTE — PROGRESS NOTES
[facility] CHILDREN   [address] [place] [address], [place], [address]  STR Note    Chief Complaint/Reason for visit: STR Follow up- Debility; Ambulatory dysfunction; Post revision ORIF medial malleolus on the right 06/04 due to fracture; HTN  History of Present Illness: 77-year-old female seen and examined for follow up  She admits to having a fear of falling during ambulating and needs reinforcement not to bear weight on RLE  She stated "other than that, I am fine"  Denies SOB, CP, H/A, dizziness, abd pain, N/V/D or constipation  Appetite is good and sleeping well  Past Medical History: unchanged from history and physical  Past Medical History:   Diagnosis Date    Anorexia     last assessed: 8/9/2013    GERD (gastroesophageal reflux disease)     Herpes zoster     last assessed: 9/11/2013    Hypertension     [facility]oma ([facility] )     resolved: 1997    Malignant melanoma of skin ([facility] )     resolved: 2015    Pelvic fracture ([facility] )      Family History: unchanged from history and physical  Social History: unchanged from history and physical  Resident Since: 05/23/2019  Review of systems:   Constitutional: Negative for appetite change, chills, diaphoresis and fatigue  HENT: Negative     Eyes: Negative     Respiratory: Negative     Cardiovascular: Negative     Gastrointestinal: Negative     Genitourinary: Negative     Musculoskeletal: Positive for gait problem  Neurological: Negative for dizziness, syncope, weakness, light-headedness and headaches  Psychiatric/Behavioral: Negative  All other systems reviewed and are negative  Medications: All medication orders at facility EHR were reviewed   No changes made  Allergies: Reviewed and unchanged  Consults reviewed:PT, OT and Other  Labs/Diagnostics (reviewed by this provider): Copy in Chart    Imaging Reviewed: None today    Physical Exam    Weight:  Temp: 98 BP: 137/80 Pulse: 81 Resp: 16 O2 Sat: 94% RA  Constitutional: Well-nourished and Normocephalic  Orientation:Person, Place, Day, Date, Month and Year   Physical Exam:  Constitutional: She is oriented to person, place, and time  She appears well-developed and well-nourished  No distress  HENT:   Head: Atraumatic  Mouth/Throat: Oropharynx is clear and moist  No oropharyngeal exudate  Eyes: Pupils are equal, round, and reactive to light  EOM are normal  Right eye exhibits no discharge  Left eye exhibits no discharge  Neck: Normal range of motion  Neck supple  No JVD present  No tracheal deviation present  Cardiovascular: Normal rate, regular rhythm, normal heart sounds and intact distal pulses  Exam reveals no gallop and no friction rub  No edema  No murmur heard  Pulmonary/Chest: Effort normal and breath sounds normal  No respiratory distress  Abdominal: Soft  Bowel sounds are normal  She exhibits no distension  There is no tenderness  There is no guarding  Musculoskeletal: Able to move all extemitites  NWB RLE  Right cam boot on  Neurovascular status intact  Able to move all toes  Skin warm and dry  Lymphadenopathy: She has no cervical adenopathy  Neurological: She is alert and oriented to person, place, and time  Skin: Skin is warm and dry  She is not diaphoretic  Psychiatric: She has a normal mood and affect  Her behavior is normal  Thought content normal    Nursing notes and vital signs reviewed      Assessment/Plan:  80year-old with:     Post revision ORIF medial malleolus on the right 06/04  -was seen by Dr Lux Never on 07/03 and X-rays today demonstrate stable alignment compared to previous  Surgical wound without ss infection or dehiscence  -patient is to continue nonweightbearing status to the right lower extremity and Cam boot   Nursing to remove Cam boot Q S to assess skin integrity  -continue gentle range-of-motion exercises ankle joint  -follow-up in 3 weeks for re-evaluation new x-rays of the right ankle     Debility due to above  -continue care and support at [facility] with ALDs, PT, OT  -fall precatuions  -provide nutritional support     Ambulatory dysfunction  -continues to have a fear of falling and bears weight on right foot at times during ambulation with assist of one although she knows not to  -continue plan as above  -patient has a fear of falling per PT during ambulation    Benign essential hypertension  -BPs reviewed and trending 130s-140s  -continue enalapril 10 mg po daily    [address]  4/19/73097:32 AM

## 2019-07-15 ENCOUNTER — OFFICE VISIT (OUTPATIENT)
Dept: OBGYN CLINIC | Facility: HOSPITAL | Age: 84
End: 2019-07-15

## 2019-07-15 VITALS
HEIGHT: 64 IN | BODY MASS INDEX: 26.98 KG/M2 | SYSTOLIC BLOOD PRESSURE: 128 MMHG | WEIGHT: 158 LBS | DIASTOLIC BLOOD PRESSURE: 88 MMHG | HEART RATE: 90 BPM

## 2019-07-15 DIAGNOSIS — Z48.89 AFTERCARE FOLLOWING SURGERY: Primary | ICD-10-CM

## 2019-07-15 PROCEDURE — 99024 POSTOP FOLLOW-UP VISIT: CPT | Performed by: PHYSICIAN ASSISTANT

## 2019-07-15 NOTE — PROGRESS NOTES
The patient is a 79-year-old female presents for postop visit  She is roughly six weeks status post revision ORIF right ankle surgery on 6/4/2019  Nursing home noticed a retained staple in she presents today for wound check and staple removal   She denies any associated pain erythema drainage or wound dehiscence  Her medial and lateral ankle incisions are healed  Medial incision staple removed without issue  Continue NWB status RLE  Keep same follow-up appointment next month for re-evaluation and new x-rays

## 2019-07-17 ENCOUNTER — NURSING HOME VISIT (OUTPATIENT)
Dept: GERIATRICS | Facility: OTHER | Age: 84
End: 2019-07-17
Payer: COMMERCIAL

## 2019-07-17 DIAGNOSIS — S82.891S CLOSED FRACTURE DISLOCATION OF RIGHT ANKLE, SEQUELA: Primary | ICD-10-CM

## 2019-07-17 DIAGNOSIS — I10 ESSENTIAL HYPERTENSION: ICD-10-CM

## 2019-07-17 DIAGNOSIS — R26.2 AMBULATORY DYSFUNCTION: ICD-10-CM

## 2019-07-17 PROCEDURE — 99308 SBSQ NF CARE LOW MDM 20: CPT | Performed by: FAMILY MEDICINE

## 2019-07-17 NOTE — PROGRESS NOTES
[facility]   [address] [place] [address], [facility] [address]  Progress Note  POS: SNF- 31    Unable to obtain from patient due to: N/A  Chief Complaint/Reason for visit:  History of Present Illness: 80year old female seen for routine SNF/STR follow up  Orthopedics note from 7/15/19 reviewed; patient to continue NWB status and CAM boot to RLE  Patient denies pain or discomfort; has PRN acetaminophen  Continues to work with therapy  Past Medical History: unchanged from history and physical  Past Medical History:   Diagnosis Date    Anorexia     last assessed: 8/9/2013    GERD (gastroesophageal reflux disease)     Herpes zoster     last assessed: 9/11/2013    Hypertension     Lymphoma ([facility] )     resolved: 1997    Malignant melanoma of skin ([facility] )     resolved: 2015    Pelvic fracture ([facility] )      Family History: unchanged from history and physical  Social History: unchanged from history and physical  Resident Since: STR/SNF Patient- May 2019  Review of systems: Review of Systems   Constitutional: Negative for activity change and appetite change  Respiratory: Negative for shortness of breath  Cardiovascular: Negative for leg swelling  Musculoskeletal: Negative for arthralgias  Psychiatric/Behavioral: Negative for sleep disturbance  All other systems reviewed and are negative  Medications: No changes made  Allergies: Reviewed and unchanged  Consults reviewed: Other- orthopedics  Labs/Diagnostics (reviewed by this provider): Copy in Chart- see paper chart for copy of most recent labs    Physical Exam    Weight: 162lb Temp:97 4F BP:146/84 Pulse:77 Resp:20 O2 Sat:94% RA  Constitutional: Well-nourished and Normocephalic  Orientation:Person, Place and Month, situation     Physical Exam   Constitutional: She is oriented to person, place, and time  She appears well-developed and well-nourished  No distress  HENT:   Head: Normocephalic and atraumatic     Right Ear: External ear normal    Left Ear: External ear normal    Mouth/Throat: Oropharynx is clear and moist    Eyes: Conjunctivae are normal  Right eye exhibits no discharge  Left eye exhibits no discharge  No scleral icterus  Cardiovascular: Normal rate and regular rhythm  Pulmonary/Chest: Effort normal and breath sounds normal  No respiratory distress  Abdominal: Soft  Bowel sounds are normal  She exhibits no distension  There is no tenderness  Musculoskeletal:   RLE with orthopedic boot in place  Toes acyanotic    Neurological: She is alert and oriented to person, place, and time  No cranial nerve deficit  Skin: She is not diaphoretic  Psychiatric: She has a normal mood and affect  Her behavior is normal    Nursing note and vitals reviewed      Assessment/Plan:  80year old female with:    Closed fracture dislocation of right ankle joint  S/p surgical revision of original ORIF  Continue NWB to RLE; follow up with ortho as scheduled  Pain controlled on current regimen of PRN acetaminophen      Ambulatory dysfunction  Continues to be NWB to RLE in setting of ankle fracture s/p surgical repair and revision of initial repair  Continue SNF for rehab; supportive care, ADL support  Fall precautions    Essential hypertension  SBP trending 130s-140s   Continue enalapril, metoprolol  Goal <150/90      [facility] [facility],   7/17/20193:03 PM

## 2019-07-17 NOTE — ASSESSMENT & PLAN NOTE
Continues to be NWB to RLE in setting of ankle fracture s/p surgical repair and revision of initial repair  Continue SNF for rehab; supportive care, ADL support  Fall precautions

## 2019-07-17 NOTE — ASSESSMENT & PLAN NOTE
S/p surgical revision of original ORIF  Continue NWB to RLE; follow up with ortho as scheduled  Pain controlled on current regimen of PRN acetaminophen

## 2019-07-23 ENCOUNTER — NURSING HOME VISIT (OUTPATIENT)
Dept: GERIATRICS | Facility: OTHER | Age: 84
End: 2019-07-23
Payer: COMMERCIAL

## 2019-07-23 DIAGNOSIS — I10 BENIGN ESSENTIAL HYPERTENSION: ICD-10-CM

## 2019-07-23 DIAGNOSIS — S82.891S CLOSED FRACTURE DISLOCATION OF RIGHT ANKLE, SEQUELA: ICD-10-CM

## 2019-07-23 DIAGNOSIS — R26.2 AMBULATORY DYSFUNCTION: ICD-10-CM

## 2019-07-23 DIAGNOSIS — R53.81 DEBILITY: Primary | ICD-10-CM

## 2019-07-23 PROCEDURE — 99309 SBSQ NF CARE MODERATE MDM 30: CPT | Performed by: NURSE PRACTITIONER

## 2019-07-23 NOTE — PROGRESS NOTES
[facility] CHILDREN   [address] [facility], [facility], [address]  STR Note    Chief Complaint/Reason for visit: STR Follow up- Debility; Ambulatory dysfunction; Closed fracture dislocation of right ankle joint; HTN  History of Present Illness: 61-year-old female seen and examined for follow up  She was sitting in chair in no distress  Offers no complaints  Reminded her of NWB orders from Ortho  She states that she is aware  Continues to have a fear of falling during PT sessions due to NWB status and needs frequent reminders and encouragement  Denies SOB, CP, dizziness, H/A, abd pain, N/V/D or constipation  Past Medical History: unchanged from history and physical  Past Medical History:   Diagnosis Date    Anorexia     last assessed: 8/9/2013    GERD (gastroesophageal reflux disease)     Herpes zoster     last assessed: 9/11/2013    Hypertension     [facility]oma ([facility] )     resolved: 1997    Malignant melanoma of skin ([facility] )     resolved: 2015    Pelvic fracture ([facility] )      Family History: unchanged from history and physical  Social History: unchanged from history and physical  Resident Since: 05/23/2019  Review of systems: Review of Systems   Constitutional: Negative for appetite change, chills and fatigue  HENT: Negative  Eyes: Negative  Respiratory: Negative  Cardiovascular: Negative  Gastrointestinal: Negative  Genitourinary: Negative  Musculoskeletal: Positive for gait problem  Skin: Positive for wound  Neurological: Negative for dizziness, syncope, light-headedness and headaches  Psychiatric/Behavioral: Negative  All other systems reviewed and are negative      Medications: No changes made  Allergies: Reviewed and unchanged  Consults reviewed:PT, OT and Other  Labs/Diagnostics (reviewed by this provider): Copy in Chart    Imaging Reviewed: No new    Physical Exam    Weight:  Temp: 98 4 BP: 137/80 Pulse: 69 Resp: 14 O2 Sat: 94% RA  Constitutional: Well-nourished and Normocephalic  Orientation:Person, Place, Day, Date, Month and Year     Physical Exam   Constitutional: She is oriented to person, place, and time  She appears well-developed and well-nourished  No distress  HENT:   Head: Normocephalic and atraumatic  Mouth/Throat: Oropharynx is clear and moist  No oropharyngeal exudate  Eyes: Pupils are equal, round, and reactive to light  Conjunctivae and EOM are normal  Right eye exhibits no discharge  Left eye exhibits no discharge  No scleral icterus  Neck: Normal range of motion  Neck supple  No JVD present  No tracheal deviation present  Cardiovascular: Normal rate, regular rhythm, normal heart sounds and intact distal pulses  Exam reveals no gallop and no friction rub  No murmur heard  Pulmonary/Chest: Effort normal and breath sounds normal  No respiratory distress  She has no wheezes  She has no rales  Abdominal: Soft  Bowel sounds are normal  She exhibits no distension  There is no tenderness  There is no guarding  Musculoskeletal: She exhibits edema  Trace edema BLE  Lymphadenopathy:     She has no cervical adenopathy  Neurological: She is alert and oriented to person, place, and time  Skin: Skin is warm and dry  She is not diaphoretic  Psychiatric: She has a normal mood and affect  Her behavior is normal  Thought content normal    Nursing note and vitals reviewed  Assessment/Plan:  20-year-old female with:     Closed fracture dislocation of right ankle joint with Post revision ORIF medial malleolus on the right 06/04  -retained staple was removed by ortho PA on 07/16 o/p  -continue NWB status RLE  -no s/s infection   Neurovascular status intact RLE to toes  -no pain    Debility/Ambulatory dysfunction  -continue care and support at [facility] for ADLs, PT,OT  -fall precautions    Benign essential hypertension  -BPs trending 130s- low 150s with an isolated elevated BP of 189/84 on 07/22  -continue enalapril        [address], [place]  4/68/731156:81 AM

## 2019-07-25 ENCOUNTER — NURSING HOME VISIT (OUTPATIENT)
Dept: GERIATRICS | Facility: OTHER | Age: 84
End: 2019-07-25
Payer: COMMERCIAL

## 2019-07-25 DIAGNOSIS — R53.81 DEBILITY: ICD-10-CM

## 2019-07-25 DIAGNOSIS — S82.891S CLOSED FRACTURE DISLOCATION OF RIGHT ANKLE, SEQUELA: Primary | ICD-10-CM

## 2019-07-25 DIAGNOSIS — R26.2 AMBULATORY DYSFUNCTION: ICD-10-CM

## 2019-07-25 DIAGNOSIS — R60.0 EDEMA OF BOTH LEGS: ICD-10-CM

## 2019-07-25 DIAGNOSIS — I10 HTN (HYPERTENSION): ICD-10-CM

## 2019-07-25 PROCEDURE — 99309 SBSQ NF CARE MODERATE MDM 30: CPT | Performed by: NURSE PRACTITIONER

## 2019-07-25 NOTE — PROGRESS NOTES
[facility] CHILDREN   [address] [place] [address], [place], [address]  STR Note    Chief Complaint/Reason for visit: STR follow up- Closed fracture dislocation of right ankle joint with Post revision ORIF medial malleolus on the right 06/04; Debility; Ambulatory dysfunction; BLE edema; HTN    History of Present Illness: 59-year-old female seen and examined for follow up  She was OOB in chair in no distress  She states that she is less fearful of falling in PT  Appetite is good and sleeping well  Denies SOB, CP H/A, dizziness, abd pain, N/V/D  Past Medical History: unchanged from history and physical  Past Medical History:   Diagnosis Date    Anorexia     last assessed: 8/9/2013    GERD (gastroesophageal reflux disease)     Herpes zoster     last assessed: 9/11/2013    Hypertension     Lymphoma ([facility] )     resolved: 1997    Malignant melanoma of skin ([facility] )     resolved: 2015    [facility]      Family History: unchanged from history and physical  Social History: unchanged from history and physical  Resident Since: 05/23/2019  Review of systems: Review of Systems   Constitutional: Positive for activity change  Negative for appetite change, chills, diaphoresis and fatigue  HENT: Negative  Eyes: Negative  Respiratory: Negative  Cardiovascular: Negative for chest pain and palpitations  Gastrointestinal: Negative  Genitourinary: Negative  Musculoskeletal: Negative  Neurological: Negative  Psychiatric/Behavioral: Negative  All other systems reviewed and are negative  Medications: All medication orders were reviewed at facility EHR    No changes made  Allergies: Reviewed and unchanged  Consults reviewed:PT, OT and Other  Labs/Diagnostics (reviewed by this provider): Copy in Chart    Imaging Reviewed: No new    Physical Exam    Weight:  Temp: 97 8 BP: 143/81 Pulse: 71 Resp: 16 O2 Sat: 92% RA  Constitutional: Well-nourished and Normocephalic  Orientation:Person, Place, Day, Date, Month and Year Physical Exam   Constitutional: She is oriented to person, place, and time  She appears well-developed and well-nourished  No distress  HENT:   Head: Normocephalic and atraumatic  Mouth/Throat: Oropharynx is clear and moist  No oropharyngeal exudate  Eyes: Pupils are equal, round, and reactive to light  Conjunctivae and EOM are normal  Right eye exhibits no discharge  Left eye exhibits no discharge  No scleral icterus  Neck: Normal range of motion  Neck supple  No JVD present  No tracheal deviation present  Cardiovascular: Regular rhythm and normal heart sounds  Occasional irregular HR  Pulmonary/Chest: No respiratory distress  She has no wheezes  She has no rales  Abdominal: Soft  Bowel sounds are normal  She exhibits no distension  There is no tenderness  There is no guarding  Musculoskeletal: She exhibits edema  Trace BLE edema  Able to move all extremities  Lymphadenopathy:     She has no cervical adenopathy  Neurological: She is alert and oriented to person, place, and time  Skin: Capillary refill takes less than 2 seconds  She is not diaphoretic  Psychiatric: She has a normal mood and affect  Her behavior is normal    Nursing note and vitals reviewed  Assessment/Plan:  68-year-old female with:      Closed fracture dislocation of right ankle joint with Post revision ORIF medial malleolus on the right 06/04  -retained staple was removed by ortho PA on 07/16 o/p  -continue NWB status RLE in [place]  -no s/s infection  Neurovascular status intact RLE to toes  -no pain  -f/u with ortho end of July     Debility/Ambulatory dysfunction  -continue care and support at [facility] for ADLs, PT,OT  -fall precautions     Benign essential hypertension  -BPs stable  -continue enalapril  -trace edema noted BLE   Check weight tomorrow and Monday for [address] [place] [address]  2/72/696863:22 AM

## 2019-08-07 ENCOUNTER — APPOINTMENT (OUTPATIENT)
Dept: RADIOLOGY | Facility: AMBULARY SURGERY CENTER | Age: 84
End: 2019-08-07
Payer: COMMERCIAL

## 2019-08-07 ENCOUNTER — OFFICE VISIT (OUTPATIENT)
Dept: OBGYN CLINIC | Facility: CLINIC | Age: 84
End: 2019-08-07

## 2019-08-07 VITALS
DIASTOLIC BLOOD PRESSURE: 96 MMHG | HEIGHT: 64 IN | BODY MASS INDEX: 26.98 KG/M2 | WEIGHT: 158 LBS | HEART RATE: 62 BPM | SYSTOLIC BLOOD PRESSURE: 144 MMHG

## 2019-08-07 DIAGNOSIS — Z48.89 AFTERCARE FOLLOWING SURGERY: ICD-10-CM

## 2019-08-07 DIAGNOSIS — Z48.89 AFTERCARE FOLLOWING SURGERY: Primary | ICD-10-CM

## 2019-08-07 PROCEDURE — 99024 POSTOP FOLLOW-UP VISIT: CPT | Performed by: ORTHOPAEDIC SURGERY

## 2019-08-07 PROCEDURE — 73610 X-RAY EXAM OF ANKLE: CPT

## 2019-08-07 NOTE — PROGRESS NOTES
Assessment/Plan:    Patient seen and examined with Dr Torey Blair  She is roughly two months s/p revision ORIF right ankle surgery on 6/4/2019  Her index procedure was on 5/21/2019  X-rays today demonstrate stable alignment of ankle joint  Hardware is intact  Clinically, she has no pain  Our recommendation is to maintain nonweightbearing status right lower extremity for an additional two weeks  Start partial 50% weight-bearing RLE on 8/21/2019 with physical therapy  Continue ankle range of motion exercises  Follow up in four weeks for re-evaluation and new x-rays of the right ankle  Problem List Items Addressed This Visit        Other    Aftercare following surgery - Primary    Relevant Orders    XR ankle 3+ vw right            Subjective:      Patient ID: Melvin Walton is a 80 y o  female  HPI     Patient is a 44-year-old female presents for a postop visit  She is roughly two months s/p revision ORIF right ankle surgery on 6/4/2019  Her index procedure was on 5/21/2019  Today, the patient states that since last visit she has no new issues  She denies any pain in her right ankle  She has been compliant with nonweightbearing status  No further injury or issue  She denies any numbness and tingling in the right lower extremity  She denies any calf tenderness, shortness of breath, chest pain  The following portions of the patient's history were reviewed and updated as appropriate: allergies, current medications, past family history, past medical history, past social history and past surgical history  Review of Systems      Objective:      /96   Pulse 62   Ht 5' 4" (1 626 m)   Wt 71 7 kg (158 lb)   BMI 27 12 kg/m²          Physical Exam      No acute distress  Patient examined in the wheelchair    Right ankle:  Medial and lateral incisions are healed  She is able to plantar and dorsiflex the ankle  +EHL/FHL  Sensation intact  Trace pitting edema  No calf tenderness, no erythema, normal temperature

## 2019-09-01 LAB
ATRIAL RATE: 102 BPM
P AXIS: 45 DEGREES
PR INTERVAL: 152 MS
QRS AXIS: 27 DEGREES
QRSD INTERVAL: 72 MS
QT INTERVAL: 336 MS
QTC INTERVAL: 437 MS
T WAVE AXIS: 65 DEGREES
VENTRICULAR RATE: 102 BPM

## 2019-09-01 PROCEDURE — 93010 ELECTROCARDIOGRAM REPORT: CPT | Performed by: INTERNAL MEDICINE

## 2019-09-04 ENCOUNTER — OFFICE VISIT (OUTPATIENT)
Dept: OBGYN CLINIC | Facility: CLINIC | Age: 84
End: 2019-09-04
Payer: COMMERCIAL

## 2019-09-04 ENCOUNTER — APPOINTMENT (OUTPATIENT)
Dept: RADIOLOGY | Facility: AMBULARY SURGERY CENTER | Age: 84
End: 2019-09-04
Payer: COMMERCIAL

## 2019-09-04 ENCOUNTER — NURSING HOME VISIT (OUTPATIENT)
Dept: GERIATRICS | Facility: OTHER | Age: 84
End: 2019-09-04
Payer: COMMERCIAL

## 2019-09-04 VITALS
WEIGHT: 158 LBS | BODY MASS INDEX: 26.98 KG/M2 | HEART RATE: 58 BPM | SYSTOLIC BLOOD PRESSURE: 142 MMHG | HEIGHT: 64 IN | DIASTOLIC BLOOD PRESSURE: 80 MMHG

## 2019-09-04 DIAGNOSIS — I10 BENIGN ESSENTIAL HYPERTENSION: ICD-10-CM

## 2019-09-04 DIAGNOSIS — Z48.89 AFTERCARE FOLLOWING SURGERY: ICD-10-CM

## 2019-09-04 DIAGNOSIS — R26.2 AMBULATORY DYSFUNCTION: ICD-10-CM

## 2019-09-04 DIAGNOSIS — Z48.89 AFTERCARE FOLLOWING SURGERY: Primary | ICD-10-CM

## 2019-09-04 DIAGNOSIS — Z87.81 S/P ORIF (OPEN REDUCTION INTERNAL FIXATION) FRACTURE: ICD-10-CM

## 2019-09-04 DIAGNOSIS — S82.891P: ICD-10-CM

## 2019-09-04 DIAGNOSIS — S82.891S CLOSED FRACTURE DISLOCATION OF RIGHT ANKLE, SEQUELA: Primary | ICD-10-CM

## 2019-09-04 DIAGNOSIS — Z98.890 S/P ORIF (OPEN REDUCTION INTERNAL FIXATION) FRACTURE: ICD-10-CM

## 2019-09-04 PROCEDURE — 99309 SBSQ NF CARE MODERATE MDM 30: CPT | Performed by: FAMILY MEDICINE

## 2019-09-04 PROCEDURE — 99213 OFFICE O/P EST LOW 20 MIN: CPT | Performed by: ORTHOPAEDIC SURGERY

## 2019-09-04 PROCEDURE — 73610 X-RAY EXAM OF ANKLE: CPT

## 2019-09-04 NOTE — ASSESSMENT & PLAN NOTE
In setting of in setting of ankle fracture s/p surgical repair and revision of initial repair, now with new orders for WBAT to RLE   PT/OT consult- evaluate and treat  Fall precautions

## 2019-09-04 NOTE — ASSESSMENT & PLAN NOTE
SBP remains at goal <150/90  Continue enalapril, metoprolol at current doses  Due for [facility] October 2019

## 2019-09-04 NOTE — PROGRESS NOTES
[facility] CHILDREN   [address] [facility], [place], [address]  Progress Note  POS: Nursing Facility/LTC- 28    Unable to obtain from patient due to: N/A; history obtained from patient at bedside  Chief Complaint/Reason for visit: Follow up right ankle fracture, ambulatory dysfunction, chronic medical conditions  History of Present Illness: 80year old female seen for routine follow up  She saw orthopedics earlier today with plan to transition out of CAM boot to regular shoe with WBAT; patient denies pain related to her ankle fracture s/p ORIF  She is looking forward to working with therapy and getting back to her apartment to sit out on her porch  BP has been WNL, SBP trending generally 120s-140s; she continues on metoprolol and enalapril  Last BMP in June was WNL; will be due for BMP in October  Weight stable overall; good appetite and PO intake  Past Medical History: unchanged from history and physical  Past Medical History:   Diagnosis Date    Anorexia     last assessed: 8/9/2013    GERD (gastroesophageal reflux disease)     Herpes zoster     last assessed: 9/11/2013    Hypertension     [facility]oma [facility] )     resolved: 1997    Malignant melanoma of skin ([facility] )     resolved: 2015    Pelvic fracture ([facility] )      Family History: unchanged from history and physical  Social History: unchanged from history and physical  Resident Since: May 2019  Review of systems: Review of Systems   Constitutional: Negative for appetite change and unexpected weight change  Respiratory: Negative for cough and shortness of breath  Cardiovascular: Negative for leg swelling  Gastrointestinal: Negative for abdominal pain and constipation  Genitourinary: Negative for difficulty urinating  Musculoskeletal: Negative for arthralgias  Psychiatric/Behavioral: Negative for confusion and sleep disturbance  All other systems reviewed and are negative      Medications: No changes made  Allergies: Reviewed and unchanged  Consults reviewed: Other- Ortho  Labs/Diagnostics (reviewed by this provider): Copy in Chart- 6/24/19: CBC and BMP WNL    Physical Exam    Weight: 161 2lb (158 6<-160) Temp:97 5F BP:141/68 Pulse:86   Resp:18 O2 Sat:95% RA  Constitutional: Well-nourished and Normocephalic  Orientation:Person, Place and Day, situation     Physical Exam   Constitutional: She appears well-developed and well-nourished  No distress  HENT:   Head: Normocephalic and atraumatic  Right Ear: External ear normal    Left Ear: External ear normal    Mouth/Throat: Oropharynx is clear and moist  No oropharyngeal exudate  Eyes: Conjunctivae are normal  Right eye exhibits no discharge  Left eye exhibits no discharge  No scleral icterus  Cardiovascular: Normal rate and regular rhythm  Pulmonary/Chest: Effort normal and breath sounds normal  No respiratory distress  Abdominal: Soft  Bowel sounds are normal  She exhibits no distension  There is no tenderness  Musculoskeletal: She exhibits no edema  Right ankle in CAM boot   Neurological: She is alert  No cranial nerve deficit  Skin: She is not diaphoretic  Dry skin b/l LEs   Psychiatric: She has a normal mood and affect  Thought content normal    Nursing note and vitals reviewed      Assessment/Plan:  80year old female with:    Closed fracture dislocation of right ankle joint  S/p surgical revision of original ORIF per orthopedics  Ortho note reviewed from visit earlier today- transition out of CAM boot and WBAT  PT/OT ordered to restart- evaluate and treat  Pain remains controlled, has PRN acetaminophen      Benign essential hypertension  SBP remains at goal <150/90  Continue enalapril, metoprolol at current doses  Due for [facility] [place] [facility] October 2019    Ambulatory dysfunction  In setting of in setting of ankle fracture s/p surgical repair and revision of initial repair, now with new orders for WBAT to RLE   PT/OT consult- evaluate and treat  Fall precautions      [address] [facility],   9/4/20192:59 PM

## 2019-09-04 NOTE — ASSESSMENT & PLAN NOTE
Patient is 3 months status post revision ORIF right ankle  She reports no pain  She denies any wound drainage or wound related issues  On physical exam she has mild valgus appearance to the ankle  She has well-healed incisions which are nontender  She has good dorsiflexion past neutral and plantar flexion to 20°  X-ray demonstrates healed fractures and stable fixation with symmetric mortise    Assessment and plan    Healed ankle fracture status post revision ORIF  Mild valgus appearance  Start weight-bearing  Follow-up in 4 weeks for re-evaluation    Weightbearing as tolerated

## 2019-09-04 NOTE — PROGRESS NOTES
Assessment/Plan:    Status post revision ORIF right ankle on 06/04/2019  · Continue physical therapy, transition out of cam walker boot  · Weightbearing as tolerated  · Follow up 3 months       Problem List Items Addressed This Visit        Musculoskeletal and Integument    Closed fracture dislocation of right ankle joint     Patient is 3 months status post revision ORIF right ankle  She reports no pain  She denies any wound drainage or wound related issues  On physical exam she has mild valgus appearance to the ankle  She has well-healed incisions which are nontender  She has good dorsiflexion past neutral and plantar flexion to 20°  X-ray demonstrates healed fractures and stable fixation with symmetric mortise    Assessment and plan    Healed ankle fracture status post revision ORIF  Mild valgus appearance  Start weight-bearing  Follow-up in 4 weeks for re-evaluation  Weightbearing as tolerated            Other    Aftercare following surgery - Primary    Relevant Orders    XR ankle 3+ vw right    Ambulatory referral to Physical Therapy      Other Visit Diagnoses     S/P ORIF (open reduction internal fixation) fracture        Relevant Orders    Ambulatory referral to Physical Therapy            Subjective:      Patient ID: Romi Peterson is a 80 y o  female  HPI  Patient presents to the office status post revision ORIF right ankle on 06/04/2019  She reports doing well overall  She has been compliant with her non-weightbearing restrictions and her cam walker boot  She has been attending physical therapy  She has no new complaints or concerns today in the office  The following portions of the patient's history were reviewed and updated as appropriate: current medications, past family history, past medical history, past social history, past surgical history and problem list     Review of Systems   Constitutional: Negative for fever and unexpected weight change     HENT: Negative for hearing loss, nosebleeds and sore throat  Eyes: Negative for pain, redness and visual disturbance  Respiratory: Negative for cough, shortness of breath and wheezing  Cardiovascular: Negative for chest pain, palpitations and leg swelling  Gastrointestinal: Negative for abdominal pain, nausea and vomiting  Endocrine: Negative for polydipsia and polyuria  Genitourinary: Negative for dysuria and hematuria  Skin: Negative for rash and wound  Neurological: Negative for dizziness and headaches  Psychiatric/Behavioral: Negative for agitation and suicidal ideas  Objective:      /80   Pulse 58   Ht 5' 4" (1 626 m)   Wt 71 7 kg (158 lb)   BMI 27 12 kg/m²          Physical Exam   Constitutional: She is oriented to person, place, and time  She appears well-developed and well-nourished  HENT:   Head: Normocephalic and atraumatic  Eyes: Pupils are equal, round, and reactive to light  Neck: Neck supple  Cardiovascular: Intact distal pulses  Pulmonary/Chest: Effort normal and breath sounds normal    Neurological: She is alert and oriented to person, place, and time  Skin: Skin is warm and dry  Psychiatric: She has a normal mood and affect   Her behavior is normal        Patient presents to the office on a wheelchair  Incision well healed  Dorsiflexion past neutral  Plantar flexion full  Sensation L2-S1 intact   Neurologically stable    Imaging  X-rays of right ankle 09/04/2019 were reviewed and shows stable hardware in good alignment    Scribe Attestation    I,:   Samantha Huber am acting as a scribe while in the presence of the attending physician :        I,:   Crow Jain MD personally performed the services described in this documentation    as scribed in my presence :

## 2019-09-04 NOTE — ASSESSMENT & PLAN NOTE
S/p surgical revision of original ORIF per orthopedics  Ortho note reviewed from visit earlier today- transition out of CAM boot and WBAT  PT/OT ordered to restart- evaluate and treat  Pain remains controlled, has PRN acetaminophen

## 2019-09-13 ENCOUNTER — NURSING HOME VISIT (OUTPATIENT)
Dept: GERIATRICS | Facility: OTHER | Age: 84
End: 2019-09-13
Payer: COMMERCIAL

## 2019-09-13 DIAGNOSIS — R26.2 AMBULATORY DYSFUNCTION: ICD-10-CM

## 2019-09-13 DIAGNOSIS — S82.891S CLOSED FRACTURE DISLOCATION OF RIGHT ANKLE, SEQUELA: Primary | ICD-10-CM

## 2019-09-13 DIAGNOSIS — Z98.890 S/P ORIF (OPEN REDUCTION INTERNAL FIXATION) FRACTURE: ICD-10-CM

## 2019-09-13 DIAGNOSIS — Z87.81 S/P ORIF (OPEN REDUCTION INTERNAL FIXATION) FRACTURE: ICD-10-CM

## 2019-09-13 DIAGNOSIS — I10 ESSENTIAL HYPERTENSION: ICD-10-CM

## 2019-09-13 PROCEDURE — 99316 NF DSCHRG MGMT 30 MIN+: CPT | Performed by: NURSE PRACTITIONER

## 2019-09-13 NOTE — PROGRESS NOTES
[facility]  [address]  [phone]  [address] [place] [address]: [facility]   [address], [address]    NAME: Jamila Beltrán  AGE: 80 y o  SEX: female  DATE OF ADMISSION: 05/23/2019   DATE OF DISCHARGE: 09/16/2019  DISCHARGE DISPOSITION: Home     Reason for admission: Patient was admitted from [facility] for rehabilitation initially after hospitalization for Closed fracture dislocation of right ankle s/p ORIF and then revision of original ORIF  Admission Diagnoses: As mentioned above  Additional Problems:   Past Medical History:   Diagnosis Date    Anorexia     last assessed: 8/9/2013    GERD (gastroesophageal reflux disease)     Herpes zoster     last assessed: 9/11/2013    Hypertension     Lymphoma ([facility] [address] )     resolved: 1997    Malignant melanoma of skin ([facility] [address] )     resolved: 2015    Pelvic fracture [facility])      Discharge Diagnoses: See problem list follow-up recommendations below  Course of stay: Patient was admitted to [facility] initially for rehabilitation due to debility, deconditioning, and ambulatory dysfunction following hospitalization  She has been followed by orthopedic surgeon for right ankle fracture then revision  Patient then transitioned to LTC until WBAT resumed by orthopedic surgeon  Patient has been doing well at  without any acute issues and has remained stable throughout stay  She is scheduled to be discharged home on 09/16/2019  Labs and testing performed during stay:  CBC and BMP stable in 06/24/2019    Discharge Medications: See discharge medication list which was reviewed  Status at time of discharge: Stable    Today's Visit: 9/13/201911:21 AM    Subjective:   Constitutional: Negative  Respiratory: Negative  Cardiovascular: Positive for leg edema  Gastrointestinal: Negative  Genitourinary: Negative  Musculoskeletal: Negative  Psychiatric/Behavioral: Negative      Vitals:Weight: 161 2 pounds BP: 169/72 TPR: 95 2/ 77/18    Exam: Physical Exam   Constitutional: She is oriented to person, place, and time  She appears well-developed and well-nourished  No distress  HENT:   Head: Normocephalic and atraumatic  Mouth/Throat: Oropharynx is clear and moist  No oropharyngeal exudate  Eyes: Pupils are equal, round, and reactive to light  EOM are normal  Right eye exhibits no discharge  Left eye exhibits no discharge  Neck: Normal range of motion  Neck supple  No JVD present  No tracheal deviation present  Cardiovascular: Normal rate, regular rhythm and normal heart sounds  Exam reveals no gallop and no friction rub  No murmur heard  Dependent trace edema BLE  Pulmonary/Chest: Effort normal and breath sounds normal  No respiratory distress  She has no wheezes  She has no rales  Abdominal: Soft  Bowel sounds are normal  She exhibits no distension  There is no tenderness  There is no guarding  Musculoskeletal: She exhibits edema  Lymphadenopathy:     She has no cervical adenopathy  Neurological: She is alert and oriented to person, place, and time  Skin: Skin is warm and dry  Capillary refill takes less than 2 seconds  She is not diaphoretic  Psychiatric: She has a normal mood and affect  Her behavior is normal    Nursing note and vitals reviewed  Discussion with patient/family and further instructions:  -Fall precautions  -Monitor for signs/symptoms of infection  -Medication list was reviewed at facility     Follow-up Recommendations: Please follow-up with your primary care physician within 7-10 days of discharge to review medication changes and current status       Problem List Follow-up Recommendations:  77-year-old female with:    Closed fracture dislocation of right ankle joint  -S/p surgical revision of original ORIF per orthopedics  -WBAT  -O/P PT/OT   -Follow-up with ortho as scheduled    HTN  -SBP trending 120s-140s with an isolated elevated SBP of 169/72 today  -Continue enalapril and metoprolol  -BMP due October 2019  -Follow-up with PCP routine     Ambulatory dysfunction  -Continue O/P PT/OT- script provided  -Continue fall precautions    I have spent >30 minutes with Patient today in which greater than 50% of this time was spent in counseling/coordination of care regarding Intructions for management, Patient and family education and Importance of tx compliance      [address]   5/43/130705:92 AM

## 2019-09-17 ENCOUNTER — TRANSITIONAL CARE MANAGEMENT (OUTPATIENT)
Dept: FAMILY MEDICINE CLINIC | Facility: CLINIC | Age: 84
End: 2019-09-17

## 2019-09-24 ENCOUNTER — TELEPHONE (OUTPATIENT)
Dept: FAMILY MEDICINE CLINIC | Facility: CLINIC | Age: 84
End: 2019-09-24

## 2019-09-24 NOTE — TELEPHONE ENCOUNTER
Patient was called because she missed TCM appt on 9/23/19  Patient states she is unable to make it to her appt because of transportation issues  Patient is scheduled to see orthopedic specialist on 9/25/19  Patient was counseled on importance of TCM follow up within 1-2 weeks of discharge  Patient verbalized understanding  She will call to schedule TCM if she finds transportation

## 2019-10-21 ENCOUNTER — OFFICE VISIT (OUTPATIENT)
Dept: OBGYN CLINIC | Facility: HOSPITAL | Age: 84
End: 2019-10-21
Payer: COMMERCIAL

## 2019-10-21 ENCOUNTER — HOSPITAL ENCOUNTER (OUTPATIENT)
Dept: RADIOLOGY | Facility: HOSPITAL | Age: 84
Discharge: HOME | End: 2019-10-21
Payer: COMMERCIAL

## 2019-10-21 VITALS
HEIGHT: 64 IN | DIASTOLIC BLOOD PRESSURE: 78 MMHG | SYSTOLIC BLOOD PRESSURE: 154 MMHG | HEART RATE: 90 BPM | BODY MASS INDEX: 27.12 KG/M2

## 2019-10-21 DIAGNOSIS — Z48.89 AFTERCARE FOLLOWING SURGERY: Primary | ICD-10-CM

## 2019-10-21 DIAGNOSIS — Z48.89 AFTERCARE FOLLOWING SURGERY: ICD-10-CM

## 2019-10-21 PROCEDURE — 99213 OFFICE O/P EST LOW 20 MIN: CPT | Performed by: PHYSICIAN ASSISTANT

## 2019-10-21 PROCEDURE — 73610 X-RAY EXAM OF ANKLE: CPT

## 2019-10-21 RX ORDER — PANTOPRAZOLE SODIUM 20 MG/1
TABLET, DELAYED RELEASE ORAL
COMMUNITY
Start: 2019-09-07 | End: 2020-07-27

## 2019-10-21 NOTE — PROGRESS NOTES
Assessment/Plan:    Patient seen and examined  She is roughly four months status post revision ORIF right ankle surgery on 6/4/2019  Her index procedure was on 5/21/2019  Overall she is doing well and denies any pain in the right ankle  X-rays today demonstrate stable alignment of ankle joint with hardware that is intact  She can now transition out of Cam boot  She is weight-bearing as tolerated right lower extremity  She should continue ankle range-of-motion exercises as needed  At this point, she can follow up with us on a p r n  Basis  She was instructed to let us know she has any questions or concerns in the future  Problem List Items Addressed This Visit        Other    Aftercare following surgery - Primary    Relevant Orders    XR ankle 3+ vw right            Subjective:      Patient ID: Richard Lizarraga is a 80 y o  female  HPI     The patient is a 51-year-old female presents for a postop visit  She is roughly four months status post revision ORIF right ankle surgery on 6/4/2019  Her index procedure was on 5/21/2019  At last visit, she was transition to weight-bearing as tolerated right lower extremity  Today she states overall she is doing well, and has no pain in the right ankle  She has been weight-bearing as tolerated in Cam boot  Since last visit, she denies any new symptoms  She has no complaints at today's office visit  The following portions of the patient's history were reviewed and updated as appropriate: allergies, current medications, past family history, past medical history, past social history, past surgical history and problem list     Review of Systems   Constitutional: Negative for activity change, diaphoresis, fatigue and fever  Respiratory: Negative  Cardiovascular: Negative  Musculoskeletal: Positive for gait problem  Skin: Negative  Neurological: Negative for weakness and numbness           Objective:      Ht 5' 4" (1 626 m)   BMI 27 12 kg/m² Physical Exam   Constitutional: She is oriented to person, place, and time  She appears well-developed and well-nourished  No distress  HENT:   Head: Normocephalic and atraumatic  Eyes: Right eye exhibits no discharge  Cardiovascular: Intact distal pulses  Pulmonary/Chest: Effort normal  No respiratory distress  Abdominal: Soft  Musculoskeletal: She exhibits no tenderness  Neurological: She is alert and oriented to person, place, and time  Skin: Skin is warm and dry  She is not diaphoretic  Psychiatric: She has a normal mood and affect  Nursing note and vitals reviewed  No acute distress  Gait is assisted with a walker  Right ankle: Inspection reveals healed medial and lateral incisions  Medial and lateral malleoli are nontender to palpation  Good range of motion with ankle plantar dorsiflexion, inversion eversion  Sensation intact  Feet are warm and well perfused  There is no calf tenderness or pitting edema  Good capillary refill at the toes

## 2019-10-28 ENCOUNTER — TELEPHONE (OUTPATIENT)
Dept: GERIATRICS | Age: 84
End: 2019-10-28

## 2019-10-30 ENCOUNTER — TELEPHONE (OUTPATIENT)
Dept: GERIATRICS | Age: 84
End: 2019-10-30

## 2019-10-30 NOTE — TELEPHONE ENCOUNTER
-Son called and would like his mother to have an appointment with Dr Daniel Ayala  -Will call daughter, Rosario Martinez, a contact in chart, to confirm if patient is changing PCP to Dr Daniel Ayala; if so, I will make an appointment to establish care

## 2019-11-12 DIAGNOSIS — I73.9 PAD (PERIPHERAL ARTERY DISEASE) (HCC): Primary | ICD-10-CM

## 2019-11-13 ENCOUNTER — TELEPHONE (OUTPATIENT)
Dept: GERIATRICS | Age: 84
End: 2019-11-13

## 2019-11-13 ENCOUNTER — OFFICE VISIT (OUTPATIENT)
Dept: GERIATRICS | Age: 84
End: 2019-11-13
Payer: COMMERCIAL

## 2019-11-13 VITALS
HEIGHT: 64 IN | TEMPERATURE: 97.9 F | SYSTOLIC BLOOD PRESSURE: 130 MMHG | WEIGHT: 160.38 LBS | HEART RATE: 94 BPM | DIASTOLIC BLOOD PRESSURE: 70 MMHG | OXYGEN SATURATION: 98 % | BODY MASS INDEX: 27.38 KG/M2 | RESPIRATION RATE: 16 BRPM

## 2019-11-13 DIAGNOSIS — I10 BENIGN ESSENTIAL HYPERTENSION: Primary | Chronic | ICD-10-CM

## 2019-11-13 DIAGNOSIS — Z71.89 MEDICATION CARE PLAN DISCUSSED WITH PATIENT: ICD-10-CM

## 2019-11-13 PROBLEM — G30.1 LATE ONSET ALZHEIMER'S DISEASE WITHOUT BEHAVIORAL DISTURBANCE (HCC): Status: ACTIVE | Noted: 2019-11-13

## 2019-11-13 PROBLEM — I73.9 PAD (PERIPHERAL ARTERY DISEASE) (HCC): Status: RESOLVED | Noted: 2019-04-25 | Resolved: 2019-11-13

## 2019-11-13 PROBLEM — R53.81 DEBILITY: Status: RESOLVED | Noted: 2019-05-24 | Resolved: 2019-11-13

## 2019-11-13 PROBLEM — D50.9 MICROCYTIC ANEMIA: Status: RESOLVED | Noted: 2017-12-05 | Resolved: 2019-11-13

## 2019-11-13 PROBLEM — F02.80 LATE ONSET ALZHEIMER'S DISEASE WITHOUT BEHAVIORAL DISTURBANCE (HCC): Status: ACTIVE | Noted: 2019-11-13

## 2019-11-13 PROBLEM — D50.9 IRON DEFICIENCY ANEMIA: Chronic | Status: ACTIVE | Noted: 2018-01-16

## 2019-11-13 PROBLEM — D50.9 IRON DEFICIENCY ANEMIA: Status: RESOLVED | Noted: 2018-01-16 | Resolved: 2019-11-13

## 2019-11-13 PROBLEM — R71.0 HEMOGLOBIN DROP: Status: RESOLVED | Noted: 2019-05-24 | Resolved: 2019-11-13

## 2019-11-13 PROCEDURE — 99214 OFFICE O/P EST MOD 30 MIN: CPT | Performed by: INTERNAL MEDICINE

## 2019-11-13 NOTE — PROGRESS NOTES
[address]  [phone]   Intake      SOCIAL HISTORY    Patient: Natalie Maher  Date:11/13/2019    Current Living Situation: [facility] - has lived there nine years      Source of referral: Dr Yue Cervantes  Reason for referral: Family member concerns regarding memory concerns  When were behaviors/symptoms first noticed? Last several years    Please provide examples: Becoming more withdrawn, short responses, forgetfulness and disorientation at times, preferring to stay in her apartment    Patients main concern(s): None reported, although Xavier Leung did acknowledge difficulty with memory at times during both GDS and MoCA completion  Caregiver main concern(s): Safety and diagnosis, review of memory/personality changes including apathy      Is respite needed for caregiver(s)? No      Who is available to provide care in case of illness or crisis (please specify relation to patient and level of care able to provide)? Son      FAMILY BACKGROUND    Marital status:      Does patient have children? Yes How Many? 2    Where do the children live? Verlan Ligas in [place] and son in [facility] members assisting patient at home: Daughter will visit as often as possible, she lives an hour away    Employment History    Currently Employed:No      Educational History    Highest Level of Education: [facility]: No       [place] Benefits Describe (if applicable): N/A      FINANCIAL    Total Monthly income: Not disclosed  Source of income: Not disclosed    Meet current needs? Yes     Funds available for home care? Yes   Funds available for nursing home? Yes possibly  She has long term care insurance    Do you rent or own your home? N/A - lives in [address] [facility] [place] [address] [facility]    Are any relationships causing problems right now: No      [employer] and Organizations:  Activities at [employer] [facility]    Major life events in past two years (ex: group home, death in family, major illness etc ): None reported    Does the patient currently drive: Yes   If not, date stopped driving: N/A- William Ray states "I believe this is coming to an end according to my daughter"      [address] [facility] which have helped with shopping, meals, bathing, etc : Assistance from [email or web address] that assessment team feels would be beneficial to patient/family: Potential increase of services (from independent to assisted living services- med management, more frequent check-ins)  Specifically reviewed necessity for med management assistance today  Reviewed pill box use and also discussed [employer] or [facility] blister pack services for both packaging of pills and delivery  LEGAL    Advanced directives: Living will in place  POA is daughter     HOME SAFETY ASSESSMENT   Full home assessment not completed as William Ray is living in [address] [place] [address]  Her daughter reports no concerns about housing safety and notes that she can operate her landline phone and uses it to call friends  She reports she wears a fall alert daily and has grab bars in the bathroom and string pulls in her apartment  She does not have concerns about clutter or old food in the refrigerator  FUNCTIONAL ACTIVITIES QUESTIONNAIRE         Informant or Patient: Daughter    Instructions:  Place a check lillian under the column that best describes the patient's ability to perform the tasks listed below:    TASK Completely unable to perform task  (3 points) Requires  Assistance  (2 points) Has difficulty but accomplishes task, or has never done, but the informant feels could do task with difficulty  (1 point) Normal performance, or has never done task, but the informant feels the patient could do the task if necessary  (0 points)   1  Writing checks, paying bills, balancing checkbook 3      2   Assembling tax records, business affairs, or papers 3 3  Shopping alone for clothes, household necessities or groceries   2     4  Playing a game of skill, working on a hobby   1    5  Heating water, making a cup of coffee, turning off the stove   1    6  Preparing a balanced meal   3      7  Keeping track of current events   3      8  Paying attention to, understanding, discussing a TV show, book, or magazine  2     9  Remembering appointments, family occasions, holidays, medications  2/3     10   Traveling out of the neighborhood, driving, arranging to take buses 3        [address] [place] [address] _____24_______     Adapted and reprinted with permission from Jazmine Melendez  1982;[id number](3):[phone]

## 2019-11-13 NOTE — PROGRESS NOTES
ASSESSMENT AND PLAN:  Late onset Alzheimer's disease without behavioral disturbance ([facility] [address] )   Her history, prior laboratory testing/imaging, and cognitive testing performed today are consistent with the diagnosis of late onset Alzheimer's disease without behavioral disturbance  As reviewed with her daughter, I recommend that she continue to receive support for her ADLs and IADLs as need arises  I recommended for her mother to remain socially and physically active  We discussed the possibility of using donepezil for symptomatic treatment, but reviewed the potentially limited benefit and potential side effects  She states she will discuss taking the medication with her mother and follow up with her new PCP with her decision  Finally, I recommended to her daughter that she review her mother's ability to self administer her medications  Suggested to start with placing medications in a pillbox so that correct dosing of the medication could be verified  Intervention/support pending this review  Benign essential hypertension  -today's values reveal that her blood pressure is well controlled  -I recommend that she continue with her current medications and bring all of her current medication bottles to her next visit to be verified by her PCP  -I recommend continued clinical and periodic laboratory monitoring  Medication care plan discussed with patient  -I recommended to both the patient and her daughter that she bring all her medication bottles to her next PCP office visit for verification of dosing and need  HPI:    We had the pleasure of evaluating Fish Crowell who is a 80 y o  female in Geriatric consultation today  Ms Ericka Huang is in the office with her daughter  She lives independently in an apartment in a [facility] community setting  When asked, she reports that her memory is not as good as it used to be and asks "What do yo expect at 80years old?"    Her daughter reports that she has become more and more withdrawn over the last 4 to 5 years  She is has become less emotionally and socially engaged in her surroundings and with her family over this time  Daughter notes that she has required more assistance with her finances, homemaking, and meal preparation over the last several months  This has become more pronounced since her two recent ankle surgeries  Her daughter notes that she has become more forgetful  Epecially with things involving her recent memory  Her daughter reports difficulty learning new tasks  Including, learning how to operate a new phone  She has voluntarily given up her car and driving  Although her car is a relatively new model, she told her daughter that it was from the 80s  Her daughter reports that she is superficially involved in her interpersonal relationships  She reports feeling well and has no specific complaints during the visit  She does admit to some memory difficulties  She denies being in physical pain/discomfort  She feels that her mood is well and denies feelings of anxiety and depression  She is happy and content living in her apartment  Memory Issues noticed over several years      Memory affected: short term memory loss    Symptoms started: gradual  Over time the memory has:  worsened  Memory issue(s) were noted by: family   Patient has difficulties with apathy  Difficulty finding the right word while speaking: No  Requires repeat information or asking the same question repeatedly: Yes  Does the patient still drive: No       Any recent accidents, citations or getting lost in familiar places :No  Does patient handle own financial affairs such as balancing your checkbook, paying bills, investments: No  Difficulties with handling own financial affairs: Yes  Changes in mood or personality:No  Current or previous treatment for depression or anxiety: No  Any gait or balance disorder: Yes  Uses:  Walker  Any falls in the last year: Yes  Any hallucination or delusion: No  Fluctuation in alertness: No  Sleep Issues: No  Urinary/Stool Incontinence: No  Hearing and vision issue: No   Wears eyeglasses  Does patient have POA:Yes  Does patient have a Living will Yes    Family Review of Behavior [facility]:   agressive/combative behavior  No   agitated  No   temper outbursts  No    resistance to care  No   forgetfulness of actions Yes  hoarding/hiding objects  No    withdrawn Yes  wandering  No  personal hygiene problems  No    Family member with dementia and what type? no  History of head trauma No  History of alcohol or substance abuse No    ROS: Review of Systems   Constitutional: Negative for appetite change  HENT: Negative for hearing loss  Eyes: Negative for visual disturbance  Respiratory: Negative for shortness of breath  Cardiovascular: Negative for chest pain and palpitations  Gastrointestinal: Negative for constipation and diarrhea  Genitourinary:        She denies urinary incontinence  Musculoskeletal: Negative for arthralgias  Neurological: Negative for headaches  Hematological: Does not bruise/bleed easily  Psychiatric/Behavioral:        She denies feelings of anxiety and depression         Allergies   Allergen Reactions    Contrast [Iodinated Diagnostic Agents]      Other reaction(s): "I get very cold"    Iodine      Other reaction(s): Chills  Reaction Date: 02Aug2011;     Sulfa Antibiotics Vomiting    Omnipaque [Iohexol]        Medications:    Current Outpatient Medications on File Prior to Visit   Medication Sig Dispense Refill    aspirin 81 MG tablet Take 1 tablet (81 mg total) by mouth 3 (three) times a week (Patient taking differently: Take 81 mg by mouth daily )      calcium-vitamin D 250-100 MG-UNIT per tablet Take 1 tablet by mouth daily      Cholecalciferol (VITAMIN D3) 3000 UNITS TABS Take by mouth      clopidogrel (PLAVIX) 75 mg tablet Take 75 mg by mouth daily      famotidine (PEPCID) 40 MG tablet Take 1 tablet (40 mg total) by mouth daily 30 tablet 5    Magnesium Oxide 250 MG TABS Take 1 tablet by mouth daily      simvastatin (ZOCOR) 20 mg tablet Take 1 tablet (20 mg total) by mouth daily 30 tablet 5    fluticasone (FLONASE) 50 mcg/act nasal spray       LUMIGAN 0 01 % ophthalmic drops Administer 1 drop to both eyes daily at bedtime       metoprolol tartrate (LOPRESSOR) 25 mg tablet       pantoprazole (PROTONIX) 20 mg tablet       [DISCONTINUED] esomeprazole (NexIUM) 40 MG capsule Take 1 capsule (40 mg total) by mouth daily for 30 days 30 capsule 5    [DISCONTINUED] oxyCODONE (ROXICODONE) 5 mg immediate release tablet Take 0 5 tablets (2 5 mg total) by mouth every 6 (six) hours as needed for severe painMax Daily Amount: 10 mg (Patient not taking: Reported on 11/13/2019) 15 tablet 0     No current facility-administered medications on file prior to visit          History:  Past Medical History:   Diagnosis Date    Anorexia     last assessed: 8/9/2013    GERD (gastroesophageal reflux disease)     Herpes zoster     last assessed: 9/11/2013    Hypertension     Lymphoma ([facility] [address] )     resolved: 1997    Malignant melanoma of skin ([facility] [address] )     resolved: 2015    Pelvic fracture ([facility] [address] )      Past Surgical History:   Procedure Laterality Date    BREAST SURGERY      enlargement procedure    BYPASS FEMORAL-POPLITEAL Left     onset: 8/31/2012    CATARACT EXTRACTION Right 06/19/2013    HYSTERECTOMY      resolved: 1970    [address] Right 5/21/2019    Procedure: OPEN REDUCTION W/ INTERNAL FIXATION (ORIF) ANKLE;  Surgeon: Kerrie Brantley MD;  [facility];  Service: Orthopedics    ORIF TIBIA & FIBULA FRACTURES Right 6/4/2019    Procedure: Revision right ankle ORIF;  Surgeon: Kerrie Brantley MD;  [facility];  Service: Orthopedics    OTHER SURGICAL HISTORY  05/30/2013    PTA Femoral-popliteal initial stenosis left Description: with mechanical thrombectomy, PTA and stent     NV OPEN RX DISTAL RADIUS FX, INTRA-ARTICULAR, 3+ FRAG Left 1/18/2017    Procedure: DISTAL RADIUS OPEN REDUCTION W/ INTERNAL FIXATION ;  Surgeon: Gary Lucero MD;  [facility];  Service: Orthopedics     Family History   Problem Relation Age of Onset    Heart attack Mother     Heart failure Father     Other Sister         thyroid surgery     Social History     Socioeconomic History    Marital status:      Spouse name: Not on file    Number of children: 2    Years of education: Not on file    Highest education level: Not on file   Occupational History    Not on file   Social Needs    Financial resource strain: Not on file    Food insecurity:     Worry: Not on file     Inability: Not on file    Transportation needs:     Medical: Not on file     Non-medical: Not on file   Tobacco Use    Smoking status: Never Smoker    Smokeless tobacco: Never Used   Substance and Sexual Activity    Alcohol use:  Yes     Alcohol/week: 1 0 standard drinks     Types: 1 Glasses of wine per week     Frequency: 2-4 times a month     Drinks per session: 1 or 2     Binge frequency: Never     Comment: Per allscripts - social drinker     Drug use: No    Sexual activity: Not on file   Lifestyle    Physical activity:     Days per week: Not on file     Minutes per session: Not on file    Stress: Not on file   Relationships    Social connections:     Talks on phone: Not on file     Gets together: Not on file     Attends Yazdanism service: Not on file     Active member of club or organization: Not on file     Attends meetings of clubs or organizations: Not on file     Relationship status: Not on file    Intimate partner violence:     Fear of current or ex partner: Not on file     Emotionally abused: Not on file     Physically abused: Not on file     Forced sexual activity: Not on file   Other Topics Concern    Not on file   Social History Narrative    Advance directive on file      Past Surgical History:   Procedure Laterality Date    BREAST SURGERY      enlargement procedure    BYPASS FEMORAL-POPLITEAL Left     onset: 8/31/2012    CATARACT EXTRACTION Right 06/19/2013    HYSTERECTOMY      resolved: 1970    ORIF TIBIA & FIBULA FRACTURES Right 5/21/2019    Procedure: OPEN REDUCTION W/ INTERNAL FIXATION (ORIF) ANKLE;  Surgeon: Chiara Willis MD;  [facility];  Service: Orthopedics    [address] Right 6/4/2019    Procedure: Revision right ankle ORIF;  Surgeon: Chiara Willis MD;  [facility];  Service: Orthopedics    OTHER SURGICAL HISTORY  05/30/2013    PTA Femoral-popliteal initial stenosis left Description: with mechanical thrombectomy, PTA and stent     OR OPEN RX DISTAL RADIUS FX, INTRA-ARTICULAR, 3+ FRAG Left 1/18/2017    Procedure: DISTAL RADIUS OPEN REDUCTION W/ INTERNAL FIXATION ;  Surgeon: Sun Stockton MD;  [facility];  Service: Orthopedics       OBJECTIVE:  Vitals:    11/13/19 1003   BP: 130/70   BP Location: Left arm   Patient Position: Sitting   Cuff Size: Standard   Pulse: 94   Resp: 16   Temp: 97 9 °F (36 6 °C)   TempSrc: Tympanic   SpO2: 98%   Weight: 72 7 kg (160 lb 6 oz)   Height: 5' 4" (1 626 m)     Body mass index is 27 53 kg/m²  Physical Exam   Constitutional: Vital signs are normal  She appears well-developed and well-nourished  She is cooperative  Non-toxic appearance  She does not have a sickly appearance  She does not appear ill  No distress  She appears comfortable sitting in the exam room chair, younger than her stated age, and healthy  HENT:   Head: Normocephalic and atraumatic  Right Ear: External ear normal    Left Ear: External ear normal    Eyes: Conjunctivae are normal  No scleral icterus  Neck: No tracheal deviation present  No thyromegaly present  Cardiovascular: Normal rate, regular rhythm and normal heart sounds  Exam reveals no gallop and no friction rub  No murmur heard    Pulmonary/Chest: Effort normal and breath sounds normal  No stridor  No respiratory distress  She has no wheezes  She has no rales  Abdominal: Soft  She exhibits no distension and no mass  There is no guarding  Musculoskeletal: She exhibits no deformity  Lymphadenopathy:     She has no cervical adenopathy  Neurological: She is alert  Skin: Skin is warm and dry  Well-healed incision over right medial malleolus  Psychiatric: She has a normal mood and affect  Her behavior is normal    She is oriented to person and place, but not time  Vitals reviewed  MoCA: 14/30  GDS: 3/15    Labs & Imaging:  Lab Results   Component Value Date    WBC 5 24 06/07/2019    HGB 10 7 (L) 06/07/2019    HCT 34 4 (L) 06/07/2019    MCV 93 06/07/2019     06/07/2019     Lab Results   Component Value Date    SODIUM 139 06/07/2019    K 3 7 06/07/2019     06/07/2019    CO2 31 06/07/2019    BUN 15 06/07/2019    CREATININE 0 53 (L) 06/07/2019    GLUC 87 06/07/2019    CALCIUM 8 7 06/07/2019     No results found for: LUZ GALO  [facility]  Lab Results   Component Value Date    PFS0NUOLFMJF 1 190 05/21/2019     Lab Results   Component Value Date    FYKJ98WMHHNL 31 11/29/2017      Results for orders placed during the hospital encounter of 05/20/19   CT head without contrast    Narrative CT BRAIN - WITHOUT CONTRAST    INDICATION:   trauma/ fall  COMPARISON:  CT brain 12/29/2016    TECHNIQUE:  CT examination of the brain was performed  In addition to axial images, coronal 2D reformatted images were created and submitted for interpretation  Radiation dose length product (DLP) for this visit:  967 mGy-cm   This examination, like all CT scans performed in the [facility], was performed utilizing techniques to minimize radiation dose exposure, including the use of iterative   reconstruction and automated exposure control  IMAGE QUALITY:  Diagnostic      FINDINGS:    PARENCHYMA: Decreased attenuation is noted in periventricular and subcortical white matter demonstrating an appearance that is statistically most likely to represent mild microangiopathic change; this appearance is similar when compared to most recent   prior examination  No CT signs of acute infarction  No intracranial mass, mass effect or midline shift  No acute parenchymal hemorrhage  VENTRICLES AND EXTRA-AXIAL SPACES:  Normal for the patient's age  VISUALIZED ORBITS AND PARANASAL SINUSES:  Unremarkable  CALVARIUM AND EXTRACRANIAL SOFT TISSUES:  Minimal frontal scalp soft tissue swelling  No fracture  Impression No acute intracranial abnormality              Workstation performed: [id number]

## 2019-11-13 NOTE — ASSESSMENT & PLAN NOTE
Her history, prior laboratory testing/imaging, and cognitive testing performed today are consistent with the diagnosis of late onset Alzheimer's disease without behavioral disturbance  As reviewed with her daughter, I recommend that she continue to receive support for her ADLs and IADLs as need arises  I recommended for her mother to remain socially and physically active  We discussed the possibility of using donepezil for symptomatic treatment, but reviewed the potentially limited benefit and potential side effects  She states she will discuss taking the medication with her mother and follow up with her new PCP with her decision  Finally, I recommended to her daughter that she review her mother's ability to self administer her medications  Suggested to start with placing medications in a pillbox so that correct dosing of the medication could be verified  Intervention/support pending this review

## 2019-11-13 NOTE — TELEPHONE ENCOUNTER
Per request from Dr Toma Esqueda, moved Jan 8, 2020 appointment to Dec 16th  Advised patient to keep a list of medications and bring it in, or even if needed bag all the battles to bring in

## 2019-11-14 ENCOUNTER — TELEPHONE (OUTPATIENT)
Dept: ADMINISTRATIVE | Facility: HOSPITAL | Age: 84
End: 2019-11-14

## 2019-11-14 NOTE — ASSESSMENT & PLAN NOTE
-I recommended to both the patient and her daughter that she bring all her medication bottles to her next PCP office visit for verification of dosing and need

## 2019-11-14 NOTE — ASSESSMENT & PLAN NOTE
-today's values reveal that her blood pressure is well controlled  -I recommend that she continue with her current medications and bring all of her current medication bottles to her next visit to be verified by her PCP  -I recommend continued clinical and periodic laboratory monitoring

## 2019-12-02 ENCOUNTER — HOSPITAL ENCOUNTER (OUTPATIENT)
Dept: NON INVASIVE DIAGNOSTICS | Facility: CLINIC | Age: 84
Discharge: HOME | End: 2019-12-02
Payer: COMMERCIAL

## 2019-12-02 DIAGNOSIS — I73.9 PAD (PERIPHERAL ARTERY DISEASE) (HCC): ICD-10-CM

## 2019-12-02 PROCEDURE — 93923 UPR/LXTR ART STDY 3+ LVLS: CPT

## 2019-12-02 PROCEDURE — 93922 UPR/L XTREMITY ART 2 LEVELS: CPT | Performed by: SURGERY

## 2019-12-02 PROCEDURE — 93925 LOWER EXTREMITY STUDY: CPT | Performed by: SURGERY

## 2019-12-02 PROCEDURE — 93925 LOWER EXTREMITY STUDY: CPT

## 2019-12-16 ENCOUNTER — OFFICE VISIT (OUTPATIENT)
Dept: GERIATRICS | Facility: CLINIC | Age: 84
End: 2019-12-16
Payer: COMMERCIAL

## 2019-12-16 VITALS
RESPIRATION RATE: 16 BRPM | BODY MASS INDEX: 26.8 KG/M2 | HEART RATE: 95 BPM | WEIGHT: 156.13 LBS | TEMPERATURE: 97.8 F | OXYGEN SATURATION: 100 % | DIASTOLIC BLOOD PRESSURE: 64 MMHG | SYSTOLIC BLOOD PRESSURE: 142 MMHG

## 2019-12-16 DIAGNOSIS — S82.891S CLOSED FRACTURE DISLOCATION OF RIGHT ANKLE, SEQUELA: ICD-10-CM

## 2019-12-16 DIAGNOSIS — I87.2 VENOUS STASIS DERMATITIS OF BOTH LOWER EXTREMITIES: ICD-10-CM

## 2019-12-16 DIAGNOSIS — I70.213 ATHEROSCLEROSIS OF NATIVE ARTERY OF BOTH LOWER EXTREMITIES WITH INTERMITTENT CLAUDICATION (HCC): Chronic | ICD-10-CM

## 2019-12-16 DIAGNOSIS — F02.80 LATE ONSET ALZHEIMER'S DISEASE WITHOUT BEHAVIORAL DISTURBANCE (HCC): ICD-10-CM

## 2019-12-16 DIAGNOSIS — C85.90 LYMPHOMA IN REMISSION (HCC): Chronic | ICD-10-CM

## 2019-12-16 DIAGNOSIS — G30.1 LATE ONSET ALZHEIMER'S DISEASE WITHOUT BEHAVIORAL DISTURBANCE (HCC): ICD-10-CM

## 2019-12-16 DIAGNOSIS — I10 BENIGN ESSENTIAL HYPERTENSION: Primary | Chronic | ICD-10-CM

## 2019-12-16 PROCEDURE — 99214 OFFICE O/P EST MOD 30 MIN: CPT | Performed by: FAMILY MEDICINE

## 2019-12-16 RX ORDER — ESOMEPRAZOLE MAGNESIUM 40 MG/1
40 CAPSULE, DELAYED RELEASE ORAL DAILY
Refills: 5 | COMMUNITY
Start: 2019-11-27 | End: 2020-07-27

## 2019-12-16 RX ORDER — ENALAPRIL MALEATE 10 MG/1
10 TABLET ORAL DAILY
Refills: 1 | COMMUNITY
Start: 2019-11-28 | End: 2020-07-27 | Stop reason: SDUPTHER

## 2019-12-16 NOTE — PROGRESS NOTES
[address] [facility]    NAME: Romie Chaudhry  AGE: 80 y o  SEX: female    DATE OF ENCOUNTER: 12/16/19    Assessment and Plan     Benign essential hypertension  Goal <150/90  Continue enalapril 10mg daily  Due for BMP- ordered today to be drawn in January 2020    Lymphoma in remission [facility])  CBC w/diff ordered to be drawn January 2020    Closed fracture dislocation of right ankle joint  Ambulating with rolling walker, no orthotic device usage  No pain or discomfort; can use PRN acetaminophen for pain  Fall precautions reviewed with patient  Continue calcium and Vitamin D supplementation    Late onset Alzheimer's disease without behavioral disturbance ([facility] )  MoCA 14/30 as performed in [place] [address] office  Patient oriented to person, place, day, month, year in office today  Plan to repeat cognitive testing at next visit  Patient with assistance for showers; consider addition of medication assistance; patient given instructions to review and compare home meds vs med list and let us know of any discrepancies; will follow up with patient later this week regarding this (contact daughter to assist if not completed)  CT head with chronic microangiopathic changes  TSH recently checked and WNL  Continued management of chronic medical conditions as outlined    Atherosclerosis 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 (HCC)  Continue aspirin, plavix + PPI for GI protection    Venous stasis dermatitis of both lower extremities  Reviewed skin care with patient; to increase application of moisturizer from nighttime to morning and night      Orders Placed This Encounter   Procedures    Basic metabolic panel    CBC and differential       Chief Complaint     Chief Complaint   Patient presents with    New patient     Patient is here to establish care with Dr Katie Peters  Patient family would like an assessment of her cognitive abilities          History of Present Illness 80year old female seen for follow up of chronic medical conditions and to establish care  Patient is known to me from recent stay in [place] [address] following right ankle fracture and surgical repair  She had mild ABLA postoperatively; also with history of lymphoma in remission; she is due for follow up CBC  Since returning to her apartment, she has been ambulating with a walker; denies significant pain or discomfort; denies falls or near falls  Stable chronic LE edema with associated venous stasis dermatitis/dry skin; tells me she puts lotion on her legs in the evening  Stable appetite and weight  Patient continues to manage her own medications; she brings a list from September 2019 with her but does not have her pill bottles- given current list today with assignment to review list vs her pill bottles and notify us of any discrepancies  She receives assistance in her apartment for showering  Was seen by geriatric consult service in November 2019; was diagnosed with late onset Alzheimer dementia, mild; was not started on any medication  Workup has included CT head in May 2019 showing chronic microangiopathic changes; TSH WNL May 2019  No macrocytosis or anemia noted on CBC  Patient noted to be on metoprolol per Epic records; however, was not on this medication during recent SNF stay; per patient, she is not taking this medication at home  Continues on enalapril for hypertension  The following portions of the patient's history were reviewed and updated as appropriate: allergies, current medications, past family history, past medical history, past social history, past surgical history and problem list     Review of Systems     Review of Systems   Constitutional: Negative for activity change, appetite change, fatigue, fever and unexpected weight change  HENT: Negative for trouble swallowing  Eyes: Negative for visual disturbance  Respiratory: Negative for cough and shortness of breath  Cardiovascular: Positive for leg swelling (chronic with dry skin)  Negative for chest pain and palpitations  Gastrointestinal: Negative for abdominal pain and constipation  Genitourinary: Negative for difficulty urinating  Musculoskeletal: Negative for arthralgias and joint swelling  Neurological: Negative for dizziness and light-headedness  Hematological: Does not bruise/bleed easily  Psychiatric/Behavioral: Negative for sleep disturbance  The patient is not nervous/anxious  All other systems reviewed and are negative  Active Problem List     Patient Active Problem List   Diagnosis    Atherosclerosis 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 (HCC)    Benign essential hypertension    Cataract    GERD without esophagitis    Hypercholesterolemia    Hypertriglyceridemia    [facility]oma [facility] ([facility] )    Age-related osteoporosis without current pathological fracture    Vitamin D deficiency    Closed fracture dislocation of right ankle joint    Ambulatory dysfunction    Allergic rhinitis    Aftercare following surgery    Orthopedic hardware present    [facility]    Medication care plan discussed with patient    Venous stasis dermatitis of both lower extremities       Objective     /64 (BP Location: Left arm, Patient Position: Sitting, Cuff Size: Standard)   Pulse 95   Temp 97 8 °F (36 6 °C) (Temporal)   Resp 16   Wt 70 8 kg (156 lb 2 oz)   SpO2 100%   BMI 26 80 kg/m²     Physical Exam   Constitutional: She appears well-developed and well-nourished  No distress  HENT:   Head: Normocephalic and atraumatic  Right Ear: External ear normal    Left Ear: External ear normal    Mouth/Throat: Oropharynx is clear and moist  No oropharyngeal exudate  Eyes: Conjunctivae are normal  Right eye exhibits no discharge  Left eye exhibits no discharge  No scleral icterus  Neck: Neck supple  Cardiovascular: Normal rate and regular rhythm  Pulmonary/Chest: Effort normal  No respiratory distress  Decreased breath sounds throughout  +thoracic kyphosis, nontender   Abdominal: Soft  Bowel sounds are normal  She exhibits no distension  There is no tenderness  Musculoskeletal: She exhibits no edema (1+ pretibial edema R>L with chronic venous stasis changes and dry skin)  Neurological: She is alert  Skin: Skin is warm and dry  She is not diaphoretic  There is pallor  Psychiatric: She has a normal mood and affect  Her behavior is normal    Nursing note and vitals reviewed        Pertinent Laboratory/Diagnostic Studies:  CBC: (6/24/19) Hgb 12 2 Hct 37 3 WBC 5 6 Plt 303  BMP: (6/24/19) Na 139 K 4 3 Cl 106 CO2 27 BUN 19 Creat 0 46 Glc 84 Ca 8 9    Current Medications     Current Outpatient Medications:     aspirin 81 MG tablet, Take 1 tablet (81 mg total) by mouth 3 (three) times a week (Patient taking differently: Take 81 mg by mouth daily ), Disp: , Rfl:     calcium-vitamin D 250-100 MG-UNIT per tablet, Take 1 tablet by mouth daily, Disp: , Rfl:     clopidogrel (PLAVIX) 75 mg tablet, Take 75 mg by mouth daily, Disp: , Rfl:     enalapril (VASOTEC) 10 mg tablet, Take 10 mg by mouth daily, Disp: , Rfl: 1    esomeprazole (NexIUM) 40 MG capsule, Take 40 mg by mouth daily, Disp: , Rfl: 5    famotidine (PEPCID) 40 MG tablet, Take 1 tablet (40 mg total) by mouth daily, Disp: 30 tablet, Rfl: 5    fluticasone (FLONASE) 50 mcg/act nasal spray, , Disp: , Rfl:     LUMIGAN 0 01 % ophthalmic drops, Administer 1 drop to both eyes daily at bedtime , Disp: , Rfl:     Magnesium Oxide 250 MG TABS, Take 1 tablet by mouth daily, Disp: , Rfl:     simvastatin (ZOCOR) 20 mg tablet, Take 1 tablet (20 mg total) by mouth daily, Disp: 30 tablet, Rfl: 5    Cholecalciferol (VITAMIN D3) 3000 UNITS TABS, Take by mouth, Disp: , Rfl:     pantoprazole (PROTONIX) 20 mg tablet, , Disp: , Rfl:     Health Maintenance     Health Maintenance   Topic Date Due    SLP PLAN OF CARE  04/10/[birth year]28    DTaP,Tdap,and Td Vaccines (1 - Tdap) 04/10/1939    BMI: Followup Plan  04/10/1946    Pneumococcal Vaccine: 65+ Years (1 of 2 - PCV13) 04/10/1993    Influenza Vaccine  07/01/2019    Fall Risk  04/15/2020    Depression Screening PHQ  04/15/2020    Urinary Incontinence Screening  04/15/2020    Medicare Annual Wellness Visit (AWV)  04/15/2020    BMI: Adult  11/13/2020    DXA SCAN  01/24/2021    Pneumococcal Vaccine: Pediatrics (0 to 5 Years) and At-Risk Patients (6 to 59 Years)  Aged Out    HIB Vaccine  Aged Out    Hepatitis B Vaccine  Aged Out    IPV Vaccine  Aged Out    Hepatitis A Vaccine  Aged Out    Meningococcal ACWY Vaccine  Aged Out    HPV Vaccine  Aged [employer] History   Administered Date(s) Administered    INFLUENZA 01/09/2015, 10/04/2018    TD (adult) Preservative Free 01/13/2017    Td (adult), adsorbed 01/13/2017       Erin Polanco No Toro Ash DO  12/16/19

## 2019-12-16 NOTE — ASSESSMENT & PLAN NOTE
MoCA 14/30 as performed in [facility] office  Patient oriented to person, place, day, month, year in office today  Plan to repeat cognitive testing at next visit  Patient with assistance for showers; consider addition of medication assistance; patient given instructions to review and compare home meds vs med list and let us know of any discrepancies; will follow up with patient later this week regarding this (contact daughter to assist if not completed)  CT head with chronic microangiopathic changes  TSH recently checked and WNL  Continued management of chronic medical conditions as outlined

## 2019-12-16 NOTE — PATIENT INSTRUCTIONS
-Review medication list from visit today; compare to your medication bottles at home; let us know of any differences between the two (call [phone]) or stop by on Monday afternoon or Wednesday morning  -Blood work slip given today; have labs drawn in January  -Follow up in 3 months

## 2019-12-16 NOTE — ASSESSMENT & PLAN NOTE
Reviewed skin care with patient; to increase application of moisturizer from nighttime to morning and night

## 2019-12-16 NOTE — ASSESSMENT & PLAN NOTE
Ambulating with rolling walker, no orthotic device usage  No pain or discomfort; can use PRN acetaminophen for pain  Fall precautions reviewed with patient  Continue calcium and Vitamin D supplementation

## 2020-04-09 ENCOUNTER — TELEPHONE (OUTPATIENT)
Dept: GERIATRICS | Facility: CLINIC | Age: 85
End: 2020-04-09

## 2020-04-22 ENCOUNTER — TELEMEDICINE (OUTPATIENT)
Dept: GERIATRICS | Facility: CLINIC | Age: 85
End: 2020-04-22
Payer: COMMERCIAL

## 2020-04-22 VITALS — HEIGHT: 64 IN | TEMPERATURE: 98 F | BODY MASS INDEX: 26.8 KG/M2

## 2020-04-22 DIAGNOSIS — S82.891D CLOSED FRACTURE DISLOCATION OF RIGHT ANKLE WITH ROUTINE HEALING, SUBSEQUENT ENCOUNTER: ICD-10-CM

## 2020-04-22 DIAGNOSIS — F02.80 LATE ONSET ALZHEIMER'S DISEASE WITHOUT BEHAVIORAL DISTURBANCE (HCC): ICD-10-CM

## 2020-04-22 DIAGNOSIS — G30.1 LATE ONSET ALZHEIMER'S DISEASE WITHOUT BEHAVIORAL DISTURBANCE (HCC): ICD-10-CM

## 2020-04-22 DIAGNOSIS — I10 BENIGN ESSENTIAL HYPERTENSION: Primary | Chronic | ICD-10-CM

## 2020-04-22 PROCEDURE — 99213 OFFICE O/P EST LOW 20 MIN: CPT | Performed by: FAMILY MEDICINE

## 2020-04-22 PROCEDURE — 1160F RVW MEDS BY RX/DR IN RCRD: CPT | Performed by: FAMILY MEDICINE

## 2020-07-16 ENCOUNTER — TELEPHONE (OUTPATIENT)
Dept: ADMINISTRATIVE | Facility: HOSPITAL | Age: 85
End: 2020-07-16

## 2020-07-21 ENCOUNTER — TELEPHONE (OUTPATIENT)
Dept: GERIATRICS | Facility: CLINIC | Age: 85
End: 2020-07-21

## 2020-07-23 ENCOUNTER — TELEPHONE (OUTPATIENT)
Dept: GERIATRICS | Facility: CLINIC | Age: 85
End: 2020-07-23

## 2020-07-27 ENCOUNTER — OFFICE VISIT (OUTPATIENT)
Dept: GERIATRICS | Facility: CLINIC | Age: 85
End: 2020-07-27
Payer: COMMERCIAL

## 2020-07-27 VITALS
HEART RATE: 94 BPM | BODY MASS INDEX: 27.11 KG/M2 | HEIGHT: 64 IN | TEMPERATURE: 97.8 F | DIASTOLIC BLOOD PRESSURE: 80 MMHG | WEIGHT: 158.8 LBS | SYSTOLIC BLOOD PRESSURE: 152 MMHG | OXYGEN SATURATION: 98 %

## 2020-07-27 DIAGNOSIS — I10 BENIGN ESSENTIAL HYPERTENSION: Primary | Chronic | ICD-10-CM

## 2020-07-27 DIAGNOSIS — E78.00 HYPERCHOLESTEREMIA: ICD-10-CM

## 2020-07-27 DIAGNOSIS — I70.213 ATHEROSCLEROSIS OF NATIVE ARTERY OF BOTH LOWER EXTREMITIES WITH INTERMITTENT CLAUDICATION (HCC): Chronic | ICD-10-CM

## 2020-07-27 DIAGNOSIS — G30.1 LATE ONSET ALZHEIMER'S DISEASE WITHOUT BEHAVIORAL DISTURBANCE (HCC): ICD-10-CM

## 2020-07-27 DIAGNOSIS — K21.9 GERD WITHOUT ESOPHAGITIS: Chronic | ICD-10-CM

## 2020-07-27 DIAGNOSIS — C85.90 LYMPHOMA IN REMISSION (HCC): Chronic | ICD-10-CM

## 2020-07-27 DIAGNOSIS — F02.80 LATE ONSET ALZHEIMER'S DISEASE WITHOUT BEHAVIORAL DISTURBANCE (HCC): ICD-10-CM

## 2020-07-27 PROCEDURE — 1160F RVW MEDS BY RX/DR IN RCRD: CPT | Performed by: FAMILY MEDICINE

## 2020-07-27 PROCEDURE — 1036F TOBACCO NON-USER: CPT | Performed by: FAMILY MEDICINE

## 2020-07-27 PROCEDURE — 99214 OFFICE O/P EST MOD 30 MIN: CPT | Performed by: FAMILY MEDICINE

## 2020-07-27 PROCEDURE — 3079F DIAST BP 80-89 MM HG: CPT | Performed by: FAMILY MEDICINE

## 2020-07-27 PROCEDURE — 3077F SYST BP >= 140 MM HG: CPT | Performed by: FAMILY MEDICINE

## 2020-07-27 RX ORDER — ESOMEPRAZOLE MAGNESIUM 40 MG/1
40 CAPSULE, DELAYED RELEASE ORAL DAILY
Qty: 30 CAPSULE | Refills: 5 | Status: SHIPPED | OUTPATIENT
Start: 2020-07-27 | End: 2020-11-30 | Stop reason: SDUPTHER

## 2020-07-27 RX ORDER — ENALAPRIL MALEATE 10 MG/1
10 TABLET ORAL DAILY
Qty: 30 TABLET | Refills: 5 | Status: SHIPPED | OUTPATIENT
Start: 2020-07-27 | End: 2020-11-30 | Stop reason: SDUPTHER

## 2020-07-27 RX ORDER — SIMVASTATIN 20 MG
20 TABLET ORAL DAILY
Qty: 30 TABLET | Refills: 5 | Status: SHIPPED | OUTPATIENT
Start: 2020-07-27 | End: 2020-11-30 | Stop reason: SDUPTHER

## 2020-07-27 NOTE — PROGRESS NOTES
[address] [facility]    NAME: Mitesh Olivier  AGE: 80 y o  SEX: female    DATE OF ENCOUNTER: 7/27/20    Assessment and Plan     GERD without esophagitis  Patient without symptoms  D/c famotidine  Continue esomeprazole, refill sent to [facility]' pharmacy; consider d/c of PPI at next visit if remains asymptomatic; however is on DAPT so could continue PPI for GI ppx    Benign essential hypertension  Goal <150/90  Continue enalapril 10mg daily; bottle noted to be empty as brought to office- ?compliance with medication? Refill sent to [address] [place] [address]  BMP reordered to be drawn within next 1-2 weeks, patient has not had labs performed as previously ordered    Lymphoma in remission ([facility] )  CBC w/diff ordered to be drawn in next 1-2 weeks    Atherosclerosis 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 (HCC)  Continue aspirin, plavix + PPI for GI protection, statin    [facility]  Most recent MoCA 14/30  Patient remains oriented to person, place, month, year in office today; incorrect week day (thought was Sunday so was late for appointment)  Will review with Wellness Office staff regarding increased assistance with medications (all bottles empty that patient brought along- ?compliance?)  Supportive care; continued management of acute and chronic medical conditions as outlined      Orders Placed This Encounter   Procedures    Basic metabolic panel    CBC and differential       Chief Complaint     Chief Complaint   Patient presents with    Follow-up     3 months       History of Present Illness     80year old female presents for routine follow up  Presented late after called by office staff, patient thought her appointment was "tomorrow on Monday " She has no acute concerns, is here for routine follow up  Continues on enalapril for hypertension; pill bottle she brought with her noted to be empty   Simvastatin, famotidine, esomeprazole pill bottles also empty  Patient unable to recall how long they have been empty and if she is still taking these medications as prescribed  She states she manages her own medications at home  Denies GI discomfort, dyspepsia, nausea, vomiting, loose stool  Weight stable overall  Continues on aspirin, plavix for PAD; no color changes in legs or discomfort reported  She denies arthralgias or residual pain from recent ankle fracture  The following portions of the patient's history were reviewed and updated as appropriate: allergies, current medications, past family history, past medical history, past social history, past surgical history and problem list     Review of Systems     Review of Systems   Constitutional: Negative for appetite change, chills, fever and unexpected weight change  HENT: Negative for trouble swallowing  Respiratory: Negative for cough, chest tightness and shortness of breath  Cardiovascular: Negative for leg swelling  Gastrointestinal: Negative for abdominal pain and nausea  Musculoskeletal: Negative for arthralgias and gait problem  Neurological: Negative for speech difficulty, weakness and light-headedness  All other systems reviewed and are negative        Active Problem List     Patient Active Problem List   Diagnosis    Atherosclerosis 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 (HCC)    Benign essential hypertension    Cataract    GERD without esophagitis    Hypercholesterolemia    Hypertriglyceridemia    [facility]oma in remission ([facility] )    Age-related osteoporosis without current pathological fracture    Vitamin D deficiency    Closed fracture dislocation of right ankle joint    Ambulatory dysfunction    Allergic rhinitis    Aftercare following surgery    Orthopedic hardware present    Late onset Alzheimer's disease without behavioral disturbance ([facility] )    Medication care plan discussed with patient    Venous stasis dermatitis of both lower extremities       Objective     /80 (BP Location: Right arm, Patient Position: Sitting, Cuff Size: Standard)   Pulse 94   Temp 97 8 °F (36 6 °C) (Temporal)   Ht 5' 4" (1 626 m)   Wt 72 kg (158 lb 12 8 oz)   SpO2 98%   BMI 27 26 kg/m²     Physical Exam    Pertinent Laboratory/Diagnostic Studies:  [facility] (4/28/20): see results in chart    Current Medications     Current Outpatient Medications:     aspirin 81 MG tablet, Take 1 tablet (81 mg total) by mouth 3 (three) times a week (Patient taking differently: Take 81 mg by mouth daily ), Disp: , Rfl:     calcium-vitamin D 250-100 MG-UNIT per tablet, Take 1 tablet by mouth daily, Disp: , Rfl:     Cholecalciferol (VITAMIN D3) 3000 UNITS TABS, Take by mouth, Disp: , Rfl:     clopidogrel (PLAVIX) 75 mg tablet, Take 75 mg by mouth daily, Disp: , Rfl:     enalapril (VASOTEC) 10 mg tablet, Take 1 tablet (10 mg total) by mouth daily, Disp: 30 tablet, Rfl: 5    esomeprazole (NexIUM) 40 MG capsule, Take 1 capsule (40 mg total) by mouth daily, Disp: 30 capsule, Rfl: 5    fluticasone (FLONASE) 50 mcg/act nasal spray, , Disp: , Rfl:     LUMIGAN 0 01 % ophthalmic drops, Administer 1 drop to both eyes daily at bedtime , Disp: , Rfl:     Magnesium Oxide 250 MG TABS, Take 1 tablet by mouth daily, Disp: , Rfl:     simvastatin (ZOCOR) 20 mg tablet, Take 1 tablet (20 mg total) by mouth daily, Disp: 30 tablet, Rfl: 5    Health Maintenance     Immunization History   Administered Date(s) Administered    INFLUENZA 01/09/2015, 10/04/2018, 10/30/2019    TD (adult) Preservative Free 01/13/2017    Td (adult), adsorbed 01/13/2017       Erin Fontenot DO  7/27/20

## 2020-07-27 NOTE — PATIENT INSTRUCTIONS
-Follow up in 3-4 months  -Have blood work drawn 2 weeks prior to next appointment  -Famotidine stopped  Continue esomeprazole- refill sent to [facility]   Notify office if heartburn, stomach pains, acid reflux

## 2020-07-29 NOTE — ASSESSMENT & PLAN NOTE
Patient without symptoms  D/c famotidine  Continue esomeprazole, refill sent to [facility]' pharmacy; consider d/c of PPI at next visit if remains asymptomatic; however is on DAPT so could continue PPI for GI ppx

## 2020-07-29 NOTE — ASSESSMENT & PLAN NOTE
Most recent MoCA 14/30  Patient remains oriented to person, place, month, year in office today; incorrect week day (thought was Sunday so was late for appointment)  Will review with Wellness Office staff regarding increased assistance with medications (all bottles empty that patient brought along- ?compliance?)  Supportive care; continued management of acute and chronic medical conditions as outlined

## 2020-07-29 NOTE — ASSESSMENT & PLAN NOTE
Goal <150/90  Continue enalapril 10mg daily; bottle noted to be empty as brought to office- ?compliance with medication?  Refill sent to [address] [place] [address]  BMP reordered to be drawn within next 1-2 weeks, patient has not had labs performed as previously ordered

## 2020-11-16 ENCOUNTER — OFFICE VISIT (OUTPATIENT)
Dept: GERIATRICS | Facility: CLINIC | Age: 85
End: 2020-11-16
Payer: COMMERCIAL

## 2020-11-16 VITALS
OXYGEN SATURATION: 100 % | TEMPERATURE: 98.4 F | BODY MASS INDEX: 28.21 KG/M2 | RESPIRATION RATE: 16 BRPM | HEIGHT: 64 IN | HEART RATE: 95 BPM | SYSTOLIC BLOOD PRESSURE: 150 MMHG | DIASTOLIC BLOOD PRESSURE: 78 MMHG | WEIGHT: 165.25 LBS

## 2020-11-16 DIAGNOSIS — K21.9 GERD WITHOUT ESOPHAGITIS: Chronic | ICD-10-CM

## 2020-11-16 DIAGNOSIS — I10 BENIGN ESSENTIAL HYPERTENSION: Primary | Chronic | ICD-10-CM

## 2020-11-16 DIAGNOSIS — I70.213 ATHEROSCLEROSIS OF NATIVE ARTERY OF BOTH LOWER EXTREMITIES WITH INTERMITTENT CLAUDICATION (HCC): Chronic | ICD-10-CM

## 2020-11-16 DIAGNOSIS — G30.1 LATE ONSET ALZHEIMER'S DISEASE WITHOUT BEHAVIORAL DISTURBANCE (HCC): ICD-10-CM

## 2020-11-16 DIAGNOSIS — C85.90 LYMPHOMA IN REMISSION (HCC): Chronic | ICD-10-CM

## 2020-11-16 DIAGNOSIS — F02.80 LATE ONSET ALZHEIMER'S DISEASE WITHOUT BEHAVIORAL DISTURBANCE (HCC): ICD-10-CM

## 2020-11-16 PROBLEM — S82.891A CLOSED FRACTURE DISLOCATION OF RIGHT ANKLE JOINT: Status: RESOLVED | Noted: 2019-05-20 | Resolved: 2020-11-16

## 2020-11-16 PROCEDURE — 1036F TOBACCO NON-USER: CPT | Performed by: FAMILY MEDICINE

## 2020-11-16 PROCEDURE — T1015 CLINIC SERVICE: HCPCS | Performed by: FAMILY MEDICINE

## 2020-11-16 PROCEDURE — 1160F RVW MEDS BY RX/DR IN RCRD: CPT | Performed by: FAMILY MEDICINE

## 2020-11-16 PROCEDURE — 99214 OFFICE O/P EST MOD 30 MIN: CPT | Performed by: FAMILY MEDICINE

## 2020-11-30 DIAGNOSIS — I10 BENIGN ESSENTIAL HYPERTENSION: Chronic | ICD-10-CM

## 2020-11-30 DIAGNOSIS — K21.9 GERD WITHOUT ESOPHAGITIS: Chronic | ICD-10-CM

## 2020-11-30 DIAGNOSIS — E78.00 HYPERCHOLESTEREMIA: ICD-10-CM

## 2020-11-30 RX ORDER — ENALAPRIL MALEATE 10 MG/1
10 TABLET ORAL DAILY
Qty: 30 TABLET | Refills: 5 | Status: SHIPPED | OUTPATIENT
Start: 2020-11-30 | End: 2021-05-12

## 2020-11-30 RX ORDER — SIMVASTATIN 20 MG
20 TABLET ORAL DAILY
Qty: 30 TABLET | Refills: 5 | Status: SHIPPED | OUTPATIENT
Start: 2020-11-30 | End: 2021-05-12

## 2020-11-30 RX ORDER — ESOMEPRAZOLE MAGNESIUM 40 MG/1
40 CAPSULE, DELAYED RELEASE ORAL DAILY
Qty: 30 CAPSULE | Refills: 5 | Status: SHIPPED | OUTPATIENT
Start: 2020-11-30 | End: 2021-02-15

## 2021-02-08 NOTE — PROGRESS NOTES
Assessment & Plan:     K21 9 GERD without esophagitis  I have evaluated the patient and found the condition to be: Resolved  I have evaluated the patient and: Adjusted the medications as indicated    I10 Benign essential hypertension  I have evaluated the patient and found the condition to be: Stable  I have evaluated the patient and: Recommended continue with same treatment plan and Ordered additional services/ studies    I70 219 Atherosclerosis 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 ([facility] )  I have evaluated the patient and found the condition to be: Stable  I have evaluated the patient and: Recommended continue with same treatment plan    G30 1 ,F02 80 Late onset Alzheimer's disease without behavioral disturbance ([facility] )  I have evaluated the patient and found the condition to be: Stable  I have evaluated the patient and: Recommended continue with same treatment plan    I87 2 Venous stasis dermatitis of both lower extremities  I have evaluated the patient and found the condition to be: Stable  I have evaluated the patient and: Recommended continue with same treatment plan    M81 0 Age-related osteoporosis without current pathological fracture  I have evaluated the patient and found the condition to be: Stable  I have evaluated the patient and: Recommended continue with same treatment plan    E55 9 Vitamin D deficiency  I have evaluated the patient and found the condition to be: Stable  I have evaluated the patient and: Recommended continue with same treatment plan    C85 90 Lymphoma in remission ([facility] )  I have evaluated the patient and found the condition to be: Stable  I have evaluated the patient and: Recommended continue with same treatment plan and Ordered additional services/ studies    Z97 8 Orthopedic hardware present  I have evaluated the patient and found the condition to be:  Stable  I have evaluated the patient and: Recommended continue with same treatment plan    E78 1 Hypertriglyceridemia  I have evaluated the patient and found the condition to be: Stable  I have evaluated the patient and: Recommended continue with same treatment plan    E78 00 Hypercholesterolemia  I have evaluated the patient and found the condition to be: Stable  I have evaluated the patient and: Recommended continue with same treatment plan    R26 2 Ambulatory dysfunction  I have evaluated the patient and found the condition to be: Stable  I have evaluated the patient and: Recommended continue with same treatment plan         Subjective:     Patient ID: Ron Dong is a 80 y o  female     Chief Complaint   Patient presents with    Follow-up     3 MONTHS      Patient presents for routine follow up of chronic medical conditions  Continues on enalapril for hypertension; most recent BMP reviewed, stable  Patient is without abdominal pain, nausea, vomiting, acid reflux/heartburn; currently on PPI daily  Denies pain or discomfort; no lower extremity swelling or skin color changes  Review of Systems   Constitutional: Negative for activity change, appetite change, chills and fever  Respiratory: Negative for cough and shortness of breath  Cardiovascular: Negative for leg swelling  Gastrointestinal: Negative for abdominal pain, constipation and nausea  Musculoskeletal: Negative for arthralgias  All other systems reviewed and are negative        Objective:      /88 (BP Location: Left arm, Patient Position: Sitting, Cuff Size: Standard)   Pulse 103   Temp 97 5 °F (36 4 °C) (Temporal)   Resp 22   Ht 5' 4" (1 626 m) Comment: W SHOES  Wt 76 9 kg (169 lb 9 6 oz) Comment: W SHOES  SpO2 95%   BMI 29 11 kg/m²     Problem List Items Addressed This Visit        Cardiovascular and Mediastinum    Benign essential hypertension - Primary (Chronic)    Relevant Orders    Comprehensive metabolic panel       Other    Lymphoma in remission (HCC) (Chronic)    Relevant Orders    Comprehensive metabolic panel CBC and differential          Physical Exam  Vitals signs and nursing note reviewed  Constitutional:       General: She is awake  She is not in acute distress  Appearance: She is well-developed and well-groomed  She is not ill-appearing, toxic-appearing or diaphoretic  HENT:      Head: Normocephalic and atraumatic  Right Ear: External ear normal       Left Ear: External ear normal       Nose: No rhinorrhea  Mouth/Throat:      Mouth: Mucous membranes are moist       Pharynx: Oropharynx is clear  Eyes:      General: No scleral icterus  Right eye: No discharge  Left eye: No discharge  Conjunctiva/sclera: Conjunctivae normal    Neck:      Musculoskeletal: Neck supple  No neck rigidity  Cardiovascular:      Rate and Rhythm: Normal rate and regular rhythm  Heart sounds: S1 normal and S2 normal    Pulmonary:      Effort: Pulmonary effort is normal  No respiratory distress  Breath sounds: No wheezing  Abdominal:      General: Bowel sounds are normal  There is no distension  Palpations: Abdomen is soft  Tenderness: There is no abdominal tenderness  There is no guarding  Musculoskeletal:         General: Deformity (kyphosis) present  Right lower leg: No edema  Left lower leg: No edema  Skin:     General: Skin is warm and dry  Coloration: Skin is not jaundiced or pale  Neurological:      General: No focal deficit present  Mental Status: She is alert  Mental status is at baseline  Cranial Nerves: No cranial nerve deficit  Psychiatric:         Attention and Perception: Attention and perception normal          Mood and Affect: Mood and affect normal          Speech: Speech normal          Behavior: Behavior normal  Behavior is cooperative

## 2021-02-15 ENCOUNTER — OFFICE VISIT (OUTPATIENT)
Dept: GERIATRICS | Facility: CLINIC | Age: 86
End: 2021-02-15
Payer: COMMERCIAL

## 2021-02-15 VITALS
HEIGHT: 64 IN | HEART RATE: 103 BPM | BODY MASS INDEX: 28.95 KG/M2 | OXYGEN SATURATION: 95 % | DIASTOLIC BLOOD PRESSURE: 88 MMHG | WEIGHT: 169.6 LBS | TEMPERATURE: 97.5 F | RESPIRATION RATE: 22 BRPM | SYSTOLIC BLOOD PRESSURE: 156 MMHG

## 2021-02-15 DIAGNOSIS — G30.1 LATE ONSET ALZHEIMER'S DISEASE WITHOUT BEHAVIORAL DISTURBANCE (HCC): ICD-10-CM

## 2021-02-15 DIAGNOSIS — C85.90 LYMPHOMA IN REMISSION (HCC): Chronic | ICD-10-CM

## 2021-02-15 DIAGNOSIS — I10 BENIGN ESSENTIAL HYPERTENSION: Primary | Chronic | ICD-10-CM

## 2021-02-15 DIAGNOSIS — F02.80 LATE ONSET ALZHEIMER'S DISEASE WITHOUT BEHAVIORAL DISTURBANCE (HCC): ICD-10-CM

## 2021-02-15 DIAGNOSIS — I70.213 ATHEROSCLEROSIS OF NATIVE ARTERY OF BOTH LOWER EXTREMITIES WITH INTERMITTENT CLAUDICATION (HCC): Chronic | ICD-10-CM

## 2021-02-15 DIAGNOSIS — K21.9 GERD WITHOUT ESOPHAGITIS: Chronic | ICD-10-CM

## 2021-02-15 PROCEDURE — 99214 OFFICE O/P EST MOD 30 MIN: CPT | Performed by: FAMILY MEDICINE

## 2021-02-15 PROCEDURE — T1015 CLINIC SERVICE: HCPCS | Performed by: FAMILY MEDICINE

## 2021-02-15 PROCEDURE — 1036F TOBACCO NON-USER: CPT | Performed by: FAMILY MEDICINE

## 2021-02-15 PROCEDURE — 1160F RVW MEDS BY RX/DR IN RCRD: CPT | Performed by: FAMILY MEDICINE

## 2021-02-15 NOTE — PATIENT INSTRUCTIONS
-Follow up in 4 months  -Have blood work drawn at least one week prior to next appointment  -Stop taking esomeprazole  Please call office if any stomach pain, acid reflux, indigestion

## 2021-02-15 NOTE — PROGRESS NOTES
[address] [facility]    NAME: Saúl Waldrop  AGE: 80 y o  SEX: female    DATE OF ENCOUNTER: 2/15/21    Assessment and Plan     Benign essential hypertension  Goal <150/90  Continue enalapril 10mg daily  CMP ordered to be drawn prior to next visit    GERD without esophagitis  Asymptomatic  D/c esomeprazole     Atherosclerosis 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 ([facility] [address] )  Continue statin  Patient has not taken aspirin, plavix for some time (previously discontinued on her own)  Stable LE vascular exam  No complaints of claudication; continue to monitor closely    Lymphoma in remission ([facility] )  CBC w/diff, CMP ordered to be drawn prior to next visit    Late onset Alzheimer's disease without behavioral disturbance ([facility] )  Most recent MoCA 14/30  Stable overall over past 3 months  Supportive care; continued management of chronic medical conditions as outlined      Orders Placed This Encounter   Procedures    Comprehensive metabolic panel    CBC and differential       Chief Complaint     Chief Complaint   Patient presents with    Follow-up     3 MONTHS       History of Present Illness     Patient presents for routine follow up of chronic medical conditions  Continues on enalapril for hypertension; most recent BMP reviewed, stable  Patient is without abdominal pain, nausea, vomiting, acid reflux/heartburn; currently on PPI daily  Denies pain or discomfort; no lower extremity swelling or skin color changes  The following portions of the patient's history were reviewed and updated as appropriate: allergies, current medications, past family history, past medical history, past social history, past surgical history and problem list     Review of Systems     Review of Systems   Constitutional: Negative for activity change, appetite change, chills, fatigue and fever  Respiratory: Negative for cough, chest tightness and shortness of breath      Cardiovascular: Negative for leg swelling  Gastrointestinal: Negative for abdominal pain, constipation, diarrhea, nausea and vomiting  No heartburn, acid reflux   Musculoskeletal: Negative for arthralgias  All other systems reviewed and are negative  Active Problem List     Patient Active Problem List   Diagnosis    Atherosclerosis 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 (HCC)    Benign essential hypertension    Cataract    GERD without esophagitis    Hypercholesterolemia    Hypertriglyceridemia    [facility]    Age-related osteoporosis without current pathological fracture    Vitamin D deficiency    Ambulatory dysfunction    Allergic rhinitis    Aftercare following surgery    Orthopedic hardware present    Late onset [facility]'s disease without behavioral disturbance ([facility] )    Medication care plan discussed with patient    Venous stasis dermatitis of both lower extremities       Objective     /88 (BP Location: Left arm, Patient Position: Sitting, Cuff Size: Standard)   Pulse 103   Temp 97 5 °F (36 4 °C) (Temporal)   Resp 22   Ht 5' 4" (1 626 m) Comment: W SHOES  Wt 76 9 kg (169 lb 9 6 oz) Comment: W SHOES  SpO2 95%   BMI 29 11 kg/m²     Physical Exam  Vitals signs and nursing note reviewed  Constitutional:       General: She is awake  She is not in acute distress  Appearance: Normal appearance  She is well-developed and well-groomed  She is not ill-appearing, toxic-appearing or diaphoretic  HENT:      Head: Normocephalic and atraumatic  Right Ear: External ear normal       Left Ear: External ear normal       Nose: No rhinorrhea  Mouth/Throat:      Comments: Mask covering mouth  Eyes:      General: No scleral icterus  Right eye: No discharge  Left eye: No discharge  Conjunctiva/sclera: Conjunctivae normal    Neck:      Musculoskeletal: Neck supple  No neck rigidity     Cardiovascular:      Rate and Rhythm: Normal rate and regular rhythm  Heart sounds: S1 normal and S2 normal    Pulmonary:      Effort: Pulmonary effort is normal  No respiratory distress  Breath sounds: No wheezing  Abdominal:      General: Bowel sounds are normal  There is no distension  Palpations: Abdomen is soft  Tenderness: There is no abdominal tenderness  Musculoskeletal:         General: Deformity (thoracic kyphosis) present  No tenderness  Right lower leg: Edema (trace pedal) present  Left lower leg: Edema (trace pedal) present  Skin:     General: Skin is warm and dry  Capillary Refill: Capillary refill takes less than 2 seconds  Coloration: Skin is not jaundiced or pale  Neurological:      General: No focal deficit present  Mental Status: She is alert  Mental status is at baseline  Cranial Nerves: No cranial nerve deficit  Psychiatric:         Attention and Perception: Attention and perception normal          Mood and Affect: Mood and affect normal          Speech: Speech normal          Behavior: Behavior is cooperative  Thought Content:  Thought content normal          Pertinent Laboratory/Diagnostic Studies:  CBC (11/10/20): Hb 14 4 Hct 43 3 WBC 5 7 Plt 271  BMP (11/10/20): Na 143 K 4 3 Cl 108 CO2 29 BUN 18 Creat 0 59 Glc 83 Ca 9 1    Current Medications     Current Outpatient Medications:     enalapril (VASOTEC) 10 mg tablet, Take 1 tablet (10 mg total) by mouth daily, Disp: 30 tablet, Rfl: 5    simvastatin (ZOCOR) 20 mg tablet, Take 1 tablet (20 mg total) by mouth daily, Disp: 30 tablet, Rfl: 5    Health Maintenance     Immunization History   Administered Date(s) Administered    INFLUENZA 01/09/2015, 10/04/2018, 10/30/2019    TD (adult) Preservative Free 01/13/2017    Td (adult), adsorbed 01/13/2017       Erin Fontenot DO  2/15/21

## 2021-02-16 NOTE — ASSESSMENT & PLAN NOTE
Continue statin  Patient has not taken aspirin, plavix for some time (previously discontinued on her own)  Stable LE vascular exam  No complaints of claudication; continue to monitor closely

## 2021-02-16 NOTE — ASSESSMENT & PLAN NOTE
Most recent MoCA 14/30  Stable overall over past 3 months  Supportive care; continued management of chronic medical conditions as outlined

## 2021-03-11 ENCOUNTER — TELEPHONE (OUTPATIENT)
Dept: VASCULAR SURGERY | Facility: CLINIC | Age: 86
End: 2021-03-11

## 2021-03-11 NOTE — TELEPHONE ENCOUNTER
Please schedule patient for the following appointments    Called patient to let her know that she was due to schedule CATINA and f/u OV, however she did not want to schedule at this time, felt it was not needed  She stated that she would call if needed        [] Carotid doppler (CV)  [] Abdominal Aorta Iliac (AOIL)  [x] Lower Limb Arterial (CATINA)  [] Renal Artery  [] Upper Limb (UEA)  [] EVAR  [] Advanced Imaging (CT/CTA) NOT NEEDED    [x] Office Visit Follow-up for Risk Factor Modification (RFM)

## 2021-05-12 DIAGNOSIS — E78.00 HYPERCHOLESTEREMIA: ICD-10-CM

## 2021-05-12 DIAGNOSIS — I10 BENIGN ESSENTIAL HYPERTENSION: Chronic | ICD-10-CM

## 2021-05-12 RX ORDER — SIMVASTATIN 20 MG
TABLET ORAL
Qty: 30 TABLET | Refills: 5 | Status: SHIPPED | OUTPATIENT
Start: 2021-05-12 | End: 2021-11-24

## 2021-05-12 RX ORDER — ENALAPRIL MALEATE 10 MG/1
TABLET ORAL
Qty: 32 TABLET | Refills: 5 | Status: SHIPPED | OUTPATIENT
Start: 2021-05-12 | End: 2021-11-24

## 2021-06-14 NOTE — PROGRESS NOTES
Assessment & Plan:     C85 90 Lymphoma in remission  I have evaluated the patient and found the condition to be: Stable  I have evaluated the patient and: Ordered additional servicese/studies    I70 219 Atherosclerosis 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   I have evaluated the patient and found the condition to be: Stable  I have evaluated the patient and: Ordered additional services/ studies    G30 1, F02 80 Late onset Alzheimer's disease without behavioral disturbance   I have evaluated the patient and found the condition to be: Stable  I have evaluated the patient and: Recommended continue with same treatment plan    K21 9 GERD without esophagitis  I have evaluated the patient and found the condition to be: Resolved  I have evaluated the patient and: Recommended continue with same treatment plan    I10 Hypertension  I have evaluated the patient and found the condition to be: Stable  I have evaluated the patient and: Ordered additional services/ studies    E78 00 Hypercholesterolemia  I have evaluated the patient and found the condition to be: Stable  I have evaluated the patient and: Ordered additional services/ studies         Subjective:     Patient ID: Star Caballero is a 80 y o  female     No chief complaint on file  See HPI/ROS/Assessment and Plan in separate note created for this office visit     Objective: There were no vitals taken for this visit      Problem List Items Addressed This Visit        Digestive    GERD without esophagitis (Chronic)     Doing well off of PPI; continue dietary modification            Cardiovascular and Mediastinum    Benign essential hypertension (Chronic)     Goal <150/90  Continue enalapril 10mg daily  CMP ordered to be drawn at least one week prior to next visit            Other    Hypercholesterolemia (Chronic)     Continue simvastatin; will order lipid panel to be drawn prior to next visit; based on results can review risks vs benefits of continued use of this medication         Lymphoma in remission ([facility] ) - Primary (Chronic)     CBC w/diff, CMP ordered to be drawn prior to next visit

## 2021-06-21 ENCOUNTER — OFFICE VISIT (OUTPATIENT)
Dept: GERIATRICS | Facility: CLINIC | Age: 86
End: 2021-06-21
Payer: COMMERCIAL

## 2021-06-21 VITALS
OXYGEN SATURATION: 95 % | HEART RATE: 105 BPM | TEMPERATURE: 97.3 F | SYSTOLIC BLOOD PRESSURE: 150 MMHG | DIASTOLIC BLOOD PRESSURE: 76 MMHG | RESPIRATION RATE: 20 BRPM | BODY MASS INDEX: 29.11 KG/M2 | HEIGHT: 64 IN

## 2021-06-21 DIAGNOSIS — K21.9 GERD WITHOUT ESOPHAGITIS: Chronic | ICD-10-CM

## 2021-06-21 DIAGNOSIS — E78.00 HYPERCHOLESTEROLEMIA: ICD-10-CM

## 2021-06-21 DIAGNOSIS — I10 BENIGN ESSENTIAL HYPERTENSION: Chronic | ICD-10-CM

## 2021-06-21 DIAGNOSIS — C85.90 LYMPHOMA IN REMISSION (HCC): Primary | ICD-10-CM

## 2021-06-21 PROCEDURE — 99214 OFFICE O/P EST MOD 30 MIN: CPT | Performed by: FAMILY MEDICINE

## 2021-06-21 PROCEDURE — T1015 CLINIC SERVICE: HCPCS | Performed by: FAMILY MEDICINE

## 2021-06-21 NOTE — PROGRESS NOTES
[address] [facility]    NAME: Luis Felipe Tobias  AGE: 80 y o  SEX: female    DATE OF ENCOUNTER: 6/21/21    Assessment and Plan     Benign essential hypertension  Goal <150/90  Continue enalapril 10mg daily  CMP ordered to be drawn at least one week prior to next visit    Lymphoma in remission ([facility] )  CBC w/diff, CMP ordered to be drawn prior to next visit    Hypercholesterolemia  Continue simvastatin; will order lipid panel to be drawn prior to next visit; based on results can review risks vs benefits of continued use of this medication    GERD without esophagitis  Doing well off of PPI; continue dietary modification      Orders Placed This Encounter   Procedures    CBC and differential    Comprehensive metabolic panel    Lipid panel       Chief Complaint     No chief complaint on file  History of Present Illness     80year old female presents for routine follow up of chronic medical conditions  Continues on enalapril for hypertension; 150/76 in office today  Due for follow up BMP which was not drawn prior to appointment  CBC also not obtained in setting of lymphoma in remission  Continues on simvastatin for hyperlipidemia and PAD of lower extremities  Continues on personal care services  The following portions of the patient's history were reviewed and updated as appropriate: allergies, current medications, past family history, past medical history, past social history, past surgical history and problem list     Review of Systems     Review of Systems   Constitutional: Negative for chills, fever and unexpected weight change  Respiratory: Negative for cough and shortness of breath  Cardiovascular: Positive for leg swelling (chronic, mild)  Musculoskeletal: Negative for arthralgias  Neurological: Negative for dizziness and light-headedness  All other systems reviewed and are negative        Active Problem List     Patient Active Problem List   Diagnosis    Atherosclerosis 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 (HCC)    Benign essential hypertension    Cataract    GERD without esophagitis    Hypercholesterolemia    Hypertriglyceridemia    Lymphoma in remission (HCC)    Age-related osteoporosis without current pathological fracture    Vitamin D deficiency    Ambulatory dysfunction    Allergic rhinitis    Aftercare following surgery    Orthopedic hardware present    Late onset Alzheimer's disease without behavioral disturbance ([address] )    Medication care plan discussed with patient    Venous stasis dermatitis of both lower extremities       Objective     /76 (BP Location: Left arm, Patient Position: Sitting, Cuff Size: Standard)   Pulse 105   Temp (!) 97 3 °F (36 3 °C) (Temporal)   Resp 20   Ht 5' 4" (1 626 m)   SpO2 95%   BMI 29 11 kg/m²     Physical Exam  Vitals and nursing note reviewed  Constitutional:       General: She is awake  She is not in acute distress  Appearance: She is well-developed and well-groomed  She is not ill-appearing, toxic-appearing or diaphoretic  HENT:      Head: Normocephalic and atraumatic  Right Ear: External ear normal       Left Ear: External ear normal       Nose: No rhinorrhea  Mouth/Throat:      Mouth: Mucous membranes are moist       Pharynx: Oropharynx is clear  Eyes:      General: No scleral icterus  Right eye: No discharge  Left eye: No discharge  Conjunctiva/sclera: Conjunctivae normal    Cardiovascular:      Rate and Rhythm: Normal rate and regular rhythm  Heart sounds: S1 normal and S2 normal    Pulmonary:      Effort: Pulmonary effort is normal  No respiratory distress  Breath sounds: No wheezing, rhonchi or rales  Abdominal:      General: There is no distension  Palpations: Abdomen is soft  Musculoskeletal:         General: No deformity  Cervical back: Neck supple  No rigidity        Right lower le+ Edema (distal pretibial) present  Left lower le+ Edema (distal pretibial) present  Skin:     General: Skin is warm and dry  Coloration: Skin is not jaundiced or pale  Comments: Dry skin of lower extremities with mild chronic venous stasis changes   Neurological:      General: No focal deficit present  Mental Status: She is alert  Mental status is at baseline  Cranial Nerves: No cranial nerve deficit  Psychiatric:         Attention and Perception: Attention normal          Mood and Affect: Mood and affect normal          Speech: Speech normal          Behavior: Behavior normal  Behavior is cooperative           Pertinent Laboratory/Diagnostic Studies:  CBC, CMP- 2020; see results in chart    Current Medications     Current Outpatient Medications:     enalapril (VASOTEC) 10 mg tablet, TAKE ONE TABLET BY MOUTH ONCE EVERY DAY FOR HYPERTENSION, Disp: 32 tablet, Rfl: 5    simvastatin (ZOCOR) 20 mg tablet, TAKE ONE TABLET BY MOUTH EVERY EVENING FOR hypercholesterolemia, Disp: 30 tablet, Rfl: 5    Health Maintenance     Immunization History   Administered Date(s) Administered    INFLUENZA 2015, 10/04/2018, 10/30/2019    TD (adult) Preservative Free 2017    Td (adult), adsorbed 2017       Erin [address], DO  21

## 2021-06-21 NOTE — ASSESSMENT & PLAN NOTE
Goal <150/90  Continue enalapril 10mg daily  CMP ordered to be drawn at least one week prior to next visit

## 2021-06-21 NOTE — ASSESSMENT & PLAN NOTE
Continue simvastatin; will order lipid panel to be drawn prior to next visit; based on results can review risks vs benefits of continued use of this medication

## 2021-11-19 DIAGNOSIS — E78.00 HYPERCHOLESTEREMIA: ICD-10-CM

## 2021-11-19 DIAGNOSIS — I10 BENIGN ESSENTIAL HYPERTENSION: Chronic | ICD-10-CM

## 2021-11-22 ENCOUNTER — OFFICE VISIT (OUTPATIENT)
Dept: GERIATRICS | Facility: CLINIC | Age: 86
End: 2021-11-22
Payer: COMMERCIAL

## 2021-11-22 VITALS
BODY MASS INDEX: 29.19 KG/M2 | RESPIRATION RATE: 18 BRPM | DIASTOLIC BLOOD PRESSURE: 78 MMHG | HEIGHT: 64 IN | WEIGHT: 171 LBS | SYSTOLIC BLOOD PRESSURE: 142 MMHG | TEMPERATURE: 97.3 F | OXYGEN SATURATION: 97 % | HEART RATE: 95 BPM

## 2021-11-22 DIAGNOSIS — K21.9 GERD WITHOUT ESOPHAGITIS: Chronic | ICD-10-CM

## 2021-11-22 DIAGNOSIS — C85.90 LYMPHOMA IN REMISSION (HCC): Chronic | ICD-10-CM

## 2021-11-22 DIAGNOSIS — F02.80 LATE ONSET ALZHEIMER'S DISEASE WITHOUT BEHAVIORAL DISTURBANCE (HCC): Primary | ICD-10-CM

## 2021-11-22 DIAGNOSIS — E78.00 HYPERCHOLESTEROLEMIA: Chronic | ICD-10-CM

## 2021-11-22 DIAGNOSIS — I10 BENIGN ESSENTIAL HYPERTENSION: Chronic | ICD-10-CM

## 2021-11-22 DIAGNOSIS — G30.1 LATE ONSET ALZHEIMER'S DISEASE WITHOUT BEHAVIORAL DISTURBANCE (HCC): Primary | ICD-10-CM

## 2021-11-22 DIAGNOSIS — Z13.31 NEGATIVE DEPRESSION SCREENING: ICD-10-CM

## 2021-11-22 PROBLEM — J30.9 ALLERGIC RHINITIS: Status: RESOLVED | Noted: 2019-05-24 | Resolved: 2021-11-22

## 2021-11-22 PROBLEM — Z48.89 AFTERCARE FOLLOWING SURGERY: Status: RESOLVED | Noted: 2019-06-03 | Resolved: 2021-11-22

## 2021-11-22 PROCEDURE — 99214 OFFICE O/P EST MOD 30 MIN: CPT | Performed by: NURSE PRACTITIONER

## 2021-11-24 RX ORDER — SIMVASTATIN 20 MG
TABLET ORAL
Qty: 30 TABLET | Refills: 5 | Status: SHIPPED | OUTPATIENT
Start: 2021-11-24 | End: 2022-05-04

## 2021-11-24 RX ORDER — ENALAPRIL MALEATE 10 MG/1
TABLET ORAL
Qty: 32 TABLET | Refills: 5 | Status: SHIPPED | OUTPATIENT
Start: 2021-11-24 | End: 2022-05-04

## 2022-02-21 ENCOUNTER — OFFICE VISIT (OUTPATIENT)
Dept: GERIATRICS | Facility: CLINIC | Age: 87
End: 2022-02-21
Payer: COMMERCIAL

## 2022-02-21 VITALS
WEIGHT: 165 LBS | BODY MASS INDEX: 28.17 KG/M2 | DIASTOLIC BLOOD PRESSURE: 68 MMHG | HEIGHT: 64 IN | SYSTOLIC BLOOD PRESSURE: 140 MMHG | OXYGEN SATURATION: 95 % | HEART RATE: 90 BPM | TEMPERATURE: 97.3 F | RESPIRATION RATE: 18 BRPM

## 2022-02-21 DIAGNOSIS — I70.213 ATHEROSCLEROSIS OF NATIVE ARTERY OF BOTH LOWER EXTREMITIES WITH INTERMITTENT CLAUDICATION (HCC): Chronic | ICD-10-CM

## 2022-02-21 DIAGNOSIS — F02.80 LATE ONSET ALZHEIMER'S DISEASE WITHOUT BEHAVIORAL DISTURBANCE (HCC): Primary | ICD-10-CM

## 2022-02-21 DIAGNOSIS — G30.1 LATE ONSET ALZHEIMER'S DISEASE WITHOUT BEHAVIORAL DISTURBANCE (HCC): Primary | ICD-10-CM

## 2022-02-21 DIAGNOSIS — R26.2 AMBULATORY DYSFUNCTION: ICD-10-CM

## 2022-02-21 DIAGNOSIS — I87.2 VENOUS STASIS DERMATITIS OF BOTH LOWER EXTREMITIES: ICD-10-CM

## 2022-02-21 DIAGNOSIS — I10 BENIGN ESSENTIAL HYPERTENSION: Chronic | ICD-10-CM

## 2022-02-21 PROCEDURE — 99214 OFFICE O/P EST MOD 30 MIN: CPT | Performed by: NURSE PRACTITIONER

## 2022-02-21 NOTE — PROGRESS NOTES
[address] [facility]  POS: [facility]     NAME: Maureen Vicente  AGE: 80 y o  SEX: female    DATE OF ENCOUNTER: 2/21/2022    Assessment and Plan     80year-old female with:    Late onset Alzheimer's disease without behavioral disturbance ([facility] [address] )  Patient is alert and oriented x3  No behavior issues noted  Continue supportive care  Continue management of chronic medical conditions    Venous stasis dermatitis of both lower extremities  Advised patient to apply moisturizer to both legs at least daily  No open wounds noted    Benign essential hypertension  BP goal <150/90   /68 today  Continue enalapril 10 mg  Most recent blood work in December 2021 reviewed; stable for patient    Atherosclerosis 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 ([facility] )  Continue simvastatin  Stable without complaints of pain    Ambulatory dysfunction  Patient uses a walker to ambulate  No recent falls reported  Continue fall precautions    - Counseling Documentation: patient was counseled regarding: diagnostic results, instructions for management, risks and benefits of treatment options and importance of compliance with treatment  - Medication Side Effects: Adverse side effects of medications were reviewed with the patient/guardian today  Chief Complaint     Chief Complaint   Patient presents with    Follow-up     Patient is here for 3 month routine exam         History of Present Illness     80year-old female who presents in office today for routine follow-up of chronic medical conditions  Patient is by herself and provides history  She is alert and oriented to self and place  She recalled her birth date and date accurately  Patient takes only 2 medications vasotec and Zocor  Patient reports that she is feeling well with good appetite    Denies shortness of breath, chest pain, headache, dizziness, abdominal pain, dysuria, nausea, vomiting, diarrhea, constipation  Overall, patient appears stable  Return in 1 month for Medicare wellness checkup    The following portions of the patient's history were reviewed and updated as appropriate: allergies, current medications, past family history, past medical history, past social history, past surgical history and problem list     Review of Systems     Review of Systems   Constitutional: Negative for appetite change, chills, diaphoresis, fatigue and fever  HENT: Negative for congestion, sore throat and trouble swallowing  Eyes: Positive for visual disturbance  Negative for pain and redness  Respiratory: Negative  Negative for cough and shortness of breath  Cardiovascular: Negative  Gastrointestinal: Negative for abdominal pain, constipation, diarrhea, nausea and vomiting  Genitourinary: Negative for difficulty urinating  Musculoskeletal: Positive for gait problem  Neurological: Negative for dizziness, syncope, speech difficulty, light-headedness, numbness and headaches  Psychiatric/Behavioral: Negative for agitation, behavioral problems, dysphoric mood and hallucinations  The patient is not nervous/anxious  All other systems reviewed and are negative        Active Problem List     Patient Active Problem List   Diagnosis    Atherosclerosis 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 (HCC)    Benign essential hypertension    Cataract    GERD without esophagitis    Hypercholesterolemia    Hypertriglyceridemia    Lymphoma in remission ([facility] )    Vitamin D deficiency    Ambulatory dysfunction    Orthopedic hardware present    Late onset Alzheimer's disease without behavioral disturbance ([facility] )    Medication care plan discussed with patient    Venous stasis dermatitis of both lower extremities    Negative depression screening       Objective     /68 (BP Location: Left arm, Patient Position: Sitting, Cuff Size: Standard)   Pulse 90   Temp (!) 97 3 °F (36 3 °C) (Temporal)   Resp 18   Ht 5' 4" (1 626 m) Comment: with shoes  Wt 74 8 kg (165 lb) Comment: with shoes  SpO2 95%   BMI 28 32 kg/m²     Physical Exam  Vitals and nursing note reviewed  Constitutional:       General: She is not in acute distress  Appearance: She is not ill-appearing, toxic-appearing or diaphoretic  HENT:      Head: Normocephalic  Nose: No congestion or rhinorrhea  Mouth/Throat:      Mouth: Mucous membranes are moist       Pharynx: No oropharyngeal exudate  Eyes:      General: No scleral icterus  Right eye: No discharge  Left eye: No discharge  Extraocular Movements: Extraocular movements intact  Conjunctiva/sclera: Conjunctivae normal       Pupils: Pupils are equal, round, and reactive to light  Comments: Wears glasses  Cardiovascular:      Rate and Rhythm: Normal rate and regular rhythm  Pulses: Normal pulses  Pulmonary:      Effort: Pulmonary effort is normal  No respiratory distress  Breath sounds: Normal breath sounds  No wheezing, rhonchi or rales  Abdominal:      General: Bowel sounds are normal  There is no distension  Palpations: Abdomen is soft  Tenderness: There is no abdominal tenderness  There is no guarding  Musculoskeletal:      Cervical back: Neck supple  No rigidity  Right lower leg: No edema  Left lower leg: No edema  Comments: Moves all 4 extremities  Lymphadenopathy:      Cervical: No cervical adenopathy  Skin:     General: Skin is warm and dry  Capillary Refill: Capillary refill takes less than 2 seconds  Comments: Skin is very dry bilateral lower extremities  With vascular discoloration bilateral lower extremities  Neurological:      General: No focal deficit present  Mental Status: She is alert and oriented to person, place, and time  Cranial Nerves: No cranial nerve deficit  Motor: Weakness present        Gait: Gait abnormal    Psychiatric:         Mood and Affect: Mood normal          Behavior: Behavior normal          Thought Content: Thought content normal        Pertinent Laboratory/Diagnostic Studies:  CBC, CMP, and lipid panel reviewed from December 2021 stable for patient  Hemoglobin: 15 0    hematocrit:  44 5    CBC: 5 8   platelet count: 724  Glucose: 88   BUN: 12   creatinine:  0 58   sodium: 143   potassium: 4 4   chloride: 108   carbon dioxide: 27   alkaline phosphatase: 95   albumin:  3 3   GFR: 80  Cholesterol: 209   triglyceride: 126   cholesterol, HDL: 61   cholesterol, non HDL: 148   cholesterol, LDL, calculated: 123       cholesterol/HDL ratio: 3 43  Current Medications     Current Outpatient Medications:     enalapril (VASOTEC) 10 mg tablet, TAKE ONE TABLET BY MOUTH ONCE EVERY DAY FOR HYPERTENSION, Disp: 32 tablet, Rfl: 5    simvastatin (ZOCOR) 20 mg tablet, TAKE ONE TABLET BY MOUTH EVERY EVENING FOR hypercholesterolemia, Disp: 30 tablet, Rfl: 5    Health Maintenance     Health Maintenance   Topic Date Due    Medicare Annual Wellness Visit (AWV)  Never done    SLP PLAN OF CARE  Never done    COVID-19 Vaccine (1) Never done    BMI: Followup Plan  Never done    Pneumococcal Vaccine: 65+ Years (1 of 1 - PPSV23) Never done    Breast Cancer Screening: Mammogram  10/21/2014    DTaP,Tdap,and Td Vaccines (1 - Tdap) 01/14/2017    Fall Risk  04/15/2020    Depression Screening  04/15/2020    DXA SCAN  01/24/2021    Influenza Vaccine (1) 09/01/2021    BMI: Adult  02/21/2023    HIB Vaccine  Aged Out    Hepatitis B Vaccine  Aged Out    IPV Vaccine  Aged Out    Hepatitis A Vaccine  Aged Out    Meningococcal ACWY Vaccine  Aged Out    HPV Vaccine  Aged [employer] History   Administered Date(s) Administered    INFLUENZA 01/09/2015, 10/04/2018, 10/30/2019    TD (adult) Preservative Free 01/13/2017    Td (adult), adsorbed 01/13/2017     This note was completed in part utilizing m-modal fluency direct voice recognition software  Grammatical errors, random word insertion, spelling mistakes, and incomplete sentences may be an occasional consequence of the system secondary to software limitations, ambient noise and hardware issues  At the time of dictation, efforts were made to edit, clarify and/or correct errors  Please read the chart carefully and recognize, using context, where substitutions have occurred  If you have any questions or concerns about the context, text or information contained within the body of this dictation, please contact myself, the provider, for further clarification      Duy Arboleda [address]   2/21/2022 2:55 PM

## 2022-02-21 NOTE — ASSESSMENT & PLAN NOTE
Patient is alert and oriented x3  No behavior issues noted  Continue supportive care  Continue management of chronic medical conditions

## 2022-02-21 NOTE — ASSESSMENT & PLAN NOTE
BP goal <150/90   /68 today  Continue enalapril 10 mg  Most recent blood work in December 2021 reviewed; stable for patient

## 2022-02-21 NOTE — PATIENT INSTRUCTIONS
You were here for routine 3 month follow up today   No acute concerns   Continue same medications  No bloodwork ordered at this time  Return to office for Medicare wellness check

## 2022-03-21 ENCOUNTER — OFFICE VISIT (OUTPATIENT)
Dept: GERIATRICS | Facility: CLINIC | Age: 87
End: 2022-03-21
Payer: COMMERCIAL

## 2022-03-21 VITALS
BODY MASS INDEX: 27.36 KG/M2 | DIASTOLIC BLOOD PRESSURE: 70 MMHG | HEIGHT: 64 IN | TEMPERATURE: 98.3 F | SYSTOLIC BLOOD PRESSURE: 140 MMHG | OXYGEN SATURATION: 98 % | HEART RATE: 102 BPM | WEIGHT: 160.25 LBS | RESPIRATION RATE: 18 BRPM

## 2022-03-21 DIAGNOSIS — E78.00 HYPERCHOLESTEROLEMIA: Chronic | ICD-10-CM

## 2022-03-21 DIAGNOSIS — I87.2 VENOUS STASIS DERMATITIS OF BOTH LOWER EXTREMITIES: ICD-10-CM

## 2022-03-21 DIAGNOSIS — I10 BENIGN ESSENTIAL HYPERTENSION: Chronic | ICD-10-CM

## 2022-03-21 DIAGNOSIS — G30.1 LATE ONSET ALZHEIMER'S DISEASE WITHOUT BEHAVIORAL DISTURBANCE (HCC): Primary | ICD-10-CM

## 2022-03-21 DIAGNOSIS — F02.80 LATE ONSET ALZHEIMER'S DISEASE WITHOUT BEHAVIORAL DISTURBANCE (HCC): Primary | ICD-10-CM

## 2022-03-21 PROCEDURE — G0439 PPPS, SUBSEQ VISIT: HCPCS | Performed by: NURSE PRACTITIONER

## 2022-03-21 NOTE — PROGRESS NOTES
Assessment and Plan:     79-year-old female with:    Late onset Alzheimer's disease without behavioral disturbance ([facility] [address] )  Patient's mental status is at baseline  No behavior issues reported  Continued support with personal care services  Continue management chronic medical conditions    Venous stasis dermatitis of both lower extremities  Encouraged patient to elevate both legs at rest   It would be ideal for patient to wear compression stockings, but patient stated in the office that she will not wear compression stockings    Benign essential hypertension  BP goal <150/90  /70 today  Continue labral 10 mg daily    Hypercholesterolemia  Encouraged patient to have blood work done in December 2021 and she did have it done  Most recent lipid panel December 2021:  Cholesterol 209, triglyceride 126, cholesterol HDL 61, cholesterol non , cholesterol LDL calculated 123  Continue simvastatin 20 mg daily    Preventive health issues were discussed with patient, and age appropriate screening tests were ordered as noted in patient's After Visit Summary  Personalized health advice and appropriate referrals for health education or preventive services given if needed, as noted in patient's After Visit Summary  History of Present Illness:     Patient presents for Welcome to Medicare visit  Patient presents in office by herself and answered to questions appropriately  Patient Care Team:  Raj Dee DO as PCP - General (Geriatric Medicine)  Yulissa Rojas DO as PCP - PCP-[facility]  MD Jenelle Latif MD (Inactive)  MD Latha Culp MD Sallee Garbe, DO Amy Yvonna Sol, PA-C [place], CRNP (Vascular Surgery)     Review of Systems:     Review of Systems   Constitutional: Negative for appetite change, chills, diaphoresis, fatigue and fever  HENT: Negative for congestion, sore throat and trouble swallowing      Eyes: Positive for visual disturbance  Respiratory: Negative for cough and shortness of breath  Cardiovascular: Positive for leg swelling  Negative for chest pain and palpitations  Gastrointestinal: Negative for abdominal pain, diarrhea, nausea and vomiting  Genitourinary: Negative for difficulty urinating  Musculoskeletal: Positive for gait problem  Neurological: Negative for dizziness, syncope, speech difficulty, light-headedness, numbness and headaches  Psychiatric/Behavioral: Negative for behavioral problems  The patient is not nervous/anxious  All other systems reviewed and are negative       Problem List:     Patient Active Problem List   Diagnosis    Atherosclerosis 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 (HCC)    Benign essential hypertension    Cataract    GERD without esophagitis    Hypercholesterolemia    Hypertriglyceridemia    Lymphoma in remission ([facility] [address] )    Vitamin D deficiency    Ambulatory dysfunction    Orthopedic hardware present    Late onset Alzheimer's disease without behavioral disturbance ([facility]    Medication care plan discussed with patient    Venous stasis dermatitis of both lower extremities    Negative depression screening      Past Medical and Surgical History:     Past Medical History:   Diagnosis Date    Anorexia     last assessed: 8/9/2013    Closed fracture dislocation of right ankle joint 5/20/2019    Added automatically from request for surgery [id number]    GERD (gastroesophageal reflux disease)     Herpes zoster     last assessed: 9/11/2013    Hypertension     Lymphoma ([facility] [address] )     resolved: 1997    Malignant melanoma of skin ([facility] [address] )     resolved: 2015    Pelvic fracture ([facility] )      Past Surgical History:   Procedure Laterality Date    BREAST SURGERY      enlargement procedure    BYPASS FEMORAL-POPLITEAL Left     onset: 8/31/2012    CATARACT EXTRACTION Right 06/19/2013    HYSTERECTOMY      resolved: 1970    [address] Right 5/21/2019 Procedure: OPEN REDUCTION W/ INTERNAL FIXATION (ORIF) ANKLE;  Surgeon: Zunilda Lui MD;  [facility];  Service: Orthopedics    ORIF TIBIA & FIBULA FRACTURES Right 6/4/2019    Procedure: Revision right ankle ORIF;  Surgeon: Zunilda Lui MD;  [facility];  Service: Orthopedics    OTHER SURGICAL HISTORY  05/30/2013    PTA Femoral-popliteal initial stenosis left Description: with mechanical thrombectomy, PTA and stent     MS OPEN RX DISTAL RADIUS FX, INTRA-ARTICULAR, 3+ FRAG Left 1/18/2017    Procedure: DISTAL RADIUS OPEN REDUCTION W/ INTERNAL FIXATION ;  Surgeon: Toshia Chavez MD;  [facility];  Service: Orthopedics      Family History:     Family History   Problem Relation Age of Onset    Heart attack Mother     Heart failure Father     Other Sister         thyroid surgery      Social History:     Social History     Socioeconomic History    Marital status:      Spouse name: Not on file    Number of children: 2    Years of education: Not on file    Highest education level: Not on file   Occupational History    Not on file   Tobacco Use    Smoking status: Never Smoker    Smokeless tobacco: Never Used   Substance and Sexual Activity    Alcohol use:  Yes     Alcohol/week: 1 0 standard drink     Types: 1 Glasses of wine per week     Comment: Per allscripts - social drinker     Drug use: No    Sexual activity: Not on file   Other Topics Concern    Not on file   Social History Narrative    Advance directive on file      Social Determinants of Health     Financial Resource Strain: Not on file   Food Insecurity: Not on file   Transportation Needs: Not on file   Physical Activity: Not on file   Stress: Not on file   Social Connections: Not on file   Intimate Partner Violence: Not on file   Housing Stability: Not on file      Medications and Allergies:     Current Outpatient Medications   Medication Sig Dispense Refill    enalapril (VASOTEC) 10 mg tablet TAKE ONE TABLET BY MOUTH ONCE EVERY DAY FOR HYPERTENSION 32 tablet 5    simvastatin (ZOCOR) 20 mg tablet TAKE ONE TABLET BY MOUTH EVERY EVENING FOR hypercholesterolemia 30 tablet 5     No current facility-administered medications for this visit  Allergies   Allergen Reactions    Contrast [Iodinated Diagnostic Agents]      Other reaction(s): "I get very cold"    Iodine - Food Allergy      Other reaction(s): Chills  Reaction Date: 02Aug2011;     Sulfa Antibiotics Vomiting    Omnipaque [Iohexol]       Immunizations:     Immunization History   Administered Date(s) Administered    INFLUENZA 01/09/2015, 10/04/2018, 10/30/2019    TD (adult) Preservative Free 01/13/2017    Td (adult), adsorbed 01/13/2017      Health Maintenance:         Topic Date Due    Breast Cancer Screening: Mammogram  10/21/2014    DXA SCAN  01/24/2021         Topic Date Due    COVID-19 Vaccine (1) Never done    Pneumococcal Vaccine: 65+ Years (1 of 1 - PPSV23) Never done    DTaP,Tdap,and Td Vaccines (1 - Tdap) 01/14/2017    Influenza Vaccine (1) 09/01/2021      Medicare Screening Tests and Risk Assessments:         Historian  Patient cannot answer questions due to cognitive impairment, intelluctual disability, or expressive limitations  Information provided by: other (Patient has Late onset Alzheimer's and no family present with patient at this AWV)  Health Risk Assessment:   Patient rates overall health as good  Patient feels that their physical health rating is same  Patient is very satisfied with their life  Eyesight was rated as same  Hearing was rated as same  Patient feels that their emotional and mental health rating is same  Patient states they are never, rarely unusually tired/fatigued  Pain experienced in the last 7 days has been none  Patient states that she has experienced no weight loss or gain in last 6 months  Depression Screening:   PHQ-2 Score: 0      Fall Risk Screening:    In the past year, patient has experienced: no history of falling in past year      Urinary Incontinence Screening:   Patient has not leaked urine accidently in the last six months  Home Safety:  Patient does not have trouble with stairs inside or outside of their home  Patient has working smoke alarms and has working carbon monoxide detector  Home safety hazards include: none  Nutrition:   Current diet is Regular  Medications:   Patient is not currently taking any over-the-counter supplements  Patient is not able to manage medications  Activities of Daily Living (ADLs)/Instrumental Activities of Daily Living (IADLs):   Walk and transfer into and out of bed and chair?: Yes  Dress and groom yourself?: Yes    Bathe or shower yourself?: Yes    Feed yourself? Yes  Do your laundry/housekeeping?: Yes  Manage your money, pay your bills and track your expenses?: No  Make your own meals?: No    Do your own shopping?: No    Previous Hospitalizations:   Any hospitalizations or ED visits within the last 12 months?: No      Advance Care Planning:       Comments: Patient unable to answer Yes or No in this section due to her late onset Alzheimer's and patient does not have any family with her at this visit  PREVENTIVE SCREENINGS      Cardiovascular Screening:    General: Screening Not Indicated and History Lipid Disorder      Colorectal Cancer Screening:     General: Screening Not Indicated      Cervical Cancer Screening:    General: Screening Not Indicated      Lung Cancer Screening:     General: Screening Not Indicated    Screening, Brief Intervention, and Referral to Treatment (SBIRT)    Screening  Typical number of drinks in a day: 0  Typical number of drinks in a week: 0  Interpretation: Low risk drinking behavior      Single Item Drug Screening:  How often have you used an illegal drug (including marijuana) or a prescription medication for non-medical reasons in the past year? never    Single Item Drug Screen Score: 0  Interpretation: Negative screen for possible drug use disorder    No exam data present     Physical Exam:     /70 (BP Location: Left arm, Patient Position: Sitting, Cuff Size: Standard)   Pulse 102   Temp 98 3 °F (36 8 °C) (Temporal)   Resp 18   Ht 5' 3 5" (1 613 m) Comment: with shoes  Wt 72 7 kg (160 lb 4 oz) Comment: with shoes  SpO2 98%   BMI 27 94 kg/m²     Physical Exam  Vitals and nursing note reviewed  Constitutional:       General: She is not in acute distress  Appearance: She is well-developed  She is not ill-appearing, toxic-appearing or diaphoretic  HENT:      Head: Normocephalic and atraumatic  Mouth/Throat:      Mouth: Mucous membranes are moist    Eyes:      Extraocular Movements: Extraocular movements intact  Conjunctiva/sclera: Conjunctivae normal       Pupils: Pupils are equal, round, and reactive to light  Cardiovascular:      Rate and Rhythm: Normal rate and regular rhythm  Heart sounds: No murmur heard  Pulmonary:      Effort: Pulmonary effort is normal  No respiratory distress  Breath sounds: Normal breath sounds  Abdominal:      Palpations: Abdomen is soft  Tenderness: There is no abdominal tenderness  Musculoskeletal:      Cervical back: Neck supple  Right lower leg: Edema present  Left lower leg: Edema present  Comments: Able to move all 4 extremities  Able to ambulate with walker  Skin:     General: Skin is warm and dry  Neurological:      Mental Status: She is alert  Mental status is at baseline  Cranial Nerves: No cranial nerve deficit  Motor: Weakness present  Gait: Gait abnormal       Comments: Alert and oriented to self and place   Psychiatric:         Mood and Affect: Mood normal          Behavior: Behavior normal          Thought Content:  Thought content normal         Fabio Wlison

## 2022-03-21 NOTE — ASSESSMENT & PLAN NOTE
Encouraged patient to have blood work done in December 2021 and she did have it done  Most recent lipid panel December 2021:  Cholesterol 209, triglyceride 126, cholesterol HDL 61, cholesterol non , cholesterol LDL calculated 123  Continue simvastatin 20 mg daily

## 2022-03-21 NOTE — ASSESSMENT & PLAN NOTE
Patient's mental status is at baseline    No behavior issues reported  Continued support with personal care services  Continue management chronic medical conditions

## 2022-03-21 NOTE — ASSESSMENT & PLAN NOTE
Encouraged patient to elevate both legs at rest   It would be ideal for patient to wear compression stockings, but patient stated in the office that she will not wear compression stockings

## 2022-03-21 NOTE — PATIENT INSTRUCTIONS
You where here today for Medicare wellness visit    No complaints of pain or discomfort  No new orders  Continue same medications  Return to office in 3 months for routine follow-up or sooner if needed

## 2022-05-03 DIAGNOSIS — E78.00 HYPERCHOLESTEREMIA: ICD-10-CM

## 2022-05-03 DIAGNOSIS — I10 BENIGN ESSENTIAL HYPERTENSION: Chronic | ICD-10-CM

## 2022-05-04 RX ORDER — SIMVASTATIN 20 MG
TABLET ORAL
Qty: 30 TABLET | Refills: 5 | Status: SHIPPED | OUTPATIENT
Start: 2022-05-04

## 2022-05-04 RX ORDER — ENALAPRIL MALEATE 10 MG/1
TABLET ORAL
Qty: 32 TABLET | Refills: 5 | Status: SHIPPED | OUTPATIENT
Start: 2022-05-04

## 2022-06-08 ENCOUNTER — OFFICE VISIT (OUTPATIENT)
Dept: GERIATRICS | Facility: CLINIC | Age: 87
End: 2022-06-08
Payer: COMMERCIAL

## 2022-06-08 ENCOUNTER — HOSPITAL ENCOUNTER (EMERGENCY)
Facility: HOSPITAL | Age: 87
Discharge: HOME | End: 2022-06-08
Attending: EMERGENCY MEDICINE | Admitting: EMERGENCY MEDICINE
Payer: COMMERCIAL

## 2022-06-08 ENCOUNTER — APPOINTMENT (EMERGENCY)
Dept: RADIOLOGY | Facility: HOSPITAL | Age: 87
End: 2022-06-08
Payer: COMMERCIAL

## 2022-06-08 VITALS
SYSTOLIC BLOOD PRESSURE: 168 MMHG | HEART RATE: 102 BPM | RESPIRATION RATE: 20 BRPM | DIASTOLIC BLOOD PRESSURE: 97 MMHG | TEMPERATURE: 97.8 F | OXYGEN SATURATION: 93 %

## 2022-06-08 VITALS
OXYGEN SATURATION: 93 % | SYSTOLIC BLOOD PRESSURE: 162 MMHG | TEMPERATURE: 97.5 F | RESPIRATION RATE: 16 BRPM | DIASTOLIC BLOOD PRESSURE: 70 MMHG | HEART RATE: 123 BPM

## 2022-06-08 DIAGNOSIS — F41.9 ANXIETY: ICD-10-CM

## 2022-06-08 DIAGNOSIS — G30.1 LATE ONSET ALZHEIMER'S DISEASE WITHOUT BEHAVIORAL DISTURBANCE (HCC): ICD-10-CM

## 2022-06-08 DIAGNOSIS — R00.0 TACHYCARDIA: Primary | ICD-10-CM

## 2022-06-08 DIAGNOSIS — R00.0 SINUS TACHYCARDIA: Primary | ICD-10-CM

## 2022-06-08 DIAGNOSIS — F02.80 LATE ONSET ALZHEIMER'S DISEASE WITHOUT BEHAVIORAL DISTURBANCE (HCC): ICD-10-CM

## 2022-06-08 DIAGNOSIS — I10 BENIGN ESSENTIAL HYPERTENSION: Chronic | ICD-10-CM

## 2022-06-08 LAB
ANION GAP SERPL CALCULATED.3IONS-SCNC: 9 MMOL/L (ref 4–13)
APTT PPP: 27 SECONDS (ref 23–37)
BASOPHILS # BLD AUTO: 0.03 THOUSANDS/ΜL (ref 0–0.1)
BASOPHILS NFR BLD AUTO: 1 % (ref 0–1)
BUN SERPL-MCNC: 12 MG/DL (ref 5–25)
CALCIUM SERPL-MCNC: 9.3 MG/DL (ref 8.3–10.1)
CARDIAC TROPONIN I PNL SERPL HS: 7 NG/L
CHLORIDE SERPL-SCNC: 107 MMOL/L (ref 100–108)
CO2 SERPL-SCNC: 24 MMOL/L (ref 21–32)
CREAT SERPL-MCNC: 0.68 MG/DL (ref 0.6–1.3)
D DIMER PPP FEU-MCNC: 0.44 UG/ML FEU
EOSINOPHIL # BLD AUTO: 0.03 THOUSAND/ΜL (ref 0–0.61)
EOSINOPHIL NFR BLD AUTO: 1 % (ref 0–6)
ERYTHROCYTE [DISTWIDTH] IN BLOOD BY AUTOMATED COUNT: 13.6 % (ref 11.6–15.1)
GFR SERPL CREATININE-BSD FRML MDRD: 75 ML/MIN/1.73SQ M
GLUCOSE SERPL-MCNC: 144 MG/DL (ref 65–140)
HCT VFR BLD AUTO: 46 % (ref 34.8–46.1)
HGB BLD-MCNC: 14.9 G/DL (ref 11.5–15.4)
IMM GRANULOCYTES # BLD AUTO: 0.01 THOUSAND/UL (ref 0–0.2)
IMM GRANULOCYTES NFR BLD AUTO: 0 % (ref 0–2)
INR PPP: 0.94 (ref 0.84–1.19)
LYMPHOCYTES # BLD AUTO: 1.29 THOUSANDS/ΜL (ref 0.6–4.47)
LYMPHOCYTES NFR BLD AUTO: 22 % (ref 14–44)
MCH RBC QN AUTO: 30.5 PG (ref 26.8–34.3)
MCHC RBC AUTO-ENTMCNC: 32.4 G/DL (ref 31.4–37.4)
MCV RBC AUTO: 94 FL (ref 82–98)
MONOCYTES # BLD AUTO: 0.61 THOUSAND/ΜL (ref 0.17–1.22)
MONOCYTES NFR BLD AUTO: 10 % (ref 4–12)
NEUTROPHILS # BLD AUTO: 4.03 THOUSANDS/ΜL (ref 1.85–7.62)
NEUTS SEG NFR BLD AUTO: 66 % (ref 43–75)
NRBC BLD AUTO-RTO: 0 /100 WBCS
PLATELET # BLD AUTO: 263 THOUSANDS/UL (ref 149–390)
PMV BLD AUTO: 11.4 FL (ref 8.9–12.7)
POTASSIUM SERPL-SCNC: 4 MMOL/L (ref 3.5–5.3)
PROTHROMBIN TIME: 12.2 SECONDS (ref 11.6–14.5)
RBC # BLD AUTO: 4.88 MILLION/UL (ref 3.81–5.12)
SODIUM SERPL-SCNC: 140 MMOL/L (ref 136–145)
TSH SERPL DL<=0.05 MIU/L-ACNC: 1.77 UIU/ML (ref 0.45–4.5)
WBC # BLD AUTO: 6 THOUSAND/UL (ref 4.31–10.16)

## 2022-06-08 PROCEDURE — 85730 THROMBOPLASTIN TIME PARTIAL: CPT | Performed by: EMERGENCY MEDICINE

## 2022-06-08 PROCEDURE — 93005 ELECTROCARDIOGRAM TRACING: CPT

## 2022-06-08 PROCEDURE — 36415 COLL VENOUS BLD VENIPUNCTURE: CPT | Performed by: EMERGENCY MEDICINE

## 2022-06-08 PROCEDURE — 99285 EMERGENCY DEPT VISIT HI MDM: CPT | Performed by: EMERGENCY MEDICINE

## 2022-06-08 PROCEDURE — 84484 ASSAY OF TROPONIN QUANT: CPT | Performed by: EMERGENCY MEDICINE

## 2022-06-08 PROCEDURE — 99215 OFFICE O/P EST HI 40 MIN: CPT | Performed by: NURSE PRACTITIONER

## 2022-06-08 PROCEDURE — 80048 BASIC METABOLIC PNL TOTAL CA: CPT | Performed by: EMERGENCY MEDICINE

## 2022-06-08 PROCEDURE — 71045 X-RAY EXAM CHEST 1 VIEW: CPT

## 2022-06-08 PROCEDURE — 96361 HYDRATE IV INFUSION ADD-ON: CPT

## 2022-06-08 PROCEDURE — 85379 FIBRIN DEGRADATION QUANT: CPT | Performed by: EMERGENCY MEDICINE

## 2022-06-08 PROCEDURE — 85610 PROTHROMBIN TIME: CPT | Performed by: EMERGENCY MEDICINE

## 2022-06-08 PROCEDURE — 84443 ASSAY THYROID STIM HORMONE: CPT | Performed by: EMERGENCY MEDICINE

## 2022-06-08 PROCEDURE — 99284 EMERGENCY DEPT VISIT MOD MDM: CPT

## 2022-06-08 PROCEDURE — 96360 HYDRATION IV INFUSION INIT: CPT

## 2022-06-08 PROCEDURE — 85025 COMPLETE CBC W/AUTO DIFF WBC: CPT | Performed by: EMERGENCY MEDICINE

## 2022-06-08 RX ORDER — SODIUM CHLORIDE 9 MG/ML
3 INJECTION INTRAVENOUS
Status: DISCONTINUED | OUTPATIENT
Start: 2022-06-08 | End: 2022-06-08 | Stop reason: HOSPADM

## 2022-06-08 RX ADMIN — SODIUM CHLORIDE 1000 ML: 0.9 INJECTION, SOLUTION INTRAVENOUS at 11:46

## 2022-06-08 NOTE — ASSESSMENT & PLAN NOTE
Patient not feeling well overall  Heart rate 124 and slightly irregular in office  Bilateral upper extremities shaking  Recommend that patient go to the ED for further evaluation and patient agreed

## 2022-06-08 NOTE — ASSESSMENT & PLAN NOTE
Patient woke up feeling shaky and anxious  No event precipitated anxiety per patient    Most likely due to tachycardia

## 2022-06-08 NOTE — ASSESSMENT & PLAN NOTE
Patient's mental status is at baseline  No behavior issues reported  Patient receives personal care assistance with medication administration at [facility]

## 2022-06-08 NOTE — PROGRESS NOTES
[address] [facility]  POS: [facility]     NAME: Richard Lizarraga  AGE: 80 y o  SEX: female    DATE OF ENCOUNTER: 6/8/2022    Assessment and Plan     80year-old female with: Tachycardia  Patient not feeling well overall  Heart rate 124 and slightly irregular in office  Bilateral upper extremities shaking  Recommend that patient go to the ED for further evaluation and patient agreed  Anxiety  Patient woke up feeling shaky and anxious  No event precipitated anxiety per patient  Most likely due to tachycardia    Benign essential hypertension  /70 in office  Patient took vasotec 10 mg this morning    Late onset Alzheimer's disease without behavioral disturbance ([facility] [address] )  Patient's mental status is at baseline  No behavior issues reported  Patient receives personal care assistance with medication administration at [facility] [place]  Hypercholesterolemia    - Counseling Documentation: patient was counseled regarding: instructions for management    Chief Complaint     Chief Complaint   Patient presents with    Shaking     High Pulse        History of Present Illness     49-year-old female with medical history benign hypertension, late onset Alzheimer's disease without behavioral disturbance, atherosclerosis 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, hypercholesterolemia, lymphoma in remission, and GERD who presents in office for an emergent visit  Patient reports that she woke up this morning not feeling well overall, she felt shaky and "woosy" without dizziness or lightheadedness or chest pain  Feeling anxious because she is not feeling well  She ate two bowls of cereal and an orange hoping to feel better but she did not so she came down to the office for evaluation  She took vasotec 10 mg this am (the only scheduled medication ordered)   At time of examination, patient is alert and oriented x3, speech is clear and appropriate, and upper extremities were shaking  Heart rate 124 and slightly irregular  No diaphoresis noted  Patient denies shortness of breath, cough, chest pain, headache, dizziness, lightheadedness, abdominal pain, nausea, vomiting, diarrhea, or constipation  Advised patient to go to the emergency room for further evaluation and treatment  Patient's daughter made aware via phone  Patient stayed in office until EMS arrived to transport patient to the hospital     The following portions of the patient's history were reviewed and updated as appropriate: allergies, current medications, past family history, past medical history, past social history, past surgical history and problem list     Review of Systems     Review of Systems   Constitutional: Negative for appetite change, chills and diaphoresis  Feeling shaky    HENT: Negative for congestion  Respiratory: Negative for cough and shortness of breath  Cardiovascular: Negative for chest pain, palpitations and leg swelling  Gastrointestinal: Negative for abdominal pain  Endocrine: Negative  Genitourinary: Negative  Neurological: Negative for dizziness, syncope, speech difficulty, light-headedness, numbness and headaches  Psychiatric/Behavioral: Negative for agitation, behavioral problems and confusion  All other systems reviewed and are negative        Active Problem List     Patient Active Problem List   Diagnosis    Atherosclerosis 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 (HCC)    Benign essential hypertension    Cataract    GERD without esophagitis    Hypercholesterolemia    Hypertriglyceridemia    Lymphoma in remission ([facility] [address] [facility]    Vitamin D deficiency    Ambulatory dysfunction    Orthopedic hardware present    Late onset Alzheimer's disease without behavioral disturbance ([address] )    Medication care plan discussed with patient    Venous stasis dermatitis of both lower extremities    Negative depression screening    Tachycardia    Anxiety       Objective     /70 (BP Location: Right arm, Patient Position: Sitting, Cuff Size: Standard)   Pulse (!) 123   Temp 97 5 °F (36 4 °C) (Temporal)   Resp 16   SpO2 93%     Physical Exam  Vitals and nursing note reviewed  Constitutional:       General: She is not in acute distress  Appearance: She is not ill-appearing, toxic-appearing or diaphoretic  HENT:      Head: Normocephalic  Nose: No congestion or rhinorrhea  Mouth/Throat:      Mouth: Mucous membranes are moist       Pharynx: No oropharyngeal exudate  Eyes:      General: No scleral icterus  Right eye: No discharge  Left eye: No discharge  Extraocular Movements: Extraocular movements intact  Conjunctiva/sclera: Conjunctivae normal       Pupils: Pupils are equal, round, and reactive to light  Cardiovascular:      Rate and Rhythm: Tachycardia present  Rhythm irregular  Pulmonary:      Effort: Pulmonary effort is normal  No respiratory distress  Breath sounds: Normal breath sounds  No wheezing, rhonchi or rales  Abdominal:      General: Bowel sounds are normal  There is no distension  Palpations: Abdomen is soft  Tenderness: There is no abdominal tenderness  There is no guarding  Musculoskeletal:      Cervical back: Neck supple  No rigidity  Right lower leg: No edema  Left lower leg: No edema  Comments: Moves all 4 extremities  Lymphadenopathy:      Cervical: No cervical adenopathy  Skin:     General: Skin is warm and dry  Capillary Refill: Capillary refill takes less than 2 seconds  Neurological:      Mental Status: She is alert and oriented to person, place, and time  Cranial Nerves: No cranial nerve deficit  Psychiatric:         Mood and Affect: Mood normal          Behavior: Behavior normal          Thought Content:  Thought content normal          Pertinent Laboratory/Diagnostic Studies:     Current Medications Current Outpatient Medications:     enalapril (VASOTEC) 10 mg tablet, TAKE ONE TABLET BY MOUTH ONCE EVERY DAY FOR HYPERTENSION, Disp: 32 tablet, Rfl: 5    simvastatin (ZOCOR) 20 mg tablet, TAKE ONE TABLET BY MOUTH EVERY EVENING FOR hypercholesterolemia, Disp: 30 tablet, Rfl: 5    Health Maintenance     Health Maintenance   Topic Date Due    SLP PLAN OF CARE  Never done    COVID-19 Vaccine (1) Never done    BMI: Followup Plan  Never done    Pneumococcal Vaccine: 65+ Years (1 - PCV) Never done    Breast Cancer Screening: Mammogram  10/21/2014    DTaP,Tdap,and Td Vaccines (1 - Tdap) 01/14/2017    DXA SCAN  01/24/2021    Influenza Vaccine (Season Ended) 09/01/2022    Fall Risk  03/21/2023    Depression Screening  03/21/2023    Medicare Annual Wellness Visit (AWV)  03/21/2023    BMI: Adult  03/21/2023    HIB Vaccine  Aged Out    Hepatitis B Vaccine  Aged Out    IPV Vaccine  Aged Out    Hepatitis A Vaccine  Aged Out    Meningococcal ACWY Vaccine  Aged Out    HPV Vaccine  Aged [employer] History   Administered Date(s) Administered    INFLUENZA 01/09/2015, 10/04/2018, 10/30/2019    TD (adult) Preservative Free 01/13/2017    Td (adult), adsorbed 01/13/2017     This note was completed in part utilizing [employer] direct voice recognition software  Grammatical errors, random word insertion, spelling mistakes, and incomplete sentences may be an occasional consequence of the system secondary to software limitations, ambient noise and hardware issues  At the time of dictation, efforts were made to edit, clarify and/or correct errors  Please read the chart carefully and recognize, using context, where substitutions have occurred  If you have any questions or concerns about the context, text or information contained within the body of this dictation, please contact myself, the provider, for further clarification      Corrine Urbina [address], [address]  6/8/2022 10:40 AM

## 2022-06-08 NOTE — DISCHARGE INSTRUCTIONS
Patient Instructions: Today you were seen in the emergency department for a fast heart rate  We reviewed your EKG and your labs and determined that you would be able to be discharged  You should follow up with your primary care doctor  Please return to the emergency department if your symptoms get worse, you develop a fever, or you have any other symptoms we discussed today prior to discharge  Nice to meet you! Best of luck with everything!

## 2022-06-08 NOTE — ED NOTES
Called [facility] for update on transport time   They are waiting to hear back from [address] [place] [address]  06/08/22 [id number]

## 2022-06-09 ENCOUNTER — TELEPHONE (OUTPATIENT)
Dept: GERIATRICS | Facility: CLINIC | Age: 87
End: 2022-06-09

## 2022-06-09 LAB
ATRIAL RATE: 111 BPM
P AXIS: 44 DEGREES
PR INTERVAL: 170 MS
QRS AXIS: 3 DEGREES
QRSD INTERVAL: 72 MS
QT INTERVAL: 318 MS
QTC INTERVAL: 432 MS
T WAVE AXIS: 50 DEGREES
VENTRICULAR RATE: 111 BPM

## 2022-06-09 PROCEDURE — 93010 ELECTROCARDIOGRAM REPORT: CPT | Performed by: INTERNAL MEDICINE

## 2022-06-09 NOTE — TELEPHONE ENCOUNTER
Patient's daughter, Rosario Martinez ([phone]), contacted office and scheduled follow up appointment (from ER visit) with [place] at [facility] for Monday 6/13  At this appointment, daughter requesting low dose medication be prescribed for patient anxiety  Daughter would also like a quick call after appointment is completed if possible  Daughter relayed she believes the reason patient was experiencing symptoms of shakiness and confusion yesterday, was due to opening financial statements and forgetting that daughter takes care of these statements  Daughter relayed she believes a medication for anxiety would help patient

## 2022-06-12 NOTE — ED PROVIDER NOTES
Final Diagnosis:  1  Sinus tachycardia    2  Late onset Alzheimer's disease without behavioral disturbance ([facility] [address] )    3  Anxiety      ED Course as of 06/12/22 1935 Wed Jun 08, 2022   1221 D-Dimer, Quant: 0 44   1221 WBC: 6 00   1250 Procedure Note: EKG  Date/Time: 06/08/22 12:50 PM   Interpreted by: Carmella Guo  Indications / Diagnosis: tachycardia   ECG reviewed by me, the ED Provider: yes   The EKG demonstrates:  Rhythm: ST   Intervals: normal intervals  Axis: normal axis  QRS/Blocks: normal QRS  ST Changes: No acute ST Changes, no STD/HEAVEN  Chief Complaint   Patient presents with    Anxiety     Per EMS - pt woke up this morning and felt anxious, ate breakfast and then still felt shaky, HR was 120 before calling EMS       ASSESSMENT + PLAN:   - Nursing note reviewed  1  Tachycardia  -Unclear etiology of tachycardia, patient's anxiety  -Start IVF  -Evaluate for PE, ACS, thyroid abnormalities, anemia  -Labs wnl, patient feeling better, discussed with patient's daughter over the phone, will d/c back to nursing home  -Here her heart is regular  EKG does not show any a fib      Procedures       Final Dispo   Patient was reassessed  Vital signs stable  Patient and/or family given discharge instructions and return precautions  Patient and/or family was reassured  The patient and/or family vocalizes understanding  Answered all of the patient's and/or family's questions  Will follow up with PCP  Patient and/or family are agreeable to the plan          Medications   sodium chloride 0 9 % bolus 1,000 mL (0 mL Intravenous Stopped 6/8/22 1334)     Time reflects when diagnosis was documented in both MDM as applicable and the Disposition within this note     Time User Action Codes Description Comment    6/8/2022  1:19 PM Grover Galvan [G30 1,  F02 80] Late onset Alzheimer's disease without behavioral disturbance ([facility] [address] )     6/8/2022  1:19 PM Grover Galvan [R00 0] Tachycardia     6/8/2022  1:19 PM Carmella Guo Modify [G30 1,  F02 80] Late onset Alzheimer's disease without behavioral disturbance ([facility] [address] )     6/8/2022  1:19 PM Grover Galvan Modify [R00 0] Tachycardia     6/8/2022  1:19 PM Grover Galvan Add [R00 0] Sinus tachycardia     6/8/2022  1:19 PM HaGrover perales Modify [R00 0] Tachycardia     6/8/2022  1:19 PM Grover Galvan Modify [R00 0] Sinus tachycardia     6/8/2022  1:19 PM HaGrover perales Remove [R00 0] Tachycardia     6/8/2022  1:19 PM Aubrey Hookon Add [F41 9] Anxiety       ED Disposition     ED Disposition   Discharge    Condition   Stable    Date/Time   Wed Jun 8, 2022  1:19 PM    Comment   Sudha Castro discharge to home/self care  Follow-up Information     Follow up With Specialties Details Why Contact Info    Nadir Prasad, DO Geriatric Medicine Schedule an appointment as soon as possible for a visit   [address] [place] [address]  [phone]          Discharge Medication List as of 6/8/2022  1:20 PM      CONTINUE these medications which have NOT CHANGED    Details   enalapril (VASOTEC) 10 mg tablet TAKE ONE TABLET BY MOUTH ONCE EVERY DAY FOR HYPERTENSION, Normal      simvastatin (ZOCOR) 20 mg tablet TAKE ONE TABLET BY MOUTH EVERY EVENING FOR hypercholesterolemia, Normal           No discharge procedures on file  Prior to Admission Medications   Prescriptions Last Dose Informant Patient Reported? Taking?   enalapril (VASOTEC) 10 mg tablet   No No   Sig: TAKE ONE TABLET BY MOUTH ONCE EVERY DAY FOR HYPERTENSION   simvastatin (ZOCOR) 20 mg tablet   No No   Sig: TAKE ONE TABLET BY MOUTH EVERY EVENING FOR hypercholesterolemia      Facility-Administered Medications: None       History of Present Illness: This is a 80 y o  female PMH significant for alzheimer's, HTN coming in today with complaint of tachycardia, anxiety      Onset: Acute  Location: None  Duration: Today  Radiation: None  Associated Symptoms:  No associated chest pain, shortness of breath, cough, diaphoresis, dizziness, lightheadedness, abdominal pain, n/v/d  Anxiety  Severity: Mild to moderate  Attempted Treatments: None  Historical Elements:  Patient was hitting her primary care doctor for feeling a little shaky  She said that she felt anxious  She was found to be have a heart rate of 124 and this was possibly irregular  Relevant Social History    None    - No language barrier    - History obtained from patient and chart   - Reviewed and documented relevant past medical/family/social history  - Limited by Alzheimer's  - Previous charting was reviewed  Some data reviewed included below for ease of access whether or not it is relevant to this patient encounter  Past Medical, Past Surgical History:    has a past medical history of Anorexia, Closed fracture dislocation of right ankle joint (5/20/2019), GERD (gastroesophageal reflux disease), Herpes zoster, Hypertension, Lymphoma ([facility] [address] ), Malignant melanoma of skin ([facility] [address] ), and Pelvic fracture ([facility] [address] )  has a past surgical history that includes pr open rx distal radius fx, intra-articular, 3+ frag (Left, 1/18/2017); Breast surgery; Cataract extraction (Right, 06/19/2013); Hysterectomy; BYPASS FEMORAL-POPLITEAL (Left); Other surgical history (05/30/2013); ORIF tibia & fibula fractures (Right, 5/21/2019); and ORIF tibia & fibula fractures (Right, 6/4/2019)  Allergies:      Allergies   Allergen Reactions    Contrast [Iodinated Diagnostic Agents]      Other reaction(s): "I get very cold"    Iodine - Food Allergy      Other reaction(s): Chills  Reaction Date: 02Aug2011;     Sulfa Antibiotics Vomiting    Omnipaque [Iohexol]        Social and Family History:     Social History     Substance and Sexual Activity   Alcohol Use Yes    Alcohol/week: 1 0 standard drink    Types: 1 Glasses of wine per week    Comment: Per allscripts - social drinker      Social History     Tobacco Use   Smoking Status Never Smoker   Smokeless Tobacco Never Used     Social History Substance and Sexual Activity   Drug Use No       Review of Systems:   Review of Systems   Unable to perform ROS: Dementia   Constitutional: Negative for chills, diaphoresis and fever  HENT: Negative  Eyes: Negative  Negative for visual disturbance  Respiratory: Negative  Negative for shortness of breath  Cardiovascular: Negative  Negative for chest pain  Gastrointestinal: Negative  Negative for abdominal pain, nausea and vomiting  Endocrine: Negative  Genitourinary: Negative  Musculoskeletal: Negative  Negative for myalgias  Skin: Negative  Negative for rash  Allergic/Immunologic: Negative  Neurological: Negative  Negative for weakness, light-headedness, numbness and headaches  Shakiness   Hematological: Negative  Psychiatric/Behavioral: The patient is nervous/anxious  All other systems reviewed and are negative  Physical Examination     Vitals:    06/08/22 1112 06/08/22 1200 06/08/22 1330   BP: (!) 193/102 (!) 180/95 168/97   BP Location: Right arm Right arm Right arm   Pulse: (!) 115 100 102   Resp: 20 20 20   Temp: 97 8 °F (36 6 °C)     SpO2: 93% 96% 93%     Vitals reviewed by me  Physical Exam  Vitals and nursing note reviewed  Constitutional:       General: She is not in acute distress  Appearance: She is not ill-appearing or toxic-appearing  HENT:      Head: Atraumatic  Right Ear: External ear normal       Left Ear: External ear normal       Nose: Nose normal       Mouth/Throat:      Pharynx: Oropharynx is clear  Eyes:      General: No scleral icterus  Extraocular Movements: Extraocular movements intact  Pupils: Pupils are equal, round, and reactive to light  Cardiovascular:      Rate and Rhythm: Regular rhythm  Tachycardia present  Pulses: Normal pulses  Pulmonary:      Effort: Pulmonary effort is normal  No respiratory distress  Breath sounds: Normal breath sounds     Abdominal:      General: There is no distension  Tenderness: There is no abdominal tenderness  Musculoskeletal:         General: No deformity  Normal range of motion  Skin:     Findings: No rash  Neurological:      General: No focal deficit present  Mental Status: She is oriented to person, place, and time  Mental status is at baseline  Cranial Nerves: No cranial nerve deficit  Motor: No weakness  Gait: Gait normal    Psychiatric:         Mood and Affect: Mood normal             Risk Stratification Tools                ED Course as of 06/12/22 1935 Wed Jun 08, 2022   1221 D-Dimer, Quant: 0 44   1221 WBC: 6 00   1250 Procedure Note: EKG  Date/Time: 06/08/22 12:50 PM   Interpreted by: Gisele Olivarez  Indications / Diagnosis: tachycardia   ECG reviewed by me, the ED Provider: yes   The EKG demonstrates:  Rhythm: ST   Intervals: normal intervals  Axis: normal axis  QRS/Blocks: normal QRS  ST Changes: No acute ST Changes, no STD/HEAVEN  X-ray chest 1 view portable   ED Interpretation   No acute osseous abnormality      Final Result   No acute cardiopulmonary disease  Workstation performed: [id number]           Orders Placed This Encounter   Procedures    X-ray chest 1 view portable    CBC and differential    Basic metabolic panel    HS Troponin 0hr (reflex protocol)    D-dimer, quantitative    TSH, 3rd generation with Free T4 reflex    Protime-INR    APTT    Continuous cardiac monitoring    Continuous pulse oximetry    ECG 12 lead    ECG 12 lead       Labs:     Labs Reviewed   BASIC METABOLIC PANEL - Abnormal       Result Value Ref Range Status    Sodium 140  136 - 145 mmol/L Final    Potassium 4 0  3 5 - 5 3 mmol/L Final    Comment: Slightly Hemolyzed; Results May be Affected&XA&Slightly Hemolyzed;  Results May be Affected    Chloride 107  100 - 108 mmol/L Final    CO2 24  21 - 32 mmol/L Final    ANION GAP 9  4 - 13 mmol/L Final    BUN 12  5 - 25 mg/dL Final    Creatinine 0 68  0 60 - 1 30 mg/dL Final    Comment: Standardized to IDMS reference method    Glucose 144 (*) 65 - 140 mg/dL Final    Comment: If the patient is fasting, the ADA then defines impaired fasting glucose as > 100 mg/dL and diabetes as > or equal to 123 mg/dL  Specimen collection should occur prior to Sulfasalazine administration due to the potential for falsely depressed results  Specimen collection should occur prior to Sulfapyridine administration due to the potential for falsely elevated results  Calcium 9 3  8 3 - 10 1 mg/dL Final    eGFR 75  ml/min/1 73sq m Final    Narrative:     [place] guidelines for Chronic Kidney Disease (CKD):     Stage 1 with normal or high GFR (GFR > 90 mL/min/1 73 square meters)    Stage 2 Mild CKD (GFR = 60-89 mL/min/1 73 square meters)    Stage 3A Moderate CKD (GFR = 45-59 mL/min/1 73 square meters)    Stage 3B Moderate CKD (GFR = 30-44 mL/min/1 73 square meters)    Stage 4 Severe CKD (GFR = 15-29 mL/min/1 73 square meters)    Stage 5 End Stage CKD (GFR <15 mL/min/1 73 square meters)  Note: GFR calculation is accurate only with a steady state creatinine   HS TROPONIN I 0HR - Normal    hs TnI 0hr 7  "Refer to ACS Flowchart"- see link ng/L Final    Comment:                                              Initial (time 0) result  If >=50 ng/L, Myocardial injury suggested ;  Type of myocardial injury and treatment strategy  to be determined  If 5-49 ng/L, a delta result at 2 hours and or 4 hours will be needed to further evaluate  If <4 ng/L, and chest pain has been >3 hours since onset, patient may qualify for discharge based on the HEART score in the ED  If <5 ng/L and <3hours since onset of chest pain, a delta result at 2 hours will be needed to further evaluate  HS Troponin 99th Percentile URL of a Health Population=12 ng/L with a 95% Confidence Interval of 8-18 ng/L      Second Troponin (time 2 hours)  If calculated delta >= 20 ng/L,  Myocardial injury suggested ; Type of myocardial injury and treatment strategy to be determined  If 5-49 ng/L and the calculated delta is 5-19 ng/L, consult medical service for evaluation  Continue evaluation for ischemia on ecg and other possible etiology and repeat hs troponin at 4 hours  If delta is <5 ng/L at 2 hours, consider discharge based on risk stratification via the HEART score (if in ED), or JUANITA risk score in IP/Observation  HS Troponin 99th Percentile URL of a Health Population=12 ng/L with a 95% Confidence Interval of 8-18 ng/L  D-DIMER, QUANTITATIVE - Normal    D-Dimer, Quant 0 44  <0 50 ug/ml FEU Final    Comment: Reference and upper limits to exclude DVT and PE are the same  Do not use to exclude if clinical symptoms are present  Pregnant women:  1st trimester:  <0 22 - 1 06 ug/ml FEU  2nd trimester:  <0 22 - 1 88 ug/ml FEU  3rd trimester:   0 24 - 3 28 ug/ml FEU    Note: Normal ranges may not apply to patients who are transgender, non-binary, or whose legal sex, sex at birth, and gender identity differ  Narrative: In the evaluation for possible pulmonary embolism, in the appropriate (Well's Score of 4 or less) patient, the age adjusted d-dimer cutoff for this patient can be calculated as:    Age x 0 01 (in ug/mL) for Age-adjusted D-dimer exclusion threshold for a patient over 50 years  TSH, 3RD GENERATION WITH FREE T4 REFLEX - Normal    TSH 3RD GENERATON 1 770  0 450 - 4 500 uIU/mL Final    Comment: The recommended reference ranges for TSH during pregnancy are as follows:   First trimester 0 1 to 2 5 uIU/mL   Second trimester  0 2 to 3 0 uIU/mL   Third trimester 0 3 to 3 0 uIU/m    Note: Normal ranges may not apply to patients who are transgender, non-binary, or whose legal sex, sex at birth, and gender identity differ  Adult TSH (3rd generation) reference range follows the recommended guidelines of the American Thy[employer] Association, January, 2020      Narrative:     Patients undergoing fluorescein dye angiography may retain small amounts of fluorescein in the body for 48-72 hours post procedure  Samples containing fluorescein can produce falsely depressed TSH values  If the patient had this procedure,a specimen should be resubmitted post fluorescein clearance  PROTIME-INR - Normal    Protime 12 2  11 6 - 14 5 seconds Final    INR 0 94  0 84 - 1 19 Final   APTT - Normal    PTT 27  23 - 37 seconds Final    Comment: Therapeutic Heparin Range =  60-90 seconds   CBC AND DIFFERENTIAL    WBC 6 00  4 31 - 10 16 Thousand/uL Final    RBC 4 88  3 81 - 5 12 Million/uL Final    Hemoglobin 14 9  11 5 - 15 4 g/dL Final    Hematocrit 46 0  34 8 - 46 1 % Final    MCV 94  82 - 98 fL Final    MCH 30 5  26 8 - 34 3 pg Final    MCHC 32 4  31 4 - 37 4 g/dL Final    RDW 13 6  11 6 - 15 1 % Final    MPV 11 4  8 9 - 12 7 fL Final    Platelets 166  498 - 390 Thousands/uL Final    nRBC 0  /100 WBCs Final    Neutrophils Relative 66  43 - 75 % Final    Immat GRANS % 0  0 - 2 % Final    Lymphocytes Relative 22  14 - 44 % Final    Monocytes Relative 10  4 - 12 % Final    Eosinophils Relative 1  0 - 6 % Final    Basophils Relative 1  0 - 1 % Final    Neutrophils Absolute 4 03  1 85 - 7 62 Thousands/µL Final    Immature Grans Absolute 0 01  0 00 - 0 20 Thousand/uL Final    Lymphocytes Absolute 1 29  0 60 - 4 47 Thousands/µL Final    Monocytes Absolute 0 61  0 17 - 1 22 Thousand/µL Final    Eosinophils Absolute 0 03  0 00 - 0 61 Thousand/µL Final    Basophils Absolute 0 03  0 00 - 0 10 Thousands/µL Final       Imaging:   X-ray chest 1 view portable    Result Date: 6/8/2022  Narrative: CHEST INDICATION:   chest pain  COMPARISON:  [place] [facility], 2019 EXAM PERFORMED/VIEWS:  XR CHEST PORTABLE Single image FINDINGS:  Lungs adequately aerated  Significant elevation of left hemidiaphragm with gas-filled bowel beneath  Cardiac size top normal   No vascular congestion or peribronchial thickening  No pneumothorax or free air   Osseous structures appear within normal limits for patient age  Impression: No acute cardiopulmonary disease  Workstation performed: [id number]        Final Diagnosis:  1  Sinus tachycardia    2  Late onset Alzheimer's disease without behavioral disturbance ([facility] [address] )    3  Anxiety        Code Status: Prior    Portions of the record may have been created with voice recognition software  Occasional wrong word or "sound a like" substitutions may have occurred due to the inherent limitations of voice recognition software  Read the chart carefully and recognize, using context, where substitutions have occurred      Electronically signed by:  Abbey Alvarado, PGY 3, MD Micah Blount MD  06/12/22 [id number]

## 2022-06-13 ENCOUNTER — OFFICE VISIT (OUTPATIENT)
Dept: GERIATRICS | Facility: CLINIC | Age: 87
End: 2022-06-13
Payer: COMMERCIAL

## 2022-06-13 VITALS
RESPIRATION RATE: 20 BRPM | HEART RATE: 92 BPM | SYSTOLIC BLOOD PRESSURE: 142 MMHG | TEMPERATURE: 97 F | OXYGEN SATURATION: 95 % | DIASTOLIC BLOOD PRESSURE: 80 MMHG | HEIGHT: 64 IN | WEIGHT: 163.2 LBS | BODY MASS INDEX: 27.86 KG/M2

## 2022-06-13 DIAGNOSIS — R00.0 TACHYCARDIA: Primary | ICD-10-CM

## 2022-06-13 DIAGNOSIS — F41.9 ANXIETY: ICD-10-CM

## 2022-06-13 PROCEDURE — 99214 OFFICE O/P EST MOD 30 MIN: CPT | Performed by: NURSE PRACTITIONER

## 2022-06-13 RX ORDER — SERTRALINE HYDROCHLORIDE 25 MG/1
25 TABLET, FILM COATED ORAL DAILY
Qty: 90 TABLET | Refills: 3 | Status: SHIPPED | OUTPATIENT
Start: 2022-06-13 | End: 2022-06-16 | Stop reason: SDUPTHER

## 2022-06-13 NOTE — ASSESSMENT & PLAN NOTE
Patient's daughter with like medication to help with anxiety  Zoloft would be more beneficial to patient than Ativan    Will start patient on Zoloft 25 mg daily for anxiety  Return to office in 2 weeks for evaluation of medication and mood

## 2022-06-13 NOTE — ASSESSMENT & PLAN NOTE
Patient was sent to the emergency room on 06/08/2022 for evaluation of new onset tachycardia associated with anxiety  Patient's daughter stated that patient may have worked herself up after looking at the [address] morning of 06/08/2022 and forgets that her daughter a ready helped her  Anxiety most likely cause of tachycardia  Heart rate 90s sitting in office  Will have personal care check patient's pulse and record when administering her medications in the morning    If tachycardia continues, will recommend cardiology consult

## 2022-06-13 NOTE — PROGRESS NOTES
[address] [facility]  POS: [facility]     NAME: Rossy Matthew  AGE: 80 y o  SEX: female    DATE OF ENCOUNTER: 6/13/2022    Assessment and Plan     80year-old female with: Tachycardia  Patient was sent to the emergency room on 06/08/2022 for evaluation of new onset tachycardia associated with anxiety  EKG in the ED revealed sinus tachycardia without significant change as compared to EKG from June 2019  Patient's daughter stated that patient may have worked herself up after looking at the [address] morning of 06/08/2022 and forgets that her daughter a ready helped her  Anxiety most likely cause of tachycardia  Heart rate 90s sitting in office  Will have personal care check patient's pulse and record when administering her medications in the morning  Heart rate in the low 100s when patient first arrived to the office then slowed down in the 90s after 15 minutes  Patient did ambulate from her apartment to the office  If tachycardia continues, will recommend cardiology consult    Anxiety  No anxiety was ever reported before 06/08/2022  Patient developed severe anxiety on 06/08/2022 and since it was new and unsure if tachycardia caused the anxiety or the anxiety caused tachycardia, she was sent to the emergency room for further evaluation   Patient's daughter with like medication to help with anxiety  Zoloft would be more beneficial to patient than Ativan  Will start patient on Zoloft 25 mg daily for anxiety  Return to office in 2 weeks for evaluation of medication and mood    Chief Complaint     Chief Complaint   Patient presents with    Follow-up     Post hospital       History of Present Illness     80year-old female who presents in office today for follow-up tachycardia, anxiety, post hospital visit  She ambulated into the office using her walker  She is alert and oriented to self, place, and day  She is very forgetful of present events  She vaguely remembers what happened on 06/08/2022  Patient's daughter is requesting antianxiety medication for patient  Patient is pleasant and cooperative in the office  Denies having anxiety, pain, or discomfort  Will collaborate with personal care  regarding plan of care  Spoke to patient's daughter Taylor Becerra via phone regarding plan of care and she agreed to plan of care  Return to office in 2 weeks for follow-up of the effectiveness of Zoloft, anxiety, and tachycardia  The following portions of the patient's history were reviewed and updated as appropriate: allergies, current medications, past family history, past medical history, past social history, past surgical history and problem list     Review of Systems     Review of Systems   Constitutional: Negative for appetite change, chills and diaphoresis  HENT: Negative for congestion  Eyes: Negative  Respiratory: Negative for cough and shortness of breath  Cardiovascular: Negative for chest pain  Gastrointestinal: Negative for abdominal pain, constipation, diarrhea, nausea and vomiting  Endocrine: Negative  Genitourinary: Negative  Neurological: Negative for dizziness, syncope, speech difficulty, light-headedness, numbness and headaches  Psychiatric/Behavioral: Negative for behavioral problems  All other systems reviewed and are negative        Active Problem List     Patient Active Problem List   Diagnosis    Atherosclerosis 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 (HCC)    Benign essential hypertension    Cataract    GERD without esophagitis    Hypercholesterolemia    Hypertriglyceridemia    [facility]oma in remission ([facility] [address] [facility]    Vitamin D deficiency    Ambulatory dysfunction    Orthopedic hardware present    Late onset Alzheimer's disease without behavioral disturbance ([facility] [address] )    Medication care plan discussed with patient    Venous stasis dermatitis of both lower extremities    Negative depression screening    Tachycardia    Anxiety       Objective     /80 (BP Location: Left arm, Patient Position: Sitting, Cuff Size: Standard)   Pulse 92 Comment: started out at 103 went down after 5 minutes  Temp (!) 97 °F (36 1 °C) (Tympanic)   Resp 20   Ht 5' 3 5" (1 613 m)   Wt 74 kg (163 lb 3 2 oz) Comment: w shoes  SpO2 95%   BMI 28 46 kg/m²     Physical Exam  Vitals reviewed  Constitutional:       General: She is not in acute distress  Appearance: She is not toxic-appearing or diaphoretic  HENT:      Head: Normocephalic  Nose: No congestion or rhinorrhea  Mouth/Throat:      Mouth: Mucous membranes are moist       Pharynx: No oropharyngeal exudate  Eyes:      General: No scleral icterus  Right eye: No discharge  Left eye: No discharge  Extraocular Movements: Extraocular movements intact  Conjunctiva/sclera: Conjunctivae normal       Pupils: Pupils are equal, round, and reactive to light  Comments: Wears glasses  Cardiovascular:      Rate and Rhythm: Tachycardia present  Rhythm irregular  Pulses: Normal pulses  Comments: Heart rate 90s to low 100s  Rhythm slightly irregular  Pulmonary:      Effort: Pulmonary effort is normal  No respiratory distress  Breath sounds: Normal breath sounds  No wheezing, rhonchi or rales  Comments: Short of breath after ambulating from her apartment down to the office  Abdominal:      General: Bowel sounds are normal  There is no distension  Palpations: Abdomen is soft  Tenderness: There is no abdominal tenderness  There is no guarding  Musculoskeletal:      Cervical back: Neck supple  No rigidity  Right lower leg: No edema  Left lower leg: No edema  Comments: Moves all 4 extremities  Lymphadenopathy:      Cervical: No cervical adenopathy  Skin:     General: Skin is warm and dry  Capillary Refill: Capillary refill takes less than 2 seconds     Neurological: Mental Status: She is alert  Mental status is at baseline  Comments: Forgetful of present events  Psychiatric:         Mood and Affect: Mood normal          Behavior: Behavior normal          Thought Content: Thought content normal          Pertinent Laboratory/Diagnostic Studies:  Hospital lab results 06/08/2022 reviewed  BMP stable  CBC stable  TSH 3rd generation: 1 77  D-dimer: 0 44  Troponin: 7    Current Medications     Current Outpatient Medications:     sertraline (Zoloft) 25 mg tablet, Take 1 tablet (25 mg total) by mouth daily For anxiety, Disp: 90 tablet, Rfl: 3    enalapril (VASOTEC) 10 mg tablet, TAKE ONE TABLET BY MOUTH ONCE EVERY DAY FOR HYPERTENSION, Disp: 32 tablet, Rfl: 5    simvastatin (ZOCOR) 20 mg tablet, TAKE ONE TABLET BY MOUTH EVERY EVENING FOR hypercholesterolemia, Disp: 30 tablet, Rfl: 5    Health Maintenance     Health Maintenance   Topic Date Due    SLP PLAN OF CARE  Never done    COVID-19 Vaccine (1) Never done    BMI: Followup Plan  Never done    Pneumococcal Vaccine: 65+ Years (1 - PCV) Never done    Breast Cancer Screening: Mammogram  10/21/2014    DTaP,Tdap,and Td Vaccines (1 - Tdap) 01/14/2017    DXA SCAN  01/24/2021    Influenza Vaccine (Season Ended) 09/01/2022    Fall Risk  03/21/2023    Depression Screening  03/21/2023    Medicare Annual Wellness Visit (AWV)  03/21/2023    BMI: Adult  03/21/2023    HIB Vaccine  Aged Out    Hepatitis B Vaccine  Aged Out    IPV Vaccine  Aged Out    Hepatitis A Vaccine  Aged Out    Meningococcal ACWY Vaccine  Aged Out    HPV Vaccine  Aged [employer] History   Administered Date(s) Administered    INFLUENZA 01/09/2015, 10/04/2018, 10/30/2019    TD (adult) Preservative Free 01/13/2017    Td (adult), adsorbed 01/13/2017     This note was completed in part utilizing [employer] direct voice recognition software    Grammatical errors, random word insertion, spelling mistakes, and incomplete sentences may be an occasional consequence of the system secondary to software limitations, ambient noise and hardware issues  At the time of dictation, efforts were made to edit, clarify and/or correct errors  Please read the chart carefully and recognize, using context, where substitutions have occurred  If you have any questions or concerns about the context, text or information contained within the body of this dictation, please contact myself, the provider, for further clarification      [address], [address] [facility]   6/13/2022 2:06 PM

## 2022-06-13 NOTE — PATIENT INSTRUCTIONS
You were here for follow-up of tachycardia and anxiety  Zoloft 25 mg ordered daily for anxiety  Personal care to check heart rate every morning, record, and report HR >100  Return to office in 2 weeks for follow-up of effectiveness of Zoloft for anxiety

## 2022-06-16 DIAGNOSIS — F41.9 ANXIETY: ICD-10-CM

## 2022-06-16 RX ORDER — SERTRALINE HYDROCHLORIDE 25 MG/1
25 TABLET, FILM COATED ORAL DAILY
Qty: 90 TABLET | Refills: 3 | Status: SHIPPED | OUTPATIENT
Start: 2022-06-16

## 2022-06-27 NOTE — ED ATTENDING ATTESTATION
6/8/2022  IWhitney MD, saw and evaluated the patient  I have discussed the patient with the resident/non-physician practitioner and agree with the resident's/non-physician practitioner's findings, Plan of Care, and MDM as documented in the resident's/non-physician practitioner's note, except where noted  All available labs and Radiology studies were reviewed  I was present for key portions of any procedure(s) performed by the resident/non-physician practitioner and I was immediately available to provide assistance  At this point I agree with the current assessment done in the Emergency Department  I have conducted an independent evaluation of this patient a history and physical is as follows:    ED Course     Patient presents for evaluation secondary to anxiety and feeling shaky  Currently improved  Exam: AAOx3, NAD, RRR, tachy  A/p: Tachycardia  Unclear eitology  Will check labs, EKG, TSH, and DDimer         Critical Care Time  Procedures

## 2022-07-06 ENCOUNTER — OFFICE VISIT (OUTPATIENT)
Dept: GERIATRICS | Facility: CLINIC | Age: 87
End: 2022-07-06
Payer: COMMERCIAL

## 2022-07-06 VITALS
OXYGEN SATURATION: 96 % | DIASTOLIC BLOOD PRESSURE: 80 MMHG | SYSTOLIC BLOOD PRESSURE: 126 MMHG | HEIGHT: 64 IN | BODY MASS INDEX: 28.46 KG/M2 | TEMPERATURE: 97.5 F | RESPIRATION RATE: 18 BRPM | HEART RATE: 72 BPM

## 2022-07-06 DIAGNOSIS — F41.9 ANXIETY: ICD-10-CM

## 2022-07-06 DIAGNOSIS — R00.0 TACHYCARDIA: Primary | ICD-10-CM

## 2022-07-06 DIAGNOSIS — I10 BENIGN ESSENTIAL HYPERTENSION: ICD-10-CM

## 2022-07-06 PROCEDURE — 99214 OFFICE O/P EST MOD 30 MIN: CPT | Performed by: FAMILY MEDICINE

## 2022-07-06 NOTE — ASSESSMENT & PLAN NOTE
Resolved; HR trending 70s-80s  Management of anxiety as outlined below  Recent labs including CBC, BMP, TSH unremarkable  Recent EKG showing sinus tachycardia  Patient asymptomatic; continue to monitor

## 2022-07-06 NOTE — PROGRESS NOTES
[address] [facility]    NAME: Keshav Jean-Baptiste  AGE: 80 y o  SEX: female    DATE OF ENCOUNTER: 7/6/22    Assessment and Plan     Tachycardia  Resolved; HR trending 70s-80s  Management of anxiety as outlined below  Recent labs including CBC, BMP, TSH unremarkable  Recent EKG showing sinus tachycardia  Patient asymptomatic; continue to monitor    Anxiety  Continue sertraline 25mg daily; tolerating well without significant side effects reported with improvement in anxiety noted  Recheck BMP prior to 2 month follow up appointment to reassess sodium level; sooner if any clinical changes  Psychosocial support    Benign essential hypertension  At goal of <140-150/90  Continue enalapril  BMP ordered      Orders Placed This Encounter   Procedures    Basic metabolic panel       Chief Complaint     Chief Complaint   Patient presents with    Follow-up     2 weeks       History of Present Illness     80year old female presents for follow up of tachycardia and anxiety  Patient denies feeling anxious or worried; continues on sertraline 25mg daily; most recent BMP with normal sodium level  No further episodes of tachycardia reported; review of pulse log over past 10 days trending 74-84  Continues on enalapril for hypertension, /80 in office today  The following portions of the patient's history were reviewed and updated as appropriate: allergies, current medications, past family history, past medical history, past social history, past surgical history and problem list     Review of Systems     Review of Systems   Constitutional: Negative for chills, fever and unexpected weight change  Respiratory: Negative for cough and shortness of breath  Cardiovascular: Negative for chest pain, palpitations and leg swelling  Gastrointestinal: Negative for abdominal pain and nausea  Musculoskeletal: Negative for arthralgias  Neurological: Negative for dizziness and light-headedness  Psychiatric/Behavioral: The patient is not nervous/anxious  All other systems reviewed and are negative  Active Problem List     Patient Active Problem List   Diagnosis    Atherosclerosis 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 (HCC)    Benign essential hypertension    Cataract    GERD without esophagitis    Hypercholesterolemia    Hypertriglyceridemia    Lymphoma in remission ([facility] [address] [facility]    Vitamin D deficiency    Ambulatory dysfunction    Orthopedic hardware present    Late onset Alzheimer's disease without behavioral disturbance ([facility] [address] [facility]    Medication care plan discussed with patient    Venous stasis dermatitis of both lower extremities    Negative depression screening    Tachycardia    Anxiety       Objective     /80 (BP Location: Left arm, Patient Position: Sitting, Cuff Size: Standard)   Pulse 72   Temp 97 5 °F (36 4 °C) (Tympanic)   Resp 18   Ht 5' 3 5" (1 613 m) Comment: as per last vital  SpO2 96%   BMI 28 46 kg/m²     Physical Exam  Vitals and nursing note reviewed  Constitutional:       General: She is awake  She is not in acute distress  Appearance: She is well-developed and well-groomed  She is not ill-appearing, toxic-appearing or diaphoretic  HENT:      Head: Normocephalic and atraumatic  Right Ear: External ear normal       Left Ear: External ear normal       Nose: No rhinorrhea  Mouth/Throat:      Mouth: Mucous membranes are moist       Pharynx: Oropharynx is clear  Eyes:      General: No scleral icterus  Right eye: No discharge  Left eye: No discharge  Conjunctiva/sclera: Conjunctivae normal    Cardiovascular:      Rate and Rhythm: Normal rate and regular rhythm  Heart sounds: S1 normal and S2 normal    Pulmonary:      Effort: Pulmonary effort is normal  No respiratory distress  Breath sounds: No wheezing or rhonchi  Abdominal:      General: Bowel sounds are normal  There is no distension  Palpations: Abdomen is soft  Musculoskeletal:         General: Deformity (thoracic kyphosis) present  Cervical back: Normal range of motion  No rigidity  Right lower leg: No edema  Left lower leg: No edema  Skin:     General: Skin is warm and dry  Coloration: Skin is not jaundiced or pale  Comments: Venous stasis changes b/l LEs   Neurological:      Mental Status: She is alert  Mental status is at baseline  Cranial Nerves: No dysarthria or facial asymmetry  Psychiatric:         Attention and Perception: Attention and perception normal          Mood and Affect: Mood and affect normal          Behavior: Behavior is cooperative           Pertinent Laboratory/Diagnostic Studies:  Lab Results   Component Value Date    WBC 6 00 06/08/2022    HGB 14 9 06/08/2022    HCT 46 0 06/08/2022    MCV 94 06/08/2022     06/08/2022     Lab Results   Component Value Date    SODIUM 140 06/08/2022    K 4 0 06/08/2022     06/08/2022    CO2 24 06/08/2022    BUN 12 06/08/2022    CREATININE 0 68 06/08/2022    GLUC 144 (H) 06/08/2022    CALCIUM 9 3 06/08/2022     Lab Results   Component Value Date    [id number] 1 770 06/08/2022       Current Medications     Current Outpatient Medications:     enalapril (VASOTEC) 10 mg tablet, TAKE ONE TABLET BY MOUTH ONCE EVERY DAY FOR HYPERTENSION, Disp: 32 tablet, Rfl: 5    sertraline (Zoloft) 25 mg tablet, Take 1 tablet (25 mg total) by mouth daily For anxiety, Disp: 90 tablet, Rfl: 3    simvastatin (ZOCOR) 20 mg tablet, TAKE ONE TABLET BY MOUTH EVERY EVENING FOR hypercholesterolemia, Disp: 30 tablet, Rfl: [address], DO  7/6/22

## 2022-07-06 NOTE — ASSESSMENT & PLAN NOTE
Continue sertraline 25mg daily; tolerating well without significant side effects reported with improvement in anxiety noted  Recheck BMP prior to 2 month follow up appointment to reassess sodium level; sooner if any clinical changes  Psychosocial support

## 2022-08-08 DIAGNOSIS — R19.7 DIARRHEA, UNSPECIFIED TYPE: Primary | ICD-10-CM

## 2022-08-08 RX ORDER — LOPERAMIDE HYDROCHLORIDE 2 MG/1
2 CAPSULE ORAL 4 TIMES DAILY PRN
COMMUNITY
End: 2022-08-08 | Stop reason: SDUPTHER

## 2022-08-08 RX ORDER — LOPERAMIDE HYDROCHLORIDE 2 MG/1
2 CAPSULE ORAL 4 TIMES DAILY PRN
Qty: 30 CAPSULE | Refills: 0 | Status: SHIPPED | OUTPATIENT
Start: 2022-08-08 | End: 2022-09-15

## 2022-08-09 ENCOUNTER — TELEPHONE (OUTPATIENT)
Dept: GERIATRICS | Facility: CLINIC | Age: 87
End: 2022-08-09

## 2022-08-10 ENCOUNTER — HOSPITAL ENCOUNTER (EMERGENCY)
Facility: HOSPITAL | Age: 87
Discharge: HOME | End: 2022-08-10
Attending: EMERGENCY MEDICINE | Admitting: EMERGENCY MEDICINE
Payer: COMMERCIAL

## 2022-08-10 VITALS
SYSTOLIC BLOOD PRESSURE: 147 MMHG | HEART RATE: 89 BPM | RESPIRATION RATE: 17 BRPM | OXYGEN SATURATION: 94 % | DIASTOLIC BLOOD PRESSURE: 68 MMHG | TEMPERATURE: 99.6 F

## 2022-08-10 DIAGNOSIS — R19.7 DIARRHEA: Primary | ICD-10-CM

## 2022-08-10 LAB
ALBUMIN SERPL BCP-MCNC: 3 G/DL (ref 3.5–5)
ALP SERPL-CCNC: 70 U/L (ref 46–116)
ALT SERPL W P-5'-P-CCNC: 16 U/L (ref 12–78)
ANION GAP SERPL CALCULATED.3IONS-SCNC: 1 MMOL/L (ref 4–13)
AST SERPL W P-5'-P-CCNC: 21 U/L (ref 5–45)
ATRIAL RATE: 89 BPM
BASOPHILS # BLD AUTO: 0.04 THOUSANDS/ΜL (ref 0–0.1)
BASOPHILS NFR BLD AUTO: 1 % (ref 0–1)
BILIRUB SERPL-MCNC: 0.51 MG/DL (ref 0.2–1)
BUN SERPL-MCNC: 16 MG/DL (ref 5–25)
CALCIUM ALBUM COR SERPL-MCNC: 9.6 MG/DL (ref 8.3–10.1)
CALCIUM SERPL-MCNC: 8.8 MG/DL (ref 8.3–10.1)
CHLORIDE SERPL-SCNC: 110 MMOL/L (ref 96–108)
CO2 SERPL-SCNC: 30 MMOL/L (ref 21–32)
CREAT SERPL-MCNC: 0.66 MG/DL (ref 0.6–1.3)
EOSINOPHIL # BLD AUTO: 0.12 THOUSAND/ΜL (ref 0–0.61)
EOSINOPHIL NFR BLD AUTO: 2 % (ref 0–6)
ERYTHROCYTE [DISTWIDTH] IN BLOOD BY AUTOMATED COUNT: 13.7 % (ref 11.6–15.1)
FLUAV RNA RESP QL NAA+PROBE: NEGATIVE
FLUBV RNA RESP QL NAA+PROBE: NEGATIVE
GFR SERPL CREATININE-BSD FRML MDRD: 75 ML/MIN/1.73SQ M
GLUCOSE SERPL-MCNC: 97 MG/DL (ref 65–140)
HCT VFR BLD AUTO: 45.4 % (ref 34.8–46.1)
HGB BLD-MCNC: 14.5 G/DL (ref 11.5–15.4)
IMM GRANULOCYTES # BLD AUTO: 0.04 THOUSAND/UL (ref 0–0.2)
IMM GRANULOCYTES NFR BLD AUTO: 1 % (ref 0–2)
LYMPHOCYTES # BLD AUTO: 1.34 THOUSANDS/ΜL (ref 0.6–4.47)
LYMPHOCYTES NFR BLD AUTO: 20 % (ref 14–44)
MCH RBC QN AUTO: 30.3 PG (ref 26.8–34.3)
MCHC RBC AUTO-ENTMCNC: 31.9 G/DL (ref 31.4–37.4)
MCV RBC AUTO: 95 FL (ref 82–98)
MONOCYTES # BLD AUTO: 0.79 THOUSAND/ΜL (ref 0.17–1.22)
MONOCYTES NFR BLD AUTO: 12 % (ref 4–12)
NEUTROPHILS # BLD AUTO: 4.28 THOUSANDS/ΜL (ref 1.85–7.62)
NEUTS SEG NFR BLD AUTO: 64 % (ref 43–75)
NRBC BLD AUTO-RTO: 0 /100 WBCS
P AXIS: 64 DEGREES
PLATELET # BLD AUTO: 276 THOUSANDS/UL (ref 149–390)
PMV BLD AUTO: 11.5 FL (ref 8.9–12.7)
POTASSIUM SERPL-SCNC: 3.9 MMOL/L (ref 3.5–5.3)
PR INTERVAL: 170 MS
PROT SERPL-MCNC: 7.3 G/DL (ref 6.4–8.4)
QRS AXIS: 59 DEGREES
QRSD INTERVAL: 82 MS
QT INTERVAL: 370 MS
QTC INTERVAL: 450 MS
RBC # BLD AUTO: 4.79 MILLION/UL (ref 3.81–5.12)
RSV RNA RESP QL NAA+PROBE: NEGATIVE
SARS-COV-2 RNA RESP QL NAA+PROBE: NEGATIVE
SODIUM SERPL-SCNC: 141 MMOL/L (ref 135–147)
T WAVE AXIS: 50 DEGREES
VENTRICULAR RATE: 89 BPM
WBC # BLD AUTO: 6.61 THOUSAND/UL (ref 4.31–10.16)

## 2022-08-10 PROCEDURE — 93010 ELECTROCARDIOGRAM REPORT: CPT | Performed by: INTERNAL MEDICINE

## 2022-08-10 PROCEDURE — 93005 ELECTROCARDIOGRAM TRACING: CPT

## 2022-08-10 PROCEDURE — 99284 EMERGENCY DEPT VISIT MOD MDM: CPT

## 2022-08-10 PROCEDURE — 96365 THER/PROPH/DIAG IV INF INIT: CPT

## 2022-08-10 PROCEDURE — 36415 COLL VENOUS BLD VENIPUNCTURE: CPT | Performed by: STUDENT IN AN ORGANIZED HEALTH CARE EDUCATION/TRAINING PROGRAM

## 2022-08-10 PROCEDURE — 80053 COMPREHEN METABOLIC PANEL: CPT | Performed by: STUDENT IN AN ORGANIZED HEALTH CARE EDUCATION/TRAINING PROGRAM

## 2022-08-10 PROCEDURE — 85025 COMPLETE CBC W/AUTO DIFF WBC: CPT | Performed by: STUDENT IN AN ORGANIZED HEALTH CARE EDUCATION/TRAINING PROGRAM

## 2022-08-10 PROCEDURE — 99285 EMERGENCY DEPT VISIT HI MDM: CPT | Performed by: EMERGENCY MEDICINE

## 2022-08-10 PROCEDURE — 0241U HB NFCT DS VIR RESP RNA 4 TRGT: CPT | Performed by: STUDENT IN AN ORGANIZED HEALTH CARE EDUCATION/TRAINING PROGRAM

## 2022-08-10 RX ORDER — SODIUM CHLORIDE, SODIUM GLUCONATE, SODIUM ACETATE, POTASSIUM CHLORIDE, MAGNESIUM CHLORIDE, SODIUM PHOSPHATE, DIBASIC, AND POTASSIUM PHOSPHATE .53; .5; .37; .037; .03; .012; .00082 G/100ML; G/100ML; G/100ML; G/100ML; G/100ML; G/100ML; G/100ML
500 INJECTION, SOLUTION INTRAVENOUS ONCE
Status: COMPLETED | OUTPATIENT
Start: 2022-08-10 | End: 2022-08-10

## 2022-08-10 RX ADMIN — SODIUM CHLORIDE, SODIUM GLUCONATE, SODIUM ACETATE, POTASSIUM CHLORIDE, MAGNESIUM CHLORIDE, SODIUM PHOSPHATE, DIBASIC, AND POTASSIUM PHOSPHATE 500 ML: .53; .5; .37; .037; .03; .012; .00082 INJECTION, SOLUTION INTRAVENOUS at 13:25

## 2022-08-10 NOTE — ED NOTES
Pt able to stand up quickly without assistance  Walked quickly and steadily with a walker       Nancyann Collet, RN  08/10/22 [id number]

## 2022-08-10 NOTE — DISCHARGE INSTRUCTIONS
You were seen in the ED for diarrhea  Your evaluation was negative  Please follow up with your primary care provider  Return to the ED if you develop any blood in your diarrhea, fevers, if you develop difficulty breathing, or for any new or concerning symptoms

## 2022-08-10 NOTE — ED ATTENDING ATTESTATION
Final Diagnosis:  1  Diarrhea           iKshor PACK MD, saw and evaluated the patient  All available labs and X-rays were ordered by me or the resident and have been reviewed by myself  I discussed the patient with the resident / non-physician and agree with the resident's / non-physician practitioner's findings and plan as documented in the resident's / non-physician practicitioner's note, except where noted  At this point, I agree with the current assessment done in the ED  I was present during key portions of all procedures performed unless otherwise stated  Chief Complaint   Patient presents with    Diarrhea     Per EMS pt 's facility reported she has had diarrhea for 6 days  Pt  Denies n/v  Pt  Forgetful at baseline  This is a 80 y o  female presenting for evaluation of diarrhea  Patient is a poor historian, doesn't know why she is here  States she feels fine  Denies f/ch/n/v/cp/sob  PMH:   has a past medical history of Anorexia, Closed fracture dislocation of right ankle joint (5/20/2019), GERD (gastroesophageal reflux disease), Herpes zoster, Hypertension, Lymphoma ([facility] [address] Malignant melanoma of skin ([facility] [address] ), and Pelvic fracture ([facility] [address] )  PSH:   has a past surgical history that includes pr open rx distal radius fx, intra-articular, 3+ frag (Left, 1/18/2017); Breast surgery; Cataract extraction (Right, 06/19/2013); Hysterectomy; BYPASS FEMORAL-POPLITEAL (Left); Other surgical history (05/30/2013); ORIF tibia & fibula fractures (Right, 5/21/2019); and ORIF tibia & fibula fractures (Right, 6/4/2019)      Social:  Social History     Substance and Sexual Activity   Alcohol Use Yes    Alcohol/week: 1 0 standard drink    Types: 1 Glasses of wine per week    Comment: Per allscripts - social drinker      Social History     Tobacco Use   Smoking Status Never Smoker   Smokeless Tobacco Never Used     Social History     Substance and Sexual Activity   Drug Use No     PE:  Vitals:    08/10/22 1249 08/10/22 1252   BP: 147/68    BP Location: Right arm    Pulse: 89    Resp: 17    Temp:  99 6 °F (37 6 °C)   TempSrc:  Oral   SpO2: 94%    General: VSS, NAD, awake, alert  Well-nourished, well-developed  Appears stated age  Head: Normocephalic, atraumatic, nontender  Eyes: PERRL, EOM-I  No diplopia  No hyphema  No subconjunctival hemorrhages  Symmetrical lids  ENTAtraumatic external nose and ears  MMM  No stridor  Normal phonation  No drooling  Base of mouth is soft  No mastoid tenderness  Neck: Symmetric, trachea midline  No JVD  CV: Peripheral pulses +2 throughout  No chest wall tenderness  Lungs:   Unlabored   No retractions  No crepitus  No tachypnea  No paradoxical motion  Abd: +BS, soft, NT/ND    MSK:   FROM   Back:   No CVAT  Skin: Dry, intact  Neuro: awake  Alert  Ox3, GCS 15, CN II-XII grossly intact  Motor grossly intact  Psychiatric/Behavioral: Appropriate mood and affect   Exam: deferred  A:  - diarrhea??  P:  - Looks well  - call facility ? find out more history about why she is here  - 13 point ROS was performed and all are normal unless stated in the history above  - Nursing note reviewed  Vitals reviewed  - Orders placed by myself and/or advanced practitioner / resident     - Previous chart was reviewed  - No language barrier    - History obtained from patient  - There are limitations to the history obtained  Reasons ROS could not be obtained: dementia?  - Critical care time: Not applicable for this patient       Code Status: Prior  Advance Directive and Living Will: Yes    Power of :    POLST:      Medications   multi-electrolyte (ISOLYTE-S PH 7 4) bolus 500 mL (0 mL Intravenous Stopped 8/10/22 1415)     No orders to display     Orders Placed This Encounter   Procedures    ED ECG Documentation Only    FLU/RSV/COVID - if FLU/RSV clinically relevant    CBC and differential    Comprehensive metabolic panel    ECG 12 lead    ECG 12 lead     Labs Reviewed   COMPREHENSIVE METABOLIC PANEL - Abnormal       Result Value Ref Range Status    Sodium 141  135 - 147 mmol/L Final    Potassium 3 9  3 5 - 5 3 mmol/L Final    Chloride 110 (*) 96 - 108 mmol/L Final    CO2 30  21 - 32 mmol/L Final    ANION GAP 1 (*) 4 - 13 mmol/L Final    BUN 16  5 - 25 mg/dL Final    Creatinine 0 66  0 60 - 1 30 mg/dL Final    Comment: Standardized to IDMS reference method    Glucose 97  65 - 140 mg/dL Final    Comment: If the patient is fasting, the ADA then defines impaired fasting glucose as > 100 mg/dL and diabetes as > or equal to 123 mg/dL  Specimen collection should occur prior to Sulfasalazine administration due to the potential for falsely depressed results  Specimen collection should occur prior to Sulfapyridine administration due to the potential for falsely elevated results  Calcium 8 8  8 3 - 10 1 mg/dL Final    Corrected Calcium 9 6  8 3 - 10 1 mg/dL Final    AST 21  5 - 45 U/L Final    Comment: Specimen collection should occur prior to Sulfasalazine administration due to the potential for falsely depressed results  ALT 16  12 - 78 U/L Final    Comment: Specimen collection should occur prior to Sulfasalazine and/or Sulfapyridine administration due to the potential for falsely depressed results  Alkaline Phosphatase 70  46 - 116 U/L Final    Total Protein 7 3  6 4 - 8 4 g/dL Final    Albumin 3 0 (*) 3 5 - 5 0 g/dL Final    Total Bilirubin 0 51  0 20 - 1 00 mg/dL Final    Comment: Use of this assay is not recommended for patients undergoing treatment with eltrombopag due to the potential for falsely elevated results      eGFR 75  ml/min/1 73sq m Final    Narrative:     [place] guidelines for Chronic Kidney Disease (CKD):     Stage 1 with normal or high GFR (GFR > 90 mL/min/1 73 square meters)    Stage 2 Mild CKD (GFR = 60-89 mL/min/1 73 square meters)    Stage 3A Moderate CKD (GFR = 45-59 mL/min/1 73 square meters)    Stage 3B Moderate CKD (GFR = 30-44 mL/min/1 73 square meters)    Stage 4 Severe CKD (GFR = 15-29 mL/min/1 73 square meters)    Stage 5 End Stage CKD (GFR <15 mL/min/1 73 square meters)  Note: GFR calculation is accurate only with a steady state creatinine   CBC AND DIFFERENTIAL    WBC 6 61  4 31 - 10 16 Thousand/uL Final    RBC 4 79  3 81 - 5 12 Million/uL Final    Hemoglobin 14 5  11 5 - 15 4 g/dL Final    Hematocrit 45 4  34 8 - 46 1 % Final    MCV 95  82 - 98 fL Final    MCH 30 3  26 8 - 34 3 pg Final    MCHC 31 9  31 4 - 37 4 g/dL Final    RDW 13 7  11 6 - 15 1 % Final    MPV 11 5  8 9 - 12 7 fL Final    Platelets 736  047 - 390 Thousands/uL Final    nRBC 0  /100 WBCs Final    Neutrophils Relative 64  43 - 75 % Final    Immat GRANS % 1  0 - 2 % Final    Lymphocytes Relative 20  14 - 44 % Final    Monocytes Relative 12  4 - 12 % Final    Eosinophils Relative 2  0 - 6 % Final    Basophils Relative 1  0 - 1 % Final    Neutrophils Absolute 4 28  1 85 - 7 62 Thousands/µL Final    Immature Grans Absolute 0 04  0 00 - 0 20 Thousand/uL Final    Lymphocytes Absolute 1 34  0 60 - 4 47 Thousands/µL Final    Monocytes Absolute 0 79  0 17 - 1 22 Thousand/µL Final    Eosinophils Absolute 0 12  0 00 - 0 61 Thousand/µL Final    Basophils Absolute 0 04  0 00 - 0 10 Thousands/µL Final     Time reflects when diagnosis was documented in both MDM as applicable and the Disposition within this note       Time User Action Codes Description Comment    8/10/2022  3:04 PM Mora Walker Add [R19 7] Diarrhea           ED Disposition       ED Disposition   Discharge    Condition   Stable    Date/Time   Wed Aug 10, 2022  2:55 PM    Comment   Claudine Bernardino discharge to home/self care                     Follow-up Information       Follow up With Specialties Details Why Contact Info Additional Information    Vernon Suggs DO Geriatric Medicine   [address] [place] [address] Darwin Castano [address] Department Emergency Medicine  As needed [address] [phone]  [facility] [address] Emergency Department, [address], [place], [place], [address]          Discharge Medication List as of 8/10/2022  3:06 PM        CONTINUE these medications which have NOT CHANGED    Details   loperamide (IMODIUM) 2 mg capsule Take 1 capsule (2 mg total) by mouth 4 (four) times a day as needed for diarrhea Take Imodium 4x a day as needed for diarrhea for4 days only  , Starting Mon 8/8/2022, Normal      enalapril (VASOTEC) 10 mg tablet TAKE ONE TABLET BY MOUTH ONCE EVERY DAY FOR HYPERTENSION, Normal      sertraline (Zoloft) 25 mg tablet Take 1 tablet (25 mg total) by mouth daily For anxiety, Starting Thu 6/16/2022, Normal      simvastatin (ZOCOR) 20 mg tablet TAKE ONE TABLET BY MOUTH EVERY EVENING FOR hypercholesterolemia, Normal           No discharge procedures on file  Prior to Admission Medications   Prescriptions Last Dose Informant Patient Reported? Taking?   enalapril (VASOTEC) 10 mg tablet   No No   Sig: TAKE ONE TABLET BY MOUTH ONCE EVERY DAY FOR HYPERTENSION   loperamide (IMODIUM) 2 mg capsule   No No   Sig: Take 1 capsule (2 mg total) by mouth 4 (four) times a day as needed for diarrhea Take Imodium 4x a day as needed for diarrhea for4 days only  sertraline (Zoloft) 25 mg tablet   No No   Sig: Take 1 tablet (25 mg total) by mouth daily For anxiety   simvastatin (ZOCOR) 20 mg tablet   No No   Sig: TAKE ONE TABLET BY MOUTH EVERY EVENING FOR hypercholesterolemia      Facility-Administered Medications: None       Portions of the record may have been created with voice recognition software  Occasional wrong word or "sound a like" substitutions may have occurred due to the inherent limitations of voice recognition software  Read the chart carefully and recognize, using context, where substitutions have occurred      Electronically signed by:  Jesusita Up Jcda Spire

## 2022-08-11 NOTE — ED PROVIDER NOTES
History  Chief Complaint   Patient presents with    Diarrhea     Per EMS pt 's facility reported she has had diarrhea for 6 days  Pt  Denies n/v  Pt  Forgetful at baseline  Patient is a 81 YO F, PMHx of HTN, HLD, dementia, and GERD, who presents to the ED for diarrhea  Per EMS, they were called because patient has had diarrhea for approximately 6 days  No other history was given to EMS  Currently, patient has no complaints  She denies any abdominal pain, chest pain, SOB, nausea, vomiting, diarrhea, or any other new or concerning symptoms  Furhter hx limited 2/2 dementia  History provided by:  EMS personnel  History limited by:  Dementia   used: No    Diarrhea      Prior to Admission Medications   Prescriptions Last Dose Informant Patient Reported? Taking?   enalapril (VASOTEC) 10 mg tablet   No No   Sig: TAKE ONE TABLET BY MOUTH ONCE EVERY DAY FOR HYPERTENSION   loperamide (IMODIUM) 2 mg capsule   No No   Sig: Take 1 capsule (2 mg total) by mouth 4 (four) times a day as needed for diarrhea Take Imodium 4x a day as needed for diarrhea for4 days only     sertraline (Zoloft) 25 mg tablet   No No   Sig: Take 1 tablet (25 mg total) by mouth daily For anxiety   simvastatin (ZOCOR) 20 mg tablet   No No   Sig: TAKE ONE TABLET BY MOUTH EVERY EVENING FOR hypercholesterolemia      Facility-Administered Medications: None       Past Medical History:   Diagnosis Date    Anorexia     last assessed: 8/9/2013    Closed fracture dislocation of right ankle joint 5/20/2019    Added automatically from request for surgery [id number]    GERD (gastroesophageal reflux disease)     Herpes zoster     last assessed: 9/11/2013    Hypertension     Lymphoma ([facility] [address] )     resolved: 1997    Malignant melanoma of skin ([facility] [address] )     resolved: 2015    Pelvic fracture ([facility] [address] )        Past Surgical History:   Procedure Laterality Date    BREAST SURGERY      enlargement procedure    BYPASS FEMORAL-POPLITEAL Left onset: 8/31/2012    CATARACT EXTRACTION Right 06/19/2013    HYSTERECTOMY      resolved: 1970    ORIF TIBIA & FIBULA FRACTURES Right 5/21/2019    Procedure: OPEN REDUCTION W/ INTERNAL FIXATION (ORIF) ANKLE;  Surgeon: Simeon Pelayo MD;  [facility];  Service: Orthopedics    ORIF TIBIA & FIBULA FRACTURES Right 6/4/2019    Procedure: Revision right ankle ORIF;  Surgeon: Simeon ePlayo MD;  [facility];  Service: Orthopedics    OTHER SURGICAL HISTORY  05/30/2013    PTA Femoral-popliteal initial stenosis left Description: with mechanical thrombectomy, PTA and stent     UT OPEN RX DISTAL RADIUS FX, INTRA-ARTICULAR, 3+ FRAG Left 1/18/2017    Procedure: DISTAL RADIUS OPEN REDUCTION W/ INTERNAL FIXATION ;  Surgeon: Edward Day MD;  [facility]: [facility];  Service: Orthopedics       Family History   Problem Relation Age of Onset    Heart attack Mother     Heart failure Father     Other Sister         thyroid surgery     I have reviewed and agree with the history as documented  E-Cigarette/Vaping     E-Cigarette/Vaping Substances     Social History     Tobacco Use    Smoking status: Never Smoker    Smokeless tobacco: Never Used   Substance Use Topics    Alcohol use:  Yes     Alcohol/week: 1 0 standard drink     Types: 1 Glasses of wine per week     Comment: Per allscripts - social drinker     Drug use: No        Review of Systems   Unable to perform ROS: Dementia       Physical Exam  ED Triage Vitals   Temperature Pulse Respirations Blood Pressure SpO2   08/10/22 1252 08/10/22 1249 08/10/22 1249 08/10/22 1249 08/10/22 1249   99 6 °F (37 6 °C) 89 17 147/68 94 %      Temp Source Heart Rate Source Patient Position - Orthostatic VS BP Location FiO2 (%)   08/10/22 1252 08/10/22 1249 08/10/22 1249 08/10/22 1249 --   Oral Monitor Sitting Right arm       Pain Score       --                    Orthostatic Vital Signs  Vitals:    08/10/22 1249   BP: 147/68   Pulse: 89   Patient Position - Orthostatic VS: Sitting       Physical Exam  Vitals and nursing note reviewed  Constitutional:       General: She is not in acute distress  Appearance: She is well-developed  She is not diaphoretic  HENT:      Head: Normocephalic and atraumatic  Right Ear: External ear normal       Left Ear: External ear normal       Nose: Nose normal    Eyes:      General: Lids are normal  No scleral icterus  Cardiovascular:      Rate and Rhythm: Normal rate and regular rhythm  Heart sounds: Normal heart sounds  No murmur heard  No friction rub  No gallop  Pulmonary:      Effort: Pulmonary effort is normal  No respiratory distress  Breath sounds: Normal breath sounds  No wheezing or rales  Abdominal:      Palpations: Abdomen is soft  Tenderness: There is no abdominal tenderness  There is no guarding or rebound  Musculoskeletal:         General: No deformity  Normal range of motion  Cervical back: Normal range of motion and neck supple  Skin:     General: Skin is warm and dry  Neurological:      General: No focal deficit present  Mental Status: She is alert  Psychiatric:         Mood and Affect: Mood normal          Behavior: Behavior normal          ED Medications  Medications   multi-electrolyte (ISOLYTE-S PH 7 4) bolus 500 mL (0 mL Intravenous Stopped 8/10/22 1415)       Diagnostic Studies  Results Reviewed     Procedure Component Value Units Date/Time    FLU/RSV/COVID - if FLU/RSV clinically relevant [[id number]]  (Normal) Collected: 08/10/22 1416    Lab Status: Final result Specimen: Nares from Nose Updated: 08/10/22 6757     SARS-CoV-2 Negative     INFLUENZA A PCR Negative     INFLUENZA B PCR Negative     RSV PCR Negative    Narrative:      FOR PEDIATRIC PATIENTS - copy/paste COVID Guidelines URL to browser: [email or web address]    SARS-CoV-2 assay is a Nucleic Acid Amplification assay intended for the  qualitative detection of nucleic acid from SARS-CoV-2 in nasopharyngeal  swabs  Results are for the presumptive identification of SARS-CoV-2 RNA  Positive results are indicative of infection with SARS-CoV-2, the virus  causing COVID-19, but do not rule out bacterial infection or co-infection  with other viruses  Laboratories within the [facility] and its  territories are required to report all positive results to the appropriate  public health authorities  Negative results do not preclude SARS-CoV-2  infection and should not be used as the sole basis for treatment or other  patient management decisions  Negative results must be combined with  clinical observations, patient history, and epidemiological information  This test has not been FDA cleared or approved  This test has been authorized by FDA under an Emergency Use Authorization  (EUA)  This test is only authorized for the duration of time the  declaration that circumstances exist justifying the authorization of the  emergency use of an in vitro diagnostic tests for detection of SARS-CoV-2  virus and/or diagnosis of COVID-19 infection under section 564(b)(1) of  the Act, 21 U  S C  894NQB-1(Y)(5), unless the authorization is terminated  or revoked sooner  The test has been validated but independent review by FDA  and CLIA is pending  Test performed using [employer] GeneXpert: This RT-PCR assay targets N2,  a region unique to SARS-CoV-2  A conserved region in the E-gene was chosen  for pan-Sarbecovirus detection which includes SARS-CoV-2      Comprehensive metabolic panel [[id number]]  (Abnormal) Collected: 08/10/22 1316    Lab Status: Final result Specimen: Blood from Arm, Left Updated: 08/10/22 1356     Sodium 141 mmol/L      Potassium 3 9 mmol/L      Chloride 110 mmol/L      CO2 30 mmol/L      ANION GAP 1 mmol/L      BUN 16 mg/dL      Creatinine 0 66 mg/dL      Glucose 97 mg/dL      Calcium 8 8 mg/dL      Corrected Calcium 9 6 mg/dL      AST 21 U/L      ALT 16 U/L Alkaline Phosphatase 70 U/L      Total Protein 7 3 g/dL      Albumin 3 0 g/dL      Total Bilirubin 0 51 mg/dL      eGFR 75 ml/min/1 73sq m     Narrative:      National Kidney Disease Foundation guidelines for Chronic Kidney Disease (CKD):     Stage 1 with normal or high GFR (GFR > 90 mL/min/1 73 square meters)    Stage 2 Mild CKD (GFR = 60-89 mL/min/1 73 square meters)    Stage 3A Moderate CKD (GFR = 45-59 mL/min/1 73 square meters)    Stage 3B Moderate CKD (GFR = 30-44 mL/min/1 73 square meters)    Stage 4 Severe CKD (GFR = 15-29 mL/min/1 73 square meters)    Stage 5 End Stage CKD (GFR <15 mL/min/1 73 square meters)  Note: GFR calculation is accurate only with a steady state creatinine    CBC and differential [[id number]] Collected: 08/10/22 1316    Lab Status: Final result Specimen: Blood from Arm, Left Updated: 08/10/22 1331     WBC 6 61 Thousand/uL      RBC 4 79 Million/uL      Hemoglobin 14 5 g/dL      Hematocrit 45 4 %      MCV 95 fL      MCH 30 3 pg      MCHC 31 9 g/dL      RDW 13 7 %      MPV 11 5 fL      Platelets 793 Thousands/uL      nRBC 0 /100 WBCs      Neutrophils Relative 64 %      Immat GRANS % 1 %      Lymphocytes Relative 20 %      Monocytes Relative 12 %      Eosinophils Relative 2 %      Basophils Relative 1 %      Neutrophils Absolute 4 28 Thousands/µL      Immature Grans Absolute 0 04 Thousand/uL      Lymphocytes Absolute 1 34 Thousands/µL      Monocytes Absolute 0 79 Thousand/µL      Eosinophils Absolute 0 12 Thousand/µL      Basophils Absolute 0 04 Thousands/µL                  No orders to display         Procedures  ECG 12 Lead Documentation Only    Date/Time: 8/10/2022 10:04 PM  Performed by: Ashely Cordero DO  Authorized by: Ashely Cordero DO     ECG reviewed by me, the ED Provider: yes    Patient location:  ED  Interpretation:     Interpretation: abnormal    Rate:     ECG rate:  89    ECG rate assessment: normal    Rhythm:     Rhythm: sinus rhythm    Ectopy:     Ectopy: PVCs      PVCs:  Infrequent  QRS:     QRS axis:  Normal  Conduction:     Conduction: normal    ST segments:     ST segments:  Normal  T waves:     T waves: normal            ED Course  ED Course as of 08/10/22 2212   Wed Aug 10, 2022   1304 Spoke with Paulette at patient's facility  She states that patient normally has help daily  Over the past several days staff has noticed loose stool in her diaper  There has been no blood  Today, they felt that patient was somewhat harder to wake up and patient needed assistance getting out of bed (and she normally does not need assistance)  No other concerns at this time  Will reassess patient following fluids   1337 CBC and differential   1359 Sodium: 141   1359 Potassium: 3 9   1359 eGFR: 75   1430 Patient re-evaluated  Remains without complaints at this time  Niece at bedside  States she say patient this AM prior to coming in and she was more tired than usual this AM     1500 Patient again re-evaluated  Able to get out of bed without assistance and ambulate using a walker (which is her baseline)  Niece reports patient is back to baseline  Will d/c  Recommended PCP f/u  RTED precautions discussed  All questions answered  MDM  Number of Diagnoses or Management Options  Diarrhea  Diagnosis management comments: Patient is a 80 y o  female who presents to the ED for diarrhea  Further hx limited 2/2 dementia  Patient non-toxic appearing, afebrile, and in NAD  No abdominal tenderness on exam      Unclear etiology of diarrhea  Doubt acute bacterial diarrhea  Also doubt other intraabdominal infections  Low suspicion for secondary causes of diarrhea such as hyperthyroidism  Plan: Labs, fluids, reassessment  Will reach out to patient's living facility for more information  Portions of the record may have been created with voice recognition software   Occasional wrong word or "sound a like" substitutions may have occurred due to the inherent limitations of voice recognition software  Read the chart carefully and recognize, using context, where substitutions have occurred  Amount and/or Complexity of Data Reviewed  Clinical lab tests: ordered  Tests in the medicine section of CPT®: ordered    Risk of Complications, Morbidity, and/or Mortality  Presenting problems: low  Diagnostic procedures: low  Management options: low    Patient Progress  Patient progress: stable      Disposition  Final diagnoses:   Diarrhea     Time reflects when diagnosis was documented in both MDM as applicable and the Disposition within this note     Time User Action Codes Description Comment    8/10/2022  3:04 PM Bozena Holly Add [R19 7] Diarrhea       ED Disposition     ED Disposition   Discharge    Condition   Stable    Date/Time   Wed Aug 10, 2022  2:55 PM    Comment   Mercedez Coleman discharge to home/self care  Follow-up Information     Follow up With Specialties Details Why Contact Info Additional Information    Graham Garsia, DO Geriatric Medicine   [address] [place] [address] Emergency Department Emergency Medicine  As needed [place] [address] [phone]  [address] Emergency Department, [address], [place], [place], [address] [facility]          Discharge Medication List as of 8/10/2022  3:06 PM      CONTINUE these medications which have NOT CHANGED    Details   loperamide (IMODIUM) 2 mg capsule Take 1 capsule (2 mg total) by mouth 4 (four) times a day as needed for diarrhea Take Imodium 4x a day as needed for diarrhea for4 days only  , Starting Mon 8/8/2022, Normal      enalapril (VASOTEC) 10 mg tablet TAKE ONE TABLET BY MOUTH ONCE EVERY DAY FOR HYPERTENSION, Normal      sertraline (Zoloft) 25 mg tablet Take 1 tablet (25 mg total) by mouth daily For anxiety, Starting Thu 6/16/2022, Normal      simvastatin (ZOCOR) 20 mg tablet TAKE ONE TABLET BY MOUTH EVERY EVENING FOR hypercholesterolemia, Normal           No discharge procedures on file  PDMP Review     None           ED Provider  Attending physically available and evaluated Audra Sarbjit PACK managed the patient along with the ED Attending      Electronically Signed by         Racquel Paige DO  08/10/22 1253

## 2022-08-15 ENCOUNTER — OFFICE VISIT (OUTPATIENT)
Dept: GERIATRICS | Facility: CLINIC | Age: 87
End: 2022-08-15
Payer: COMMERCIAL

## 2022-08-15 VITALS
TEMPERATURE: 97.5 F | HEIGHT: 64 IN | OXYGEN SATURATION: 94 % | SYSTOLIC BLOOD PRESSURE: 122 MMHG | HEART RATE: 104 BPM | BODY MASS INDEX: 26.87 KG/M2 | WEIGHT: 157.4 LBS | DIASTOLIC BLOOD PRESSURE: 74 MMHG | RESPIRATION RATE: 33 BRPM

## 2022-08-15 DIAGNOSIS — F41.9 ANXIETY: ICD-10-CM

## 2022-08-15 DIAGNOSIS — K59.1 FUNCTIONAL DIARRHEA: Primary | ICD-10-CM

## 2022-08-15 PROCEDURE — 99213 OFFICE O/P EST LOW 20 MIN: CPT | Performed by: NURSE PRACTITIONER

## 2022-08-15 NOTE — PATIENT INSTRUCTIONS
You were here for follow up of diarrhea, post hospital  You stated that you had a normal bowel movement last night and no further diarrhea  No diarrhea reported from personal care staff  You lost 6 lb since June office visit  Most recent hospital lab results revealed no acute concerns  No open areas noted on exam  Your vital signs are stable  Continue to stay hydrated by drinking fluids  Return to office as per routine or sooner if needed

## 2022-08-15 NOTE — ASSESSMENT & PLAN NOTE
With bilateral upper extremity shakiness when anxious  Continue sertraline 25 mg daily; no side effects reported  Continue psychosocial support

## 2022-08-15 NOTE — PROGRESS NOTES
[address] [facility]  POS: [facility]     NAME: Griselda Plan  AGE: 80 y o  SEX: female    DATE OF ENCOUNTER: 8/15/2022    Assessment and Plan     80year-old female with:    Functional diarrhea  Diarrhea of unknown etiology most likely not infectious as it has resolved  Patient had diarrhea last week for approximately 6 days as reported by personal care staff and was evaluated in the emergency room on 08/10/2022  Patient was treated with IV fluids and returned to her apartment  Blood work in the emergency room revealed no acute findings  She is at baseline, eating and drinking fluids without difficulty  No further diarrhea reported  Patient states that she had a normal bowel movement yesterday    Anxiety  With bilateral upper extremity shakiness when anxious  Continue sertraline 25 mg daily; no side effects reported  Continue psychosocial support    No orders of the defined types were placed in this encounter     - Counseling Documentation: patient was counseled regarding: importance of compliance with treatment    Chief Complaint     Chief Complaint   Patient presents with    Follow-up     Post ED visit, break down under belly fold and on bottom  History of Present Illness     80year-old female who presents in office today for follow-up of diarrhea  Patient presents in a wheelchair by herself  Patient was evaluated in the emergency room recently on 08/10/2022 due to weakness and diarrhea, treated with IV fluids, condition returned to baseline, and patient was discharged from the emergency room back to her apartment  No further reports of diarrhea  Patient is forgetful of present events, but able to express her needs  She receives assistance from personal care  She has bilateral lower extremity weakness and uses a wheelchair for long distance  No open areas noted      The following portions of the patient's history were reviewed and updated as appropriate: allergies, current medications, past family history, past medical history, past social history, past surgical history and problem list     Review of Systems     Review of Systems   Constitutional: Negative for appetite change, chills and diaphoresis  HENT: Negative for congestion  Respiratory: Negative for cough and shortness of breath  Cardiovascular: Negative  Gastrointestinal: Negative for abdominal pain, constipation and diarrhea  Endocrine: Negative  Genitourinary: Negative  Musculoskeletal: Positive for gait problem  Neurological: Positive for tremors (Bilateral upper extremities when anxious)  Negative for dizziness, syncope, speech difficulty, light-headedness, numbness and headaches  Memory impairment   Psychiatric/Behavioral: Negative for behavioral problems and dysphoric mood  The patient is nervous/anxious  All other systems reviewed and are negative  Active Problem List     Patient Active Problem List   Diagnosis    Atherosclerosis 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 (HCC)    Benign essential hypertension    Cataract    GERD without esophagitis    Hypercholesterolemia    Hypertriglyceridemia    [facility] ([facility]    Vitamin D deficiency    Ambulatory dysfunction    Orthopedic hardware present    Late onset Alzheimer's disease without behavioral disturbance ([facility] )    Medication care plan discussed with patient    Venous stasis dermatitis of both lower extremities    Negative depression screening    Tachycardia    Anxiety    Functional diarrhea       Objective     /74 (BP Location: Left arm, Patient Position: Sitting, Cuff Size: Standard)   Pulse 104   Temp 97 5 °F (36 4 °C) (Tympanic)   Resp (!) 33   Ht 5' 3 5" (1 613 m)   Wt 71 4 kg (157 lb 6 4 oz)   LMP  (LMP Unknown)   SpO2 94%   BMI 27 44 kg/m²     Physical Exam  Vitals and nursing note reviewed     Constitutional:       General: She is not in acute distress  Appearance: She is not toxic-appearing or diaphoretic  HENT:      Head: Normocephalic  Nose: No congestion or rhinorrhea  Mouth/Throat:      Mouth: Mucous membranes are moist       Pharynx: No oropharyngeal exudate  Eyes:      General: No scleral icterus  Right eye: No discharge  Left eye: No discharge  Extraocular Movements: Extraocular movements intact  Conjunctiva/sclera: Conjunctivae normal       Pupils: Pupils are equal, round, and reactive to light  Cardiovascular:      Rate and Rhythm: Normal rate and regular rhythm  Pulses: Normal pulses  Pulmonary:      Effort: Pulmonary effort is normal  No respiratory distress  Breath sounds: Normal breath sounds  No wheezing, rhonchi or rales  Abdominal:      General: Bowel sounds are normal  There is no distension  Palpations: Abdomen is soft  Tenderness: There is no abdominal tenderness  There is no guarding  Musculoskeletal:      Cervical back: Neck supple  No rigidity  Right lower leg: No edema  Left lower leg: No edema  Comments: Moves all 4 extremities with BLE weakness  BUE shakiness  Lymphadenopathy:      Cervical: No cervical adenopathy  Skin:     General: Skin is warm and dry  Capillary Refill: Capillary refill takes less than 2 seconds  Comments: Bilateral gluteal cleft dark pink most likely due to recent diarrhea, but shows healing  No open areas noted on exam    Neurological:      Mental Status: She is alert  Mental status is at baseline  Motor: Weakness present  Gait: Gait abnormal       Comments: Forgetful  Psychiatric:         Mood and Affect: Mood normal          Behavior: Behavior normal          Thought Content:  Thought content normal          Pertinent Laboratory/Diagnostic Studies:  08/10/2022 CMP, flu/RSV/COVID study, CBC with diff results reviewed; no acute issues found    Current Medications     Current Outpatient Medications:     loperamide (IMODIUM) 2 mg capsule, Take 1 capsule (2 mg total) by mouth 4 (four) times a day as needed for diarrhea Take Imodium 4x a day as needed for diarrhea for4 days only  , Disp: 30 capsule, Rfl: 0    enalapril (VASOTEC) 10 mg tablet, TAKE ONE TABLET BY MOUTH ONCE EVERY DAY FOR HYPERTENSION, Disp: 32 tablet, Rfl: 5    sertraline (Zoloft) 25 mg tablet, Take 1 tablet (25 mg total) by mouth daily For anxiety, Disp: 90 tablet, Rfl: 3    simvastatin (ZOCOR) 20 mg tablet, TAKE ONE TABLET BY MOUTH EVERY EVENING FOR hypercholesterolemia, Disp: 30 tablet, Rfl: 5    Health Maintenance     Health Maintenance   Topic Date Due    SLP PLAN OF CARE  Never done    COVID-19 Vaccine (1) Never done    BMI: Followup Plan  Never done    Pneumococcal Vaccine: 65+ Years (1 - PCV) Never done    Breast Cancer Screening: Mammogram  10/21/2014    DXA SCAN  01/24/2021    Influenza Vaccine (1) 09/01/2022    Fall Risk  03/21/2023    Depression Screening  03/21/2023    Urinary Incontinence Screening  03/21/2023    Medicare Annual Wellness Visit (AWV)  03/21/2023    BMI: Adult  08/15/2023    HIB Vaccine  Aged Out    Hepatitis B Vaccine  Aged Out    IPV Vaccine  Aged Out    Hepatitis A Vaccine  Aged Out    Meningococcal ACWY Vaccine  Aged Out    HPV Vaccine  Aged [employer] History   Administered Date(s) Administered    INFLUENZA 01/09/2015, 10/04/2018, 10/30/2019    TD (adult) Preservative Free 01/13/2017    Td (adult), adsorbed 01/13/2017     This note was completed in part utilizing [employer] direct voice recognition software  Grammatical errors, random word insertion, spelling mistakes, and incomplete sentences may be an occasional consequence of the system secondary to software limitations, ambient noise and hardware issues  At the time of dictation, efforts were made to edit, clarify and/or correct errors    Please read the chart carefully and recognize, using context, where substitutions have occurred  If you have any questions or concerns about the context, text or information contained within the body of this dictation, please contact myself, the provider, for further clarification      [address], [address]  8/15/2022 3:44 PM

## 2022-08-15 NOTE — ASSESSMENT & PLAN NOTE
Diarrhea of unknown etiology most likely not infectious as it has resolved  Patient had diarrhea last week for approximately 6 days as reported by personal care staff and was evaluated in the emergency room on 08/10/2022  Patient was treated with IV fluids and returned to her apartment  Blood work in the emergency room revealed no acute findings  She is at baseline, eating and drinking fluids without difficulty  No further diarrhea reported    Patient states that she had a normal bowel movement yesterday

## 2022-08-24 ENCOUNTER — TELEPHONE (OUTPATIENT)
Dept: GERIATRICS | Facility: CLINIC | Age: 87
End: 2022-08-24

## 2022-08-24 DIAGNOSIS — K59.1 FUNCTIONAL DIARRHEA: Primary | ICD-10-CM

## 2022-08-24 NOTE — TELEPHONE ENCOUNTER
I am unable to put in order as a prescription  [facility] have the cream there and they will provide patient with it

## 2022-08-29 ENCOUNTER — TELEPHONE (OUTPATIENT)
Dept: GERIATRICS | Facility: OTHER | Age: 87
End: 2022-08-29

## 2022-08-29 DIAGNOSIS — F41.9 ANXIETY: ICD-10-CM

## 2022-08-29 RX ORDER — SERTRALINE HYDROCHLORIDE 25 MG/1
12.5 TABLET, FILM COATED ORAL DAILY
Qty: 90 TABLET | Refills: 3 | Status: SHIPPED | OUTPATIENT
Start: 2022-08-29

## 2022-08-29 NOTE — PROGRESS NOTES
Patient continues with loose stool  May be a side effect of current medications  D/c simvastatin  Dose reduce sertraline to 12 5mg PO daily

## 2022-09-07 ENCOUNTER — OFFICE VISIT (OUTPATIENT)
Dept: GERIATRICS | Facility: CLINIC | Age: 87
End: 2022-09-07
Payer: COMMERCIAL

## 2022-09-07 DIAGNOSIS — R19.7 DIARRHEA, UNSPECIFIED TYPE: Primary | ICD-10-CM

## 2022-09-07 DIAGNOSIS — I70.213 ATHEROSCLEROSIS OF NATIVE ARTERY OF BOTH LOWER EXTREMITIES WITH INTERMITTENT CLAUDICATION (HCC): Chronic | ICD-10-CM

## 2022-09-07 DIAGNOSIS — F41.9 ANXIETY: ICD-10-CM

## 2022-09-07 PROCEDURE — 99214 OFFICE O/P EST MOD 30 MIN: CPT | Performed by: FAMILY MEDICINE

## 2022-09-07 NOTE — PROGRESS NOTES
[address] [facility]    NAME: Karen Kaiser  AGE: 80 y o  SEX: female    DATE OF ENCOUNTER: 9/7/22    Assessment and Plan     Diarrhea  Suspect medication side effect as primary cause, correlation with recent start of sertraline- dose reduced from 25mg to 12 5mg daily with improvement in symptoms  Statin discontinued in this setting as well  Continue to monitor symptoms; judicious use of PRN imodium; notify provider if symptoms return    Anxiety  Psychosocial support  Marked improvement in symptoms with addition of sertraline- tolerated dose reduction to 12 5mg daily in setting of above with maintained good anxiety control    Atherosclerosis 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 (HCC)  Simvastatin discontinued in setting of persistent loose stool with improvement in symptoms      Chief Complaint     Chief Complaint   Patient presents with    Follow-up     Diarrhea       History of Present Illness     80year old female evaluated for follow up of persistent diarrhea; no associated nausea, vomiting, weight loss, change in appetite  Recently started on sertraline for anxiety with good effect  With persistent diarrhea not improved with imodium, banatrol ordered but not obtained due to cost  Medication review conducted by myself, statin was discontinued and sertraline was dose reduced from 25mg to 12 5mg daily  [facility] staff confirms marked improvement in loose stool since medication changes  The following portions of the patient's history were reviewed and updated as appropriate: allergies, current medications, past family history, past medical history, past social history, past surgical history and problem list     Review of Systems     Review of Systems   Constitutional: Negative for chills, fever and unexpected weight change  Respiratory: Negative for cough and shortness of breath  Cardiovascular: Negative for leg swelling  Gastrointestinal: Negative for abdominal distention, abdominal pain, constipation, diarrhea, nausea and vomiting  Musculoskeletal: Negative for arthralgias  Neurological: Negative for dizziness and light-headedness  Psychiatric/Behavioral: Negative for sleep disturbance  The patient is not nervous/anxious  All other systems reviewed and are negative  Active Problem List     Patient Active Problem List   Diagnosis    Atherosclerosis 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 (HCC)    Benign essential hypertension    Cataract    GERD without esophagitis    Hypercholesterolemia    Hypertriglyceridemia    Lymphoma in remission ([facility] [address] [facility]    Vitamin D deficiency    Ambulatory dysfunction    Orthopedic hardware present    [facility]    Medication care plan discussed with patient    Venous stasis dermatitis of both lower extremities    Negative depression screening    Tachycardia    Anxiety    Diarrhea       Objective       Physical Exam  Vitals and nursing note reviewed  Constitutional:       General: She is awake  She is not in acute distress  Appearance: She is well-developed and well-groomed  She is not ill-appearing, toxic-appearing or diaphoretic  HENT:      Head: Normocephalic and atraumatic  Right Ear: External ear normal       Left Ear: External ear normal       Nose: No rhinorrhea  Mouth/Throat:      Mouth: Mucous membranes are moist    Eyes:      General: No scleral icterus  Right eye: No discharge  Left eye: No discharge  Conjunctiva/sclera: Conjunctivae normal    Cardiovascular:      Rate and Rhythm: Normal rate and regular rhythm  Heart sounds: S1 normal and S2 normal    Pulmonary:      Effort: Pulmonary effort is normal  No respiratory distress  Breath sounds: No wheezing  Abdominal:      General: Bowel sounds are normal  There is no distension  Palpations: Abdomen is soft  Tenderness: There is no abdominal tenderness  There is no guarding  Musculoskeletal:      Cervical back: No rigidity  Right lower leg: No edema  Left lower leg: No edema  Skin:     General: Skin is warm and dry  Coloration: Skin is not jaundiced or pale  Neurological:      Mental Status: She is alert  Mental status is at baseline  Cranial Nerves: No dysarthria or facial asymmetry  Psychiatric:         Attention and Perception: Attention and perception normal          Mood and Affect: Mood and affect normal          Speech: Speech normal          Behavior: Behavior is cooperative  Pertinent Laboratory/Diagnostic Studies:  [facility] (8/30/22)    Current Medications     Current Outpatient Medications:     loperamide (IMODIUM) 2 mg capsule, Take 1 capsule (2 mg total) by mouth 4 (four) times a day as needed for diarrhea Take Imodium 4x a day as needed for diarrhea for4 days only  , Disp: 30 capsule, Rfl: 0    enalapril (VASOTEC) 10 mg tablet, TAKE ONE TABLET BY MOUTH ONCE EVERY DAY FOR HYPERTENSION, Disp: 32 tablet, Rfl: 5    sertraline (Zoloft) 25 mg tablet, Take 0 5 tablets (12 5 mg total) by mouth daily For anxiety, Disp: 90 tablet, Rfl: 3    Erin Fontenot, DO  9/7/22

## 2022-09-10 PROBLEM — R19.7 DIARRHEA: Status: ACTIVE | Noted: 2022-08-15

## 2022-09-10 NOTE — ASSESSMENT & PLAN NOTE
Suspect medication side effect as primary cause, correlation with recent start of sertraline- dose reduced from 25mg to 12 5mg daily with improvement in symptoms  Statin discontinued in this setting as well  Continue to monitor symptoms; judicious use of PRN imodium; notify provider if symptoms return

## 2022-09-10 NOTE — ASSESSMENT & PLAN NOTE
Psychosocial support  Marked improvement in symptoms with addition of sertraline- tolerated dose reduction to 12 5mg daily in setting of above with maintained good anxiety control

## 2022-09-15 DIAGNOSIS — R19.7 DIARRHEA, UNSPECIFIED TYPE: ICD-10-CM

## 2022-09-15 RX ORDER — LOPERAMIDE HYDROCHLORIDE 2 MG/1
CAPSULE ORAL
Qty: 30 CAPSULE | Refills: 0 | Status: SHIPPED | OUTPATIENT
Start: 2022-09-15

## 2022-11-11 DIAGNOSIS — I10 BENIGN ESSENTIAL HYPERTENSION: Chronic | ICD-10-CM

## 2022-11-11 RX ORDER — ENALAPRIL MALEATE 10 MG/1
TABLET ORAL
Qty: 32 TABLET | Refills: 5 | Status: SHIPPED | OUTPATIENT
Start: 2022-11-11

## 2022-12-14 ENCOUNTER — OFFICE VISIT (OUTPATIENT)
Dept: GERIATRICS | Facility: CLINIC | Age: 87
End: 2022-12-14

## 2022-12-14 VITALS
TEMPERATURE: 97.5 F | RESPIRATION RATE: 18 BRPM | BODY MASS INDEX: 24.89 KG/M2 | SYSTOLIC BLOOD PRESSURE: 110 MMHG | HEART RATE: 85 BPM | WEIGHT: 145.8 LBS | HEIGHT: 64 IN | OXYGEN SATURATION: 96 % | DIASTOLIC BLOOD PRESSURE: 80 MMHG

## 2022-12-14 DIAGNOSIS — I10 BENIGN ESSENTIAL HYPERTENSION: Chronic | ICD-10-CM

## 2022-12-14 DIAGNOSIS — H61.23 BILATERAL IMPACTED CERUMEN: Primary | ICD-10-CM

## 2022-12-14 DIAGNOSIS — F02.80 LATE ONSET ALZHEIMER'S DISEASE WITHOUT BEHAVIORAL DISTURBANCE (HCC): ICD-10-CM

## 2022-12-14 DIAGNOSIS — R26.2 AMBULATORY DYSFUNCTION: ICD-10-CM

## 2022-12-14 DIAGNOSIS — G30.1 LATE ONSET ALZHEIMER'S DISEASE WITHOUT BEHAVIORAL DISTURBANCE (HCC): ICD-10-CM

## 2022-12-14 DIAGNOSIS — I70.213 ATHEROSCLEROSIS OF NATIVE ARTERY OF BOTH LOWER EXTREMITIES WITH INTERMITTENT CLAUDICATION (HCC): Chronic | ICD-10-CM

## 2022-12-14 NOTE — ASSESSMENT & PLAN NOTE
Continue personal care at the   No behavior disturbances reported    Encourage to participate group activities at the facility as able

## 2022-12-14 NOTE — PROGRESS NOTES
[facility]  [address] [facility], [address] [place] [address], [facility] [address]    NAME: Karan Hurt  AGE: 80 y o  SEX: female    DATE OF ENCOUNTER: 12/14/2022    Assessment and Plan     Atherosclerosis 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 (HCC)  Currently asymptomatic  Not on statin due to intolerance/diarrhea  Continue to monitor clinically    Benign essential hypertension  /80 in the office today  Continue enalapril 10 mg daily  Monitor BP  Avoid hypotension    Late onset Alzheimer's disease without behavioral disturbance ([address] )  Continue personal care at the   No behavior disturbances reported  Encourage to participate group activities at the facility as able    Ambulatory dysfunction  Uses walker at baseline  Fall precautions    Anxiety  Stable on sertraline 12 5 mg daily    Bilateral impacted cerumen  Debrox given for 5-7 days  May take nutrition supplement, such as Ensure  No orders of the defined types were placed in this encounter       - Counseling Documentation: patient was counseled regarding: instructions for management    Chief Complaint     Chief Complaint   Patient presents with   • Follow-up     3 months       History of Present Illness     HPI  The patient is seen in the office today as a routine FU  She denies any pain in her legs  No HA, CP, SOB, or abdominal pain  She reports good sleep, good appetite  No dysuria  BMs daily  Weight loss per nursing staff  The following portions of the patient's history were reviewed and updated as appropriate: allergies, current medications, past family history, past medical history, past social history, past surgical history and problem list     Review of Systems     Review of Systems   Constitutional: Negative for appetite change and unexpected weight change  HENT: Negative for congestion, rhinorrhea and trouble swallowing      Eyes: Negative for visual disturbance  Respiratory: Negative for cough and shortness of breath  Cardiovascular: Negative for chest pain and palpitations  Gastrointestinal: Negative for abdominal pain and constipation  Genitourinary: Negative for dysuria  Musculoskeletal: Negative for arthralgias and back pain  Skin: Negative for color change  Neurological: Negative for headaches  Hematological: Does not bruise/bleed easily  Psychiatric/Behavioral: Negative for sleep disturbance  All other systems reviewed and are negative  Active Problem List     Patient Active Problem List   Diagnosis   • Atherosclerosis 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 (HCC)   • Benign essential hypertension   • Cataract   • GERD without esophagitis   • Hypercholesterolemia   • Hypertriglyceridemia   • Lymphoma in remission ([facility] [address] )   • Vitamin D deficiency   • Ambulatory dysfunction   • Orthopedic hardware present   • Late onset Alzheimer's disease without behavioral disturbance ([facility] )   • Medication care plan discussed with patient   • Venous stasis dermatitis of both lower extremities   • Negative depression screening   • Tachycardia   • Anxiety   • Diarrhea   • Bilateral impacted cerumen       Objective     /80 (BP Location: Left arm, Patient Position: Sitting, Cuff Size: Standard)   Pulse 85   Temp 97 5 °F (36 4 °C)   Resp 18   Ht 5' 3 5" (1 613 m) Comment: as per last vital  Wt 66 1 kg (145 lb 12 8 oz) Comment: w shoes  LMP  (LMP Unknown)   SpO2 96%   BMI 25 42 kg/m²     Physical Exam  Vitals and nursing note reviewed  Constitutional:       General: She is not in acute distress  HENT:      Head: Atraumatic  Right Ear: There is impacted cerumen  Left Ear: There is impacted cerumen  Nose: Nose normal       Mouth/Throat:      Mouth: Mucous membranes are moist    Eyes:      General:         Right eye: No discharge  Left eye: No discharge        Conjunctiva/sclera: Conjunctivae normal    Cardiovascular:      Rate and Rhythm: Normal rate and regular rhythm  Heart sounds: No murmur heard  Pulmonary:      Effort: Pulmonary effort is normal       Breath sounds: Normal breath sounds  No rales  Abdominal:      General: Bowel sounds are normal  There is no distension  Palpations: Abdomen is soft  Tenderness: There is no abdominal tenderness  Musculoskeletal:         General: Deformity (DIP deviations in digits b/l hands 2/2 arthritis) present  Normal range of motion  Cervical back: Neck supple  Right lower leg: No edema  Left lower leg: No edema  Skin:     General: Skin is warm and dry  Neurological:      Mental Status: She is alert  Comments: Oriented to person  Forgot what she had for breakfast   Pleasant, cooperative, follows commands, strong      Psychiatric:         Behavior: Behavior normal            Current Medications     Current Outpatient Medications:   •  carbamide peroxide (DEBROX) 6 5 % otic solution, Administer 5 drops into both ears 2 (two) times a day, Disp: 15 mL, Rfl: 0  •  enalapril (VASOTEC) 10 mg tablet, TAKE ONE TABLET BY MOUTH ONCE EVERY DAY FOR HYPERTENSION, Disp: 32 tablet, Rfl: 5  •  sertraline (Zoloft) 25 mg tablet, Take 0 5 tablets (12 5 mg total) by mouth daily For anxiety, Disp: 90 tablet, Rfl: 3    Health Maintenance     Health Maintenance   Topic Date Due   • Hepatitis B Vaccine (1 of 3 - 3-dose series) Never done   • SLP PLAN OF CARE  Never done   • COVID-19 Vaccine (1) Never done   • BMI: Followup Plan  Never done   • Pneumococcal Vaccine: 65+ Years (1 - PCV) Never done   • Breast Cancer Screening: Mammogram  10/21/2014   • DXA SCAN  01/24/2021   • Influenza Vaccine (1) 09/01/2022   • Depression Screening  03/21/2023   • Fall Risk  03/21/2023   • Urinary Incontinence Screening  03/21/2023   • Medicare Annual Wellness Visit (AWV)  03/21/2023   • BMI: Adult  12/14/2023   • HIB Vaccine  Aged Out   • IPV Vaccine  Aged Out   • Hepatitis A Vaccine  Aged Out   • Meningococcal ACWY Vaccine  Aged Out   • HPV Vaccine  Aged Out     Immunization History   Administered Date(s) Administered   • INFLUENZA 01/09/2015, 10/04/2018, 10/30/2019   • TD (adult) Preservative Free 01/13/2017   • Td (adult), adsorbed 01/13/2017

## 2023-01-01 ENCOUNTER — TELEPHONE (OUTPATIENT)
Age: 88
End: 2023-01-01

## 2023-01-18 ENCOUNTER — PROCEDURE VISIT (OUTPATIENT)
Dept: GERIATRICS | Facility: CLINIC | Age: 88
End: 2023-01-18

## 2023-01-18 VITALS
DIASTOLIC BLOOD PRESSURE: 70 MMHG | HEART RATE: 93 BPM | TEMPERATURE: 97.5 F | RESPIRATION RATE: 20 BRPM | SYSTOLIC BLOOD PRESSURE: 110 MMHG | OXYGEN SATURATION: 94 %

## 2023-01-18 DIAGNOSIS — H61.23 BILATERAL IMPACTED CERUMEN: Primary | ICD-10-CM

## 2023-01-18 NOTE — PROGRESS NOTES
Procedure note  Mahogany Delong  BD: 4/10/1928    Ear cerumen removal    Date/Time: 1/18/2023 2:22 PM  Performed by: GEORGIA Valdez  Authorized by: GEORGIA Valdez   Universal Protocol:  Consent: Verbal consent obtained  Written consent not obtained  Risks and benefits: risks, benefits and alternatives were discussed  Consent given by: patient  Time out: Immediately prior to procedure a "time out" was called to verify the correct patient, procedure, equipment, support staff and site/side marked as required  Timeout called at: 1/18/2023 2:15 PM   Patient understanding: patient states understanding of the procedure being performed  Patient consent: the patient's understanding of the procedure matches consent given  Procedure consent: procedure consent matches procedure scheduled  Patient identity confirmed: verbally with patient      Patient location:  Clinic  Indications / Diagnosis:  Bilateral ear cerumen impaction  Procedure details:     Local anesthetic:  None    Location:  R ear and L ear    Procedure type: irrigation with instrumentation      Instrumentation: curette      Approach:  External and natural orifice    Visualization (free text):  Cerument impaction bilateral ear canal    Equipment used:  Water pik  Post-procedure details:     Hearing quality:  Improved    Patient tolerance of procedure:  Procedure terminated at patient's request  Comments:      Cerumen removed 50% on left ear, 70% to the right ear using curette and ear WaterPik  Patient voiced dislike to the procedure and requested the procedure to be stopped    Wound

## 2023-02-12 PROBLEM — H61.23 BILATERAL IMPACTED CERUMEN: Status: RESOLVED | Noted: 2022-12-14 | Resolved: 2023-02-12

## 2023-03-15 ENCOUNTER — NURSING HOME VISIT (OUTPATIENT)
Dept: GERIATRICS | Facility: OTHER | Age: 88
End: 2023-03-15

## 2023-03-15 DIAGNOSIS — R26.2 AMBULATORY DYSFUNCTION: ICD-10-CM

## 2023-03-15 DIAGNOSIS — G30.1 LATE ONSET ALZHEIMER'S DISEASE WITHOUT BEHAVIORAL DISTURBANCE (HCC): Primary | ICD-10-CM

## 2023-03-15 DIAGNOSIS — I10 BENIGN ESSENTIAL HYPERTENSION: Chronic | ICD-10-CM

## 2023-03-15 DIAGNOSIS — F02.80 LATE ONSET ALZHEIMER'S DISEASE WITHOUT BEHAVIORAL DISTURBANCE (HCC): Primary | ICD-10-CM

## 2023-03-15 DIAGNOSIS — I70.213 ATHEROSCLEROSIS OF NATIVE ARTERY OF BOTH LOWER EXTREMITIES WITH INTERMITTENT CLAUDICATION (HCC): Chronic | ICD-10-CM

## 2023-03-15 DIAGNOSIS — F41.9 ANXIETY: ICD-10-CM

## 2023-03-15 NOTE — PROGRESS NOTES
"[facility]   [address] [facility], [facility], [address]  History and Physical  POS: Nursing Facility/LTC-32    Records Reviewed include: [address] outpatient records    Chief Complaint/ Reason for Admission: LTC; dementia    History of Present Illness:            80year old female admitted for long term care  Active medical issues as below  Mood stable on low dose sertraline; loose stool not reported  Continues on enalapril for hypertension; SBP trending 110s-130s since SNF admission; due for metabolic panel  Patient denies pain or discomfort; history limited in setting of underlying dementia         Allergies    Allergies   Allergen Reactions   • Contrast [Iodinated Contrast Media]      Other reaction(s): \"I get very cold\"   • Iodine - Food Allergy      Other reaction(s): Chills  Reaction Date: 02Aug2011;    • Sulfa Antibiotics Vomiting   • Omnipaque [Iohexol]        Past Medical History  Past Medical History:   Diagnosis Date   • Anorexia     last assessed: 8/9/2013   • Closed fracture dislocation of right ankle joint 5/20/2019    Added automatically from request for surgery [id number]   • GERD (gastroesophageal reflux disease)    • Herpes zoster     last assessed: 9/11/2013   • Hypertension    • Lymphoma ([address] )     resolved: 1997   • Malignant melanoma of skin ([facility] )     resolved: 2015   • Pelvic fracture ([facility] [address] )         Past Surgical History:   Procedure Laterality Date   • BREAST SURGERY      enlargement procedure   • BYPASS FEMORAL-POPLITEAL Left     onset: 8/31/2012   • CATARACT EXTRACTION Right 06/19/2013   • HYSTERECTOMY      resolved: 1970   • ORIF TIBIA & FIBULA FRACTURES Right 5/21/2019    Procedure: OPEN REDUCTION W/ INTERNAL FIXATION (ORIF) ANKLE;  Surgeon: Aida Rojas MD;  [facility];  Service: Orthopedics   • ORIF TIBIA & FIBULA FRACTURES Right 6/4/2019    Procedure: Revision right ankle ORIF;  Surgeon: Aida Rojas MD;  [facility];  Service: Orthopedics   • OTHER SURGICAL " HISTORY  05/30/2013    PTA Femoral-popliteal initial stenosis left Description: with mechanical thrombectomy, PTA and stent    • MA OPTX DSTL RADL I-ARTIC FX/EPIPHYSL SEP 3 FRAG Left 1/18/2017    Procedure: DISTAL RADIUS OPEN REDUCTION W/ INTERNAL FIXATION ;  Surgeon: Meme Landers MD;  [facility];  Service: Orthopedics       Family History  Family History   Problem Relation Age of Onset   • Heart attack Mother    • Heart failure Father    • Other Sister         thyroid surgery       Social History  Social History     Tobacco Use   Smoking Status Never   Smokeless Tobacco Never      Social History     Substance and Sexual Activity   Alcohol Use Yes   • Alcohol/week: 1 0 standard drink   • Types: 1 Glasses of wine per week    Comment: Per allscripts - social drinker       Social History     Substance and Sexual Activity   Drug Use No        Physical Exam    Weight: 140lb Temp:97 5F BP:134/78 Pulse:82 Resp:18 O2 Sat:98%RA    Physical Exam  Vitals and nursing note reviewed  Constitutional:       General: She is awake  She is not in acute distress  Appearance: She is well-developed and well-groomed  She is not toxic-appearing or diaphoretic  HENT:      Head: Normocephalic and atraumatic  Right Ear: External ear normal       Left Ear: External ear normal       Nose: No rhinorrhea  Mouth/Throat:      Mouth: Mucous membranes are moist    Eyes:      General: No scleral icterus  Right eye: No discharge  Left eye: No discharge  Conjunctiva/sclera: Conjunctivae normal    Pulmonary:      Effort: Pulmonary effort is normal  No respiratory distress  Abdominal:      General: There is no distension  Musculoskeletal:      Cervical back: No rigidity  Right lower leg: Edema present  Left lower leg: Edema present  Skin:     Coloration: Skin is not jaundiced or pale  Neurological:      Mental Status: She is alert  Mental status is at baseline  Cranial Nerves:  No dysarthria or facial asymmetry  Psychiatric:         Attention and Perception: Attention and perception normal          Behavior: Behavior is cooperative  Review of Systems:  Review of Systems   Unable to perform ROS: Dementia   Constitutional: Negative for fever  Respiratory: Negative for cough  Cardiovascular: Positive for leg swelling (chronic, mild)  Gastrointestinal: Negative for abdominal pain and diarrhea  Musculoskeletal: Negative for arthralgias  List of Current Medications: Medication list reviewed and updated in Epic to reflect most current SNF orders    Labs/Diagnostics (reviewed by this provider): Old Records   Lab Results   Component Value Date    WBC 6 61 08/10/2022    HGB 14 5 08/10/2022    HCT 45 4 08/10/2022    MCV 95 08/10/2022     08/10/2022     Lab Results   Component Value Date    WBC 6 61 08/10/2022    HGB 14 5 08/10/2022    HCT 45 4 08/10/2022    MCV 95 08/10/2022     08/10/2022     Lab Results   Component Value Date    [id number] 1 770 06/08/2022       Assessment/Plan:  80year old female with:    Late onset Alzheimer's disease without behavioral disturbance (HCC)  Moderate;  Alzheimer vs mixed type with underlying arterial vascular disease  Admit to LTC for 24/7 and ADL care  Supportive care; management of chronic medical conditions as outlined  OT consult placed    Benign essential hypertension  Goal <150/90  Continue enalapril  BMP ordered to monitor renal function and electrolytes    Atherosclerosis 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 (HCC)  Asymptomatic   Statin previously discontinued due to intolerance (loose stool)    Anxiety  Psychosocial support  Continue sertraline 12 5mg daily (did not tolerate higher doses due to loose stool); periodic reassessment for GDR    Ambulatory dysfunction  Ambulates with walker at baseline  Fall precautions  Physical therapy consult placed    Pain: No  Rehab Potential:Fair  Patient Informed of Medical Condition: Yes  Patient is Capable of Understanding Their Right: Limited due to cognitive impairment  Prognosis:Fair  Discharge Plan:  LTC  Surrogate Decision Maker: Daughter  Advanced Directives: Yes  Code status:Code status order updated to DNR/DNI while at SNF    Immunization History   Administered Date(s) Administered   • INFLUENZA 01/09/2015, 10/04/2018, 10/30/2019   • TD (adult) Preservative Free 01/13/2017   • Td (adult), adsorbed 01/13/2017     [address] Imani Ash DO  3/15/23

## 2023-03-27 ENCOUNTER — APPOINTMENT (EMERGENCY)
Dept: RADIOLOGY | Facility: HOSPITAL | Age: 88
End: 2023-03-27

## 2023-03-27 ENCOUNTER — NURSING HOME VISIT (OUTPATIENT)
Dept: GERIATRICS | Facility: OTHER | Age: 88
End: 2023-03-27

## 2023-03-27 ENCOUNTER — HOSPITAL ENCOUNTER (INPATIENT)
Facility: HOSPITAL | Age: 88
LOS: 2 days | Discharge: NURSING, REHAB OR LONG-TERM CARE | End: 2023-03-29
Attending: EMERGENCY MEDICINE | Admitting: SURGERY

## 2023-03-27 DIAGNOSIS — R26.2 INABILITY TO AMBULATE DUE TO HIP: ICD-10-CM

## 2023-03-27 DIAGNOSIS — M25.552 LEFT HIP PAIN: Primary | ICD-10-CM
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
## 2023-03-27 DIAGNOSIS — S32.512A CLOSED FRACTURE OF SUPERIOR RAMUS OF LEFT PUBIS, INITIAL ENCOUNTER (HCC): Primary | ICD-10-CM

## 2023-03-27 PROBLEM — Z71.89 MEDICATION CARE PLAN DISCUSSED WITH PATIENT: Status: RESOLVED | Noted: 2019-11-13 | Resolved: 2023-03-27

## 2023-03-27 PROBLEM — R00.0 TACHYCARDIA: Status: RESOLVED | Noted: 2022-06-08 | Resolved: 2023-03-27

## 2023-03-27 PROBLEM — Z13.31 NEGATIVE DEPRESSION SCREENING: Status: RESOLVED | Noted: 2021-11-22 | Resolved: 2023-03-27

## 2023-03-27 LAB
ABO GROUP BLD: NORMAL
ANION GAP SERPL CALCULATED.3IONS-SCNC: 2 MMOL/L (ref 4–13)
ATRIAL RATE: 76 BPM
BASOPHILS # BLD AUTO: 0.02 THOUSANDS/ÂΜL (ref 0–0.1)
BASOPHILS NFR BLD AUTO: 0 % (ref 0–1)
BLD GP AB SCN SERPL QL: NEGATIVE
BUN SERPL-MCNC: 20 MG/DL (ref 5–25)
CALCIUM SERPL-MCNC: 9.5 MG/DL (ref 8.3–10.1)
CHLORIDE SERPL-SCNC: 109 MMOL/L (ref 96–108)
CO2 SERPL-SCNC: 30 MMOL/L (ref 21–32)
CREAT SERPL-MCNC: 0.7 MG/DL (ref 0.6–1.3)
EOSINOPHIL # BLD AUTO: 0.06 THOUSAND/ÂΜL (ref 0–0.61)
EOSINOPHIL NFR BLD AUTO: 1 % (ref 0–6)
ERYTHROCYTE [DISTWIDTH] IN BLOOD BY AUTOMATED COUNT: 13.7 % (ref 11.6–15.1)
GFR SERPL CREATININE-BSD FRML MDRD: 74 ML/MIN/1.73SQ M
GLUCOSE SERPL-MCNC: 112 MG/DL (ref 65–140)
HCT VFR BLD AUTO: 41 % (ref 34.8–46.1)
HGB BLD-MCNC: 13.4 G/DL (ref 11.5–15.4)
IMM GRANULOCYTES # BLD AUTO: 0.04 THOUSAND/UL (ref 0–0.2)
IMM GRANULOCYTES NFR BLD AUTO: 1 % (ref 0–2)
LYMPHOCYTES # BLD AUTO: 1.25 THOUSANDS/ÂΜL (ref 0.6–4.47)
LYMPHOCYTES NFR BLD AUTO: 16 % (ref 14–44)
MCH RBC QN AUTO: 30.5 PG (ref 26.8–34.3)
MCHC RBC AUTO-ENTMCNC: 32.7 G/DL (ref 31.4–37.4)
MCV RBC AUTO: 93 FL (ref 82–98)
MONOCYTES # BLD AUTO: 0.82 THOUSAND/ÂΜL (ref 0.17–1.22)
MONOCYTES NFR BLD AUTO: 11 % (ref 4–12)
NEUTROPHILS # BLD AUTO: 5.57 THOUSANDS/ÂΜL (ref 1.85–7.62)
NEUTS SEG NFR BLD AUTO: 71 % (ref 43–75)
NRBC BLD AUTO-RTO: 0 /100 WBCS
P AXIS: 46 DEGREES
PLATELET # BLD AUTO: 285 THOUSANDS/UL (ref 149–390)
PMV BLD AUTO: 10.8 FL (ref 8.9–12.7)
POTASSIUM SERPL-SCNC: 3.9 MMOL/L (ref 3.5–5.3)
PR INTERVAL: 154 MS
QRS AXIS: 20 DEGREES
QRSD INTERVAL: 74 MS
QT INTERVAL: 370 MS
QTC INTERVAL: 416 MS
RBC # BLD AUTO: 4.4 MILLION/UL (ref 3.81–5.12)
RH BLD: POSITIVE
SODIUM SERPL-SCNC: 141 MMOL/L (ref 135–147)
SPECIMEN EXPIRATION DATE: NORMAL
T WAVE AXIS: 55 DEGREES
VENTRICULAR RATE: 76 BPM
WBC # BLD AUTO: 7.76 THOUSAND/UL (ref 4.31–10.16)

## 2023-03-27 RX ORDER — GABAPENTIN 100 MG/1
100 CAPSULE ORAL 3 TIMES DAILY
Status: DISCONTINUED | OUTPATIENT
Start: 2023-03-27 | End: 2023-03-29 | Stop reason: HOSPADM

## 2023-03-27 RX ORDER — OXYCODONE HYDROCHLORIDE 5 MG/1
5 TABLET ORAL EVERY 4 HOURS PRN
Status: DISCONTINUED | OUTPATIENT
Start: 2023-03-27 | End: 2023-03-27

## 2023-03-27 RX ORDER — OXYCODONE HYDROCHLORIDE 5 MG/1
5 TABLET ORAL EVERY 4 HOURS PRN
Status: DISCONTINUED | OUTPATIENT
Start: 2023-03-27 | End: 2023-03-29 | Stop reason: HOSPADM

## 2023-03-27 RX ORDER — METHOCARBAMOL 500 MG/1
500 TABLET, FILM COATED ORAL EVERY 6 HOURS SCHEDULED
Status: DISCONTINUED | OUTPATIENT
Start: 2023-03-27 | End: 2023-03-29 | Stop reason: HOSPADM

## 2023-03-27 RX ORDER — DEXTROSE, SODIUM CHLORIDE, AND POTASSIUM CHLORIDE 5; .45; .15 G/100ML; G/100ML; G/100ML
75 INJECTION INTRAVENOUS CONTINUOUS
Status: DISCONTINUED | OUTPATIENT
Start: 2023-03-27 | End: 2023-03-27

## 2023-03-27 RX ORDER — HYDROMORPHONE HCL IN WATER/PF 6 MG/30 ML
0.2 PATIENT CONTROLLED ANALGESIA SYRINGE INTRAVENOUS
Status: DISCONTINUED | OUTPATIENT
Start: 2023-03-27 | End: 2023-03-29 | Stop reason: HOSPADM

## 2023-03-27 RX ORDER — GINSENG 100 MG
1 CAPSULE ORAL DAILY
Status: DISCONTINUED | OUTPATIENT
Start: 2023-03-27 | End: 2023-03-29 | Stop reason: HOSPADM

## 2023-03-27 RX ORDER — MORPHINE SULFATE 4 MG/ML
4 INJECTION, SOLUTION INTRAMUSCULAR; INTRAVENOUS ONCE
Status: COMPLETED | OUTPATIENT
Start: 2023-03-27 | End: 2023-03-27

## 2023-03-27 RX ORDER — ACETAMINOPHEN 325 MG/1
650 TABLET ORAL EVERY 6 HOURS PRN
Status: DISCONTINUED | OUTPATIENT
Start: 2023-03-27 | End: 2023-03-29 | Stop reason: HOSPADM

## 2023-03-27 RX ORDER — ENOXAPARIN SODIUM 100 MG/ML
30 INJECTION SUBCUTANEOUS EVERY 12 HOURS
Status: DISCONTINUED | OUTPATIENT
Start: 2023-03-27 | End: 2023-03-29 | Stop reason: HOSPADM

## 2023-03-27 RX ORDER — SERTRALINE HYDROCHLORIDE 25 MG/1
12.5 TABLET, FILM COATED ORAL DAILY
Status: DISCONTINUED | OUTPATIENT
Start: 2023-03-28 | End: 2023-03-29 | Stop reason: HOSPADM

## 2023-03-27 RX ORDER — LIDOCAINE 50 MG/G
2 PATCH TOPICAL DAILY
Status: DISCONTINUED | OUTPATIENT
Start: 2023-03-28 | End: 2023-03-29 | Stop reason: HOSPADM

## 2023-03-27 RX ADMIN — BACITRACIN 1 LARGE APPLICATION: 500 OINTMENT TOPICAL at 18:57

## 2023-03-27 RX ADMIN — ENOXAPARIN SODIUM 30 MG: 30 INJECTION SUBCUTANEOUS at 18:57

## 2023-03-27 RX ADMIN — METHOCARBAMOL 500 MG: 500 TABLET ORAL at 22:30

## 2023-03-27 RX ADMIN — GABAPENTIN 100 MG: 100 CAPSULE ORAL at 22:30

## 2023-03-27 RX ADMIN — MORPHINE SULFATE 4 MG: 4 INJECTION INTRAVENOUS at 11:35

## 2023-03-27 NOTE — ED PROVIDER NOTES
History  Chief Complaint   Patient presents with   • Fall     Pt had fall from standing last night in bathroom per staff at facility, unknown LOC, unknown headstrike, - thinners, c/o L hip pain      Patient is a 80year old female who was brought in for evaluation after a fall  Per EMS, patient fell last night from standing while she was in the bathroom  She denies hitting her head, does not believe she lost consciousness, and she denies any headaches  She is not on any antiplatelet or anticoagulant medications  She endorses pain in the left hip currently  Denies any numbness or tingling in the distal left leg  Denies any neck or back pain  No other injuries that she is aware of  Prior to Admission Medications   Prescriptions Last Dose Informant Patient Reported?  Taking?   enalapril (VASOTEC) 10 mg tablet   No No   Sig: TAKE ONE TABLET BY MOUTH ONCE EVERY DAY FOR HYPERTENSION   sertraline (Zoloft) 25 mg tablet   No No   Sig: Take 0 5 tablets (12 5 mg total) by mouth daily For anxiety      Facility-Administered Medications: None       Past Medical History:   Diagnosis Date   • Anorexia     last assessed: 8/9/2013   • Closed fracture dislocation of right ankle joint 5/20/2019    Added automatically from request for surgery [id number]   • GERD (gastroesophageal reflux disease)    • Herpes zoster     last assessed: 9/11/2013   • Hypertension    • Lymphoma ([facility] )     resolved: 1997   • Malignant melanoma of skin ([facility] [address] )     resolved: 2015   • Pelvic fracture ([facility] [address] )        Past Surgical History:   Procedure Laterality Date   • BREAST SURGERY      enlargement procedure   • BYPASS FEMORAL-POPLITEAL Left     onset: 8/31/2012   • CATARACT EXTRACTION Right 06/19/2013   • HYSTERECTOMY      resolved: 1970   • ORIF TIBIA & FIBULA FRACTURES Right 5/21/2019    Procedure: OPEN REDUCTION W/ INTERNAL FIXATION (ORIF) ANKLE;  Surgeon: Neeta Vazquez MD;  [facility];  Service: Orthopedics   • ORIF TIBIA & FIBULA FRACTURES Right 6/4/2019    Procedure: Revision right ankle ORIF;  Surgeon: Jeanie Ocampo MD;  [facility];  Service: Orthopedics   • OTHER SURGICAL HISTORY  05/30/2013    PTA Femoral-popliteal initial stenosis left Description: with mechanical thrombectomy, PTA and stent    • [place] OPTX DSTL RADL I-ARTIC FX/EPIPHYSL SEP 3 FRAG Left 1/18/2017    Procedure: DISTAL RADIUS OPEN REDUCTION W/ INTERNAL FIXATION ;  Surgeon: Nito Fregoso MD;  [facility];  Service: Orthopedics       Family History   Problem Relation Age of Onset   • Heart attack Mother    • Heart failure Father    • Other Sister         thyroid surgery     I have reviewed and agree with the history as documented  E-Cigarette/Vaping     E-Cigarette/Vaping Substances     Social History     Tobacco Use   • Smoking status: Never   • Smokeless tobacco: Never   Substance Use Topics   • Alcohol use: Yes     Alcohol/week: 1 0 standard drink     Types: 1 Glasses of wine per week     Comment: Per allscripts - social drinker    • Drug use: No        Review of Systems   Respiratory: Negative for shortness of breath  Cardiovascular: Negative for chest pain  Gastrointestinal: Negative for abdominal pain, nausea and vomiting  Musculoskeletal: Positive for arthralgias  Negative for back pain and neck pain  Neurological: Negative for numbness and headaches  All other systems reviewed and are negative        Physical Exam  ED Triage Vitals [03/27/23 1126]   Temperature Pulse Respirations Blood Pressure SpO2   97 5 °F (36 4 °C) 78 18 165/72 98 %      Temp Source Heart Rate Source Patient Position - Orthostatic VS BP Location FiO2 (%)   Oral Monitor Lying Right arm --      Pain Score       4             Orthostatic Vital Signs  Vitals:    03/27/23 1526 03/27/23 1600 03/27/23 1630 03/27/23 1730   BP: 160/70 163/72 (!) 177/72 169/74   Pulse: 66 64 62 70   Patient Position - Orthostatic VS:  Lying Lying Lying       Physical Exam  Vitals and nursing note reviewed  Constitutional:       General: She is awake  She is not in acute distress  Appearance: She is not toxic-appearing  HENT:      Head: Normocephalic and atraumatic  Eyes:      General: Vision grossly intact  Gaze aligned appropriately  Cardiovascular:      Rate and Rhythm: Normal rate and regular rhythm  Pulmonary:      Effort: Pulmonary effort is normal  No respiratory distress  Musculoskeletal:      Cervical back: Full passive range of motion without pain and neck supple  No spinous process tenderness  Comments: Left lower extremity mildly externally rotated and appears slightly shorter than the right  Left leg neurovascularly intact with intact distal motor function  No obvious hematoma over the lateral hip  Mild tenderness over the greater trochanter  Skin:     General: Skin is warm and dry  Neurological:      General: No focal deficit present  Mental Status: She is alert and oriented to person, place, and time           ED Medications  Medications   morphine injection 4 mg (4 mg Intravenous Given 3/27/23 1135)       Diagnostic Studies  Results Reviewed     Procedure Component Value Units Date/Time    Basic metabolic panel [[id number]]  (Abnormal) Collected: 03/27/23 1133    Lab Status: Final result Specimen: Blood from Arm, Left Updated: 03/27/23 1244     Sodium 141 mmol/L      Potassium 3 9 mmol/L      Chloride 109 mmol/L      CO2 30 mmol/L      ANION GAP 2 mmol/L      BUN 20 mg/dL      Creatinine 0 70 mg/dL      Glucose 112 mg/dL      Calcium 9 5 mg/dL      eGFR 74 ml/min/1 73sq m     Narrative:      [place] guidelines for Chronic Kidney Disease (CKD):   •  Stage 1 with normal or high GFR (GFR > 90 mL/min/1 73 square meters)  •  Stage 2 Mild CKD (GFR = 60-89 mL/min/1 73 square meters)  •  Stage 3A Moderate CKD (GFR = 45-59 mL/min/1 73 square meters)  •  Stage 3B Moderate CKD (GFR = 30-44 mL/min/1 73 square meters)  •  Stage 4 Severe CKD (GFR = 15-29 mL/min/1 73 square meters)  •  Stage 5 End Stage CKD (GFR <15 mL/min/1 73 square meters)  Note: GFR calculation is accurate only with a steady state creatinine    CBC and differential [[id number]] Collected: 03/27/23 1133    Lab Status: Final result Specimen: Blood from Arm, Left Updated: 03/27/23 1157     WBC 7 76 Thousand/uL      RBC 4 40 Million/uL      Hemoglobin 13 4 g/dL      Hematocrit 41 0 %      MCV 93 fL      MCH 30 5 pg      MCHC 32 7 g/dL      RDW 13 7 %      MPV 10 8 fL      Platelets 553 Thousands/uL      nRBC 0 /100 WBCs      Neutrophils Relative 71 %      Immat GRANS % 1 %      Lymphocytes Relative 16 %      Monocytes Relative 11 %      Eosinophils Relative 1 %      Basophils Relative 0 %      Neutrophils Absolute 5 57 Thousands/µL      Immature Grans Absolute 0 04 Thousand/uL      Lymphocytes Absolute 1 25 Thousands/µL      Monocytes Absolute 0 82 Thousand/µL      Eosinophils Absolute 0 06 Thousand/µL      Basophils Absolute 0 02 Thousands/µL                  CT pelvis wo contrast   Final Result by David Woo MD (03/27 1555)      Acute left superior pubic ramus fracture  The study was marked in EPIC for significant notification  Workstation performed: [id number]         XR chest 1 view portable   Final Result by Abhijit Duffy MD (03/27 1616)      No active pulmonary disease  Stable elevation of the left hemidiaphragm  Workstation performed: [id number]         XR hip/pelv 2-3 vws left   Final Result by Ashtyn Cole MD (03/27 1436)      No acute osseous abnormality  Chronic-appearing fractures of the inferior pubic rami and left pubic body , which are likely present on the 2013  radiographs              Workstation performed: [id number]         CT chest and abdomen wo contrast    (Results Pending)   CT head wo contrast    (Results Pending)   CT spine cervical wo contrast    (Results Pending)         Procedures  Procedures      ED Course  ED Course as of 03/27/23 1749   Mon Mar 27, 2023   1442 XR hip/pelv 2-3 vws left  Xray read as negative for acute osseous abnormalities, but patient unable to ambulate, so will further evaluate with CT scan of the pelvis  1606 CT pelvis wo contrast  Acute left superior pubic ramus fracture  1606 Will have nursing attempt to ambulate patient  If she is unable to safely walk, will talk to trauma service for admission  [id number] Patient unable to ambulate  Spoke with trauma attending and AP about possible admission  Medical Decision Making  80year-old female presents after a fall with a complaint of left hip pain  On exam, patient noted to have her left lower extremity mildly externally rotated, but no obvious deformities of the left hip  Tenderness noted over the greater trochanter of the hip, no obvious hematomas or bruising  No other external signs of trauma on exam   Will evaluate for possible pelvic injury with x-ray  Will also send off basic labs in case patient requires operative repair  Will treat symptomatically with IV morphine  X-ray of the pelvis negative for acute osseous abnormalities  However, given that the patient is unable to ambulate on the leg, decision was made to obtain a CT scanning of the pelvis to rule out possible fracture missed by x-ray  CT scan of the pelvis revealed an acute left superior pubic ramus fracture  On reevaluation, patient still unable to ambulate secondary to pain  Spoke with the trauma attending on call who agreed to admit patient to their service  Patient admitted in stable condition  Closed fracture of superior ramus of left pubis, initial encounter [facility]): acute illness or injury  Inability to ambulate due to hip: acute illness or injury  Amount and/or Complexity of Data Reviewed  Labs: ordered  Radiology: ordered  Decision-making details documented in ED Course        Risk  Prescription drug management  Decision regarding hospitalization  Disposition  Final diagnoses:   Closed fracture of superior ramus of left pubis, [facility]   Inability to ambulate due to hip     Time reflects when diagnosis was documented in both MDM as applicable and the Disposition within this note     Time User Action Codes Description Comment    3/27/2023  4:49 PM Osiel Cardenas Add [S32 512A] Closed fracture of superior ramus of left pubis, initial encounter ([facility] )     3/27/2023  4:49 PM Osiel Cardenas Add [R26 2] Inability to ambulate due to hip       ED Disposition     ED Disposition   Admit    Condition   Stable    Date/Time   Mon Mar 27, 2023  5:54 PM    Comment   Case was discussed with trauma and the patient's admission status was agreed to be Admission Status: inpatient status to the service of Dr Nora Quintana  Follow-up Information    None         Patient's Medications   Discharge Prescriptions    No medications on file     No discharge procedures on file  PDMP Review     None           ED Provider  Attending physically available and evaluated Zander Lemons I managed the patient along with the ED Attending      Electronically Signed by         Rosario Tariq DO  04/02/23 2054

## 2023-03-27 NOTE — ASSESSMENT & PLAN NOTE
Psychosocial support  Continue sertraline 12 5mg daily (did not tolerate higher doses due to loose stool); periodic reassessment for GDR

## 2023-03-27 NOTE — ED ATTENDING ATTESTATION
3/27/2023  IEmilee MD, saw and evaluated the patient  I have discussed the patient with the resident/non-physician practitioner and agree with the resident's/non-physician practitioner's findings, Plan of Care, and MDM as documented in the resident's/non-physician practitioner's note, except where noted  All available labs and Radiology studies were reviewed  I was present for key portions of any procedure(s) performed by the resident/non-physician practitioner and I was immediately available to provide assistance  At this point I agree with the current assessment done in the Emergency Department  I have conducted an independent evaluation of this patient a history and physical is as follows: This is a 28-year-old woman who presents after fall  Patient lives in a facility  She had a fall last night  Patient cannot recollect the circumstances surrounding the fall  She has had left hip pain since  This morning, they attempted to get her out of bed at her facility and she was unable to walk  The patient is brought in for evaluation after fall  Patient denies hitting her head  She denies chest pain or shortness of breath  She denies syncope  Review of systems otherwise -12 systems reviewed  On exam the patient is frail  She has no signs of trauma to the head or neck  Her face is symmetric  Neck is supple and nontender  The patient's heart is regular without murmurs, rubs, or gallops  She has no chest wall tenderness  Her lungs are clear and equal with no wheeze, rales, rhonchi  Abdomen is soft and nontender with no masses, rebound, or guarding  Her pelvis is stable to rock  The patient does not have significant shortening of her left leg, but does have pain with rotation or movement as well as tenderness over her greater trochanter  The patient does not have any skin lesions  She is neurologically nonfocal with intact pulses and good capillary refill    Medical decision making: Fall with hip pain, anticipate likely hip fracture    We will plan to do EKG, pelvis x-rays, preoperative labs, will admit to trauma  ED Course         Critical Care Time  Procedures

## 2023-03-27 NOTE — ASSESSMENT & PLAN NOTE
Moderate;  Alzheimer vs mixed type with underlying arterial vascular disease  Admit to LTC for 24/7 and ADL care  Supportive care; management of chronic medical conditions as outlined  OT consult placed

## 2023-03-27 NOTE — H&P
H&P Exam - Trauma   Solomon Siemens 80 y o  female MRN: [id number]  Unit/Bed#: ED 28 Encounter: [id number]    Assessment/Plan   Trauma Alert: Evaluation; trauma team notified at [phone] via text  Model of Arrival: Ambulance  Trauma Team: Attending Dr Althea Li, [facility] [place]  Consultants: Ortho (TT sent at [phone] with immediate response from Dr Deepak Evans)    Trauma Active Problems:  L superior pelvic rami fracture  Poor functional status  High risk for DVT    Trauma Plan:  Admit to trauma team  Pan-scan to rule out concomitant injuries  Ortho consult  DVT ppx  PRN analgesia  Home meds    Chief Complaint: L pelvic pain    History of Present Illness   HPI:  Solomon Siemens is a 80 y o  female who presented to [facility] for evaluation of left pelvic pain  Patient had an unwitnessed fall last night, exact circumstances are unknown  She was at her nursing home and had difficulty getting out of bed this morning prompting hospital presentation  Patient was found to have left superior pelvic rami fracture on CT scan of the pelvis prompting consultation to the trauma team     Patient is amnestic to the event, unsure what happened or if she hit her head, notably she also does have dementia and thinks the year is 2022 and [employer] Black is still president  She does know that she is in a hospital   She admits that she probably did have a fall  She has no other complaints other than left pelvic pain  Mechanism: Fall    Review of Systems   Unable to perform ROS: Dementia       12-point, complete review of systems was reviewed and negative except as stated above  Historical Information   History is unobtainable from the patient due to dementia    Efforts to obtain history included the following sources: EMS, records    Past Medical History:   Diagnosis Date   • Anorexia     last assessed: 8/9/2013   • Closed fracture dislocation of right ankle joint 5/20/2019    Added automatically from request for surgery [id number]   • GERD (gastroesophageal reflux disease)    • Herpes zoster     last assessed: 9/11/2013   • Hypertension    • Lymphoma ([facility] [address] )     resolved: 1997   • Malignant melanoma of skin ([facility] [address] )     resolved: 2015   • Pelvic fracture [facility])      Past Surgical History:   Procedure Laterality Date   • BREAST SURGERY      enlargement procedure   • BYPASS FEMORAL-POPLITEAL Left     onset: 8/31/2012   • CATARACT EXTRACTION Right 06/19/2013   • HYSTERECTOMY      resolved: 1970   • ORIF TIBIA & FIBULA FRACTURES Right 5/21/2019    Procedure: OPEN REDUCTION W/ INTERNAL FIXATION (ORIF) ANKLE;  Surgeon: Marysol Duggan MD;  [facility];  Service: Orthopedics   • ORIF TIBIA & FIBULA FRACTURES Right 6/4/2019    Procedure: Revision right ankle ORIF;  Surgeon: Marysol Duggan MD;  [facility];  Service: Orthopedics   • OTHER SURGICAL HISTORY  05/30/2013    PTA Femoral-popliteal initial stenosis left Description: with mechanical thrombectomy, PTA and stent    • ID OPTX DSTL RADL I-ARTIC FX/EPIPHYSL SEP 3 FRAG Left 1/18/2017    Procedure: DISTAL RADIUS OPEN REDUCTION W/ INTERNAL FIXATION ;  Surgeon: Stephanie Gutiérrez MD;  [facility];  Service: Orthopedics     Social History   Social History     Substance and Sexual Activity   Alcohol Use Yes   • Alcohol/week: 1 0 standard drink   • Types: 1 Glasses of wine per week    Comment: Per allscripts - social drinker      Social History     Substance and Sexual Activity   Drug Use No     Social History     Tobacco Use   Smoking Status Never   Smokeless Tobacco Never     E-Cigarette/Vaping     E-Cigarette/Vaping Substances     Immunization History   Administered Date(s) Administered   • INFLUENZA 01/09/2015, 10/04/2018, 10/30/2019   • TD (adult) Preservative Free 01/13/2017   • Td (adult), adsorbed 01/13/2017       Last Tetanus: Unknown  Family History: Non-contributory  Unable to obtain/limited by dementia      Meds/Allergies   all current active meds have been reviewed, current "meds:   No current facility-administered medications for this encounter  , and PTA meds:   Prior to Admission Medications   Prescriptions Last Dose Informant Patient Reported? Taking?   enalapril (VASOTEC) 10 mg tablet   No No   Sig: TAKE ONE TABLET BY MOUTH ONCE EVERY DAY FOR HYPERTENSION   sertraline (Zoloft) 25 mg tablet   No No   Sig: Take 0 5 tablets (12 5 mg total) by mouth daily For anxiety      Facility-Administered Medications: None       Allergies   Allergen Reactions   • Contrast [Iodinated Contrast Media]      Other reaction(s): \"I get very cold\"   • Iodine - Food Allergy      Other reaction(s): Chills  Reaction Date: 02Aug2011;    • Sulfa Antibiotics Vomiting   • Omnipaque [Iohexol]          PHYSICAL EXAM    PE limited by: None    Objective   Vitals:   First set: Temperature: 97 5 °F (36 4 °C) (03/27/23 1126)  Pulse: 78 (03/27/23 1126)  Respirations: 18 (03/27/23 1126)  Blood Pressure: 165/72 (03/27/23 1126)    Primary Survey:   (A) Airway: Intact  (B) Breathing: CTAB  (C) Circulation: Pulses:   +2 carotids/radials/AT  (D) Disabliity:  GCS Total:  14, Eye Opening:   Spontaneous = 4, Motor Response: Obeys commands = 6 and Verbal Response:  Confused = 4  (E) Expose:  Completed    Secondary Survey: (Click on Physical Exam tab above)   Physical Exam  Constitutional:       General: She is not in acute distress  Appearance: Normal appearance  HENT:      Head: Normocephalic and atraumatic  Right Ear: External ear normal       Left Ear: External ear normal       Nose: Nose normal       Mouth/Throat:      Mouth: Mucous membranes are moist       Pharynx: Oropharynx is clear  Eyes:      General:         Right eye: No discharge  Left eye: No discharge  Extraocular Movements: Extraocular movements intact  Conjunctiva/sclera: Conjunctivae normal       Pupils: Pupils are equal, round, and reactive to light  Cardiovascular:      Rate and Rhythm: Normal rate        Pulses: Normal " pulses  Heart sounds: Normal heart sounds  Comments: +2 carotids, radials, ATs  Pulmonary:      Effort: Pulmonary effort is normal  No respiratory distress  Breath sounds: Normal breath sounds  Abdominal:      General: Abdomen is flat  There is no distension  Palpations: Abdomen is soft  Tenderness: There is no abdominal tenderness  There is no guarding or rebound  Musculoskeletal:         General: No swelling, tenderness or signs of injury  Cervical back: Normal range of motion and neck supple  No rigidity or tenderness  Comments: L pelvic pain on palpation  BL feet warm/pink well-perfused   Skin:     Coloration: Skin is not jaundiced  Findings: No lesion  Comments: L dorsal wrist skin tear that has been repaired with steri strips  This was present prior to admission and initially had a Mepelex dressing over the wound dated 3/26  Neurological:      Mental Status: She is alert  Comments: Oriented to person/place, says year is 2022 and [place]osta Lara is president  Motor/sensory intact BL lower extremites   Psychiatric:         Mood and Affect: Mood normal          Behavior: Behavior normal          Invasive Devices     Peripheral Intravenous Line  Duration           Peripheral IV 03/27/23 Right Forearm <1 day                Lab Results: Results: I have personally reviewed pertinent reports      BMP/CMP:  Lab Results   Component Value Date    SODIUM 141 03/27/2023    K 3 9 03/27/2023     (H) 03/27/2023    CO2 30 03/27/2023    BUN 20 03/27/2023    CREATININE 0 70 03/27/2023    CALCIUM 9 5 03/27/2023    EGFR 74 03/27/2023       CBC:  Lab Results   Component Value Date    WBC 7 76 03/27/2023    HGB 13 4 03/27/2023    HCT 41 0 03/27/2023    MCV 93 03/27/2023     03/27/2023    MCH 30 5 03/27/2023    MCHC 32 7 03/27/2023    RDW 13 7 03/27/2023    MPV 10 8 03/27/2023    NRBC 0 03/27/2023       Coagulation:  No results found for: PT, INR, APTT    Imaging/EKG Studies:  Other Studies:     XR chest 1 view portable    Result Date: 3/27/2023  Impression: No active pulmonary disease  Stable elevation of the left hemidiaphragm  Workstation performed: [id number]     XR hip/pelv 2-3 vws left    Result Date: 3/27/2023  Impression: No acute osseous abnormality  Chronic-appearing fractures of the inferior pubic rami and left pubic body , which are likely present on the 2013  radiographs  Workstation performed: [id number]     CT pelvis wo contrast    Result Date: 3/27/2023  Impression: Acute left superior pubic ramus fracture  The study was marked in EPIC for significant notification   Workstation performed: [id number]       Code Status:  Prior  Advance Directive and Living Will:  Yes     Power of :     POLST:

## 2023-03-27 NOTE — ED NOTES
Attempted to ambulate patient with walker, but patient unable to ambulate safely independently  Patient reports too much pain with ambulation  Provider made aware        Sheila Duvall RN  03/27/23 [id number]

## 2023-03-27 NOTE — Clinical Note
Case was discussed with trauma and the patient's admission status was agreed to be Admission Status: observation status to the service of Dr Sherwin Kincaid

## 2023-03-27 NOTE — CONSULTS
Orthopedics   Damian Ocasio 80 y o  female MRN: [id number]  Unit/Bed#: Cat Scan      Chief Complaint:   left anterior pelvis pain    HPI:   80 y o  female ambulates with walker status post fall complaining of left anterior hip pain  Pateint lives in an assisted living facility and sustained an unwitnessed fall last night, does not recall specifics  This AM patient noted she was unable to ambulate and was found to have a L superior pubic ramus fx  Negative headstrike  PMH significant for dementia, HTN, GERD  Does not take blood thinners regularly, reports she takes ASA PRN  Denies numbness, tingling       Review Of Systems:   · Skin: Normal  · Neuro: See HPI  · Musculoskeletal: See HPI  · 14 point review of systems negative except as stated above     Past Medical History:   Past Medical History:   Diagnosis Date   • Anorexia     last assessed: 8/9/2013   • Closed fracture dislocation of right ankle joint 5/20/2019    Added automatically from request for surgery [id number]   • GERD (gastroesophageal reflux disease)    • Herpes zoster     last assessed: 9/11/2013   • Hypertension    • Lymphoma ([facility] [address] )     resolved: 1997   • Malignant melanoma of skin ([facility] [address] )     resolved: 2015   • Pelvic fracture ([facility] [address] )        Past Surgical History:   Past Surgical History:   Procedure Laterality Date   • BREAST SURGERY      enlargement procedure   • BYPASS FEMORAL-POPLITEAL Left     onset: 8/31/2012   • CATARACT EXTRACTION Right 06/19/2013   • HYSTERECTOMY      resolved: 1970   • ORIF TIBIA & FIBULA FRACTURES Right 5/21/2019    Procedure: OPEN REDUCTION W/ INTERNAL FIXATION (ORIF) ANKLE;  Surgeon: Delores Thompson MD;  [facility];  Service: Orthopedics   • ORIF TIBIA & FIBULA FRACTURES Right 6/4/2019    Procedure: Revision right ankle ORIF;  Surgeon: Delores Thompson MD;  [facility];  Service: Orthopedics   • OTHER SURGICAL HISTORY  05/30/2013    PTA Femoral-popliteal initial stenosis left Description: with mechanical "thrombectomy, PTA and stent    • [place] OPTX DSTL RADL I-ARTIC FX/EPIPHYSL SEP 3 FRAG Left 1/18/2017    Procedure: DISTAL RADIUS OPEN REDUCTION W/ INTERNAL FIXATION ;  Surgeon: Jody Garcia MD;  [facility];  Service: Orthopedics       Family History:  Family history reviewed and non-contributory  Family History   Problem Relation Age of Onset   • Heart attack Mother    • Heart failure Father    • Other Sister         thyroid surgery       Social History:  Social History     Socioeconomic History   • Marital status:      Spouse name: None   • Number of children: 2   • Years of education: None   • Highest education level: None   Occupational History   • None   Tobacco Use   • Smoking status: Never   • Smokeless tobacco: Never   Substance and Sexual Activity   • Alcohol use: Yes     Alcohol/week: 1 0 standard drink     Types: 1 Glasses of wine per week     Comment: Per allscripts - social drinker    • Drug use: No   • Sexual activity: None   Other Topics Concern   • None   Social History Narrative    Advance directive on file      Social Determinants of Health     Financial Resource Strain: Not on file   Food Insecurity: Not on file   Transportation Needs: Not on file   Physical Activity: Not on file   Stress: Not on file   Social Connections: Not on file   Intimate Partner Violence: Not on file   Housing Stability: Not on file       Allergies:    Allergies   Allergen Reactions   • Contrast [Iodinated Contrast Media]      Other reaction(s): \"I get very cold\"   • Iodine - Food Allergy      Other reaction(s): Chills  Reaction Date: 02Aug2011;    • Sulfa Antibiotics Vomiting   • Omnipaque [Iohexol]            Labs:  0   Lab Value Date/Time    HCT 41 0 03/27/2023 1133    HCT 45 4 08/10/2022 1316    HCT 46 0 06/08/2022 1141    HCT 36 2 01/15/2018 1024    HCT 36 5 11/29/2017 0948    HCT 39 3 10/14/2016 0859    HGB 13 4 03/27/2023 1133    HGB 14 5 08/10/2022 1316    HGB 14 9 06/08/2022 1141    HGB 10 7 (L) " 01/15/2018 1024    HGB 10 7 (L) 11/29/2017 0948    HGB 12 8 10/14/2016 0859    INR 0 94 06/08/2022 1141    WBC 7 76 03/27/2023 1133    WBC 6 61 08/10/2022 1316    WBC 6 00 06/08/2022 1141    WBC 5 5 01/15/2018 1024    WBC 4 6 11/29/2017 0948    WBC 4 6 10/14/2016 0859       Meds:  No current facility-administered medications for this encounter  Current Outpatient Medications:   •  enalapril (VASOTEC) 10 mg tablet, TAKE ONE TABLET BY MOUTH ONCE EVERY DAY FOR HYPERTENSION, Disp: 32 tablet, Rfl: 5  •  sertraline (Zoloft) 25 mg tablet, Take 0 5 tablets (12 5 mg total) by mouth daily For anxiety, Disp: 90 tablet, Rfl: 3    Blood Culture:   No results found for: BLOODCX    Wound Culture:   No results found for: WOUNDCULT    Ins and Outs:  No intake/output data recorded  Physical Exam:   /74   Pulse 70   Temp 97 5 °F (36 4 °C) (Oral)   Resp 18   LMP  (LMP Unknown)   SpO2 90%   Gen: No acute distress, resting comfortably in bed  HEENT: Eyes clear, moist mucus membranes, hearing intact  Respiratory: No audible wheezing or stridor  Cardiovascular: Well Perfused peripherally, 2+ distal pulse  Abdomen: nondistended, no peritoneal signs  Musculoskeletal: left lower extremity  · Skin intact  · Tender to palpation over anterior pelvis  · Leg lengths equal  · ROM not assessed 2/2 known fracture  · Sensation intact L3-S1  · Positive knee flexion/extension, ankle dorsi/plantar flexion, EHL/FHL  Pt guarding due to pain  · 2+ DP/PT pulse  · Musculature is soft and compressible, no pain with passive stretch    Radiology:   I personally reviewed the films  X-rays AP views pelvis shows left superior pubic ramus fx    CT scan CAP demonstrating left superior pubic ramus fx     _*_*_*_*_*_*_*_*_*_*_*_*_*_*_*_*_*_*_*_*_*_*_*_*_*_*_*_*_*_*_*_*_*_*_*_*_*_*_*_*_*    Assessment:  80 y o  female S/P fall with left superior pubic rami fracture    Plan:   · Weight bearing as tolerated  · Analgesics for pain  · DVT ppx - will need 28 days of ASA 81 mg BID  · PT/OT  · Dispo: Ortho will follow  · Discussed with senior resident    Brenda Curtis MD

## 2023-03-27 NOTE — PROGRESS NOTES
Facility: [facility]  POS: 28 ([facility])  Acute Med Note    Assessment/Plan:  70-year-old female with:    Left hip pain  Sp unwitnessed fall in her room at [facility] 3/26/2023 around 11pm   Now with severe left hip and left buttock pain and immobility  Recommend that patient go to the hospital emergency room for evaluation and treatment    Fall  S/p fall last night, now with severe pain and immobility  See above plan    Late onset Alzheimer's disease without behavioral disturbance    Hypertension    Atherosclerosis 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    Anxiety    Ambulatory dysfunction    Past Medical History:   Diagnosis Date   • Anorexia     last assessed: 8/9/2013   • Closed fracture dislocation of right ankle joint 5/20/2019    Added automatically from request for surgery [id number]   • GERD (gastroesophageal reflux disease)    • Herpes zoster     last assessed: 9/11/2013   • Hypertension    • Lymphoma ([facility] [address] )     resolved: 1997   • Malignant melanoma of skin ([facility] [address] )     resolved: 2015   • Pelvic fracture ([facility] [address] )        Subjective: Severe left hip and buttock pain     Patient ID: Lisa Frances is a 80 y o  female  70-year-old female seen and examined for evaluation of severe pain left hip s/p fall  Patient recently moved from her [facility] apartment to [facility] long-term [facility] as she is requiring 24/7 care and supervision  Nursing staff reports that patient had an unwitnessed fall last night around 11 PM in her room  She was found lying on her left side and did not initially have pain  Received patient lying in bed  Patient she did not complain of pain until I examined her left hip  She is able to move her left leg slightly but unable to raise, abduct or adduct LLE  She was unable to turn to her right side for examination of her back and complaining of severe pain  Neurovascular status intact to left lower extremity    X-ray of left hip was ordered, but was not done yet at time of exam   Spoke to patient's daughter Melody Jackson via phone regarding above  The best plan of care would be to send patient to the emergency room as not to delay treatment  Patient's daughter agreed and was able to encourage patient to go to the hospital and nursing made aware  The following portions of the patient's history were reviewed and updated as appropriate: Allergies, current medications, Past Family history, past medical history, past social history, past surgical history, and problem list     Current Outpatient Medications:   •  carbamide peroxide (DEBROX) 6 5 % otic solution, Administer 5 drops into both ears 2 (two) times a day, Disp: 15 mL, Rfl: 0  •  enalapril (VASOTEC) 10 mg tablet, TAKE ONE TABLET BY MOUTH ONCE EVERY DAY FOR HYPERTENSION, Disp: 32 tablet, Rfl: 5  •  sertraline (Zoloft) 25 mg tablet, Take 0 5 tablets (12 5 mg total) by mouth daily For anxiety, Disp: 90 tablet, Rfl: 3    Review of Systems   Respiratory: Negative for cough and shortness of breath  Cardiovascular: Negative for chest pain  Gastrointestinal: Negative for abdominal pain  Genitourinary: Negative for difficulty urinating  Musculoskeletal: Positive for arthralgias  Severe left hip and left buttock pain   Neurological: Negative for dizziness, light-headedness and headaches  Psychiatric/Behavioral: Negative for agitation and hallucinations  Objective:  BP: 122/85    weight: 140 pounds   Temp: 97 5   HR: 80   Resp: 16      Physical Exam  Vitals and nursing note reviewed  Constitutional:       General: She is not in acute distress  Appearance: She is not toxic-appearing or diaphoretic  HENT:      Head: Normocephalic  Mouth/Throat:      Mouth: Mucous membranes are moist       Pharynx: No oropharyngeal exudate  Eyes:      General:         Right eye: No discharge  Left eye: No discharge  Extraocular Movements: Extraocular movements intact        Conjunctiva/sclera: Conjunctivae normal       Pupils: Pupils are equal, round, and reactive to light  Cardiovascular:      Rate and Rhythm: Normal rate  Pulses: Normal pulses  Pulmonary:      Effort: Pulmonary effort is normal  No respiratory distress  Breath sounds: No rhonchi  Abdominal:      Palpations: Abdomen is soft  Musculoskeletal:      Right lower leg: Edema present  Left lower leg: Edema present  Comments: Able to move left lower extremity slightly  Skin:     General: Skin is warm and dry  Capillary Refill: Capillary refill takes less than 2 seconds  Neurological:      Mental Status: She is alert  Mental status is at baseline  Motor: Weakness present  Psychiatric:         Mood and Affect: Mood normal          Behavior: Behavior normal            This note was completed in part utilizing with Dragon medical one voice recognition software  Grammatical errors, random word insertion, spelling mistakes, and incomplete sentences may be an occasional consequence of the system secondary to software limitations, ambient noise and hardware issues  At the time of dictation, efforts were made to edit, clarify and/or correct errors  Please read the chart carefully and recognize, using context, where substitutions have occurred  If you have any questions or concerns about the context, text or information contained within the body of this dictation, please contact myself, the provider, for further clarification

## 2023-03-28 PROBLEM — S32.592A FRACTURE OF MULTIPLE PUBIC RAMI, LEFT, CLOSED, INITIAL ENCOUNTER (HCC): Status: ACTIVE | Noted: 2023-03-28

## 2023-03-28 LAB
ANION GAP SERPL CALCULATED.3IONS-SCNC: 0 MMOL/L (ref 4–13)
BASOPHILS # BLD AUTO: 0.03 THOUSANDS/ÂΜL (ref 0–0.1)
BASOPHILS NFR BLD AUTO: 1 % (ref 0–1)
BUN SERPL-MCNC: 17 MG/DL (ref 5–25)
CA-I BLD-SCNC: 1.15 MMOL/L (ref 1.12–1.32)
CALCIUM SERPL-MCNC: 8.2 MG/DL (ref 8.3–10.1)
CHLORIDE SERPL-SCNC: 111 MMOL/L (ref 96–108)
CO2 SERPL-SCNC: 30 MMOL/L (ref 21–32)
CREAT SERPL-MCNC: 0.38 MG/DL (ref 0.6–1.3)
EOSINOPHIL # BLD AUTO: 0.12 THOUSAND/ÂΜL (ref 0–0.61)
EOSINOPHIL NFR BLD AUTO: 2 % (ref 0–6)
ERYTHROCYTE [DISTWIDTH] IN BLOOD BY AUTOMATED COUNT: 14 % (ref 11.6–15.1)
GFR SERPL CREATININE-BSD FRML MDRD: 90 ML/MIN/1.73SQ M
GLUCOSE SERPL-MCNC: 91 MG/DL (ref 65–140)
HCT VFR BLD AUTO: 36.6 % (ref 34.8–46.1)
HGB BLD-MCNC: 11.4 G/DL (ref 11.5–15.4)
IMM GRANULOCYTES # BLD AUTO: 0.02 THOUSAND/UL (ref 0–0.2)
IMM GRANULOCYTES NFR BLD AUTO: 0 % (ref 0–2)
LYMPHOCYTES # BLD AUTO: 1.04 THOUSANDS/ÂΜL (ref 0.6–4.47)
LYMPHOCYTES NFR BLD AUTO: 18 % (ref 14–44)
MAGNESIUM SERPL-MCNC: 2.5 MG/DL (ref 1.6–2.6)
MCH RBC QN AUTO: 29.5 PG (ref 26.8–34.3)
MCHC RBC AUTO-ENTMCNC: 31.1 G/DL (ref 31.4–37.4)
MCV RBC AUTO: 95 FL (ref 82–98)
MONOCYTES # BLD AUTO: 0.62 THOUSAND/ÂΜL (ref 0.17–1.22)
MONOCYTES NFR BLD AUTO: 11 % (ref 4–12)
NEUTROPHILS # BLD AUTO: 3.83 THOUSANDS/ÂΜL (ref 1.85–7.62)
NEUTS SEG NFR BLD AUTO: 68 % (ref 43–75)
NRBC BLD AUTO-RTO: 0 /100 WBCS
PHOSPHATE SERPL-MCNC: 3.2 MG/DL (ref 2.3–4.1)
PLATELET # BLD AUTO: 226 THOUSANDS/UL (ref 149–390)
PMV BLD AUTO: 10.9 FL (ref 8.9–12.7)
POTASSIUM SERPL-SCNC: 3.5 MMOL/L (ref 3.5–5.3)
RBC # BLD AUTO: 3.86 MILLION/UL (ref 3.81–5.12)
SODIUM SERPL-SCNC: 141 MMOL/L (ref 135–147)
WBC # BLD AUTO: 5.66 THOUSAND/UL (ref 4.31–10.16)

## 2023-03-28 RX ORDER — DOCUSATE SODIUM 100 MG/1
100 CAPSULE, LIQUID FILLED ORAL 2 TIMES DAILY
Status: DISCONTINUED | OUTPATIENT
Start: 2023-03-28 | End: 2023-03-29 | Stop reason: HOSPADM

## 2023-03-28 RX ORDER — ASPIRIN 81 MG/1
81 TABLET ORAL 2 TIMES DAILY
Qty: 42 TABLET | Refills: 0 | Status: ON HOLD | OUTPATIENT
Start: 2023-03-28 | End: 2023-04-07 | Stop reason: SDUPTHER

## 2023-03-28 RX ORDER — SENNOSIDES 8.6 MG
1 TABLET ORAL
Status: DISCONTINUED | OUTPATIENT
Start: 2023-03-28 | End: 2023-03-29 | Stop reason: HOSPADM

## 2023-03-28 RX ADMIN — DOCUSATE SODIUM 100 MG: 100 CAPSULE, LIQUID FILLED ORAL at 18:01

## 2023-03-28 RX ADMIN — ENOXAPARIN SODIUM 30 MG: 30 INJECTION SUBCUTANEOUS at 18:01

## 2023-03-28 RX ADMIN — BACITRACIN 1 LARGE APPLICATION: 500 OINTMENT TOPICAL at 08:48

## 2023-03-28 RX ADMIN — METHOCARBAMOL 500 MG: 500 TABLET ORAL at 18:01

## 2023-03-28 RX ADMIN — DOCUSATE SODIUM 100 MG: 100 CAPSULE, LIQUID FILLED ORAL at 12:49

## 2023-03-28 RX ADMIN — GABAPENTIN 100 MG: 100 CAPSULE ORAL at 18:01

## 2023-03-28 RX ADMIN — SERTRALINE HYDROCHLORIDE 12.5 MG: 25 TABLET ORAL at 08:47

## 2023-03-28 RX ADMIN — METHOCARBAMOL 500 MG: 500 TABLET ORAL at 12:49

## 2023-03-28 RX ADMIN — LIDOCAINE 5% 2 PATCH: 700 PATCH TOPICAL at 08:48

## 2023-03-28 RX ADMIN — GABAPENTIN 100 MG: 100 CAPSULE ORAL at 23:30

## 2023-03-28 RX ADMIN — ENOXAPARIN SODIUM 30 MG: 30 INJECTION SUBCUTANEOUS at 06:03

## 2023-03-28 RX ADMIN — GABAPENTIN 100 MG: 100 CAPSULE ORAL at 08:47

## 2023-03-28 RX ADMIN — METHOCARBAMOL 500 MG: 500 TABLET ORAL at 06:02

## 2023-03-28 RX ADMIN — METHOCARBAMOL 500 MG: 500 TABLET ORAL at 23:30

## 2023-03-28 NOTE — PLAN OF CARE
Problem: MOBILITY - ADULT  Goal: Maintain or return to baseline ADL function  Description: INTERVENTIONS:  -  Assess patient's ability to carry out ADLs; assess patient's baseline for ADL function and identify physical deficits which impact ability to perform ADLs (bathing, care of mouth/teeth, toileting, grooming, dressing, etc )  - Assess/evaluate cause of self-care deficits   - Assess range of motion  - Assess patient's mobility; develop plan if impaired  - Assess patient's need for assistive devices and provide as appropriate  - Encourage maximum independence but intervene and supervise when necessary  - Involve family in performance of ADLs  - Assess for home care needs following discharge   - Consider OT consult to assist with ADL evaluation and planning for discharge  - Provide patient education as appropriate  Outcome: Progressing  Goal: Maintains/Returns to pre admission functional level  Description: INTERVENTIONS:  - Perform BMAT or MOVE assessment daily    - Set and communicate daily mobility goal to care team and patient/family/caregiver  - Collaborate with rehabilitation services on mobility goals if consulted  - Perform Range of Motion  times a day  - Reposition patient every  hours    - Dangle patient  times a day  - Stand patient  times a day  - Ambulate patient  times a day  - Out of bed to chair  times a day   - Out of bed for meals  times a day  - Out of bed for toileting  - Record patient progress and toleration of activity level   Outcome: Progressing     Problem: PAIN - ADULT  Goal: Verbalizes/displays adequate comfort level or baseline comfort level  Description: Interventions:  - Encourage patient to monitor pain and request assistance  - Assess pain using appropriate pain scale  - Administer analgesics based on type and severity of pain and evaluate response  - Implement non-pharmacological measures as appropriate and evaluate response  - Consider cultural and social influences on pain and pain management  - Notify physician/advanced practitioner if interventions unsuccessful or patient reports new pain  Outcome: Progressing     Problem: INFECTION - ADULT  Goal: Absence or prevention of progression during hospitalization  Description: INTERVENTIONS:  - Assess and monitor for signs and symptoms of infection  - Monitor lab/diagnostic results  - Monitor all insertion sites, i e  indwelling lines, tubes, and drains  - Monitor endotracheal if appropriate and nasal secretions for changes in amount and color  - [place] appropriate cooling/warming therapies per order  - Administer medications as ordered  - Instruct and encourage patient and family to use good hand hygiene technique  - Identify and instruct in appropriate isolation precautions for identified infection/condition  Outcome: Progressing  Goal: Absence of fever/infection during neutropenic period  Description: INTERVENTIONS:  - Monitor WBC    Outcome: Progressing     Problem: SAFETY ADULT  Goal: Maintain or return to baseline ADL function  Description: INTERVENTIONS:  -  Assess patient's ability to carry out ADLs; assess patient's baseline for ADL function and identify physical deficits which impact ability to perform ADLs (bathing, care of mouth/teeth, toileting, grooming, dressing, etc )  - Assess/evaluate cause of self-care deficits   - Assess range of motion  - Assess patient's mobility; develop plan if impaired  - Assess patient's need for assistive devices and provide as appropriate  - Encourage maximum independence but intervene and supervise when necessary  - Involve family in performance of ADLs  - Assess for home care needs following discharge   - Consider OT consult to assist with ADL evaluation and planning for discharge  - Provide patient education as appropriate  Outcome: Progressing  Goal: Maintains/Returns to pre admission functional level  Description: INTERVENTIONS:  - Perform BMAT or MOVE assessment daily    - Set and communicate daily mobility goal to care team and patient/family/caregiver  - Collaborate with rehabilitation services on mobility goals if consulted  - Perform Range of Motion  times a day  - Reposition patient every  hours    - Dangle patient  times a day  - Stand patient  times a day  - Ambulate patient  times a day  - Out of bed to chair  times a day   - Out of bed for meals  times a day  - Out of bed for toileting  - Record patient progress and toleration of activity level   Outcome: Progressing  Goal: Patient will remain free of falls  Description: INTERVENTIONS:  - Educate patient/family on patient safety including physical limitations  - Instruct patient to call for assistance with activity   - Consult OT/PT to assist with strengthening/mobility   - Keep Call bell within reach  - Keep bed low and locked with side rails adjusted as appropriate  - Keep care items and personal belongings within reach  - Initiate and maintain comfort rounds  - Make Fall Risk Sign visible to staff  - Offer Toileting every  Hours, in advance of need  - Initiate/Maintain alarm  - Obtain necessary fall risk management equipment  - Apply yellow socks and bracelet for high fall risk patients  - Consider moving patient to room near nurses station  Outcome: Progressing     Problem: DISCHARGE PLANNING  Goal: Discharge to home or other facility with appropriate resources  Description: INTERVENTIONS:  - Identify barriers to discharge w/patient and caregiver  - Arrange for needed discharge resources and transportation as appropriate  - Identify discharge learning needs (meds, wound care, etc )  - Arrange for interpretive services to assist at discharge as needed  - Refer to Case Management Department for coordinating discharge planning if the patient needs post-hospital services based on physician/advanced practitioner order or complex needs related to functional status, cognitive ability, or social support system  Outcome: Progressing Problem: Knowledge Deficit  Goal: Patient/family/caregiver demonstrates understanding of disease process, treatment plan, medications, and discharge instructions  Description: Complete learning assessment and assess knowledge base    Interventions:  - Provide teaching at level of understanding  - Provide teaching via preferred learning methods  Outcome: Progressing

## 2023-03-28 NOTE — ASSESSMENT & PLAN NOTE
Patient sustained left superior pubic rami fracture  - Orthopedic consult appreciated  - Non operative managment  - WBAT LLE  - Multimodal pain control  - Orthopedics- Lovenox x 28 days  - PT/OT  - CM for dispo planning

## 2023-03-28 NOTE — ASSESSMENT & PLAN NOTE
S?p Fall unwitnessed in [address]  -Syncopal workup- Echo, EKG, Orthostatics  - Geriatric consult appreciated  - PT/OT evaluation  - Lives at [facility]

## 2023-03-28 NOTE — CASE MANAGEMENT
Case Management Discharge Planning Note    Patient name Cheryl Nunn  Location [facility] 599-35 MRN [id number]  : 4/10/1928 Date 3/28/2023       Current Admission Date: 3/27/2023  Current Admission Diagnosis:Fall   Patient Active Problem List    Diagnosis Date Noted   • Fracture of multiple pubic rami, left, closed, initial encounter ([place] [address] ) 2023   • Left hip pain 2023   • Fall 2023   • Diarrhea 08/15/2022   • Anxiety 2022   • Venous stasis dermatitis of both lower extremities 2019   • Late onset Alzheimer's disease without behavioral disturbance ([place] [address] ) 2019   • Orthopedic hardware present 2019   • Ambulatory dysfunction 2019   • Hypertriglyceridemia 10/13/2015   • Lymphoma in remission ([place] [address] ) 2014   • GERD without esophagitis 2013   • Vitamin D deficiency 2013   • Atherosclerosis 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 ([place] [address] ) 2013   • Cataract 2013   • Hypercholesterolemia 2012   • Benign essential hypertension 2012      LOS (days): 1  Geometric Mean LOS (GMLOS) (days): 2 70  Days to GMLOS:1 9     OBJECTIVE:  Risk of Unplanned Readmission Score: 15 21         Current admission status: Inpatient   Preferred Pharmacy:   [address], [address] [facility] [address] [address]  Phone: [phone] Fax: [phone]    Primary Care Provider: Abhijeet Norris DO    Primary Insurance: Casie Diaz [facility] REP  Secondary Insurance:     DISCHARGE DETAILS:    [facility] would like to bring the pt back there for rehab, meaning the pt will need to be seen by OT/PT and then a new SNF authorization will need to be secured  Therapy is aware

## 2023-03-28 NOTE — OCCUPATIONAL THERAPY NOTE
Occupational Therapy Evaluation     Patient Name: Meme Becker  AAZRR'O Date: 3/28/2023  Problem List  Active Problems:    Fall    Fracture of multiple pubic rami, left, closed, initial encounter [facility])    Past Medical History  Past Medical History:   Diagnosis Date    Anorexia     last assessed: 8/9/2013    Closed fracture dislocation of right ankle joint 5/20/2019    Added automatically from request for surgery [id number]    GERD (gastroesophageal reflux disease)     Herpes zoster     last assessed: 9/11/2013    Hypertension     Lymphoma ([facility] [address] )     resolved: 1997    Malignant melanoma of skin ([facility] [address] )     resolved: 2015    Pelvic fracture ([facility] [address] )      Past Surgical History  Past Surgical History:   Procedure Laterality Date    BREAST SURGERY      enlargement procedure    BYPASS FEMORAL-POPLITEAL Left     onset: 8/31/2012    CATARACT EXTRACTION Right 06/19/2013    HYSTERECTOMY      resolved: 1970    ORIF TIBIA & FIBULA FRACTURES Right 5/21/2019    Procedure: OPEN REDUCTION W/ INTERNAL FIXATION (ORIF) ANKLE;  Surgeon: Neeta Vazquez MD;  [facility];  Service: Orthopedics    ORIF TIBIA & FIBULA FRACTURES Right 6/4/2019    Procedure: Revision right ankle ORIF;  Surgeon: Neeta Vazquez MD;  [facility];  Service: Orthopedics    OTHER SURGICAL HISTORY  05/30/2013    PTA Femoral-popliteal initial stenosis left Description: with mechanical thrombectomy, PTA and stent     IA OPTX DSTL RADL I-ARTIC FX/EPIPHYSL SEP 3 FRAG Left 1/18/2017    Procedure: DISTAL RADIUS OPEN REDUCTION W/ INTERNAL FIXATION ;  Surgeon: Boris Awad MD;  [facility];  Service: Orthopedics             03/28/23 1430   OT Last Visit   OT Visit Date 03/28/23   Note Type   Note type Evaluation   Pain Assessment   Pain Assessment Tool 0-10   Pain Score 5   Pain Location/Orientation Orientation: Left; Location: Leg   Pain Onset/Description Onset: Ongoing; Descriptor: Aching;Descriptor: Discomfort   Effect of Pain on Daily Activities increases pain w/ mvmt   Patient's Stated Pain Goal No pain   Hospital Pain Intervention(s) Repositioned; Emotional support; Ambulation/increased activity   Restrictions/Precautions   Weight Bearing Precautions Per Order Yes   LLE Weight Bearing Per Order (S)  WBAT   Other Precautions Cognitive; Chair Alarm;WBS;Fall Risk;Pain;Hard of hearing   Home Living   Type of Home Assisted living   Home Layout One level;Performs ADLs on one level; Able to live on main level with bedroom/bathroom   Bathroom Shower/Tub Walk-in shower   Bathroom Toilet Raised   Bathroom Equipment Grab bars in shower; Shower chair   [address] [facility]   Additional Comments Pt resides in [address]; recently transitioned there in the past 2 weeks from the [facility]   Prior Function   Level of [place] Needs assistance with ADLs; Independent with functional mobility; Needs assistance with [address] staff   Receives Help From Other (Comment)  (facility staff)   IADLs Family/Friend/Other provides transportation; Family/Friend/Other provides meals; Family/Friend/Other provides medication management   Falls in the last 6 months 1 to 4   Vocational Retired   Comments (-) [address] for ADLS/IADLS, Mod I w/ transfers and functional mobility PTA   Reciprocal Relationships Pt lives w/ facility staff   Service to Others Retired; PPL    Intrinsic Gratification Activities @ the skilled [address] [address] 5  [place] [address] 4  [address] 3  Moderate Assistance   [address] 3  [facility] 73  3  Moderate [address] 3  Moderate Assistance   Functional Deficit Steadying;Verbal cueing;Supervision/safety; Increased time to complete  (Ax2)   Bed Mobility   Supine to Sit 3  Moderate assistance   Additional items Assist x 1;HOB elevated; Increased time required;Verbal cues;LE management   Sit to Supine Unable to assess   Additional Comments pt sat EOB w/ Fair sitting balance/trunk control   Transfers   Sit to Stand 3  Moderate assistance   Additional items Assist x 2; Increased time required;Verbal cues   Stand to Sit 3  Moderate assistance   Additional items Assist x 2; Increased time required;Verbal cues   Stand pivot 3  Moderate assistance   Additional items Assist x 2; Increased time required;Verbal cues   Additional Comments HHA used; VC for safety/proper technique  Functional Mobility   Functional Mobility 3  Moderate assistance   Additional Comments Pt took 1 side step w/ Mod A x2; HHA used; VC for proper technique; difficulty initiating mvmt w/ LLE 2/2 pain   Additional items Hand hold assistance   Balance   Static Sitting Fair -   Dynamic Sitting Poor +   Static Standing Poor   Dynamic Standing Poor   Ambulatory Poor   Activity Tolerance   Activity Tolerance Patient limited by fatigue;Patient limited by pain   Medical Staff Made Aware PTJhonathan   Nurse Made Aware yes   RUE Assessment   RUE Assessment WFL  (generalized weakness)   LUE Assessment   LUE Assessment WFL  (generalized weakness)   Hand Function   Gross Motor Coordination Functional   Fine Motor Coordination Functional   Vision-Basic Assessment   Current Vision Wears glasses all the time   Cognition   Overall Cognitive Status Impaired   Arousal/Participation Responsive; Cooperative   Attention Attends with cues to redirect   Orientation Level Oriented to person;Disoriented to place; Disoriented to time;Disoriented to situation   Memory Decreased recall of precautions;Decreased recall of recent events;Decreased short term memory   Following Commands Follows one step commands with increased time or repetition   Comments Pt is pleasant and cooperative; only oriented to self but appears at cognitive baseline; able to follow simple 1 step commands  Assessment   Limitation Decreased ADL status; Decreased UE strength;Decreased Safe judgement during ADL;Decreased cognition;Decreased endurance;Decreased self-care trans;Decreased high-level ADLs   Prognosis Fair   Assessment Pt is a 81 y/o female seen for OT eval s/p adm to [facility] w/ left pelvic pain s/p unwitnessed fall in her nursing home  Pt is dx'd w/ left superior pelvic rami fracture  Pt is non-op, WBAT in LLE  Pt  has a past medical history of Anorexia, Closed fracture dislocation of right ankle joint (2019), GERD (gastroesophageal reflux disease), Herpes zoster, Hypertension, Lymphoma ([facility] [address] ), Malignant melanoma of skin ([facility] [address] ), and Pelvic fracture ([facility] [address] )  Pt with active OT orders and up with assistance  orders  Pt lives with facility staff at [facility]  Pt receives assist for ADLS and IADLS, does not drive, & required use of DME PTA including a RW  Pt is currently demonstrating the following occupational deficits: Min A UB ADLS, Mod A LB ADLS, Mod A bed mobility, Mod A x2 transfers and functional mobility w/ HHA  These deficits that are impacting pt's baseline areas of occupation are a result of the following impairments: pain, endurance, activity tolerance, functional mobility, forward functional reach, balance, trunk control, functional standing tolerance, decreased I w/ ADLS/IADLS, strength, cognitive impairments, decreased safety awareness and decreased insight into deficits  The following Occupational Performance Areas to address include: eating, grooming, bathing/shower, toilet hygiene, dressing, functional mobility, community mobility and clothing management  Recommend return to facility w/ rehab services, when medically stable   Pt to continue to benefit from acute immediate OT services to address the following goals 2-3x/week to  w/in 10-14 days:   Goals   Patient Goals to rest   LTG Time Frame 10- Long Term Goal #1 see below listed goals   Plan   Treatment Interventions ADL retraining;Functional transfer training; Endurance training;UE strengthening/ROM; Patient/family training;Equipment evaluation/education; Compensatory technique education;Continued evaluation; Energy conservation; Activityengagement   Goal Expiration Date 04/11/23   OT Frequency 2-3x/wk   Recommendation   OT Discharge Recommendation Return to facility with rehabilitation services   Additional Comments  The patient's raw score on the AM-PAC Daily Activity Inpatient Short Form is 16  A raw score of less than 19 suggests the patient may benefit from discharge to post-acute rehabilitation services  Please refer to the recommendation of the Occupational Therapist for safe discharge planning   Additional Comments 2 Pt seen as a co-evaluation due to the patient's co-morbidities, clinically unstable presentation, and present impairments which are a regression from the patient's baseline   [facility] Daily Activity Inpatient   Lower Body Dressing 2   Bathing 2   Toileting 2   Upper Body Dressing 3   Grooming 3   Eating 4   Daily Activity Raw Score 16   Daily Activity Standardized Score (Calc for Raw Score >=11) 35 96   AM-Located within [facility] Applied Cognition Inpatient   Following a Speech/Presentation 2   Understanding Ordinary Conversation 3   Taking Medications 3   Remembering Where Things Are Placed or Put Away 3   Remembering List of 4-5 Errands 2   Taking Care of Complicated Tasks 2   Applied Cognition Raw Score 15   Applied Cognition Standardized Score 33 54   End of Consult   Education Provided Yes   Patient Position at End of Consult Bedside chair;Bed/Chair alarm activated; All needs within reach   Nurse Communication Nurse aware of consult      GOALS    1) Pt will improve activity tolerance to G for 30 min txment sessions for increase engagement in functional tasks    2) Pt will complete UB/LB dressing/self care w/ S using adaptive device and DME as needed    3) Pt will complete bathing w/ S w/ use of AE and DME as needed    4) Pt will complete toileting w/ S w/ G hygiene/thoroughness using DME as needed    5) Pt will improve functional transfers to S on/off all surfaces using DME as needed w/ G balance/safety     6) Pt will improve functional mobility during ADL/IADL/leisure tasks to S using DME as needed w/ G balance/safety     7) Pt will be attentive 75% of the time during ongoing cognitive assessment w/ G participation to assist w/ safe d/c planning/recommendations    8) Pt will demonstrate G carryover of pt/caregiver education and training as appropriate w/ no more than 2 verbal cues w/ good tolerance to increase safety during functional tasks    9) Pt will improve bed mobility to S and sit EOB w/ G balance/trunk control for 10 mins as a pre-requisite for further engagement in functional OOB tasks    Alcira Donald MS, OTR/L

## 2023-03-28 NOTE — PROGRESS NOTES
Progress Note - Orthopedics   Jorden Portillo 80 y o  female MRN: [id number]  Unit/Bed#: [facility] 610-01      Subjective:    80 y  o female s/p unwitnessed fall with L superior pubic ramus fx  No acute events, no new complaints  Patient doing well  Pain well controlled  Denies fevers, chills, CP, SOB, N/V, numbness or tingling  Labs:  0   Lab Value Date/Time    HCT 36 6 03/28/2023 0515    HCT 41 0 03/27/2023 1133    HCT 45 4 08/10/2022 1316    HCT 36 2 01/15/2018 1024    HCT 36 5 11/29/2017 0948    HCT 39 3 10/14/2016 0859    HGB 11 4 (L) 03/28/2023 0515    HGB 13 4 03/27/2023 1133    HGB 14 5 08/10/2022 1316    HGB 10 7 (L) 01/15/2018 1024    HGB 10 7 (L) 11/29/2017 0948    HGB 12 8 10/14/2016 0859    INR 0 94 06/08/2022 1141    WBC 5 66 03/28/2023 0515    WBC 7 76 03/27/2023 1133    WBC 6 61 08/10/2022 1316    WBC 5 5 01/15/2018 1024    WBC 4 6 11/29/2017 0948    WBC 4 6 10/14/2016 0859       Meds:    Current Facility-Administered Medications:   •  acetaminophen (TYLENOL) tablet 650 mg, 650 mg, Oral, Q6H PRN, Bernabe Fuentes MD  •  bacitracin topical ointment 1 large application, 1 application  , Topical, Daily, Bernabe Fuentes MD, 1 large application at 34/35/76 1857  •  enoxaparin (LOVENOX) subcutaneous injection 30 mg, 30 mg, Subcutaneous, Q12H, Bernabe Fuentes MD, 30 mg at 03/27/23 1857  •  gabapentin (NEURONTIN) capsule 100 mg, 100 mg, Oral, TID, Bernabe Fuentes MD, 100 mg at 03/27/23 2230  •  HYDROmorphone HCl (DILAUDID) injection 0 2 mg, 0 2 mg, Intravenous, Q3H PRN, Bernabe Fuentes MD  •  lidocaine (LIDODERM) 5 % patch 2 patch, 2 patch, Topical, Daily, Bernabe Fuentes MD  •  methocarbamol (ROBAXIN) tablet 500 mg, 500 mg, Oral, Q6H [facility], Bernabe Fuentes MD, 500 mg at 03/27/23 2230  •  oxyCODONE (ROXICODONE) IR tablet 5 mg, 5 mg, Oral, Q4H PRN, Bernabe Fuentes MD  •  sertraline (ZOLOFT) tablet 12 5 mg, 12 5 mg, Oral, Daily, Bernabe Fuentes MD    Blood Culture:   No results found for: BLOODCX    Wound Culture:   No results found for: WOUNDCULT    Ins and Outs:  No intake/output data recorded  Physical:  Vitals:    03/27/23 2212   BP: 149/69   Pulse: 77   Resp: 19   Temp: (!) 97 4 °F (36 3 °C)   SpO2: 97%     Musculoskeletal: left Lower Extremity  · Skin intact   No erythema or ecchymosis  · TTP about anterior hip  · Sensation intact to saphenous, sural, tibial, superficial peroneal nerve, and deep peroneal  · Motor intact to +FHL/EHL, +ankle dorsi/plantar flexion  · 2+ DP pulse, symmetric bilaterally  · Digits warm and well perfused  · Capillary refill < 2 seconds    Assessment:    80 y  o female s/p fall with L superior pubic ramus fx   Patient doing well  No indication for operative intervention      Plan:  · WBAT LLE  · Will monitor for ABLA and administer IVF/prbc as indicated for Greater than 2 gram drop or Hgb < 7  · PT/OT  · Pain control  · DVT ppx - Lovenox, will require 28 days of DVT ppx  · F/U with Dr Fazal Valencia outpatient in 4 wks  · Dispo: Ortho will follow    Lacy Delgado MD

## 2023-03-28 NOTE — CONSULTS
Consultation - Geriatric Medicine   Carlie Zaldivar 80 y o  female MRN: [id number]  Unit/Bed#: [facility] 610-01 Encounter: [id number]      Assessment/Plan     Ambulatory dysfunction with fall  -Reportedly unwitnessed fall at long-term care facility 3/26  -(Unknown) head strike (unknown) loss of consciousness  -injuries as outlined below  -Requires use of walker for ambulation at baseline  -hx amatory function and unsteady gait with no additional falls reported in past  6 months  -Remains high risk future falls due to age, hx fall, deconditioning/debility and unfamiliar environment   -encourage good body mechanics and assist with all transfers  -keep personal items and call bell close to prevent reaching  -maintain environment free of fall hazards  -encourage appropriate footwear and adequate lighting at all times when out of bed  -PT and OT pending    Left superior pubic rami fracture  -S/p fall as outlined above  -Noted on CT pelvis obtained admission  -Nonoperative management and WBAT per Orthopedics  -Continue acute pain control  -Neurovascular checks per protocol    Acute pain due to trauma  -Recommend pain control per Geriatric pain protocol:  Tylenol 975mg Q8H scheduled  Roxicodone 2 5mg Q4H PRN moderate pain  Roxicodone 5mg Q4H PRN severe pain  Dilaudid 0 2mg Q4H PRN  -Consider adjuncts such as lidocaine patch topically  -encourage addition of non-pharmacologic pain treatment including ice and frequent repositioning  -recommend  bowel regimen to prevent and treat constipation  gitdue to increased risk with acute pain and opiate pain medications    Vitamin D deficiency  -No recent vitamin D on record for review, recommend checking to ensure adequate stores  - encourage diet rich in calcium and vitamin D, supplement for needs unable to met by diet alone    Alzheimer's dementia without behavioral disturbance  -Moderate and progressive  -Recent increase in level of care needs prompting transition to skilled nursing -At baseline oriented to self and dependent for all cares  -MoCA 14/30 (2019), defer repeat at this time is unlikely to   -TSH WNL, no recent B12, consider checking with routine labs  -CTH on admission personally reviewed, reveals 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 most densely appreciated right temporal parietal area  -Encourage use of sensory assist devices such as corrective lenses at all appropriate time to reduce risk sensor impairment contributing to isolation, confusion, encephalopathy and more precipitous cognitive decline  -Underlying cognitive ointment markedly increases risk of developing delirium during consultation, continue strict and precautions  -Minimize transitions between rooms, units, facilities especially late afternoon/early evening to reduce risk precipitating sundowning behaviors    Anxiety  -Symptoms appear to be well controlled at home regimen with sertraline, continue home regimen, monitor electrolytes and for weight loss with use   -Encourage calm quiet room introduced risk overstimulation    Impaired Vision  -recommend use of corrective lenses at all appropriate times  -encourage adequate lighting and encourage use of assistance with ambulation  -keep personal belongings close to person to avoid reaching  -encourage appropriate footwear at all times  -Consider large font for printed materials provided to patient    Delirium precautions  -Patient is high risk of delirium due to age, fall, Chittick injuries, acute pain, hospitalization, underlying dementia  -Initiate delirium precautions  -maintain normal sleep/wake cycle  -minimize overnight interruptions, group overnight vitals/labs/nursing checks as possible  -dim lights, close blinds and turn off tv to minimize stimulation and encourage sleep environment in evenings  -ensure that pain is well controlled -acute pain management recommendations as above  -monitor for fecal and urinary retention which may precipitate delirium  -encourage early mobilization and ambulation with assistance once cleared to safely do so  -provide frequent reorientation and redirection as indicated and appropriate  -Minimize use of medications which may precipitate or worsen delirium such as tramadol, benzodiazepine, anticholinergics, and benadryl whenever possible  -encourage hydration and nutrition    Deconditioning/debility/frailty  -Clinical frailty scale stage V, moderately frail  -Multifactor including age, amatory function with fall, advanced dementia and multiple chronic medical comorbidities in elderly individual with limited physiologic and metabolic reserve  -Encourage well-balanced nutrition, consider nutritional supplements between meals if oral intake is poor  -Continue optimization of chronic medical conditions and address acute metabolic derangements as arise  -Continue psychosocial supports of patient and family    Home medication review  Personally confirmed with medication list obtained from [facility]:    Sertraline 12 5 Mg daily  Acetaminophen 650 mg Q4H PRN  Bisacodyl suppository daily as needed for constipation  MiraLAX 17 g daily as needed for constipation  Enalapril 10 Mg daily    Diet- regular texture with thin liquids    Care coordination: Rounded with Lory Sicard (RN)    History of Present Illness   Physician Requesting Consult: Florida Chu MD  Reason for Consult / Principal Problem: Don Barrera  Hx and PE limited by: N/A  Additional history obtained from: Chart review and patient evaluation, outside records including records obtained from [facility]    HPI: Geneva Hernandez is a 80y o  year old female with anxiety, GERD, hyperlipidemia, vitamin D deficiency, lymphoma in remission, hypertension, dementia without behavioral disturbance, and ambulatory dysfunction who was admitted to the trauma service with fall found to have left pubic rami fracture on admission imaging, she is being seen in consultation by geriatrics for high risk developing delirium during hospitalization  Meghna Dc is seen and examined at bedside where she is lying resting comfortably with she is admitted from [facility] where she is a long-term resident, she recently transitioned from assisted living apartments to skilled nursing due to progressive dementia now requiring 24/7 supervision and assistance  She is admitted following unwitnessed fall at her care facility found down on ground by staff at approximately 11 PM on 3/26/2023  She initially denied pain however shortly after the fall started to report significant pain in left buttock and hip and inability to get up or bear weight prompting presentation to the emergency department where she was found to have acute superior pubic rami fracture  This morning she is groggy but awakens easily to voice, she is oriented only to self and unable to provide reliable HPI and thus is obtained through extensive chart review and discussion with care teams  At baseline she requires use of walker for ambulation, no additional falls reported within the past 6 months  As noted above she recently transitioned to the skilled section of her long-term facility due to increasing daily care needs as she is now requiring assistance with ADLs and fully dependent for IADLs  She requires use of corrective lenses, does not use hearing aids, reportedly requires use of upper and lower dentures       Inpatient consult to Gerontology  Consult performed by: Lionel Maher DO  Consult ordered by: Maria De Jesus Evans MD      Review of Systems   Unable to perform ROS: Dementia     Historical Information   Past Medical History:   Diagnosis Date   • Anorexia     last assessed: 8/9/2013   • Closed fracture dislocation of right ankle joint 5/20/2019    Added automatically from request for surgery [id number]   • GERD (gastroesophageal reflux disease)    • Herpes zoster     last assessed: 9/11/2013   • Hypertension "  • Lymphoma ([facility] )     resolved: 1997   • Malignant melanoma of skin ([facility] )     resolved: 2015   • Pelvic fracture [facility])      Past Surgical History:   Procedure Laterality Date   • BREAST SURGERY      enlargement procedure   • BYPASS FEMORAL-POPLITEAL Left     onset: 8/31/2012   • CATARACT EXTRACTION Right 06/19/2013   • HYSTERECTOMY      resolved: 1970   • ORIF TIBIA & FIBULA FRACTURES Right 5/21/2019    Procedure: OPEN REDUCTION W/ INTERNAL FIXATION (ORIF) ANKLE;  Surgeon: Bonoe Aragon MD;  [facility]: [facility];  Service: Orthopedics   • ORIF TIBIA & FIBULA FRACTURES Right 6/4/2019    Procedure: Revision right ankle ORIF;  Surgeon: Boone Aragon MD;  Location[facility];  Service: Orthopedics   • OTHER SURGICAL HISTORY  05/30/2013    PTA Femoral-popliteal initial stenosis left Description: with mechanical thrombectomy, PTA and stent    • [place] OPTX DSTL RADL I-ARTIC FX/EPIPHYSL SEP 3 FRAG Left 1/18/2017    Procedure: DISTAL RADIUS OPEN REDUCTION W/ INTERNAL FIXATION ;  Surgeon: Hailey Braun MD;  Location[facility];  Service: Orthopedics     Social History   Social History     Substance and Sexual Activity   Alcohol Use Yes   • Alcohol/week: 1 0 standard drink   • Types: 1 Glasses of wine per week    Comment: Per allscripts - social drinker      Social History     Substance and Sexual Activity   Drug Use No     Social History     Tobacco Use   Smoking Status Never   Smokeless Tobacco Never     Family History:   Family History   Problem Relation Age of Onset   • Heart attack Mother    • Heart failure Father    • Other Sister         thyroid surgery     Meds/Allergies   all current active meds have been reviewed    Allergies   Allergen Reactions   • Contrast [Iodinated Contrast Media]      Other reaction(s): \"I get very cold\"   • Iodine - Food Allergy      Other reaction(s): Chills  Reaction Date: 02Aug2011;    • Sulfa Antibiotics Vomiting   • Omnipaque [Iohexol]      Objective   No intake or output data in " the 24 hours ending 03/28/23 0617  Invasive Devices     Peripheral Intravenous Line  Duration           Peripheral IV 03/27/23 Right Forearm <1 day              Physical Exam  Vitals and nursing note reviewed  Constitutional:       General: She is not in acute distress  Appearance: Normal appearance  Comments: Elderly female appears stated age   HENT:      Head: Normocephalic  Nose: Nose normal       Comments: O2 via NC     Mouth/Throat:      Mouth: Mucous membranes are dry  Comments: Poor dentition  Eyes:      General:         Right eye: Discharge present  Left eye: Discharge present  Conjunctiva/sclera: Conjunctivae normal    Neck:      Comments: Phonation normal  Cardiovascular:      Rate and Rhythm: Normal rate and regular rhythm  Pulses: Normal pulses  Pulmonary:      Effort: Pulmonary effort is normal  No respiratory distress  Breath sounds: No wheezing  Abdominal:      General: There is no distension  Palpations: Abdomen is soft  Tenderness: There is no abdominal tenderness  Musculoskeletal:      Cervical back: Neck supple  Right lower leg: No edema  Left lower leg: No edema  Comments: Obese body habitus with reduced overall muscle mass   Skin:     General: Skin is warm and dry  Coloration: Skin is pale  Comments: Left forearm skin tear with Steri-Strips in place    Skin thin and friable   Neurological:      Mental Status: Mental status is at baseline  Comments: Sleeping but awakens to voice, remains somewhat groggy, oriented to self   Psychiatric:      Comments: Does not appear restless or agitated       Lab Results:     I have personally reviewed pertinent lab results  I have personally reviewed the pertinent imaging study reports in PACS      Therapies:   PT: Pending  OT: Pending    VTE Prophylaxis: Enoxaparin (Lovenox)    Code Status: Level 3 - DNAR and DNI  Advance Directive and Living Will: Yes    Power of :    POLST:      Family and Social Support:   Living Arrangements: Lives Alone  Support Systems: Therapist; Son; Daughter; Home care staff  Assistance Needed: pt lives at an assisted living facility  Type of Current Residence: Assisted living  [address]: [address]: No    Goals of Care: Pain control

## 2023-03-28 NOTE — UTILIZATION REVIEW
Initial Clinical Review    Admission: Date/Time/Statement:   Admission Orders (From admission, onward)     Ordered        03/27/23 1752  Inpatient Admission  Once                      Orders Placed This Encounter   Procedures   • Inpatient Admission     Standing Status:   Standing     Number of Occurrences:   1     Order Specific Question:   Level of Care     Answer:   Med Surg [16]     Order Specific Question:   Bed Type     Answer:   Trauma [7]     Order Specific Question:   Estimated length of stay     Answer:   More than 2 Midnights     Order Specific Question:   Certification     Answer:   I certify that inpatient services are medically necessary for this patient for a duration of greater than two midnights  See H&P and MD Progress Notes for additional information about the patient's course of treatment  ED Arrival Information     Expected   -    Arrival   3/27/2023 11:17    Acuity   Urgent            Means of arrival   Ambulance    Escorted by   [facility]    Service   Trauma    Admission type   Emergency            Arrival complaint   Hip pain/fall/no thinners           Chief Complaint   Patient presents with   • Fall     Pt had fall from standing last night in bathroom per staff at facility, unknown LOC, unknown headstrike, - thinners, c/o L hip pain        Initial Presentation: 80 y o  female to ED from Nursing home presents for Left pelvic pain  Pt had unwitnessed fall last nigh, exact circumstances are unknown getting out of the bed this am  Pt found to have left superior pelvic rami fracture on CT scan of the pelvis  Pt amnestic to the event, unsure of LOC or head strike  Admit Inpatient level of care for Fall with L superior pelvic rami fracture  Poor functional status  High risk for DVT  Orthopedic consult  Pain control  Continue home meds  3/27  Orthopedic cons; S/p Fall with left superior pubic rami fracture  No indication for operative intervention  WBAT   Analgesics for pain Date: 3/28   Day 2:   Per Orthopedic; WBAT LLE  Pain control  Progress notes; Syncopal workup; EKG, Orthostatics  Non operative management  Pain control  ED Triage Vitals [03/27/23 1126]   Temperature Pulse Respirations Blood Pressure SpO2   97 5 °F (36 4 °C) 78 18 165/72 98 %      Temp Source Heart Rate Source Patient Position - Orthostatic VS BP Location FiO2 (%)   Oral Monitor Lying Right arm --      Pain Score       4          Wt Readings from Last 1 Encounters:   03/27/23 65 4 kg (144 lb 1 6 oz)     Additional Vital Signs:   3/28/23 08:18:58 97 °F (36 1 °C)   Abnormal  69 16 154/66 95 95 % -- -- -- --   03/27/23 22:12:02 97 4 °F (36 3 °C)   Abnormal  77 19 149/69 96 97 % -- -- -- --   03/27/23 20:37:48 -- -- -- 149/66 94 -- -- -- -- --   03/27/23 20:05:53 -- -- -- 141/103   Abnormal  116 -- -- -- -- --   03/27/23 1940 -- -- -- -- -- -- 28 2 L/min Nasal cannula  --   O2 Device: per pt, chronic oxygen use at 03/27/23 1940 03/27/23 1630 -- 62 20 177/72   Abnormal  103 100 % -- -- Nasal cannula Lying   03/27/23 1600 -- 64 18 163/72 104 99 % -- -- Nasal cannula Lying   03/27/23 1526 -- 66 -- 160/70 101 96 % -- -- -- --     Pertinent Labs/Diagnostic Test Results:   CT chest and abdomen wo contrast   Final Result by Reggie Ramirez DO (03/27 1849)      No acute traumatic injury in the chest or abdomen on limited noncontrast imaging  Elevation of the left diaphragm with mild left lower lobe compressive atelectasis, similar  Question subtle nondisplaced fracture left iliac wing  Limited study without IV or oral contrast       The study was marked in EPIC for immediate notification  Workstation performed: [id number]         CT head wo contrast   Final Result by Reggie Ramirez DO (03/27 1830)      No acute intracranial abnormality  Chronic microangiopathic changes                    Workstation performed: [id number]         CT spine cervical wo contrast   Final Result by Rafa Contreras MD (03/27 1926)      No cervical spine fracture or traumatic malalignment  Workstation performed: [id number]         CT pelvis wo contrast   Final Result by Nabeel Reich MD (03/27 1555)      Acute left superior pubic ramus fracture  The study was marked in EPIC for significant notification  Workstation performed: [id number]         XR chest 1 view portable   Final Result by Tameka Coburn MD (03/27 1616)      No active pulmonary disease  Stable elevation of the left hemidiaphragm  Workstation performed: [id number]         XR hip/pelv 2-3 vws left   Final Result by Arcelia Villafuerte MD (03/27 1436)      No acute osseous abnormality  Chronic-appearing fractures of the inferior pubic rami and left pubic body , which are likely present on the 2013  radiographs              Workstation performed: [id number]           3/27  EKG - NSR      Results from last 7 days   Lab Units 03/28/23  0515 03/27/23  1133   WBC Thousand/uL 5 66 7 76   HEMOGLOBIN g/dL 11 4* 13 4   HEMATOCRIT % 36 6 41 0   PLATELETS Thousands/uL 226 285   NEUTROS ABS Thousands/µL 3 83 5 57         Results from last 7 days   Lab Units 03/28/23  0614 03/28/23  0515 03/27/23  1133   SODIUM mmol/L  --  141 141   POTASSIUM mmol/L  --  3 5 3 9   CHLORIDE mmol/L  --  111* 109*   CO2 mmol/L  --  30 30   ANION GAP mmol/L  --  0* 2*   BUN mg/dL  --  17 20   CREATININE mg/dL  --  0 38* 0 70   EGFR ml/min/1 73sq m  --  90 74   CALCIUM mg/dL  --  8 2* 9 5   CALCIUM, IONIZED mmol/L 1 15  --   --    MAGNESIUM mg/dL  --  2 5  --    PHOSPHORUS mg/dL  --  3 2  --              Results from last 7 days   Lab Units 03/28/23  0515 03/27/23  1133   GLUCOSE RANDOM mg/dL 91 112       ED Treatment:   Medication Administration from 03/27/2023 1116 to 03/27/2023 1905       Date/Time Order Dose Route Action     03/27/2023 1135 EDT morphine injection 4 mg 4 mg Intravenous Given     03/27/2023 1851 EDT enoxaparin (LOVENOX) subcutaneous injection 30 mg 30 mg Subcutaneous Given     03/27/2023 1857 EDT bacitracin topical ointment 1 large application 1 large application Topical Given        Past Medical History:   Diagnosis Date   • Anorexia     last assessed: 8/9/2013   • Closed fracture dislocation of right ankle joint 5/20/2019    Added automatically from request for surgery [id number]   • GERD (gastroesophageal reflux disease)    • Herpes zoster     last assessed: 9/11/2013   • Hypertension    • Lymphoma ([facility] )     resolved: 1997   • Malignant melanoma of skin ([facility] )     resolved: 2015   • Pelvic fracture ([place] [address] )      Present on Admission:  **None**      Admitting Diagnosis: Inability to ambulate due to hip [R26 2]  Closed fracture of superior ramus of left pubis, initial encounter ([place] [address] ) [S32 512A]  Age/Sex: 80 y o  female     Admission Orders:  Scheduled Medications:  bacitracin topical ointment, 1 application  , Topical, Daily  enoxaparin, 30 mg, Subcutaneous, Q12H  gabapentin, 100 mg, Oral, TID  lidocaine, 2 patch, Topical, Daily  methocarbamol, 500 mg, Oral, Q6H SULAIMAN  sertraline, 12 5 mg, Oral, Daily      Continuous IV Infusions: None     PRN Meds:  acetaminophen, 650 mg, Oral, Q6H PRN  HYDROmorphone, 0 2 mg, Intravenous, Q3H PRN  oxyCODONE, 5 mg, Oral, Q4H PRN        IP CONSULT TO ORTHOPEDIC SURGERY  IP CONSULT TO GERONTOLOGY    Network Utilization Review Department  ATTENTION: Please call with any questions or concerns to [phone] and carefully listen to the prompts so that you are directed to the right person  All voicemails are confidential   Lynn Burgess all requests for admission clinical reviews, approved or denied determinations and any other requests to dedicated fax number below belonging to the campus where the patient is receiving treatment   List of dedicated fax numbers for the Facilities:  [address] [place] [address] DENIALS (Administrative/Medical Necessity) [phone]   [address] (Maternity/NICU/Pediatrics) [address]eria  [phone]   [address] [place] [address] J[facility]aran ChuNicholas [facility] [address] [facility] [address] [place] [phone]

## 2023-03-28 NOTE — PHYSICAL THERAPY NOTE
Physical Therapy Evaluation     Patient's Name: Fahad Mejía    Admitting Diagnosis  Inability to ambulate due to hip [R26 2]  Closed fracture of superior ramus of left pubis, initial encounter ([facility] ) [S32 512A]    Problem List  Patient Active Problem List   Diagnosis    Atherosclerosis 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 ([facility] )    Benign essential hypertension    Cataract    GERD without esophagitis    Hypercholesterolemia    Hypertriglyceridemia    Lymphoma in remission ([facility] )    Vitamin D deficiency    Ambulatory dysfunction    Orthopedic hardware present    Late onset Alzheimer's disease without behavioral disturbance ([facility] )    Venous stasis dermatitis of both lower extremities    Anxiety    Diarrhea    Left hip pain    Fall    Fracture of multiple pubic rami, left, closed, initial encounter [facility])       Past Medical History  Past Medical History:   Diagnosis Date    Anorexia     last assessed: 8/9/2013    Closed fracture dislocation of right ankle joint 5/20/2019    Added automatically from request for surgery [id number]    GERD (gastroesophageal reflux disease)     Herpes zoster     last assessed: 9/11/2013    Hypertension     Lymphoma ([facility] )     resolved: 1997    Malignant melanoma of skin ([place] [address] )     resolved: 2015    Pelvic fracture [facility])        Past Surgical History  Past Surgical History:   Procedure Laterality Date    BREAST SURGERY      enlargement procedure    BYPASS FEMORAL-POPLITEAL Left     onset: 8/31/2012    CATARACT EXTRACTION Right 06/19/2013    HYSTERECTOMY      resolved: 1970    ORIF TIBIA & FIBULA FRACTURES Right 5/21/2019    Procedure: OPEN REDUCTION W/ INTERNAL FIXATION (ORIF) ANKLE;  Surgeon: Jessica Gifford MD;  Location: [facility];  Service: Orthopedics    ORIF TIBIA & FIBULA FRACTURES Right 6/4/2019    Procedure: Revision right ankle ORIF;  Surgeon: Jessica Gifford MD;  [facility];  Service: Orthopedics    OTHER SURGICAL HISTORY  05/30/2013    PTA Femoral-popliteal initial stenosis left Description: with mechanical thrombectomy, PTA and stent     WA OPTX DSTL RADL I-ARTIC FX/EPIPHYSL SEP 3 FRAG Left 1/18/2017    Procedure: DISTAL RADIUS OPEN REDUCTION W/ INTERNAL FIXATION ;  Surgeon: Raya Cho MD;  Location: [facility];  Service: Orthopedics        03/28/23 1429   PT Last Visit   PT Visit Date 03/28/23   Note Type   Note type Evaluation   Pain Assessment   Pain Assessment Tool 0-10   Pain Score 5   Pain Location/Orientation Orientation: Left; Location: Hip   Patient's Stated Pain Goal No pain   Hospital Pain Intervention(s) Repositioned; Ambulation/increased activity; Emotional support   Restrictions/Precautions   Weight Bearing Precautions Per Order Yes   LLE Weight Bearing Per Order (S)  WBAT   Other Precautions Cognitive; Chair Alarm; Bed Alarm;WBS;Multiple lines; Fall Risk;Pain;O2  (2L O2)   Home Living   Type of Home Assisted living   Home Layout One level;Performs ADLs on one level; Able to live on main level with bedroom/bathroom   Bathroom Shower/Tub Walk-in shower   Bathroom Toilet Raised   Bathroom Equipment Grab bars in shower; [address]  (was using PTA)   Additional Comments Per chart review, pt resides in [facility] SNF for past 2wks, recently transitioned from  detention  Prior Function   Level of [place] Needs assistance with ADLs; Needs assistance with IADLS; Independent with functional mobility   Lives With Facility staff   Receives Help From Other (Comment)  (facility staff)   IADLs Family/Friend/Other provides transportation; Family/Friend/Other provides meals; Family/Friend/Other provides medication management   Falls in the last 6 months 1 to 4  (@ least 1 leading to current admission)   Vocational Retired   Cognition   Overall Cognitive Status Impaired   Attention Attends with cues to redirect   Orientation Level Oriented to person;Disoriented to place; Disoriented to time;Disoriented to situation Memory Decreased recall of recent events;Decreased recall of precautions   Following Commands Follows one step commands with increased time or repetition   Comments pt is pleasant and cooperative   RLE Assessment   RLE Assessment   (functionally 3+/5)   LLE Assessment   LLE Assessment   (functionally 3-/5; limited 2* pain)   Bed Mobility   Supine to Sit 3  Moderate assistance   Additional items Assist x 1;HOB elevated; Bedrails; Increased time required;Verbal cues;LE management   Sit to Supine Unable to assess   Additional Comments pt supine in bed upon arrival  Pt returned to sitting OOB in recliner with alarm on and all needs within reach   Transfers   Sit to Stand 3  Moderate assistance   Additional items Assist x 2; Increased time required;Verbal cues   Stand to Sit 3  Moderate assistance   Additional items Assist x 2; Increased time required;Verbal cues   Stand pivot 3  Moderate assistance   Additional items Assist x 2; Increased time required;Verbal cues   Additional Comments RW utilized for transfers   Ambulation/Elevation   Gait pattern Excessively slow; Short stride; Foward flexed;Decreased foot clearance; Improper Weight shift;Decreased L stance; Inconsistent yobani   Gait Assistance 3  Moderate assist   Additional items Assist x 2;Verbal cues; Tactile cues   Assistive Device Rolling walker   Distance able to take 2 steps; limited 2* LLE pain   Transitioned to SPT for safety   Balance   Static Sitting Fair   Dynamic Sitting Fair -   Static Standing Poor   Dynamic Standing Poor   Ambulatory Poor   Endurance Deficit   Endurance Deficit Yes   Endurance Deficit Description fatigue, pain, weakness   Activity Tolerance   Activity Tolerance Patient limited by fatigue;Patient limited by pain   Medical Staff Made Aware OT; co-eval performed this date 2* increased medical complexity and multiple co-morbidities   Nurse Made Aware RN cleared pt to participate in therapy session   Assessment   Prognosis Fair   Problem List Decreased strength;Decreased endurance;Decreased range of motion; Impaired balance;Decreased mobility; Decreased cognition; Impaired judgement;Decreased safety awareness;Pain;Orthopedic restrictions   Assessment Pt is a 80 y o  female seen for PT evaluation s/p admit to [facility] on 3/27/2023  Pt was admitted with a primary dx of: L superior pubic ramus fx s/p fall  Pt WBAT on LLE per ortho  PT now consulted for assessment of mobility and d/c needs  Pt with Up in chair orders  Pts current comorbidities effecting treatment include: GERD, hypercholesterolemia, hypertriglyceridemia, lymphoma in remission, ambulatory dysfunction, Alzheimer's disease, anxiety  Pts current clinical presentation is Unstable/ Unpredictable (high complexity) due to Ongoing medical management for primary dx, Increased reliance on more restrictive AD compared to baseline, Decreased activity tolerance compared to baseline, Fall risk, Increased assistance needed from caregiver at current time, Cog status, Current WBS, Trending lab values, Continuous pulse oximetry monitoring   Prior to admission, pt was residing at [facility] SNF, requiring A with ADL/ IADLs using RW at S level per pt report  Upon evaluation, pt currently is requiring mod A for bed mobility; mod Ax2 for transfers; mod Ax2 for ambulation 2 steps w/ RW  Pt presents at PT eval functioning below baseline and currently w/ overall mobility deficits 2* to: BLE weakness, decreased ROM, impaired balance, decreased endurance, impaired coordination, gait deviations, pain, decreased activity tolerance compared to baseline, decreased functional mobility tolerance compared to baseline, decreased safety awareness, impaired judgement, fall risk, orthopedic restrictions, decreased cognition  Pt currently at a fall risk 2* to impairments listed above    Pt will continue to benefit from skilled acute PT interventions to address stated impairments; to maximize functional mobility; for ongoing pt/ family training; and DME needs  At conclusion of PT session pt returned back in chair and chair alarm engaged with phone and call bell within reach  Pt denies any further questions at this time  The [facility]'s [facility] Basic Mob[facility] Inpatient Short Form Raw Score is 9  A Raw score of less than or equal to 16 suggests the patient may benefit from discharge to post-acute rehabilitation services  Please also refer to the recommendation of the Physical Therapist for safe discharge planning  Recommend return to facility with rehab services upon hospital D/C  Goals   Patient Goals to rest   STG Expiration Date 04/11/23   Short Term Goal #1 STG 1  Pt will be able to perform bed mobility tasks with S level in order to improve overall functional mobility and assist in safe d/c  STG 2  Pt with sit EOB for at least 25 minutes at S level in order to strengthen abdominal musculature and assist in future transfers/ ambulation  STG 3  Pt will be able to perform functional transfer with S level in order to improve overall functional mobility and assist in safe d/c  STG 4  Pt will be able to ambulate at least 50 feet with least restrictive device with S level A in order to improve overall functional mobility and assist in safe d/c  STG 5  Pt will improve sitting/standing static/dynamic balance 1/2 grade in order to improve functional mobility and assist in safe d/c  STG 6  Pt will improve LE strength by 1/2 grade in order to improve functional mobility and assist in safe d/c    PT Treatment Day 0   Plan   Treatment/Interventions Functional transfer training;LE strengthening/ROM; Endurance training;Cognitive reorientation;Patient/family training;Equipment eval/education; Bed mobility;Gait training;Spoke to nursing;Spoke to case management;OT   PT Frequency 2-3x/wk   Recommendation   PT Discharge Recommendation Return to facility with rehabilitation services   Equipment Recommended Wheelchair;Walker   [facility] Basic Mobility Inpatient Turning in Flat Bed Without Bedrails 3   Lying on Back to Sitting on Edge of Flat Bed Without Bedrails 2   Moving Bed to Chair 1   Standing Up From Chair Using Arms 1   Walk in Room 1   Climb 3-5 Stairs With Railing 1   Basic Mobility Inpatient Raw Score 9   Highest Level Of Mobility   -HLM Goal 3: Sit at edge of bed   -HLM Achieved 4: Move to chair/commode   Modified Finn Scale   Modified [place] Scale 4           Alfred Single, PT, DPT

## 2023-03-28 NOTE — CASE MANAGEMENT
Case Management Assessment & Discharge Planning Note    Patient name Adele Valles  [facility] MRN [id number]  : 4/10/1928 Date 3/28/2023       Current Admission Date: 3/27/2023  Current Admission Diagnosis:Fall   Patient Active Problem List    Diagnosis Date Noted   • Fracture of multiple pubic rami, left, closed, initial encounter ([place] [address] ) 2023   • Left hip pain 2023   • Fall 2023   • Diarrhea 08/15/2022   • Anxiety 2022   • Venous stasis dermatitis of both lower extremities 2019   • Late onset Alzheimer's disease without behavioral disturbance ([place] [address] ) 2019   • Orthopedic hardware present 2019   • Ambulatory dysfunction 2019   • Hypertriglyceridemia 10/13/2015   • Lymphoma in remission ([place] [address] ) 2014   • GERD without esophagitis 2013   • Vitamin D deficiency 2013   • Atherosclerosis 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 ([place] [address] ) 2013   • Cataract 2013   • Hypercholesterolemia 2012   • Benign essential hypertension 2012      LOS (days): 1  Geometric Mean LOS (GMLOS) (days): 2 70  Days to GMLOS:2     OBJECTIVE:    Risk of Unplanned Readmission Score: 14 7         Current admission status: Inpatient       Preferred Pharmacy:   [address], [address] [facility] [address] [address]  Phone: [phone] Fax: [phone]    Primary Care Provider: Natasha Elizabeth DO    Primary Insurance: [facility]  Secondary Insurance:     ASSESSMENT:  Vanessa 26 Proxies    There are no active Health Care Proxies on file         Advance Directives  Does patient have a Health Care POA?: Yes  Does patient have Advance Directives?: Yes  Advance Directives: Living will, Power of  for health care  Primary Contact: Arti Han (dtr) [phone]      Readmission Root Cause  30 Day Readmission: No    Patient Information  Admitted from[de-identified] Facility ([address])  Mental Status: Alert  During Assessment patient was accompanied by: Not accompanied during assessment  Assessment information provided by[de-identified] Daughter  Primary Caregiver: Self  Support Systems: Self, Daughter, Family members, Home care staff  [place] of Residence: [address] [place] [address] do you live in?: [facility] entry access options   Select all that apply : No steps to enter home  Type of Current Residence: Facility ([address])  Upon entering residence, is there a bedroom on the main floor (no further steps)?: Yes  Upon entering residence, is there a bathroom on the main floor (no further steps)?: Yes  In the last 12 months, was there a time when you were not able to pay the mortgage or rent on time?: No  In the last 12 months, how many places have you lived?: 2  In the last 12 months, was there a time when you did not have a steady place to sleep or slept in a shelter (including now)?: No  Homeless/housing insecurity resource given?: N/A  Living Arrangements: Lives Alone  Is patient a ?: No    Activities of Daily Living Prior to Admission  Functional Status: Assistance  Completes ADLs independently?: No  Level of ADL dependence: Assistance  Ambulates independently?: No  Level of ambulatory dependence: Assistance  Does patient use assisted devices?: Yes  Assisted Devices (DME) used: Katie Dejesus  Does patient currently own DME?: Yes  What DME does the patient currently own?: Katie Dejesus  Does patient have a history of Outpatient Therapy (PT/OT)?: Yes  Does the patient have a history of Short-Term Rehab?: Yes  Does patient have a history of HHC?: Yes  Does patient currently have [address]?: No    Patient Information Continued  Income Source: Pension/snf  Does patient have prescription coverage?: Yes  Within the past 12 months, you worried that your food would run out before you got the money to buy more : Never true  Within the past 12 months, the food you bought just didn't last and you didn't have money to get more : Never true  Food insecurity resource given?: N/A  Does patient receive dialysis treatments?: No  Does patient have a history of substance abuse?: No  Does patient have a history of Mental Health Diagnosis?: No    Means of Transportation  Means of Transport to Appts[de-identified] Family transport  In the past 12 months, has lack of transportation kept you from medical appointments or from getting medications?: No  In the past 12 months, has lack of transportation kept you from meetings, work, or from getting things needed for daily living?: No  Was application for public transport provided?: N/A    DISCHARGE DETAILS:    Discharge planning discussed with[de-identified] Jaiden Hernandez (dtr) [phone]  Freedom of Choice: Yes     CM contacted family/caregiver?: Yes  Were Treatment Team discharge recommendations reviewed with patient/caregiver?: Yes  Did patient/caregiver verbalize understanding of patient care needs?: N/A- going to facility  Were patient/caregiver advised of the risks associated with not following Treatment Team discharge recommendations?: Yes    Contacts  Patient Contacts: Jaiden Hernandez (dtsarthak) [phone]  Relationship to Patient[de-identified] Family  Contact Method: Phone  Phone Number: [phone]  Reason/Outcome: Continuity of Care, Emergency [address]         Is the patient interested in [facility] at discharge?: No    DME Referral Provided  Referral made for DME?: No    Other Referral/Resources/Interventions Provided:  Interventions: SNF    Treatment Team Recommendation: SNF  Discharge Destination Plan[de-identified] SNF      CM spoke to pt's dtr (Jaiden Hernandez (dtsarthak) [phone]) to discuss the role of CM, as well as d/c planning  Pt resides at [address] [place] [address] (moved there 2 weeks ago from the assisted living portion)  Pt has walk-in shower with grab bars, bench, and raised toilet  Pt ambulates with a walker at all times   Pt requires assist for all ADL/iADLs except for feeding and drinking  Pt's had 1 fall  CM reached out to [facility] to determine if the pt is a bedhold and can return immediately  Pt's dtr would like that to occur  CM will follow up  CM reviewed d/c planning process including the following: identifying help at home, patient preference for d/c planning needs, Discharge Lounge, [facility] Meds to Bed program, availability of treatment team to discuss questions or concerns patient and/or family may have regarding understanding medications and recognizing signs and symptoms once discharged  CM also encouraged patient to follow up with all recommended appointments after discharge  Patient advised of importance for patient and family to participate in managing patient’s medical well being

## 2023-03-28 NOTE — PLAN OF CARE
Problem: OCCUPATIONAL THERAPY ADULT  Goal: Performs self-care activities at highest level of function for planned discharge setting  See evaluation for individualized goals  Description: Treatment Interventions: ADL retraining, Functional transfer training, Endurance training, UE strengthening/ROM, Patient/family training, Equipment evaluation/education, Compensatory technique education, Continued evaluation, Energy conservation, Activityengagement          See flowsheet documentation for full assessment, interventions and recommendations  Note: Limitation: Decreased ADL status, Decreased UE strength, Decreased Safe judgement during ADL, Decreased cognition, Decreased endurance, Decreased self-care trans, Decreased high-level ADLs  Prognosis: Fair  Assessment: Pt is a 81 y/o female seen for OT eval s/p adm to [facility] w/ left pelvic pain s/p unwitnessed fall in her nursing home  Pt is dx'd w/ left superior pelvic rami fracture  Pt is non-op, WBAT in LLE  Pt  has a past medical history of Anorexia, Closed fracture dislocation of right ankle joint (5/20/2019), GERD (gastroesophageal reflux disease), Herpes zoster, Hypertension, Lymphoma ([facility] ), Malignant melanoma of skin ([facility] ), and Pelvic fracture ([facility] )  Pt with active OT orders and up with assistance  orders  Pt lives with facility staff at [facility]  Pt receives assist for ADLS and IADLS, does not drive, & required use of DME PTA including a RW  Pt is currently demonstrating the following occupational deficits: Min A UB ADLS, Mod A LB ADLS, Mod A bed mobility, Mod A x2 transfers and functional mobility w/ HHA   These deficits that are impacting pt's baseline areas of occupation are a result of the following impairments: pain, endurance, activity tolerance, functional mobility, forward functional reach, balance, trunk control, functional standing tolerance, decreased I w/ ADLS/IADLS, strength, cognitive impairments, decreased safety awareness and decreased insight into deficits  The following Occupational Performance Areas to address include: eating, grooming, bathing/shower, toilet hygiene, dressing, functional mobility, community mobility and clothing management  Recommend return to facility w/ rehab services, when medically stable   Pt to continue to benefit from acute immediate OT services to address the following goals 2-3x/week to  w/in 10-14 days:     OT Discharge Recommendation: Return to facility with rehabilitation services     Alena Cassidy MS, OTR/L

## 2023-03-28 NOTE — CASE MANAGEMENT
Case Management Discharge Planning Note    Patient name Chrissy Cyr  Location [facility]76-78 MRN [id number]  : 4/10/1928 Date 3/28/2023       Current Admission Date: 3/27/2023  Current Admission Diagnosis:Inability to ambulate due to hip, Closed fracture of superior ramus of left pubis, initial encounter [facility])   Patient Active Problem List    Diagnosis Date Noted   • Left hip pain 2023   • Fall 2023   • Diarrhea 08/15/2022   • Anxiety 2022   • Venous stasis dermatitis of both lower extremities 2019   • Late onset Alzheimer's disease without behavioral disturbance ([place] [address] ) 2019   • Orthopedic hardware present 2019   • Ambulatory dysfunction 2019   • Hypertriglyceridemia 10/13/2015   • Lymphoma in remission ([facility] ) 2014   • GERD without esophagitis 2013   • Vitamin D deficiency 2013   • Atherosclerosis 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 ([place] [address] ) 2013   • Cataract 2013   • Hypercholesterolemia 2012   • Benign essential hypertension 2012      LOS (days): 1  Geometric Mean LOS (GMLOS) (days): 2 70  Days to GMLOS:2     OBJECTIVE:  Risk of Unplanned Readmission Score: 14 7         Current admission status: Inpatient   Preferred Pharmacy:   [address], [address] [facility] [address] [place] [address]  Phone: [phone] Fax: [phone]    Primary Care Provider: Rajani Wilkinson DO    Primary Insurance: [facility]  Secondary Insurance:     DISCHARGE DETAILS:    Voicemail left for pt's dtr, as well as director of nursing from [facility], to determine pt's prior level of functioning as well as where, within, [facility], she was living

## 2023-03-28 NOTE — PROGRESS NOTES
[facility]  Tertiary/ Progress Note  Name: Javier Harrison  MRN: [id number]  Unit/Bed#: [facility] 610-01 I Date of Admission: 3/27/2023   Date of Service: 3/28/2023 I Hospital Day: 1    Assessment/Plan   Fracture of multiple pubic rami, left, closed, initial encounter [facility])  Assessment & Plan  Patient sustained left superior pubic rami fracture  - Orthopedic consult appreciated  - Non operative managment  - WBAT LLE  - Multimodal pain control  - Orthopedics- Lovenox x 28 days  - PT/OT  - CM for dispo planning    Fall  Assessment & Plan  S?p Fall unwitnessed in [address]  -Syncopal workup- Echo, EKG, Orthostatics  - Geriatric consult appreciated  - PT/OT evaluation  - Lives at [facility]             Bowel Regimen: senna colace  VTE Prophylaxis:Enoxaparin (Lovenox)     Disposition: Possibly back to [facility], awaiting PT/OT recommendations  Subjective   Chief Complaint: Left pelvic pain with motion    Subjective: Patient states has no pain while lying in bed, however with motion does endorse left leg/hip pain  Objective   Vitals:   Temp:  [97 °F (36 1 °C)-97 4 °F (36 3 °C)] 97 °F (36 1 °C)  HR:  [60-77] 69  Resp:  [16-20] 16  BP: (134-177)/() 154/66    I/O       03/26 0701  03/27 0700 03/27 0701  03/28 0700 03/28 0701  03/29 0700    Urine (mL/kg/hr)  0     Total Output  0     Net  0            Unmeasured Urine Occurrence  2 x 1 x           Physical Exam:   GENERAL APPEARANCE: NAD, Appears comfortable lying in bed  NEURO: Intact, GCS 15, alert and oriented x 3  HEENT: PERRL  CV: RRR  LUNGS: CTA B/L throughout  GI: Abdomen soft, NT, ND, +BS x 4  : Voiding on own  MSK: SAGE's, weakly lifts Left leg from bed  SKIN: No evidence of edema BLE's       Invasive Devices     Peripheral Intravenous Line  Duration           Peripheral IV 03/27/23 Right Forearm <1 day          Drain  Duration           External Urinary Catheter <1 day Lab Results:   Results: I have personally reviewed all pertinent laboratory/tests results, BMP/CMP:   Lab Results   Component Value Date    SODIUM 141 03/28/2023    K 3 5 03/28/2023     (H) 03/28/2023    CO2 30 03/28/2023    BUN 17 03/28/2023    CREATININE 0 38 (L) 03/28/2023    CALCIUM 8 2 (L) 03/28/2023    EGFR 90 03/28/2023    and CBC:   Lab Results   Component Value Date    WBC 5 66 03/28/2023    HGB 11 4 (L) 03/28/2023    HCT 36 6 03/28/2023    MCV 95 03/28/2023     03/28/2023    MCH 29 5 03/28/2023    MCHC 31 1 (L) 03/28/2023    RDW 14 0 03/28/2023    MPV 10 9 03/28/2023    NRBC 0 03/28/2023     Imaging: I have personally reviewed pertinent reports  Other Studies: None    TRAUMA TERTIARY SURVEY NOTE    VTE Prophylaxis:Enoxaparin (Lovenox)     Disposition: TBD with PT/OT and Facility    Code status:  Level 3 - DNAR and DNI    Consultants: IP CONSULT TO ORTHOPEDIC SURGERY  IP CONSULT TO GERONTOLOGY    Subjective   Transfer from: [facility] where she resides    Mechanism of Injury:Fall     Chief Complaint: Left hip pain    HPI/Last 24 hour events: Patient admitted to the trauma serivce found to have Left superior rami fracture  Orthopedics consulted  PT/OT evaluation  Geriatric consulted  Non op management, Lovenox started  Objective   Vitals:   Temp:  [97 °F (36 1 °C)-97 4 °F (36 3 °C)] 97 °F (36 1 °C)  HR:  [60-77] 69  Resp:  [16-20] 16  BP: (134-177)/() 154/66    I/O       03/26 0701  03/27 0700 03/27 0701  03/28 0700 03/28 0701 03/29 0700    Urine (mL/kg/hr)  0     Total Output  0     Net  0            Unmeasured Urine Occurrence  2 x 1 x             Invasive Devices     Peripheral Intravenous Line  Duration           Peripheral IV 03/27/23 Right Forearm <1 day          Drain  Duration           External Urinary Catheter <1 day                   1  Before the illness or injury that brought you to the Emergency, did you need someone to help you on a regular basis?  1=Yes 2  Since the illness or injury that brought you to the Emergency, have you needed more help than usual to take care of yourself? 1=Yes   3  Have you been hospitalized for one or more nights during the past 6 months (excluding a stay in the Emergency Department)? 0=No   4  In general, do you see well? 0=Yes   5  In general, do you have serious problems with your memory? 1=Yes   6  Do you take more than three different medications everyday? 1=Yes   TOTAL   4     Did you order a geriatric consult if the score was 2 or greater?: yes         Lab Results:   Results: I have personally reviewed all pertinent laboratory/tests results, BMP/CMP:   Lab Results   Component Value Date    SODIUM 141 03/28/2023    K 3 5 03/28/2023     (H) 03/28/2023    CO2 30 03/28/2023    BUN 17 03/28/2023    CREATININE 0 38 (L) 03/28/2023    CALCIUM 8 2 (L) 03/28/2023    EGFR 90 03/28/2023    and CBC:   Lab Results   Component Value Date    WBC 5 66 03/28/2023    HGB 11 4 (L) 03/28/2023    HCT 36 6 03/28/2023    MCV 95 03/28/2023     03/28/2023    MCH 29 5 03/28/2023    MCHC 31 1 (L) 03/28/2023    RDW 14 0 03/28/2023    MPV 10 9 03/28/2023    NRBC 0 03/28/2023       Imaging Results:   Chest Xray(s):  negative for acute findings   FAST exam(s): negative for acute findings   CT Scan(s): Acute minimally displaced superior left pubic ramus fracture  Healed right superior and left inferior pubic rami fractures,   CT chest /abdomen: No acute traumatic injury in the chest or abdomen on limited noncontrast imaging      Elevation of the left diaphragm with mild left lower lobe compressive atelectasis, similar  CTH: No acute intracranial abnormality  Chronic microangiopathic changes    CT C-spine Negative   Additional Xray(s): N/A

## 2023-03-28 NOTE — OCCUPATIONAL THERAPY NOTE
Pt is a 79 y/o female seen for OT eval s/p adm to [facility] w/ left pelvic pain s/p unwitnessed fall in her nursing home  Pt is dx'd w/ left superior pelvic rami fracture  Pt is non-op, WBAT in LLE  Pt  has a past medical history of Anorexia, Closed fracture dislocation of right ankle joint (2019), GERD (gastroesophageal reflux disease), Herpes zoster, Hypertension, [facility]oma ([facility] [facility] ), [facility] and [facility]  Pt with active OT orders and up with assistance  orders  Pt lives with facility staff at [facility]  Pt receives assist for ADLS and IADLS, does not drive, & required use of DME PTA including a RW  Pt is currently demonstrating the following occupational deficits: Min A UB ADLS, Mod A LB ADLS, Mod A bed mobility, Mod A x2 transfers and functional mobility w/ HHA  These deficits that are impacting pt's baseline areas of occupation are a result of the following impairments: pain, endurance, activity tolerance, functional mobility, forward functional reach, balance, trunk control, functional standing tolerance, decreased I w/ ADLS/IADLS, strength, cognitive impairments, decreased safety awareness and decreased insight into deficits  The following Occupational Performance Areas to address include: eating, grooming, bathing/shower, toilet hygiene, dressing, functional mobility, community mobility and clothing management  Recommend return to facility w/ rehab services, when medically stable  Pt to continue to benefit from acute immediate OT services to address the following goals 2-3x/week to  w/in 10-14 days: The patient's raw score on the -PAC Daily Activity Inpatient Short Form is 16  A raw score of less than 19 suggests the patient may benefit from discharge to post-acute rehabilitation services  Please refer to the recommendation of the Occupational Therapist for safe discharge planning      Pt seen as a co-evaluation due to the patient's co-morbidities, clinically unstable presentation, and present impairments which are a regression from the patient's baseline

## 2023-03-28 NOTE — PLAN OF CARE
Problem: MOBILITY - ADULT  Goal: Maintain or return to baseline ADL function  Description: INTERVENTIONS:  -  Assess patient's ability to carry out ADLs; assess patient's baseline for ADL function and identify physical deficits which impact ability to perform ADLs (bathing, care of mouth/teeth, toileting, grooming, dressing, etc )  - Assess/evaluate cause of self-care deficits   - Assess range of motion  - Assess patient's mobility; develop plan if impaired  - Assess patient's need for assistive devices and provide as appropriate  - Encourage maximum independence but intervene and supervise when necessary  - Involve family in performance of ADLs  - Assess for home care needs following discharge   - Consider OT consult to assist with ADL evaluation and planning for discharge  - Provide patient education as appropriate  Outcome: Progressing  Goal: Maintains/Returns to pre admission functional level  Description: INTERVENTIONS:  - Perform BMAT or MOVE assessment daily    - Set and communicate daily mobility goal to care team and patient/family/caregiver     - Collaborate with rehabilitation services on mobility goals if consulted  - Out of bed for toileting  - Record patient progress and toleration of activity level   Outcome: Progressing     Problem: PAIN - ADULT  Goal: Verbalizes/displays adequate comfort level or baseline comfort level  Description: Interventions:  - Encourage patient to monitor pain and request assistance  - Assess pain using appropriate pain scale  - Administer analgesics based on type and severity of pain and evaluate response  - Implement non-pharmacological measures as appropriate and evaluate response  - Consider cultural and social influences on pain and pain management  - Notify physician/advanced practitioner if interventions unsuccessful or patient reports new pain  Outcome: Progressing     Problem: INFECTION - ADULT  Goal: Absence or prevention of progression during hospitalization  Description: INTERVENTIONS:  - Assess and monitor for signs and symptoms of infection  - Monitor lab/diagnostic results  - Monitor all insertion sites, i e  indwelling lines, tubes, and drains  - Monitor endotracheal if appropriate and nasal secretions for changes in amount and color  - [place] appropriate cooling/warming therapies per order  - Administer medications as ordered  - Instruct and encourage patient and family to use good hand hygiene technique  - Identify and instruct in appropriate isolation precautions for identified infection/condition  Outcome: Progressing  Goal: Absence of fever/infection during neutropenic period  Description: INTERVENTIONS:  - Monitor WBC    Outcome: Progressing     Problem: SAFETY ADULT  Goal: Maintain or return to baseline ADL function  Description: INTERVENTIONS:  -  Assess patient's ability to carry out ADLs; assess patient's baseline for ADL function and identify physical deficits which impact ability to perform ADLs (bathing, care of mouth/teeth, toileting, grooming, dressing, etc )  - Assess/evaluate cause of self-care deficits   - Assess range of motion  - Assess patient's mobility; develop plan if impaired  - Assess patient's need for assistive devices and provide as appropriate  - Encourage maximum independence but intervene and supervise when necessary  - Involve family in performance of ADLs  - Assess for home care needs following discharge   - Consider OT consult to assist with ADL evaluation and planning for discharge  - Provide patient education as appropriate  Outcome: Progressing  Goal: Maintains/Returns to pre admission functional level  Description: INTERVENTIONS:  - Perform BMAT or MOVE assessment daily    - Set and communicate daily mobility goal to care team and patient/family/caregiver     - Collaborate with rehabilitation services on mobility goals if consulted  - Out of bed for toileting  - Record patient progress and toleration of activity level   Outcome: Progressing  Goal: Patient will remain free of falls  Description: INTERVENTIONS:  - Educate patient/family on patient safety including physical limitations  - Instruct patient to call for assistance with activity   - Consult OT/PT to assist with strengthening/mobility   - Keep Call bell within reach  - Keep bed low and locked with side rails adjusted as appropriate  - Keep care items and personal belongings within reach  - Initiate and maintain comfort rounds  - Make Fall Risk Sign visible to staff  - Apply yellow socks and bracelet for high fall risk patients  - Consider moving patient to room near nurses station  Outcome: Progressing     Problem: DISCHARGE PLANNING  Goal: Discharge to home or other facility with appropriate resources  Description: INTERVENTIONS:  - Identify barriers to discharge w/patient and caregiver  - Arrange for needed discharge resources and transportation as appropriate  - Identify discharge learning needs (meds, wound care, etc )  - Arrange for interpretive services to assist at discharge as needed  - Refer to Case Management Department for coordinating discharge planning if the patient needs post-hospital services based on physician/advanced practitioner order or complex needs related to functional status, cognitive ability, or social support system  Outcome: Progressing     Problem: Knowledge Deficit  Goal: Patient/family/caregiver demonstrates understanding of disease process, treatment plan, medications, and discharge instructions  Description: Complete learning assessment and assess knowledge base    Interventions:  - Provide teaching at level of understanding  - Provide teaching via preferred learning methods  Outcome: Progressing

## 2023-03-28 NOTE — DISCHARGE INSTR - AVS FIRST PAGE
Discharge Instructions - Orthopedics  Marli Randall 80 y o  female MRN: [id number]  Unit/Bed#: [facility] 610-01    Weight Bearing Status:                                           Weight bearing as tolerated left lower extremity    DVT prophylaxis  Please take aspirin 81mg two times per day for 21 days    Pain:  Continue analgesics as directed    Dressing Instructions:   Please keep clean, dry and intact until follow up     Appt Instructions: If you do not have your appointment, please call the clinic at [phone] t  Otherwise followup as scheduled     Contact the office sooner if you experience any increased numbness/tingling in the extremities        Miscellaneous:  Please follow up with Dr Hilario Osborn in 4 weeks

## 2023-03-28 NOTE — PLAN OF CARE
Problem: PHYSICAL THERAPY ADULT  Goal: Performs mobility at highest level of function for planned discharge setting  See evaluation for individualized goals  Description: Treatment/Interventions: Functional transfer training, LE strengthening/ROM, Endurance training, Cognitive reorientation, Patient/family training, Equipment eval/education, Bed mobility, Gait training, Spoke to nursing, Spoke to case management, OT  Equipment Recommended: Wheelchair, Fabiola Duty       See flowsheet documentation for full assessment, interventions and recommendations  Note: Prognosis: Fair  Problem List: Decreased strength, Decreased endurance, Decreased range of motion, Impaired balance, Decreased mobility, Decreased cognition, Impaired judgement, Decreased safety awareness, Pain, Orthopedic restrictions  Assessment: Pt is a 80 y o  female seen for PT evaluation s/p admit to [facility] on 3/27/2023  Pt was admitted with a primary dx of: L superior pubic ramus fx s/p fall  Pt WBAT on LLE per ortho  PT now consulted for assessment of mobility and d/c needs  Pt with Up in chair orders  Pts current comorbidities effecting treatment include: GERD, hypercholesterolemia, hypertriglyceridemia, lymphoma in remission, ambulatory dysfunction, Alzheimer's disease, anxiety  Pts current clinical presentation is Unstable/ Unpredictable (high complexity) due to Ongoing medical management for primary dx, Increased reliance on more restrictive AD compared to baseline, Decreased activity tolerance compared to baseline, Fall risk, Increased assistance needed from caregiver at current time, Cog status, Current WBS, Trending lab values, Continuous pulse oximetry monitoring   Prior to admission, pt was residing at [facility], requiring A with ADL/ IADLs using RW at S level per pt report  Upon evaluation, pt currently is requiring mod A for bed mobility; mod Ax2 for transfers; mod Ax2 for ambulation 2 steps w/ RW   Pt presents at PT eval functioning below baseline and currently w/ overall mobility deficits 2* to: BLE weakness, decreased ROM, impaired balance, decreased endurance, impaired coordination, gait deviations, pain, decreased activity tolerance compared to baseline, decreased functional mobility tolerance compared to baseline, decreased safety awareness, impaired judgement, fall risk, orthopedic restrictions, decreased cognition  Pt currently at a fall risk 2* to impairments listed above  Pt will continue to benefit from skilled acute PT interventions to address stated impairments; to maximize functional mobility; for ongoing pt/ family training; and DME needs  At conclusion of PT session pt returned back in chair and chair alarm engaged with phone and call bell within reach  Pt denies any further questions at this time  The patient's AM-PAC Basic Mobility Inpatient Short Form Raw Score is 9  A Raw score of less than or equal to 16 suggests the patient may benefit from discharge to post-acute rehabilitation services  Please also refer to the recommendation of the Physical Therapist for safe discharge planning  Recommend return to facility with rehab services upon hospital D/C  PT Discharge Recommendation: Return to facility with rehabilitation services    See flowsheet documentation for full assessment

## 2023-03-28 NOTE — CASE MANAGEMENT
Case Management Discharge Planning Note    Patient name Renea Wallace  Location [facility] 610/[facility] 027-63 MRN [id number]  : 4/10/1928 Date 3/28/2023       Current Admission Date: 3/27/2023  Current Admission Diagnosis:Fall   Patient Active Problem List    Diagnosis Date Noted   • Fracture of multiple pubic rami, left, closed, initial encounter ([place] [address] ) 2023   • Left hip pain 2023   • Fall 2023   • Diarrhea 08/15/2022   • Anxiety 2022   • Venous stasis dermatitis of both lower extremities 2019   • Late onset Alzheimer's disease without behavioral disturbance ([place] [address] ) 2019   • Orthopedic hardware present 2019   • Ambulatory dysfunction 2019   • Hypertriglyceridemia 10/13/2015   • Lymphoma in remission ([place] [address] ) 2014   • GERD without esophagitis 2013   • Vitamin D deficiency 2013   • Atherosclerosis 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 ([place] [address] ) 2013   • Cataract 2013   • Hypercholesterolemia 2012   • Benign essential hypertension 2012      LOS (days): 1  Geometric Mean LOS (GMLOS) (days): 2 70  Days to GMLOS:1 8     OBJECTIVE:  Risk of Unplanned Readmission Score: 15 21         Current admission status: Inpatient   Preferred Pharmacy:   [address], [address] [facility] [address] [address]  Phone: [phone] Fax: [phone]    Primary Care Provider: Felicitas Metzger DO    Primary Insurance: [facility] REP  Secondary Insurance:     DISCHARGE DETAILS:    CM sent therapy notes to  and they will submit for auth   Plan to d/c there tomorrow once we get auth

## 2023-03-29 ENCOUNTER — TELEPHONE (OUTPATIENT)
Dept: OTHER | Facility: OTHER | Age: 88
End: 2023-03-29

## 2023-03-29 VITALS
TEMPERATURE: 97.7 F | OXYGEN SATURATION: 97 % | RESPIRATION RATE: 18 BRPM | DIASTOLIC BLOOD PRESSURE: 61 MMHG | WEIGHT: 144.1 LBS | BODY MASS INDEX: 24.6 KG/M2 | SYSTOLIC BLOOD PRESSURE: 146 MMHG | HEIGHT: 64 IN | HEART RATE: 84 BPM

## 2023-03-29 PROBLEM — D64.9 ANEMIA: Status: ACTIVE | Noted: 2017-12-05

## 2023-03-29 LAB
ANION GAP SERPL CALCULATED.3IONS-SCNC: 0 MMOL/L (ref 4–13)
BUN SERPL-MCNC: 17 MG/DL (ref 5–25)
CALCIUM SERPL-MCNC: 8.7 MG/DL (ref 8.3–10.1)
CHLORIDE SERPL-SCNC: 107 MMOL/L (ref 96–108)
CO2 SERPL-SCNC: 33 MMOL/L (ref 21–32)
CREAT SERPL-MCNC: 0.44 MG/DL (ref 0.6–1.3)
ERYTHROCYTE [DISTWIDTH] IN BLOOD BY AUTOMATED COUNT: 13.8 % (ref 11.6–15.1)
FLUAV RNA RESP QL NAA+PROBE: NEGATIVE
FLUBV RNA RESP QL NAA+PROBE: NEGATIVE
GFR SERPL CREATININE-BSD FRML MDRD: 86 ML/MIN/1.73SQ M
GLUCOSE SERPL-MCNC: 103 MG/DL (ref 65–140)
HCT VFR BLD AUTO: 37.8 % (ref 34.8–46.1)
HGB BLD-MCNC: 12.5 G/DL (ref 11.5–15.4)
MCH RBC QN AUTO: 30.8 PG (ref 26.8–34.3)
MCHC RBC AUTO-ENTMCNC: 33.1 G/DL (ref 31.4–37.4)
MCV RBC AUTO: 93 FL (ref 82–98)
PLATELET # BLD AUTO: 264 THOUSANDS/UL (ref 149–390)
PMV BLD AUTO: 11.2 FL (ref 8.9–12.7)
POTASSIUM SERPL-SCNC: 3.4 MMOL/L (ref 3.5–5.3)
RBC # BLD AUTO: 4.06 MILLION/UL (ref 3.81–5.12)
RSV RNA RESP QL NAA+PROBE: NEGATIVE
SARS-COV-2 RNA RESP QL NAA+PROBE: NEGATIVE
SODIUM SERPL-SCNC: 140 MMOL/L (ref 135–147)
WBC # BLD AUTO: 7.66 THOUSAND/UL (ref 4.31–10.16)

## 2023-03-29 RX ORDER — METHOCARBAMOL 500 MG/1
500 TABLET, FILM COATED ORAL EVERY 6 HOURS SCHEDULED
Qty: 20 TABLET | Refills: 0 | Status: SHIPPED | OUTPATIENT
Start: 2023-03-29

## 2023-03-29 RX ORDER — GABAPENTIN 100 MG/1
100 CAPSULE ORAL 3 TIMES DAILY
Qty: 30 CAPSULE | Refills: 0 | Status: SHIPPED | OUTPATIENT
Start: 2023-03-29

## 2023-03-29 RX ORDER — LIDOCAINE 50 MG/G
2 PATCH TOPICAL DAILY
Qty: 10 PATCH | Refills: 0 | Status: SHIPPED | OUTPATIENT
Start: 2023-03-30

## 2023-03-29 RX ORDER — OXYCODONE HYDROCHLORIDE 5 MG/1
5 TABLET ORAL EVERY 4 HOURS PRN
Qty: 30 TABLET | Refills: 0 | Status: SHIPPED | OUTPATIENT
Start: 2023-03-29 | End: 2023-04-08

## 2023-03-29 RX ORDER — SENNOSIDES 8.6 MG
8.6 TABLET ORAL
Qty: 10 TABLET | Refills: 0 | Status: SHIPPED | OUTPATIENT
Start: 2023-03-29

## 2023-03-29 RX ORDER — SERTRALINE HYDROCHLORIDE 25 MG/1
12.5 TABLET, FILM COATED ORAL DAILY
Qty: 30 TABLET | Refills: 0 | Status: SHIPPED | OUTPATIENT
Start: 2023-03-30

## 2023-03-29 RX ORDER — ACETAMINOPHEN 325 MG/1
650 TABLET ORAL EVERY 6 HOURS PRN
Qty: 30 TABLET | Refills: 0 | Status: SHIPPED | OUTPATIENT
Start: 2023-03-29

## 2023-03-29 RX ADMIN — SERTRALINE HYDROCHLORIDE 12.5 MG: 25 TABLET ORAL at 08:35

## 2023-03-29 RX ADMIN — DOCUSATE SODIUM 100 MG: 100 CAPSULE, LIQUID FILLED ORAL at 08:35

## 2023-03-29 RX ADMIN — ENOXAPARIN SODIUM 30 MG: 30 INJECTION SUBCUTANEOUS at 05:49

## 2023-03-29 RX ADMIN — LIDOCAINE 5% 2 PATCH: 700 PATCH TOPICAL at 08:36

## 2023-03-29 RX ADMIN — GABAPENTIN 100 MG: 100 CAPSULE ORAL at 08:35

## 2023-03-29 RX ADMIN — BACITRACIN 1 LARGE APPLICATION: 500 OINTMENT TOPICAL at 08:36

## 2023-03-29 RX ADMIN — METHOCARBAMOL 500 MG: 500 TABLET ORAL at 11:07

## 2023-03-29 RX ADMIN — METHOCARBAMOL 500 MG: 500 TABLET ORAL at 05:49

## 2023-03-29 NOTE — PROGRESS NOTES
[facility]  Progress Note  Name: Randy Garcia  MRN: [id number]  Unit/Bed#: [facility] 610-01 I Date of Admission: 3/27/2023   Date of Service: 3/29/2023 I Hospital Day: 2    Assessment/Plan   Fracture of multiple pubic rami, left, closed, initial encounter [facility])  Assessment & Plan  Patient sustained left superior pubic rami fracture  - Orthopedic consult appreciated  - Non operative managment  - WBAT LLE  - Multimodal pain control  - Orthopedics- Lovenox x 28 days  - PT/OT  - CM for dispo planning    Fall  Assessment & Plan  S/p Fall unwitnessed in [address]  -Syncopal workup- Echo, EKG, Orthostatics  - Geriatric consult appreciated  - PT/OT evaluation - return to facility with rehab services  - Lives at [address] [place] [address]           Bowel Regimen: Colace, Senokot  VTE Prophylaxis:Enoxaparin (Lovenox)     Disposition: return to facility with rehab services    Subjective   Chief Complaint: No complaints at this time     Subjective: No acute events overnight  Patient without concerns at this time  Hemoglobin fell from 13 4-11 4 from 3 27-3 28  Patient has been afebrile and hemodynamically stable overnight  No urine output logged; however, patient's creatinine stable  Patient has VTE prophylaxis ordered as well as bowel regimen  Objective   Vitals:   Temp:  [97 °F (36 1 °C)-98 4 °F (36 9 °C)] 98 4 °F (36 9 °C)  HR:  [] 101  Resp:  [16-19] 18  BP: (142-161)/(66-76) 142/71    I/O       03/27 0701  03/28 0700 03/28 0701  03/29 0700    P  O   120    Total Intake(mL/kg)  120 (1 8)    Urine (mL/kg/hr) 0     Total Output 0     Net 0 +120          Unmeasured Urine Occurrence 2 x 2 x    Unmeasured Stool Occurrence  1 x           Physical Exam:     GENERAL APPEARANCE: NAD  NEURO: Patient is speaking clearly in complete sentences  Patient is answering appropriately and able follow commands    Patient is moving all four extremities spontaneously  Tongue midline  HEENT: PERRL, Moist mucous membranes, external ears normal, nose normal  Neck: No cervical adenopathy  CV: RRR  No murmurs, rubs, gallops  LUNGS: Clear to auscultation: No wheezes, stridor, rhonchi, rales  GI: Abdomen non-distended  Soft  Non-tender and without rebound or guarding   : Deferred at this time  MSK: No deformity  Skin: Warm and dry  Capillary refill: <2 seconds    Invasive Devices     Peripheral Intravenous Line  Duration           Peripheral IV 03/27/23 Right Forearm 1 day          Drain  Duration           External Urinary Catheter 1 day                      Lab Results:   BMP/CMP:   Lab Results   Component Value Date    SODIUM 140 03/29/2023    K 3 4 (L) 03/29/2023     03/29/2023    CO2 33 (H) 03/29/2023    BUN 17 03/29/2023    CREATININE 0 44 (L) 03/29/2023    CALCIUM 8 7 03/29/2023    EGFR 86 03/29/2023    and CBC:   Lab Results   Component Value Date    WBC 7 66 03/29/2023    HGB 12 5 03/29/2023    HCT 37 8 03/29/2023    MCV 93 03/29/2023     03/29/2023    MCH 30 8 03/29/2023    MCHC 33 1 03/29/2023    RDW 13 8 03/29/2023    MPV 11 2 03/29/2023     Imaging: I have personally reviewed pertinent reports       Other Studies: N/A

## 2023-03-29 NOTE — PLAN OF CARE
Problem: MOBILITY - ADULT  Goal: Maintain or return to baseline ADL function  Description: INTERVENTIONS:  -  Assess patient's ability to carry out ADLs; assess patient's baseline for ADL function and identify physical deficits which impact ability to perform ADLs (bathing, care of mouth/teeth, toileting, grooming, dressing, etc )  - Assess/evaluate cause of self-care deficits   - Assess range of motion  - Assess patient's mobility; develop plan if impaired  - Assess patient's need for assistive devices and provide as appropriate  - Encourage maximum independence but intervene and supervise when necessary  - Involve family in performance of ADLs  - Assess for home care needs following discharge   - Consider OT consult to assist with ADL evaluation and planning for discharge  - Provide patient education as appropriate  Outcome: Progressing     Problem: PAIN - ADULT  Goal: Verbalizes/displays adequate comfort level or baseline comfort level  Description: Interventions:  - Encourage patient to monitor pain and request assistance  - Assess pain using appropriate pain scale  - Administer analgesics based on type and severity of pain and evaluate response  - Implement non-pharmacological measures as appropriate and evaluate response  - Consider cultural and social influences on pain and pain management  - Notify physician/advanced practitioner if interventions unsuccessful or patient reports new pain  Outcome: Progressing     Problem: INFECTION - ADULT  Goal: Absence or prevention of progression during hospitalization  Description: INTERVENTIONS:  - Assess and monitor for signs and symptoms of infection  - Monitor lab/diagnostic results  - Monitor all insertion sites, i e  indwelling lines, tubes, and drains  - Monitor endotracheal if appropriate and nasal secretions for changes in amount and color  - [place] appropriate cooling/warming therapies per order  - Administer medications as ordered  - Instruct and encourage patient and family to use good hand hygiene technique  - Identify and instruct in appropriate isolation precautions for identified infection/condition  Outcome: Progressing     Problem: SAFETY ADULT  Goal: Maintain or return to baseline ADL function  Description: INTERVENTIONS:  -  Assess patient's ability to carry out ADLs; assess patient's baseline for ADL function and identify physical deficits which impact ability to perform ADLs (bathing, care of mouth/teeth, toileting, grooming, dressing, etc )  - Assess/evaluate cause of self-care deficits   - Assess range of motion  - Assess patient's mobility; develop plan if impaired  - Assess patient's need for assistive devices and provide as appropriate  - Encourage maximum independence but intervene and supervise when necessary  - Involve family in performance of ADLs  - Assess for home care needs following discharge   - Consider OT consult to assist with ADL evaluation and planning for discharge  - Provide patient education as appropriate  Outcome: Progressing

## 2023-03-29 NOTE — PROGRESS NOTES
Progress Note - Geriatric Medicine   Chrissy Cyr 80 y o  female MRN: [id number]  Unit/Bed#: [facility] 610-01 Encounter: [id number]      Assessment/Plan:    Ambulatory dysfunction with fall  -Reportedly unwitnessed fall at long-term care facility 3/26  -injuries as outlined below  -Requires use of walker for ambulation at baseline  -encourage good body mechanics and assist with all transfers  -keep personal items and call bell close to prevent reaching  -maintain environment free of fall hazards  -PT and OT following      Left superior pubic rami fracture  -S/p fall as outlined above  -Noted on CT pelvis obtained admission  -Nonoperative management and WBAT per Orthopedics  -Continue acute pain control  -Neurovascular checks per protocol     Acute pain due to trauma  -continue acute multimodal pain control   -encourage addition of non-pharmacologic pain treatment including ice and frequent repositioning  -recommend  bowel regimen to prevent and treat constipation  gitdue to increased risk with acute pain and opiate pain medications     Vitamin D deficiency  -no recent level recommend checking with routine labs  - encourage diet rich in calcium and vitamin D, supplement for needs unable to met by diet alone     Alzheimer's dementia without behavioral disturbance  -Moderate and progressive  -Recent increase in level of care needs prompting transition to skilled nursing   -At baseline oriented to self and dependent for all cares  -[facility] 14/30 (2019), defer repeat at this time is unlikely to   -TSH WNL, no recent B12, consider checking with routine labs  -CTH on admission personally reviewed, reveals 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 most densely appreciated right temporal parietal area   -Underlying cognitive ointment markedly increases risk of developing delirium during consultation, continue strict and precautions  -Minimize transitions between rooms, units, facilities "especially late afternoon/early evening to reduce risk precipitating sundowning behaviors      Anxiety  -well controlled on Sertraline    -Encourage calm quiet room introduced risk overstimulation     Impaired Vision  -recommend use of corrective lenses at all appropriate times  -Consider large font for printed materials provided to patient     High risk developing delirium  -Ensure acute pain is controlled  -Monitor for fecal urinary retention  -Reorient frequently    Care coordination: rounded with Basil Stapleton (RN)    Subjective:     Tammy Curran is seen and examined at bedside where she is lying resting, she denies pain or other acute complaints and states that only thing she needs right now is sleep  Nursing reports no acute events overnight  She is tentative for d/c back to her care facility later toady  Review of Systems   Constitutional: Negative  Negative for appetite change, diaphoresis and fever  HENT: Negative  Eyes: Negative  Respiratory: Negative  Negative for shortness of breath  Cardiovascular: Negative  Negative for chest pain  Gastrointestinal: Negative  Genitourinary: Negative  Musculoskeletal: Negative  Skin: Negative  Neurological: Negative  Negative for light-headedness, numbness and headaches  Hematological: Negative  Psychiatric/Behavioral: Negative  Negative for sleep disturbance (sleeping ok but still feels tired )  All other systems reviewed and are negative  Objective:     Vitals: Blood pressure 135/70, pulse 78, temperature 97 6 °F (36 4 °C), resp  rate 16, height 5' 4\" (1 626 m), weight 65 4 kg (144 lb 1 6 oz), SpO2 98 %  ,Body mass index is 24 73 kg/m²  No intake or output data in the 24 hours ending 03/29/23 1103    Current Medications: Reviewed    Physical Exam:   Physical Exam  Vitals and nursing note reviewed  Constitutional:       General: She is not in acute distress  Appearance: She is not toxic-appearing        Comments: Elderly female, " appears comfortable    HENT:      Head: Normocephalic  Nose: Nose normal       Mouth/Throat:      Mouth: Mucous membranes are dry  Comments: Dentition poor, missing multiple teeth  Eyes:      General:         Right eye: No discharge  Left eye: No discharge  Conjunctiva/sclera: Conjunctivae normal    Neck:      Comments: Phonation normal  Cardiovascular:      Rate and Rhythm: Normal rate  Pulses: Normal pulses  Pulmonary:      Effort: Pulmonary effort is normal  No respiratory distress  Breath sounds: No wheezing  Comments: Sat well on room air  Abdominal:      General: There is no distension  Palpations: Abdomen is soft  Tenderness: There is no abdominal tenderness  Musculoskeletal:      Cervical back: Neck supple  Right lower leg: No edema  Left lower leg: No edema  Comments: Reduced overall muscle    Skin:     General: Skin is warm and dry  Neurological:      Comments: Awake and answers questions appropriately   Psychiatric:      Comments: Mood and affect flat but cooperative         Invasive Devices     Peripheral Intravenous Line  Duration           Peripheral IV 03/27/23 Right Forearm 1 day          Drain  Duration           External Urinary Catheter 1 day              Lab, Imaging and other studies: I have personally reviewed pertinent reports

## 2023-03-29 NOTE — PLAN OF CARE
Problem: MOBILITY - ADULT  Goal: Maintain or return to baseline ADL function  Description: INTERVENTIONS:  -  Assess patient's ability to carry out ADLs; assess patient's baseline for ADL function and identify physical deficits which impact ability to perform ADLs (bathing, care of mouth/teeth, toileting, grooming, dressing, etc )  - Assess/evaluate cause of self-care deficits   - Assess range of motion  - Assess patient's mobility; develop plan if impaired  - Assess patient's need for assistive devices and provide as appropriate  - Encourage maximum independence but intervene and supervise when necessary  - Involve family in performance of ADLs  - Assess for home care needs following discharge   - Consider OT consult to assist with ADL evaluation and planning for discharge  - Provide patient education as appropriate  Outcome: Progressing  Goal: Maintains/Returns to pre admission functional level  Description: INTERVENTIONS:  - Perform BMAT or MOVE assessment daily    - Set and communicate daily mobility goal to care team and patient/family/caregiver  - Collaborate with rehabilitation services on mobility goals if consulted  - Perform Range of Motion  times a day  - Reposition patient every  hours    - Dangle patient  times a day  - Stand patient  times a day  - Ambulate patient  times a day  - Out of bed to chair  times a day   - Out of bed for meals  times a day  - Out of bed for toileting  - Record patient progress and toleration of activity level   Outcome: Progressing     Problem: PAIN - ADULT  Goal: Verbalizes/displays adequate comfort level or baseline comfort level  Description: Interventions:  - Encourage patient to monitor pain and request assistance  - Assess pain using appropriate pain scale  - Administer analgesics based on type and severity of pain and evaluate response  - Implement non-pharmacological measures as appropriate and evaluate response  - Consider cultural and social influences on pain and pain management  - Notify physician/advanced practitioner if interventions unsuccessful or patient reports new pain  Outcome: Progressing     Problem: INFECTION - ADULT  Goal: Absence or prevention of progression during hospitalization  Description: INTERVENTIONS:  - Assess and monitor for signs and symptoms of infection  - Monitor lab/diagnostic results  - Monitor all insertion sites, i e  indwelling lines, tubes, and drains  - Monitor endotracheal if appropriate and nasal secretions for changes in amount and color  - [place] appropriate cooling/warming therapies per order  - Administer medications as ordered  - Instruct and encourage patient and family to use good hand hygiene technique  - Identify and instruct in appropriate isolation precautions for identified infection/condition  Outcome: Progressing  Goal: Absence of fever/infection during neutropenic period  Description: INTERVENTIONS:  - Monitor WBC    Outcome: Progressing     Problem: SAFETY ADULT  Goal: Maintain or return to baseline ADL function  Description: INTERVENTIONS:  -  Assess patient's ability to carry out ADLs; assess patient's baseline for ADL function and identify physical deficits which impact ability to perform ADLs (bathing, care of mouth/teeth, toileting, grooming, dressing, etc )  - Assess/evaluate cause of self-care deficits   - Assess range of motion  - Assess patient's mobility; develop plan if impaired  - Assess patient's need for assistive devices and provide as appropriate  - Encourage maximum independence but intervene and supervise when necessary  - Involve family in performance of ADLs  - Assess for home care needs following discharge   - Consider OT consult to assist with ADL evaluation and planning for discharge  - Provide patient education as appropriate  Outcome: Progressing  Goal: Maintains/Returns to pre admission functional level  Description: INTERVENTIONS:  - Perform BMAT or MOVE assessment daily    - Set and communicate daily mobility goal to care team and patient/family/caregiver  - Collaborate with rehabilitation services on mobility goals if consulted  - Perform Range of Motion  times a day  - Reposition patient every  hours    - Dangle patient  times a day  - Stand patient  times a day  - Ambulate patient  times a day  - Out of bed to chair  times a day   - Out of bed for meals  times a day  - Out of bed for toileting  - Record patient progress and toleration of activity level   Outcome: Progressing  Goal: Patient will remain free of falls  Description: INTERVENTIONS:  - Educate patient/family on patient safety including physical limitations  - Instruct patient to call for assistance with activity   - Consult OT/PT to assist with strengthening/mobility   - Keep Call bell within reach  - Keep bed low and locked with side rails adjusted as appropriate  - Keep care items and personal belongings within reach  - Initiate and maintain comfort rounds  - Make Fall Risk Sign visible to staff  - Offer Toileting every  Hours, in advance of need  - Initiate/Maintain alarm  - Obtain necessary fall risk management equipment:  - Apply yellow socks and bracelet for high fall risk patients  - Consider moving patient to room near nurses station  Outcome: Progressing     Problem: DISCHARGE PLANNING  Goal: Discharge to home or other facility with appropriate resources  Description: INTERVENTIONS:  - Identify barriers to discharge w/patient and caregiver  - Arrange for needed discharge resources and transportation as appropriate  - Identify discharge learning needs (meds, wound care, etc )  - Arrange for interpretive services to assist at discharge as needed  - Refer to Case Management Department for coordinating discharge planning if the patient needs post-hospital services based on physician/advanced practitioner order or complex needs related to functional status, cognitive ability, or social support system  Outcome: Progressing Problem: Knowledge Deficit  Goal: Patient/family/caregiver demonstrates understanding of disease process, treatment plan, medications, and discharge instructions  Description: Complete learning assessment and assess knowledge base    Interventions:  - Provide teaching at level of understanding  - Provide teaching via preferred learning methods  Outcome: Progressing     Problem: Prexisting or High Potential for Compromised Skin Integrity  Goal: Skin integrity is maintained or improved  Description: INTERVENTIONS:  - Identify patients at risk for skin breakdown  - Assess and monitor skin integrity  - Assess and monitor nutrition and hydration status  - Monitor labs   - Assess for incontinence   - Turn and reposition patient  - Assist with mobility/ambulation  - Relieve pressure over bony prominences  - Avoid friction and shearing  - Provide appropriate hygiene as needed including keeping skin clean and dry  - Evaluate need for skin moisturizer/barrier cream  - Collaborate with interdisciplinary team   - Patient/family teaching  - Consider wound care consult   Outcome: Progressing

## 2023-03-29 NOTE — PLAN OF CARE
Problem: MOBILITY - ADULT  Goal: Maintain or return to baseline ADL function  Description: INTERVENTIONS:  -  Assess patient's ability to carry out ADLs; assess patient's baseline for ADL function and identify physical deficits which impact ability to perform ADLs (bathing, care of mouth/teeth, toileting, grooming, dressing, etc )  - Assess/evaluate cause of self-care deficits   - Assess range of motion  - Assess patient's mobility; develop plan if impaired  - Assess patient's need for assistive devices and provide as appropriate  - Encourage maximum independence but intervene and supervise when necessary  - Involve family in performance of ADLs  - Assess for home care needs following discharge   - Consider OT consult to assist with ADL evaluation and planning for discharge  - Provide patient education as appropriate  3/29/2023 1208 by Kevin Pfeiffer RN  Outcome: Progressing  3/29/2023 1006 by Kevin Pfeiffer RN  Outcome: Progressing  Goal: Maintains/Returns to pre admission functional level  Description: INTERVENTIONS:  - Perform BMAT or MOVE assessment daily    - Set and communicate daily mobility goal to care team and patient/family/caregiver  - Collaborate with rehabilitation services on mobility goals if consulted  - Perform Range of Motion  times a day  - Reposition patient every  hours    - Dangle patient  times a day  - Stand patient  times a day  - Ambulate patient  times a day  - Out of bed to chair  times a day   - Out of bed for meals  times a day  - Out of bed for toileting  - Record patient progress and toleration of activity level   3/29/2023 1208 by Kevin Pfeiffer RN  Outcome: Progressing  3/29/2023 1006 by Kevin Pfeiffer RN  Outcome: Progressing     Problem: PAIN - ADULT  Goal: Verbalizes/displays adequate comfort level or baseline comfort level  Description: Interventions:  - Encourage patient to monitor pain and request assistance  - Assess pain using appropriate pain scale  - Administer analgesics based on type and severity of pain and evaluate response  - Implement non-pharmacological measures as appropriate and evaluate response  - Consider cultural and social influences on pain and pain management  - Notify physician/advanced practitioner if interventions unsuccessful or patient reports new pain  3/29/2023 1208 by Jaz Gee RN  Outcome: Progressing  3/29/2023 1006 by Jaz Gee RN  Outcome: Progressing     Problem: INFECTION - ADULT  Goal: Absence or prevention of progression during hospitalization  Description: INTERVENTIONS:  - Assess and monitor for signs and symptoms of infection  - Monitor lab/diagnostic results  - Monitor all insertion sites, i e  indwelling lines, tubes, and drains  - Monitor endotracheal if appropriate and nasal secretions for changes in amount and color  - [place] appropriate cooling/warming therapies per order  - Administer medications as ordered  - Instruct and encourage patient and family to use good hand hygiene technique  - Identify and instruct in appropriate isolation precautions for identified infection/condition  3/29/2023 1208 by Jaz Gee RN  Outcome: Progressing  3/29/2023 1006 by Jaz Gee RN  Outcome: Progressing  Goal: Absence of fever/infection during neutropenic period  Description: INTERVENTIONS:  - Monitor WBC    3/29/2023 1208 by Jaz Gee RN  Outcome: Progressing  3/29/2023 1006 by Jaz Gee RN  Outcome: Progressing     Problem: SAFETY ADULT  Goal: Maintain or return to baseline ADL function  Description: INTERVENTIONS:  -  Assess patient's ability to carry out ADLs; assess patient's baseline for ADL function and identify physical deficits which impact ability to perform ADLs (bathing, care of mouth/teeth, toileting, grooming, dressing, etc )  - Assess/evaluate cause of self-care deficits   - Assess range of motion  - Assess patient's mobility; develop plan if impaired  - Assess patient's need for assistive devices and provide as appropriate  - Encourage maximum independence but intervene and supervise when necessary  - Involve family in performance of ADLs  - Assess for home care needs following discharge   - Consider OT consult to assist with ADL evaluation and planning for discharge  - Provide patient education as appropriate  3/29/2023 1208 by Mike Gaines RN  Outcome: Progressing  3/29/2023 1006 by iMke Gaines RN  Outcome: Progressing  Goal: Maintains/Returns to pre admission functional level  Description: INTERVENTIONS:  - Perform BMAT or MOVE assessment daily    - Set and communicate daily mobility goal to care team and patient/family/caregiver  - Collaborate with rehabilitation services on mobility goals if consulted  - Perform Range of Motion  times a day  - Reposition patient every  hours    - Dangle patient  times a day  - Stand patient  times a day  - Ambulate patient  times a day  - Out of bed to chair  times a day   - Out of bed for meals  times a day  - Out of bed for toileting  - Record patient progress and toleration of activity level   3/29/2023 1208 by Mike Gaines RN  Outcome: Progressing  3/29/2023 1006 by Mike Gaines RN  Outcome: Progressing  Goal: Patient will remain free of falls  Description: INTERVENTIONS:  - Educate patient/family on patient safety including physical limitations  - Instruct patient to call for assistance with activity   - Consult OT/PT to assist with strengthening/mobility   - Keep Call bell within reach  - Keep bed low and locked with side rails adjusted as appropriate  - Keep care items and personal belongings within reach  - Initiate and maintain comfort rounds  - Make Fall Risk Sign visible to staff  - Offer Toileting every  Hours, in advance of need  - Initiate/Maintain alarm  - Obtain necessary fall risk management equipment:   - Apply yellow socks and bracelet for high fall risk patients  - Consider moving patient to room near nurses station  3/29/2023 1208 by Mike Gaines RN  Outcome: Progressing  3/29/2023 1006 by Seth Maya RN  Outcome: Progressing     Problem: DISCHARGE PLANNING  Goal: Discharge to home or other facility with appropriate resources  Description: INTERVENTIONS:  - Identify barriers to discharge w/patient and caregiver  - Arrange for needed discharge resources and transportation as appropriate  - Identify discharge learning needs (meds, wound care, etc )  - Arrange for interpretive services to assist at discharge as needed  - Refer to Case Management Department for coordinating discharge planning if the patient needs post-hospital services based on physician/advanced practitioner order or complex needs related to functional status, cognitive ability, or social support system  3/29/2023 1208 by Seth Maya RN  Outcome: Progressing  3/29/2023 1006 by Seth Maya RN  Outcome: Progressing     Problem: Knowledge Deficit  Goal: Patient/family/caregiver demonstrates understanding of disease process, treatment plan, medications, and discharge instructions  Description: Complete learning assessment and assess knowledge base    Interventions:  - Provide teaching at level of understanding  - Provide teaching via preferred learning methods  3/29/2023 1208 by Seth Maya RN  Outcome: Progressing  3/29/2023 1006 by Seth Maya RN  Outcome: Progressing     Problem: Prexisting or High Potential for Compromised Skin Integrity  Goal: Skin integrity is maintained or improved  Description: INTERVENTIONS:  - Identify patients at risk for skin breakdown  - Assess and monitor skin integrity  - Assess and monitor nutrition and hydration status  - Monitor labs   - Assess for incontinence   - Turn and reposition patient  - Assist with mobility/ambulation  - Relieve pressure over bony prominences  - Avoid friction and shearing  - Provide appropriate hygiene as needed including keeping skin clean and dry  - Evaluate need for skin moisturizer/barrier cream  - Collaborate with interdisciplinary team   - Patient/family teaching  - Consider wound care consult   3/29/2023 1208 by Joaquín Jackson RN  Outcome: Progressing  3/29/2023 1006 by Joaquín Jackson RN  Outcome: Progressing

## 2023-03-29 NOTE — CASE MANAGEMENT
Case Management Discharge Planning Note    Patient name Milena Alvarez  [facility] [id number]  : 4/10/1928 Date 3/29/2023       Current Admission Date: 3/27/2023  Current Admission Diagnosis:Fall   Patient Active Problem List    Diagnosis Date Noted   • Fracture of multiple pubic rami, left, closed, initial encounter ([place] [address] ) 2023   • Left hip pain 2023   • Fall 2023   • Diarrhea 08/15/2022   • Anxiety 2022   • Venous stasis dermatitis of both lower extremities 2019   • Late onset Alzheimer's disease without behavioral disturbance ([place] [address] ) 2019   • Orthopedic hardware present 2019   • Ambulatory dysfunction 2019   • Anemia 2017   • Hypertriglyceridemia 10/13/2015   • Lymphoma in remission ([place] [address] ) 2014   • GERD without esophagitis 2013   • Vitamin D deficiency 2013   • Atherosclerosis 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 ([place] [address] ) 2013   • Cataract 2013   • Hypercholesterolemia 2012   • Benign essential hypertension 2012      LOS (days): 2  Geometric Mean LOS (GMLOS) (days): 2 70  Days to GMLOS:1     OBJECTIVE:  Risk of Unplanned Readmission Score: 13 05         Current admission status: Inpatient   Preferred Pharmacy:   [address] [address]  Phone: [phone] Fax: [phone]    Primary Care Provider: Nara Rich DO    Primary Insurance: [facility] RE[facility]  Secondary Insurance:     DISCHARGE DETAILS:    Other Referral/Resources/Interventions Provided:  Interventions: SNF  Transport at Discharge : [facility]LS Ambulance  Dispatcher Contacted: Yes  Number/Name of Dispatcher: SLETS  Transported by [employer] and Unit #): [employer] of Transport (Date): 23  ETA of Transport (Time): 4701     Transfer Mode: Stretcher  Accompanied by: Alone     IMM Given (Date):: 23  IMM Given to[de-identified] Family  Family notified[de-identified] pt's Dtr      Pt's Teetee Brumfield obtained  #[phone]     Pt will d/c to [facility] today @0266   Pt's dtr aware

## 2023-03-29 NOTE — DISCHARGE SUMMARY
"  Discharge Summary - Siria Jane 80 y o  female MRN: [id number]    [facility]counter: [id number]    Admission Date:   Admission Orders (From admission, onward)       Ordered        03/27/23 1752  Inpatient Admission  Once                            Admitting Diagnosis: Inability to ambulate due to hip [R26 2]  Closed fracture of superior ramus of left pubis, [facility] [S32 512A]    HPI: per resident, Curt Carroll:  \"  Siria Jane is a 80 y o  female who presented to [facility] for evaluation of left pelvic pain  Patient had an unwitnessed fall last night, exact circumstances are unknown  She was at her nursing home and had difficulty getting out of bed this morning prompting hospital presentation  Patient was found to have left superior pelvic rami fracture on CT scan of the pelvis prompting consultation to the trauma team \"    Procedures Performed: No orders of the defined types were placed in this encounter  Summary of Hospital Course: 79 y/o female with left pelvic pain after a fall  Found to have left pubic rami fractures  Ortho consulted and will follow up with patient  Would also adive to follow up with PCP regarding recent fall and injury  No trauma follow up required at this time  Significant Findings, Care, Treatment and Services Provided: XR chest 1 view portable    Result Date: 3/27/2023  Impression: No active pulmonary disease  Stable elevation of the left hemidiaphragm  Workstation performed: [id number]     XR hip/pelv 2-3 vws left    Result Date: 3/27/2023  Impression: No acute osseous abnormality  Chronic-appearing fractures of the inferior pubic rami and left pubic body , which are likely present on the 2013  radiographs  Workstation performed: [id number]     CT head wo contrast    Result Date: 3/27/2023  Impression: No acute intracranial abnormality  Chronic microangiopathic changes   Workstation performed: [id number]     CT " spine cervical wo contrast    Result Date: 3/27/2023  Impression: No cervical spine fracture or traumatic malalignment  Workstation performed: [id number]     CT pelvis wo contrast    Result Date: 3/27/2023  Impression: Acute left superior pubic ramus fracture  The study was marked in EPIC for significant notification  Workstation performed: [id number]     CT chest and abdomen wo contrast    Result Date: 3/27/2023  Impression: No acute traumatic injury in the chest or abdomen on limited noncontrast imaging  Elevation of the left diaphragm with mild left lower lobe compressive atelectasis, similar  Question subtle nondisplaced fracture left iliac wing  Limited study without IV or oral contrast  The study was marked in EPIC for immediate notification  Workstation performed: [id number]         Complications: none    Discharge Diagnosis: S/P Fall  Fracture of multiple pubic rami on the left    Medical Problems       Resolved Problems  Date Reviewed: 3/27/2023   None         Condition at Discharge: stable         Discharge instructions/Information to patient and family:   See after visit summary for information provided to patient and family  Provisions for Follow-Up Care:  See after visit summary for information related to follow-up care and any pertinent home health orders  PCP: Nida Hatchet, DO    Disposition:  [facility]    Planned Readmission: No      Discharge Statement   I spent 30 minutes discharging the patient  This time was spent on the day of discharge  I had direct contact with the patient on the day of discharge  Additional documentation is required if more than 30 minutes were spent on discharge  Discharge Medications:  See after visit summary for reconciled discharge medications provided to patient and family

## 2023-03-29 NOTE — ASSESSMENT & PLAN NOTE
S/p Fall unwitnessed in [address]  -Syncopal workup- Echo, EKG, Orthostatics  - Geriatric consult appreciated  - PT/OT evaluation - return to facility with rehab services  - Lives at [facility]

## 2023-03-30 NOTE — UTILIZATION REVIEW
NOTIFICATION OF ADMISSION DISCHARGE   This is a Notification of Discharge from [facility]  Please be advised that this patient has been discharge from our facility  Below you will find the admission and discharge date and time including the patient’s disposition  UTILIZATION REVIEW CONTACT:  Penelope Rodrigues  Utilization   Network Utilization Review Department  Phone: [phone] x carefully listen to the prompts  All voicemails are confidential   Email: [email or web address]     ADMISSION INFORMATION  PRESENTATION DATE: 3/27/2023 11:17 AM  OBERVATION ADMISSION DATE:   INPATIENT ADMISSION DATE: 3/27/23  5:52 PM   DISCHARGE DATE: 3/29/2023  4:16 PM   DISPOSITION:Non [facility] SNF/TCU/SNU    IMPORTANT INFORMATION:  Send all requests for admission clinical reviews, approved or denied determinations and any other requests to dedicated fax number below belonging to the campus where the patient is receiving treatment   List of dedicated fax numbers:  [address] DENIALS (Administrative/Medical Necessity) [phone]   [address] (Maternity/NICU/Pediatrics) [phone]   [facility] [phone]   [place] [address] [phone]   [address] [phone]   220 [facility] [phone]   90 [facility] [phone]   [address] [place] [address] [phone]   [facility]  [phone] 2038 [facility] [phone]   [facility] 850 [facility] [phone]

## 2023-03-31 ENCOUNTER — NURSING HOME VISIT (OUTPATIENT)
Dept: GERIATRICS | Facility: OTHER | Age: 88
End: 2023-03-31

## 2023-03-31 DIAGNOSIS — F41.9 ANXIETY: ICD-10-CM

## 2023-03-31 DIAGNOSIS — I70.213 ATHEROSCLEROSIS OF NATIVE ARTERY OF BOTH LOWER EXTREMITIES WITH INTERMITTENT CLAUDICATION (HCC): Chronic | ICD-10-CM

## 2023-03-31 DIAGNOSIS — F02.80 LATE ONSET ALZHEIMER'S DISEASE WITHOUT BEHAVIORAL DISTURBANCE (HCC): ICD-10-CM

## 2023-03-31 DIAGNOSIS — R26.2 AMBULATORY DYSFUNCTION: ICD-10-CM

## 2023-03-31 DIAGNOSIS — I10 BENIGN ESSENTIAL HYPERTENSION: Chronic | ICD-10-CM

## 2023-03-31 DIAGNOSIS — G30.1 LATE ONSET ALZHEIMER'S DISEASE WITHOUT BEHAVIORAL DISTURBANCE (HCC): ICD-10-CM

## 2023-03-31 DIAGNOSIS — S32.592D CLOSED FRACTURE OF MULTIPLE RAMI OF LEFT PUBIS WITH ROUTINE HEALING, SUBSEQUENT ENCOUNTER: Primary | ICD-10-CM

## 2023-03-31 PROBLEM — S32.599A CLOSED FRACTURE OF MULTIPLE PUBIC RAMI (HCC): Status: ACTIVE | Noted: 2023-03-28

## 2023-03-31 PROBLEM — E87.6 HYPOKALEMIA: Status: ACTIVE | Noted: 2023-03-31

## 2023-03-31 NOTE — ASSESSMENT & PLAN NOTE
Mild, noted on day of discharge from the hospital with serum potassium level 3 4  Patient is currently not on a diuretic  Will order potassium supplement 40 M EQ x2 days then recheck BMP on 4/3/2023

## 2023-03-31 NOTE — ASSESSMENT & PLAN NOTE
Alzheimer versus mixed type with underlying arterial vascular disease  Continue supportive care, reorientation, redirection as needed  Maintain sleep/wake cycle  Avoid deliriogenic medications  Continue delirium precautions  Encourage good sleep hygiene  Monitor for pain and ensure the pain is adequately controlled

## 2023-03-31 NOTE — ASSESSMENT & PLAN NOTE
In setting of recent fall sustaining pubic rami fracture  Patient ambulates with a walker at baseline  PT/OT evaluation  Continue fall precautions; patient needs frequent reminders to use her call light

## 2023-03-31 NOTE — PROGRESS NOTES
Facility: [facility]  [address] [place] [address] [facility]  [facility] ([facility]  Progress Note    Chief Complaint/Reason for visit: Acute visit  Code status: DNI/DNR  History obtained from patient, nursing staff, and EMR  History of Present Illness: 20-year-old female seen and examined  S/p unwitnessed fall in her room at [facility] on 3/26/2023 then developed severe left hip and left buttock pain along with immobility on morning of 3/27/2023  She was sent to [place] [address] and was admitted for closed left fracture of multiple pubic rami from 3/27-3/29/2023  She returned to [facility] on 3/29/2023 for rehab  Mild hypokalemia noted with inpatient blood work on 3/29/2023  CBC stable for patient on 3/29/2023  Received patient seated in chair  She appeared very sleepy and responded appropriately to questions with her eyes closed  Upon review of her medication records, patient is currently on scheduled gabapentin and methocarbamol and oxycodone as needed which could be contributing to her sleepiness  Denies having pain or discomfort at time of exam  Denies SOB, chest pain, headache, dizziness, abdominal pain, nausea, vomiting, diarrhea, or constipation  Instructed patient to use her call light when needing assistance    Past Medical History: unchanged from history and physical see the  Past Medical History:   Diagnosis Date   • Anorexia     last assessed: 8/9/2013   • Closed fracture dislocation of right ankle joint 5/20/2019    Added automatically from request for surgery [id number]   • GERD (gastroesophageal reflux disease)    • Herpes zoster     last assessed: 9/11/2013   • Hypertension    • Lymphoma ([address] )     resolved: 1997   • Malignant melanoma of skin ([facility] )     resolved: 2015   • Pelvic fracture ([facility] )      Family History: unchanged from history and physical  Social History: unchanged from history and physical  Review of systems: As per review of medical illness, all other systems reviewed and negative  Medications: All medication and routine orders were reviewed and updated  Allergies: Reviewed and unchanged  Consults reviewed: Other  Labs/Diagnostics (reviewed by this provider): Copy in Chart    Imaging Reviewed: Hospital imaging 3/27/2023  CT pelvis without contrast IV contrast: Acute left superior pubic ramus fracture  X-ray of hip/pelvis: No acute osseous abnormality  Chronic appearing fractures of the inferior pubic Rami and left pubic body, which are likely present on the 2013  radiographs  CT of chest and abdomen: No acute traumatic injury in the chest or abdomen on limited noncontrast imaging  Elevation of the left diaphragm with mild left lower lobe compressive atelectasis, similar  CT head without contrast: No acute intracranial abnormality  Chronic microangiopathic changes  CT cervical spine: No cervical spine fracture or traumatic malalignment  Chest x-ray: No active pulmonary disease  Stable elevation of the left hemidiaphragm    Physical Exam  Weight: 138 6 pounds Temp: 98 4           BP: 132/75  pulse: 73    resp: 18      O2 Sat:  Constitutional: Normocephalic  Orientation:Person     Physical Exam  Vitals and nursing note reviewed  Constitutional:       General: She is not in acute distress  Appearance: She is not toxic-appearing or diaphoretic  HENT:      Head: Normocephalic  Nose: No congestion or rhinorrhea  Mouth/Throat:      Mouth: Mucous membranes are moist       Pharynx: No oropharyngeal exudate  Eyes:      General:         Right eye: No discharge  Left eye: No discharge  Extraocular Movements: Extraocular movements intact  Conjunctiva/sclera: Conjunctivae normal       Pupils: Pupils are equal, round, and reactive to light  Comments: Wears prescription glasses  Cardiovascular:      Rate and Rhythm: Normal rate  Pulses: Normal pulses     Pulmonary:      Effort: Pulmonary effort is normal  No respiratory distress  Breath sounds: Normal breath sounds  No wheezing, rhonchi or rales  Abdominal:      General: Bowel sounds are normal  There is no distension  Palpations: Abdomen is soft  Tenderness: There is no abdominal tenderness  There is no guarding  Musculoskeletal:      Cervical back: Neck supple  No rigidity  Right lower leg: No edema  Left lower leg: No edema  Comments: Moves all 4 extremities  Lymphadenopathy:      Cervical: No cervical adenopathy  Skin:     General: Skin is warm and dry  Capillary Refill: Capillary refill takes less than 2 seconds  Comments: Large Band-Aid covering right forearm and left forearm skin tear noted  Neurological:      Mental Status: She is alert  Mental status is at baseline  Motor: Weakness present  Gait: Gait abnormal    Psychiatric:         Mood and Affect: Mood normal          Behavior: Behavior normal          Thought Content:  Thought content normal        Assessment/Plan:  17-year-old female with:    Closed fracture of multiple pubic rami (HCC)  S/p unwitnessed fall on 3/26/2023 sustaining left superior pubic rami fracture as per hospital imaging, evaluated by orthopedics, and nonoperative management was recommended  Currently on aspirin 81mg po BID x 28 days for DVT prophylaxis  Currently on methocarbamol 4 times daily; will decrease methocarbamol to 500 mg twice daily x3 days then discontinue  Currently on oxycodone 5 mg every 4 hours as needed for pain; will add scheduled Tylenol and plan to wean patient off of oxycodone  Monitor for constipation on pain medications  Weightbearing as tolerated to left lower extremity  Follow-up with orthopedics outpatient    Ambulatory dysfunction  In setting of recent fall sustaining pubic rami fracture  Patient ambulates with a walker at baseline  PT/OT evaluation  Continue fall precautions; patient needs frequent reminders to use her call light    Late onset Alzheimer's disease without behavioral disturbance (HCC)  Alzheimer versus mixed type with underlying arterial vascular disease  Continue supportive care, reorientation, redirection as needed  Maintain sleep/wake cycle  Avoid deliriogenic medications  Continue delirium precautions  Encourage good sleep hygiene  Monitor for pain and ensure the pain is adequately controlled    Benign essential hypertension  Goal BP< 150/90  /75 today  Continue enalapril    Hypokalemia  Mild, noted on day of discharge from the hospital with serum potassium level 3 4  Patient is currently not on a diuretic  Will order potassium supplement 36 M EQ x2 days then recheck BMP on 4/3/2023    Atherosclerosis 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 (HCC)  Statin previously discontinued due to intolerance (loose stool)  Currently asymptomatic    Anxiety  Currently on sertraline 12 5 mg daily  Continue psychosocial support    This note was completed in part utilizing [employer] direct voice recognition software  Grammatical errors, random word insertion, spelling mistakes, and incomplete sentences may be an occasional consequence of the system secondary to software limitations, ambient noise and hardware issues  At the time of dictation, efforts were made to edit, clarify and/or correct errors  Please read the chart carefully and recognize, using context, where substitutions have occurred  If you have any questions or concerns about the context, text or information contained within the body of this dictation, please contact myself, the provider, for further clarification      [address]  7/37/974297:47 PM

## 2023-03-31 NOTE — ASSESSMENT & PLAN NOTE
S/p unwitnessed fall on 3/26/2023 sustaining left superior pubic rami fracture as per hospital imaging, evaluated by orthopedics, and nonoperative management was recommended  Currently on aspirin 81mg po BID x 28 days for DVT prophylaxis  Currently on methocarbamol 4 times daily; will decrease methocarbamol to 500 mg twice daily x3 days then discontinue  Currently on oxycodone 5 mg every 4 hours as needed for pain; will add scheduled Tylenol and plan to wean patient off of oxycodone  Monitor for constipation on pain medications  Weightbearing as tolerated to left lower extremity  Follow-up with orthopedics outpatient

## 2023-04-04 ENCOUNTER — NURSING HOME VISIT (OUTPATIENT)
Dept: GERIATRICS | Facility: OTHER | Age: 88
End: 2023-04-04

## 2023-04-04 DIAGNOSIS — F02.80 LATE ONSET ALZHEIMER'S DISEASE WITHOUT BEHAVIORAL DISTURBANCE (HCC): ICD-10-CM

## 2023-04-04 DIAGNOSIS — E87.6 HYPOKALEMIA: ICD-10-CM

## 2023-04-04 DIAGNOSIS — I10 BENIGN ESSENTIAL HYPERTENSION: Chronic | ICD-10-CM

## 2023-04-04 DIAGNOSIS — R26.2 AMBULATORY DYSFUNCTION: ICD-10-CM

## 2023-04-04 DIAGNOSIS — G30.1 LATE ONSET ALZHEIMER'S DISEASE WITHOUT BEHAVIORAL DISTURBANCE (HCC): ICD-10-CM

## 2023-04-04 DIAGNOSIS — S32.592D CLOSED FRACTURE OF MULTIPLE RAMI OF LEFT PUBIS WITH ROUTINE HEALING, SUBSEQUENT ENCOUNTER: Primary | ICD-10-CM

## 2023-04-04 NOTE — ASSESSMENT & PLAN NOTE
Alzheimer's versus mixed type with underlying arterial vascular disease  Continue supportive care, reorientation, redirection as needed  Continue delirium precautions  Maintain sleep/wake cycle  Avoid deliriogenic medications  Monitor for pain and ensure that pain is adequately controlled  Encourage patient to participate with activities provided at facility

## 2023-04-04 NOTE — ASSESSMENT & PLAN NOTE
S/p fall sustaining left superior pubic rami fracture with nonoperative management as per orthopedics  Continue aspirin 81 mg p o  twice daily x28 days for DVT prophylaxis  Methocarbamol has been discontinued  Pain controlled with scheduled Tylenol  We will discontinue oxycodone if patient is not using  Continue senna and monitor for constipation due to decreased mobility  Follow-up with orthopedics as scheduled outpatient on 5/1/2023

## 2023-04-04 NOTE — ASSESSMENT & PLAN NOTE
Continue supportive care at [facility]  Continue PT/OT  Continue fall precautions

## 2023-04-04 NOTE — PROGRESS NOTES
Facility: [facility]  [address] [place] [address], [facility] [address]  POS: 31 (STR)  Progress Note    Chief Complaint/Reason for visit: STR follow up visit  Code status: DNI/DNR  Additional history obtained from patient, nursing staff, physical therapist, and EMR  History of Present Illness: 60-year-old female seen and examined for STR follow up of acute and chronic medical conditions  Received patient seated in chair and appeared in no distress  Patient is awake and more alert today as compared to last visit  Patient denies having pain at time of exam   Physical therapist stated that patient lacks motivation to participate in therapy sessions and requires lots of encouragement  Patient states that her appetite is good  Denies shortness of breath, chest pain, headache, dizziness, abdominal pain, nausea, vomiting, diarrhea, or constipation  Past Medical History: unchanged from history and physical  Past Medical History:   Diagnosis Date   • Anorexia     last assessed: 8/9/2013   • Closed fracture dislocation of right ankle joint 5/20/2019    Added automatically from request for surgery [id number]   • GERD (gastroesophageal reflux disease)    • Herpes zoster     last assessed: 9/11/2013   • Hypertension    • Lymphoma ([facility] )     resolved: 1997   • Malignant melanoma of skin ([facility] )     resolved: 2015   • Pelvic fracture ([facility] )      Family History: unchanged from history and physical  Social History: unchanged from history and physical  Review of systems: As per review of medical illness, all other systems reviewed and negative  Medications:  All medication and routine orders were reviewed and updated  Allergies: Reviewed and unchanged  Consults reviewed:PT, OT and Other  Labs/Diagnostics (reviewed by this provider): Copy in Chart  CBC without differential and CMP reviewed on 4/3/2023  Hemoglobin: 10 9   hematocrit: 32 4    WBC: 7 9   platelet count: 177  Glucose: 80    BUN: 17   creatinine: 0 48     sodium: 142 potassium: 4 1    chloride: 106    Carbon dioxide: 27    calcium: 8 4    anion gap: 9   GFR: 88  Imaging Reviewed: None today    Physical Exam  Weight: 138 6 pounds Temp: 98 3          BP: 165/93  pulse: 79 resp: 18        Constitutional: Normocephalic  Orientation:Person and Place     Physical Exam  Vitals and nursing note reviewed  Constitutional:       General: She is not in acute distress  Appearance: She is not toxic-appearing or diaphoretic  HENT:      Head: Normocephalic  Nose: No congestion or rhinorrhea  Mouth/Throat:      Mouth: Mucous membranes are moist       Pharynx: No oropharyngeal exudate  Eyes:      General: No scleral icterus  Right eye: No discharge  Left eye: No discharge  Extraocular Movements: Extraocular movements intact  Conjunctiva/sclera: Conjunctivae normal       Pupils: Pupils are equal, round, and reactive to light  Cardiovascular:      Rate and Rhythm: Normal rate  Pulmonary:      Effort: Pulmonary effort is normal  No respiratory distress  Breath sounds: Normal breath sounds  No wheezing, rhonchi or rales  Abdominal:      General: Bowel sounds are normal  There is no distension  Palpations: Abdomen is soft  Tenderness: There is no abdominal tenderness  There is no guarding  Musculoskeletal:         General: Deformity present  Cervical back: Neck supple  No rigidity  Right lower leg: No edema  Left lower leg: No edema  Comments: Moves all 4 extremities  Lymphadenopathy:      Cervical: No cervical adenopathy  Skin:     General: Skin is warm and dry  Capillary Refill: Capillary refill takes less than 2 seconds  Neurological:      Mental Status: She is alert  Mental status is at baseline  Psychiatric:         Mood and Affect: Mood normal          Behavior: Behavior normal          Thought Content:  Thought content normal        Assessment/Plan:  59-year-old female with:    Closed fracture of multiple pubic rami (HCC)  S/p fall sustaining left superior pubic rami fracture with nonoperative management as per orthopedics  Continue aspirin 81 mg p o  twice daily x28 days for DVT prophylaxis  Methocarbamol has been discontinued  Pain controlled with scheduled Tylenol  We will discontinue oxycodone if patient is not using  Continue senna and monitor for constipation due to decreased mobility  Follow-up with orthopedics as scheduled outpatient on 5/1/2023    Ambulatory dysfunction  Continue supportive care at [facility]  Continue PT/OT  Continue fall precautions    Hypokalemia  Mild, noted on day of discharge from the hospital with serum potassium level 3 4  Ordered potassium supplement 40 MEQ x2 days on 3/31/2023  Repeat potassium 4 1 on 4/3/2023    Benign essential hypertension  Goal BP<150/90  BPs have been stable with isolated higher reading on 4/3/2023  Continue enalapril    Late onset Alzheimer's disease without behavioral disturbance ([facility] )  Alzheimer's versus mixed type with underlying arterial vascular disease  Continue supportive care, reorientation, redirection as needed  Continue delirium precautions  Maintain sleep/wake cycle  Avoid deliriogenic medications  Monitor for pain and ensure that pain is adequately controlled  Encourage patient to participate with activities provided at facility    This note was completed in part utilizing [facility] direct voice recognition software  Grammatical errors, random word insertion, spelling mistakes, and incomplete sentences may be an occasional consequence of the system secondary to software limitations, ambient noise and hardware issues  At the time of dictation, efforts were made to edit, clarify and/or correct errors  Please read the chart carefully and recognize, using context, where substitutions have occurred    If you have any questions or concerns about the context, text or information contained within the body of this dictation, please contact myself, the provider, for further clarification      [address]  6/1/55061:90 PM

## 2023-04-04 NOTE — ASSESSMENT & PLAN NOTE
Mild, noted on day of discharge from the hospital with serum potassium level 3 4  Ordered potassium supplement 40 MEQ x2 days on 3/31/2023  Repeat potassium 4 1 on 4/3/2023

## 2023-04-05 ENCOUNTER — APPOINTMENT (INPATIENT)
Dept: NON INVASIVE DIAGNOSTICS | Facility: HOSPITAL | Age: 88
End: 2023-04-05

## 2023-04-05 ENCOUNTER — APPOINTMENT (INPATIENT)
Dept: RADIOLOGY | Facility: HOSPITAL | Age: 88
End: 2023-04-05

## 2023-04-05 ENCOUNTER — HOSPITAL ENCOUNTER (INPATIENT)
Facility: HOSPITAL | Age: 88
LOS: 2 days | Discharge: NURSING, REHAB OR LONG-TERM CARE | End: 2023-04-07
Attending: EMERGENCY MEDICINE | Admitting: STUDENT IN AN ORGANIZED HEALTH CARE EDUCATION/TRAINING PROGRAM

## 2023-04-05 DIAGNOSIS — R53.1 WEAKNESS: ICD-10-CM

## 2023-04-05 DIAGNOSIS — R29.90 STROKE-LIKE SYMPTOMS: ICD-10-CM

## 2023-04-05 DIAGNOSIS — S32.512A CLOSED FRACTURE OF SUPERIOR RAMUS OF LEFT PUBIS, INITIAL ENCOUNTER (HCC): ICD-10-CM

## 2023-04-05 DIAGNOSIS — I63.9 STROKE (HCC): Primary | ICD-10-CM

## 2023-04-05 DIAGNOSIS — I10 HTN (HYPERTENSION): ICD-10-CM

## 2023-04-05 LAB
2HR DELTA HS TROPONIN: -2 NG/L
4HR DELTA HS TROPONIN: 1 NG/L
ANION GAP SERPL CALCULATED.3IONS-SCNC: 1 MMOL/L (ref 4–13)
AORTIC ROOT: 3.1 CM
AORTIC VALVE MEAN VELOCITY: 9.7 M/S
APICAL FOUR CHAMBER EJECTION FRACTION: 80 %
APTT PPP: 20 SECONDS (ref 23–37)
ASCENDING AORTA: 3.4 CM
ATRIAL RATE: 86 BPM
AV AREA BY CONTINUOUS VTI: 2.1 CM2
AV AREA PEAK VELOCITY: 1.9 CM2
AV LVOT MEAN GRADIENT: 2 MMHG
AV LVOT PEAK GRADIENT: 4 MMHG
AV MEAN GRADIENT: 4 MMHG
AV PEAK GRADIENT: 8 MMHG
AV VALVE AREA: 2.11 CM2
AV VELOCITY RATIO: 0.67
BUN SERPL-MCNC: 29 MG/DL (ref 5–25)
CALCIUM SERPL-MCNC: 8.6 MG/DL (ref 8.3–10.1)
CARDIAC TROPONIN I PNL SERPL HS: 10 NG/L
CARDIAC TROPONIN I PNL SERPL HS: 7 NG/L
CARDIAC TROPONIN I PNL SERPL HS: 9 NG/L
CHLORIDE SERPL-SCNC: 110 MMOL/L (ref 96–108)
CO2 SERPL-SCNC: 29 MMOL/L (ref 21–32)
CREAT SERPL-MCNC: 0.56 MG/DL (ref 0.6–1.3)
DOP CALC AO PEAK VEL: 1.44 M/S
DOP CALC AO VTI: 33.03 CM
DOP CALC LVOT AREA: 2.83 CM2
DOP CALC LVOT DIAMETER: 1.9 CM
DOP CALC LVOT PEAK VEL VTI: 24.65 CM
DOP CALC LVOT PEAK VEL: 0.97 M/S
DOP CALC LVOT STROKE INDEX: 40.7 ML/M2
DOP CALC LVOT STROKE VOLUME: 69.85
ERYTHROCYTE [DISTWIDTH] IN BLOOD BY AUTOMATED COUNT: 14.2 % (ref 11.6–15.1)
FLUAV RNA RESP QL NAA+PROBE: NEGATIVE
FLUBV RNA RESP QL NAA+PROBE: NEGATIVE
FRACTIONAL SHORTENING: 21 (ref 28–44)
GFR SERPL CREATININE-BSD FRML MDRD: 80 ML/MIN/1.73SQ M
GLUCOSE SERPL-MCNC: 88 MG/DL (ref 65–140)
GLUCOSE SERPL-MCNC: 96 MG/DL (ref 65–140)
HCT VFR BLD AUTO: 38 % (ref 34.8–46.1)
HGB BLD-MCNC: 12.3 G/DL (ref 11.5–15.4)
INR PPP: 0.87 (ref 0.84–1.19)
INTERVENTRICULAR SEPTUM IN DIASTOLE (PARASTERNAL SHORT AXIS VIEW): 0.8 CM
INTERVENTRICULAR SEPTUM: 0.8 CM (ref 0.6–1.1)
LAAS-AP2: 23.7 CM2
LAAS-AP4: 19.8 CM2
LEFT ATRIUM SIZE: 2.6 CM
LEFT INTERNAL DIMENSION IN SYSTOLE: 2.6 CM (ref 2.1–4)
LEFT VENTRICLE DIASTOLIC VOLUME (MOD BIPLANE): 71 ML
LEFT VENTRICLE SYSTOLIC VOLUME (MOD BIPLANE): 20 ML
LEFT VENTRICULAR INTERNAL DIMENSION IN DIASTOLE: 3.3 CM (ref 3.5–6)
LEFT VENTRICULAR POSTERIOR WALL IN END DIASTOLE: 0.8 CM
LEFT VENTRICULAR STROKE VOLUME: 18 ML
LV EF: 72 %
LVSV (TEICH): 18 ML
MCH RBC QN AUTO: 30.4 PG (ref 26.8–34.3)
MCHC RBC AUTO-ENTMCNC: 32.4 G/DL (ref 31.4–37.4)
MCV RBC AUTO: 94 FL (ref 82–98)
P AXIS: 59 DEGREES
PLATELET # BLD AUTO: 317 THOUSANDS/UL (ref 149–390)
PLATELET # BLD AUTO: 381 THOUSANDS/UL (ref 149–390)
PMV BLD AUTO: 11 FL (ref 8.9–12.7)
PMV BLD AUTO: 11.2 FL (ref 8.9–12.7)
POTASSIUM SERPL-SCNC: 3.9 MMOL/L (ref 3.5–5.3)
PR INTERVAL: 184 MS
PROTHROMBIN TIME: 12 SECONDS (ref 11.6–14.5)
QRS AXIS: 40 DEGREES
QRSD INTERVAL: 66 MS
QT INTERVAL: 354 MS
QTC INTERVAL: 423 MS
RA PRESSURE ESTIMATED: 8 MMHG
RBC # BLD AUTO: 4.04 MILLION/UL (ref 3.81–5.12)
RIGHT ATRIUM AREA SYSTOLE A4C: 16.8 CM2
RIGHT VENTRICLE ID DIMENSION: 3.9 CM
RSV RNA RESP QL NAA+PROBE: NEGATIVE
RV PSP: 44 MMHG
SARS-COV-2 RNA RESP QL NAA+PROBE: NEGATIVE
SL CV LEFT ATRIUM LENGTH A2C: 6.4 CM
SL CV LV EF: 60
SL CV PED ECHO LEFT VENTRICLE DIASTOLIC VOLUME (MOD BIPLANE) 2D: 44 ML
SL CV PED ECHO LEFT VENTRICLE SYSTOLIC VOLUME (MOD BIPLANE) 2D: 26 ML
SODIUM SERPL-SCNC: 140 MMOL/L (ref 135–147)
T WAVE AXIS: 39 DEGREES
TR MAX PG: 36 MMHG
TR PEAK VELOCITY: 3 M/S
TRICUSPID ANNULAR PLANE SYSTOLIC EXCURSION: 1.7 CM
TRICUSPID VALVE PEAK REGURGITATION VELOCITY: 3.01 M/S
VENTRICULAR RATE: 86 BPM
WBC # BLD AUTO: 7.19 THOUSAND/UL (ref 4.31–10.16)

## 2023-04-05 RX ORDER — LISINOPRIL 10 MG/1
10 TABLET ORAL DAILY
Status: DISCONTINUED | OUTPATIENT
Start: 2023-04-05 | End: 2023-04-05

## 2023-04-05 RX ORDER — ATORVASTATIN CALCIUM 40 MG/1
40 TABLET, FILM COATED ORAL EVERY EVENING
Status: DISCONTINUED | OUTPATIENT
Start: 2023-04-05 | End: 2023-04-05

## 2023-04-05 RX ORDER — HEPARIN SODIUM 5000 [USP'U]/ML
5000 INJECTION, SOLUTION INTRAVENOUS; SUBCUTANEOUS EVERY 8 HOURS SCHEDULED
Status: DISCONTINUED | OUTPATIENT
Start: 2023-04-05 | End: 2023-04-07 | Stop reason: HOSPADM

## 2023-04-05 RX ORDER — ASPIRIN 300 MG/1
300 SUPPOSITORY RECTAL DAILY
Status: DISCONTINUED | OUTPATIENT
Start: 2023-04-05 | End: 2023-04-06

## 2023-04-05 RX ORDER — OXYCODONE HYDROCHLORIDE 5 MG/1
5 TABLET ORAL EVERY 6 HOURS PRN
Status: DISCONTINUED | OUTPATIENT
Start: 2023-04-05 | End: 2023-04-07 | Stop reason: HOSPADM

## 2023-04-05 RX ORDER — ACETAMINOPHEN 325 MG/1
650 TABLET ORAL EVERY 6 HOURS PRN
Status: DISCONTINUED | OUTPATIENT
Start: 2023-04-05 | End: 2023-04-07 | Stop reason: HOSPADM

## 2023-04-05 RX ORDER — LIDOCAINE 50 MG/G
2 PATCH TOPICAL DAILY
Status: DISCONTINUED | OUTPATIENT
Start: 2023-04-05 | End: 2023-04-07 | Stop reason: HOSPADM

## 2023-04-05 RX ORDER — SERTRALINE HYDROCHLORIDE 25 MG/1
12.5 TABLET, FILM COATED ORAL DAILY
Status: DISCONTINUED | OUTPATIENT
Start: 2023-04-05 | End: 2023-04-05

## 2023-04-05 RX ORDER — LABETALOL HYDROCHLORIDE 5 MG/ML
10 INJECTION, SOLUTION INTRAVENOUS EVERY 6 HOURS PRN
Status: DISCONTINUED | OUTPATIENT
Start: 2023-04-05 | End: 2023-04-07 | Stop reason: HOSPADM

## 2023-04-05 RX ORDER — GABAPENTIN 100 MG/1
100 CAPSULE ORAL
Status: DISCONTINUED | OUTPATIENT
Start: 2023-04-05 | End: 2023-04-05

## 2023-04-05 RX ORDER — SENNOSIDES 8.6 MG
8.6 TABLET ORAL
Status: DISCONTINUED | OUTPATIENT
Start: 2023-04-05 | End: 2023-04-05

## 2023-04-05 RX ORDER — ASPIRIN 81 MG/1
81 TABLET, CHEWABLE ORAL DAILY
Status: DISCONTINUED | OUTPATIENT
Start: 2023-04-05 | End: 2023-04-05

## 2023-04-05 RX ADMIN — IOHEXOL 85 ML: 350 INJECTION, SOLUTION INTRAVENOUS at 08:48

## 2023-04-05 RX ADMIN — LIDOCAINE 5% 2 PATCH: 700 PATCH TOPICAL at 14:42

## 2023-04-05 RX ADMIN — ASPIRIN 300 MG: 300 SUPPOSITORY RECTAL at 14:42

## 2023-04-05 RX ADMIN — HEPARIN SODIUM 5000 UNITS: 5000 INJECTION INTRAVENOUS; SUBCUTANEOUS at 14:42

## 2023-04-05 RX ADMIN — HEPARIN SODIUM 5000 UNITS: 5000 INJECTION INTRAVENOUS; SUBCUTANEOUS at 21:32

## 2023-04-05 NOTE — CASE MANAGEMENT
Case Management Assessment & Discharge Planning Note    Patient name Damian Ocasio  Location ED 12/ED 12 MRN [id number]  : 4/10/1928 Date 2023       Current Admission Date: 2023  Current Admission [facility] Aparicio9 ([facility]) Stroke Scale level of consciousness score 0, alert; keenly responsive   Patient Active Problem List    Diagnosis Date Noted   • Hypokalemia 2023   • Closed fracture of multiple pubic rami ([facility] ) 2023   • Left hip pain 2023   • Fall 2023   • Diarrhea 08/15/2022   • Anxiety 2022   • Venous stasis dermatitis of both lower extremities 2019   • Late onset Alzheimer's disease without behavioral disturbance ([facility] ) 2019   • Orthopedic hardware present 2019   • Ambulatory dysfunction 2019   • Anemia 2017   • Hypertriglyceridemia 10/13/2015   • Lymphoma in remission ([facility] ) 2014   • GERD without esophagitis 2013   • Vitamin D deficiency 2013   • Atherosclerosis 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 ([facility] ) 2013   • Cataract 2013   • Hypercholesterolemia 2012   • Benign essential hypertension 2012      LOS (days): 0  Geometric Mean LOS (GMLOS) (days):   Days to GMLOS:     OBJECTIVE:  PATIENT READMITTED TO HOSPITAL      Current admission status: Inpatient  Referral Reason: Stroke    Preferred Pharmacy:   [address], [address] [facility] [address] [place] [id number]  Phone: [phone] Fax: [phone]    Primary Care Provider: Juan Street DO    Primary Insurance: [facility]  Secondary Insurance:     ASSESSMENT:  Vanessa Saba Proxies    There are no active Health Care Proxies on file            Readmission Root Cause  30 Day Readmission: No    Patient Information  Admitted from[de-identified] Facility  Mental Status: Alert, Confused  During Assessment patient was accompanied by: Not accompanied during assessment  Assessment information provided by[de-identified] Daughter  Primary Caregiver:  Other (Comment)  Caregiver's Name[de-identified] Adry Olson Relationship to Patient[de-identified] Family Member  Support Systems: Self, Daughter, Family members  [place] of Residence: [address] [place] [address] [place] [facility]  Type of Current Residence: Facility  Upon entering residence, is there a bedroom on the main floor (no further steps)?: Yes  Upon entering residence, is there a bathroom on the main floor (no further steps)?: Yes  In the last 12 months, was there a time when you were not able to pay the mortgage or rent on time?: No  In the last 12 months, how many places have you lived?: 2  In the last 12 months, was there a time when you did not have a steady place to sleep or slept in a shelter (including now)?: No  Homeless/housing insecurity resource given?: No  Living Arrangements: Lives Alone    Activities of Daily Living Prior to Admission  Functional Status: Assistance  Completes ADLs independently?: No  Level of ADL dependence: Assistance  Ambulates independently?: Yes  Does patient have a history of Outpatient Therapy (PT/OT)?: Yes  Does the patient have a history of Short-Term Rehab?: Yes  Does patient have a history of [facility]?: Yes  Does patient currently have [place] [id number]?: No    Patient Information Continued  Income Source: Pension/alf  Does patient have prescription coverage?: Yes  Within the past 12 months, you worried that your food would run out before you got the money to buy more : Never true  Within the past 12 months, the food you bought just didn't last and you didn't have money to get more : Never true  Food insecurity resource given?: No  Does patient receive dialysis treatments?: No  Does patient have a history of substance abuse?: No  Does patient have a history of Mental Health Diagnosis?: No    Means of Transportation  Means of Transport to Appts[de-identified] Family transport  In the past 12 months, has lack of transportation kept you from medical appointments or from getting medications?: No  In the past 12 months, has lack of transportation kept you from meetings, work, or from getting things needed for daily living?: No  Was application for public transport provided?: No      DISCHARGE DETAILS:    Discharge planning discussed with[de-identified] Yordan Roa CM contacted family/caregiver?: Yes        Contacts  Patient Contacts: Shreyas Matos (dtr) [phone]  Contact Method: Phone  Phone Number: [phone]  Reason/Outcome: Continuity of Care, Emergency Contact

## 2023-04-05 NOTE — SPEECH THERAPY NOTE
Speech-Language Pathology Bedside Swallow Evaluation      Patient Name: Andre Farr    OMZPD'J Date: 4/5/2023     Problem List  Active Problems:    Benign essential hypertension    Late onset Alzheimer's disease without behavioral disturbance ([facility] )    Closed fracture of multiple pubic rami ([facility])    Stroke-like symptoms      Past Medical History  Past Medical History:   Diagnosis Date   • Anorexia     last assessed: 8/9/2013   • Closed fracture dislocation of right ankle joint 5/20/2019    Added automatically from request for surgery [id number]   • GERD (gastroesophageal reflux disease)    • Herpes zoster     last assessed: 9/11/2013   • Hypertension    • Lymphoma ([facility] )     resolved: 1997   • Malignant melanoma of skin ([facility] )     resolved: 2015   • Pelvic fracture ([facility] )        Past Surgical History  Past Surgical History:   Procedure Laterality Date   • BREAST SURGERY      enlargement procedure   • BYPASS FEMORAL-POPLITEAL Left     onset: 8/31/2012   • CATARACT EXTRACTION Right 06/19/2013   • HYSTERECTOMY      resolved: 1970   • ORIF TIBIA & FIBULA FRACTURES Right 5/21/2019    Procedure: OPEN REDUCTION W/ INTERNAL FIXATION (ORIF) ANKLE;  Surgeon: Jonathan Aleman MD;  Location: [facility];  Service: Orthopedics   • ORIF TIBIA & FIBULA FRACTURES Right 6/4/2019    Procedure: Revision right ankle ORIF;  Surgeon: Jonathan Aleman MD;  [facility];  Service: Orthopedics   • OTHER SURGICAL HISTORY  05/30/2013    PTA Femoral-popliteal initial stenosis left Description: with mechanical thrombectomy, PTA and stent    • MT OPTX DSTL RADL I-ARTIC FX/EPIPHYSL SEP 3 FRAG Left 1/18/2017    Procedure: DISTAL RADIUS OPEN REDUCTION W/ INTERNAL FIXATION ;  Surgeon: Lennie Garcia MD;  Location: [facility];  Service: Orthopedics       Summary   Pt presented with functional appearing oral stage of swallowing with s/s suggestive of mild-moderate pharyngeal dysphagia   The patient is assessed with puree solids with honey thick, nectar thick and thin liquids  Oral closure is adequate  Transfer appears timely  Swallow appears timely, but patient presents with significant cough after straw sip of thin liquids  Patient then presents with consistent throat clear with tsp sips of nectar thick and honey thick liquids  Risk/s for Aspiration: high     Recommended Diet: NPO except medications   Recommended Form of Meds: crushed with puree   Other Recommendations: Continue frequent oral care; consider VBS    Current Medical Status  Amie Mcbride is a 80 y o  female  with pertinent history of GERD, HTN, recent left pubic fracture s/p fall, dementia, who presented for stroke-like symptoms    [facility]  Per history obtained from EMS, patient was last seen well yesterday evening around dinner time  This morning, staff attempted to get patient up into the shower and noted patient had left sided weakness and right facial droop  EMS was initiated  Pre-hospital stroke alert was initiated    Of note, patient had recent admission 3/27-3/29 for left pubic rami fractures s/p fall  At baseline, patient is reported to be AAO x2  Current Precautions:  Fall  Aspiration    Allergies:  No known food allergies  Past medical history:  Please see H&P for details    Special Studies:  MRI pending     Social/Education/Vocational Hx:  Pt lives in SNF/ECF    Swallow Information   Current Risks for Dysphagia & Aspiration: decreased alertness  Current Symptoms/Concerns: coughing with po  Current Diet: NPO   Baseline Diet: regular diet and thin liquids    Baseline Assessment   Behavior/Cognition: lethargic  Speech/Language Status: able to participate in basic conversation and able to follow commands  Patient Positioning: upright in bed  Pain Status/Interventions/Response to Interventions: No report of or nonverbal indications of pain       Swallow Mechanism Exam  Facial: symmetrical  Labial: WFL  Lingual: WFL  Velum: symmetrical  Mandible: "adequate ROM  Dentition: partial dentures and limited dentition  Vocal quality:clear/adequate   Volitional Cough: strong/productive   Respiratory Status: on RA       Consistencies Assessed and Performance   Consistencies Administered: thin liquids, nectar thick, honey thick and puree  Materials administered included applesauce with honey thick, nectar thick and thin liquids     Oral Stage: WFL  Retrieval of all via tsp and straw is adequate  Bolus formation and transfer were functional with no significant oral residue noted  Possible decreased oral control with thin liquids  Pharyngeal Stage: mild-moderate  Swallow Mechanics:  Swallowing initiation appeared prompt  Laryngeal rise was palpated and judged to be within functional limits  Patient had coughing with thin liquids with consistent throat clearing with nectar thick and honey thick liquids  Esophageal Concerns: none reported    Summary and Recommendations (see above)    Results Reviewed with: patient and RN     Treatment Recommended: dysphagia therapy     Frequency of treatment: 3-5x week, as able    Patient Stated Goal: \"I'm too tired\"    Dysphagia LTG  -Patient will demonstrate safe and effective oral intake (without overt s/s significant oral/pharyngeal dysphagia including s/s penetration or aspiration) for the highest appropriate diet level       Short Term Goals:  -Pt will tolerate Dysphagia 1/pureed diet and honey thick liquid with no significant s/s oral or pharyngeal dysphagia across 1-3 diagnostic session/s    -Patient will tolerate trials of upgraded food and/or liquid texture with no significant s/s of oral or pharyngeal dysphagia including aspiration across 1-3 diagnostic sessions     -Patient will comply with a Video/Modified Barium Swallow study for more complete assessment of swallowing anatomy/physiology/aspiration risk and to assess efficacy of treatment techniques so as to best guide treatment plan    Speech Therapy Prognosis " Prognosis: fair    Prognosis Considerations: age and medical status

## 2023-04-05 NOTE — ED PROVIDER NOTES
History  Chief Complaint   Patient presents with   • STROKE Alert     Prehospital stroke alert  Last known well by staff at dinner time last night      Patient is a 22-year-old female presenting to the emergency department for evaluation of strokelike symptoms  Patient's last known normal was around dinnertime last night  They went in her room to get her up this morning and she had left-sided weakness and a possible facial droop  Past medical history significant for hypertension and melanoma  Patient is complaining of left-sided heaviness, otherwise denies any headache dizziness tinnitus vision change chest pain shortness of breath nausea vomiting diarrhea or constipation  Prior to Admission Medications   Prescriptions Last Dose Informant Patient Reported? Taking?   acetaminophen (TYLENOL) 325 mg tablet   No No   Sig: Take 2 tablets (650 mg total) by mouth every 6 (six) hours as needed for mild pain, headaches or fever   aspirin (ECOTRIN LOW STRENGTH) 81 mg EC tablet   No No   Sig: Take 1 tablet (81 mg total) by mouth 2 (two) times a day for 21 days   enalapril (VASOTEC) 10 mg tablet   No No   Sig: TAKE ONE TABLET BY MOUTH ONCE EVERY DAY FOR HYPERTENSION   gabapentin (NEURONTIN) 100 mg capsule   No No   Sig: Take 1 capsule (100 mg total) by mouth 3 (three) times a day   lidocaine (LIDODERM) 5 %   No No   Sig: Apply 2 patches topically over 12 hours daily Remove & Discard patch within 12 hours or as directed by MD Do not start before March 30, 2023     methocarbamol (ROBAXIN) 500 mg tablet   No No   Sig: Take 1 tablet (500 mg total) by mouth every 6 (six) hours   oxyCODONE (ROXICODONE) 5 immediate release tablet   No No   Sig: Take 1 tablet (5 mg total) by mouth every 4 (four) hours as needed for moderate pain or severe pain for up to 10 days Max Daily Amount: 30 mg   senna (SENOKOT) 8 6 mg   No No   Sig: Take 1 tablet (8 6 mg total) by mouth daily at bedtime   sertraline (Zoloft) 25 mg tablet   No No Sig: Take 0 5 tablets (12 5 mg total) by mouth daily Do not start before March 30, 2023  Facility-Administered Medications: None       Past Medical History:   Diagnosis Date   • Anorexia     last assessed: 8/9/2013   • Closed fracture dislocation of right ankle joint 5/20/2019    Added automatically from request for surgery [id number]   • GERD (gastroesophageal reflux disease)    • Herpes zoster     last assessed: 9/11/2013   • Hypertension    • Lymphoma ([facility] [address] )     resolved: 1997   • Malignant melanoma of skin ([facility] [address] )     resolved: 2015   • Pelvic fracture ([facility] [address] )        Past Surgical History:   Procedure Laterality Date   • BREAST SURGERY      enlargement procedure   • BYPASS FEMORAL-POPLITEAL Left     onset: 8/31/2012   • CATARACT EXTRACTION Right 06/19/2013   • HYSTERECTOMY      resolved: 1970   • ORIF TIBIA & FIBULA FRACTURES Right 5/21/2019    Procedure: OPEN REDUCTION W/ INTERNAL FIXATION (ORIF) ANKLE;  Surgeon: Jeanie Ocampo MD;  Location: [facility];  Service: Orthopedics   • ORIF TIBIA & FIBULA FRACTURES Right 6/4/2019    Procedure: Revision right ankle ORIF;  Surgeon: Jeaine Ocampo MD;  Location: [facility];  Service: Orthopedics   • OTHER SURGICAL HISTORY  05/30/2013    PTA Femoral-popliteal initial stenosis left Description: with mechanical thrombectomy, PTA and stent    • AR OPTX DSTL RADL I-ARTIC FX/EPIPHYSL SEP 3 FRAG Left 1/18/2017    Procedure: DISTAL RADIUS OPEN REDUCTION W/ INTERNAL FIXATION ;  Surgeon: Nito Fregoso MD;  Location: [facility];  Service: Orthopedics       Family History   Problem Relation Age of Onset   • Heart attack Mother    • Heart failure Father    • Other Sister         thyroid surgery     I have reviewed and agree with the history as documented  E-Cigarette/Vaping     E-Cigarette/Vaping Substances     Social History     Tobacco Use   • Smoking status: Never   • Smokeless tobacco: Never   Substance Use Topics   • Alcohol use:  Yes     Alcohol/week: 1 0 standard drink Types: 1 Glasses of wine per week     Comment: Per allscripts - social drinker    • Drug use: No        Review of Systems   Constitutional: Negative for chills and fever  HENT: Negative for ear pain and sore throat  Eyes: Negative for pain and visual disturbance  Respiratory: Negative for cough, chest tightness and shortness of breath  Cardiovascular: Negative for chest pain and palpitations  Gastrointestinal: Negative for abdominal pain, constipation, diarrhea, nausea and vomiting  Genitourinary: Negative for dysuria and hematuria  Musculoskeletal: Negative for arthralgias and back pain  Skin: Negative for color change and rash  Neurological: Positive for weakness and numbness  Negative for dizziness, seizures, syncope and headaches  Psychiatric/Behavioral: Negative for agitation and behavioral problems  All other systems reviewed and are negative  Physical Exam  ED Triage Vitals   Temperature Pulse Respirations Blood Pressure SpO2   04/05/23 0905 04/05/23 0842 04/05/23 0842 04/05/23 0842 04/05/23 0842   (!) 97 3 °F (36 3 °C) 88 20 112/67 93 %      Temp Source Heart Rate Source Patient Position - Orthostatic VS BP Location FiO2 (%)   04/05/23 0905 04/05/23 0850 04/05/23 0850 -- --   Oral Monitor Lying        Pain Score       --                    Orthostatic Vital Signs  Vitals:    04/05/23 0842 04/05/23 0850 04/05/23 0905 04/05/23 0920   BP: 112/67 153/68 160/70 125/61   Pulse: 88 93 81 79   Patient Position - Orthostatic VS:  Lying Lying Lying       Physical Exam  Vitals and nursing note reviewed  Constitutional:       General: She is not in acute distress  Appearance: She is well-developed  HENT:      Head: Normocephalic and atraumatic  Right Ear: External ear normal       Left Ear: External ear normal       Nose: Nose normal       Mouth/Throat:      Mouth: Mucous membranes are moist    Eyes:      General: Visual field deficit present        Extraocular Movements: Extraocular movements intact  Conjunctiva/sclera: Conjunctivae normal    Cardiovascular:      Rate and Rhythm: Normal rate and regular rhythm  Heart sounds: Normal heart sounds  No murmur heard  Pulmonary:      Effort: Pulmonary effort is normal  No respiratory distress  Breath sounds: Normal breath sounds  Abdominal:      General: Abdomen is flat  Palpations: Abdomen is soft  Tenderness: There is no abdominal tenderness  Musculoskeletal:         General: No swelling  Cervical back: Normal range of motion and neck supple  Skin:     General: Skin is warm and dry  Capillary Refill: Capillary refill takes less than 2 seconds  Neurological:      Mental Status: She is alert  GCS: GCS eye subscore is 4  GCS verbal subscore is 5  Sensory: Sensation is intact  Motor: Weakness present  Comments: Decreased blink to threat on left  Left upper extremity weakness as well as left lower extremity weakness  Left upper worse than left lower  Psychiatric:         Mood and Affect: Mood normal          Behavior: Behavior normal          ED Medications  Medications   iohexol (OMNIPAQUE) 350 MG/ML injection (SINGLE-DOSE) 85 mL (85 mL Intravenous Given 4/5/23 0848)       Diagnostic Studies  Results Reviewed     Procedure Component Value Units Date/Time    Protime-INR [[id number]] Collected: 04/05/23 2045    Lab Status: In process Specimen: Blood from Arm, Left Updated: 04/05/23 0931    APTT [[id number]] Collected: 04/05/23 0924    Lab Status:  In process Specimen: Blood from Arm, Left Updated: 04/05/23 0931    HS Troponin I 2hr [[id number]]     Lab Status: No result Specimen: Blood     HS Troponin 0hr (reflex protocol) [[id number]]  (Normal) Collected: 04/05/23 0843    Lab Status: Final result Specimen: Blood from Arm, Left Updated: 04/05/23 0920     hs TnI 0hr 9 ng/L     Basic metabolic panel [[id number]]  (Abnormal) Collected: 04/05/23 0843    Lab Status: Final result Specimen: Blood from Arm, Left Updated: 04/05/23 0910     Sodium 140 mmol/L      Potassium 3 9 mmol/L      Chloride 110 mmol/L      CO2 29 mmol/L      ANION GAP 1 mmol/L      BUN 29 mg/dL      Creatinine 0 56 mg/dL      Glucose 96 mg/dL      Calcium 8 6 mg/dL      eGFR 80 ml/min/1 73sq m     Narrative:      [place] guidelines for Chronic Kidney Disease (CKD):   •  Stage 1 with normal or high GFR (GFR > 90 mL/min/1 73 square meters)  •  Stage 2 Mild CKD (GFR = 60-89 mL/min/1 73 square meters)  •  Stage 3A Moderate CKD (GFR = 45-59 mL/min/1 73 square meters)  •  Stage 3B Moderate CKD (GFR = 30-44 mL/min/1 73 square meters)  •  Stage 4 Severe CKD (GFR = 15-29 mL/min/1 73 square meters)  •  Stage 5 End Stage CKD (GFR <15 mL/min/1 73 square meters)  Note: GFR calculation is accurate only with a steady state creatinine    CBC and Platelet [[id number]]  (Normal) Collected: 04/05/23 0843    Lab Status: Final result Specimen: Blood from Arm, Left Updated: 04/05/23 0852     WBC 7 19 Thousand/uL      RBC 4 04 Million/uL      Hemoglobin 12 3 g/dL      Hematocrit 38 0 %      MCV 94 fL      MCH 30 4 pg      MCHC 32 4 g/dL      RDW 14 2 %      Platelets 530 Thousands/uL      MPV 11 2 fL     FLU/COVID - if FLU clinically relevant [[id number]] Collected: 04/05/23 0843    Lab Status: In process Specimen: Nares from Nose Updated: 04/05/23 0847    Fingerstick Glucose (POCT) [[id number]]  (Normal) Collected: 04/05/23 0840    Lab Status: Final result Updated: 04/05/23 0843     POC Glucose 88 mg/dl                  CTA stroke alert (head/neck)   Final Result by Ferdinand Hollingsworth MD (04/05 1271)      No large vessel occlusion in head or neck  Severe stenosis in mildly hypoplastic left vertebral artery origin and V1 segment due to atherosclerotic disease  Severe focal stenosis in right PCA P3 segment likely due to atherosclerotic disease        Tiny focal outpouching in left ICA supraclinoid segment projecting inferiorly, could represent infundibulum or aneurysm  Negative CTA head and neck for dissection  Additional chronic/incidental findings as detailed above  Findings were directly discussed with Camille Ferro at 8:54 AM                            Workstation performed: [id number]         CT stroke alert brain   Final Result by Ferdinand Hollingsworth MD (04/05 2114)      Beam hardening artifact from dental hardware limits evaluation of lower brainstem and lower cerebellum         - No acute intracranial abnormality  Findings were directly discussed with Camille Ferro at 8:48 AM       Workstation performed: [id number]               Procedures  Procedures      ED Course  ED Course as of 04/05/23 0945   Wed Apr 05, 2023   0300 Blood Pressure: 112/67   0856 Pulse: 88   0856 Respirations: 20   0856 Patient prehospital stroke alert  Per EMS the patient went to bed normal last night woke up this morning nursing facility tried to give her shower and noticed left upper lower extremity weakness with possible right-sided facial droop  On exam the patient does have left upper extremity weakness as well as left lower extremity weakness left upper greater than left lower  Spoke to neurology attending on phone  Stroke pathway initiated  NIH of 6  Neurology NP at bedside  5481 Beam hardening artifact from dental hardware limits evaluation of lower brainstem and lower cerebellum        - No acute intracranial abnormality       0908 Reached out to Neuro for admission stroke pathway   0924 hs TnI 0hr: 9   0936 Neurology asked me to message another neuro doctor for admission  Reached out to Dr Hemal Metzger                     Stroke Assessment     Row Name 04/05/23 0909             NIH Stroke Scale    Interval Baseline      Level of Consciousness (1a ) 0      LOC Questions (1b ) 0      LOC Commands (1c ) 0      Best Gaze (2 ) 0      Visual (3 ) 1      Facial Palsy (4 ) 0      Motor Arm, Left (5a ) 3 Motor Arm, Right (5b ) 0      Motor Leg, Left (6a ) 2      Motor Leg, Right (6b ) 0      Limb Ataxia (7 ) --      Sensory (8 ) 0      Best Language (9 ) 0      Dysarthria (10 ) 0      Extinction and Inattention (11 ) (Formerly Neglect) 0      Total --              Flowsheet Row Most Recent Value   Thrombolytic Decision Options    Thrombolytic Decision Patient not a candidate  Patient is not a candidate options Unclear time of onset outside appropriate time window  MDM      Disposition  Final diagnoses:   Stroke-like symptoms   HTN (hypertension)   Weakness     Time reflects when diagnosis was documented in both MDM as applicable and the Disposition within this note     Time User Action Codes Description Comment    4/5/2023  8:32 AM Kim Nest Add [I63 9] Stroke ([address] )     4/5/2023  8:58 AM Tejinder Isaiah Add [R29 90] Stroke-like symptoms     4/5/2023  8:58 AM Tejinder Isaiah Add [I10] HTN (hypertension)     4/5/2023  8:58 AM Tejinder Isaiah Add [R53 1] Weakness       ED Disposition     ED Disposition   Admit    Condition   Stable    Date/Time   Wed Apr 5, 2023  8:58 AM    Comment   Case was discussed with           Follow-up Information    None         Patient's Medications   Discharge Prescriptions    No medications on file     No discharge procedures on file  PDMP Review     None           ED Provider  Attending physically available and evaluated Nba Cardenas I managed the patient along with the ED Attending      Electronically Signed by         Juan Jose Murray DO  04/05/23 0945

## 2023-04-05 NOTE — H&P
NEUROLOGY  - ADMISSION H&P NOTE     Name: Geneva Hernandez   Age & Sex: 80 y o  female   MRN: [id number]  Unit/Bed#: ED 12   Encounter: [id number]    ASSESSMENT & PLAN     Stroke-like symptoms  Assessment & Plan  81 y/o female with GERD, HTN, recent left pubic fracture s/p fall, dementia, who presented as a pre-hospital stroke alert for left sided weakness and right facial droop  Patient was noted by nursing home staff this morning with new onset of previously mentioned symptoms  EMS was initiated  Pre-hospital stroke alert was called  BP en route per EMS was 200/118  BP on presentation 153/68  NIHSS 7  Patient was deemed not a TNK candidate  Unclear etiology at this time  Concern for acute infarct- will place on formal stroke pathway for further evaluation  CT head: No acute intracranial abnormality  CTA head and neck:  1  No large vessel occlusion in head or neck  2  Severe stenosis in mildly hypoplastic left vertebral artery origin and V1 segment due to atherosclerotic disease  3  Severe focal stenosis in right PCA P3 segment likely due to atherosclerotic disease  4  Tiny focal outpouching in left ICA supraclinoid segment projecting inferiorly, could represent infundibulum or aneurysm  5  Negative CTA head and neck for dissection  Plan:  - Stroke pathway  MRI brain  Echo  Lipid Panel  Hemoglobin A1c  On aspirin 81 mg BID for DVT prophylaxis s/p pubic fracture- unfortunately, patient is currently NPO, therefore, will place on aspirin 300 mg DC for now  May hold off on starting statin at this time per attending neurologist  Permissive HTN, labetalol if SBP >200  Continue telemetry  PT/OT/ST  Frequent neuro checks  Continue to monitor and notify neurology with any changes  - Medical management and supportive care  Correction of any metabolic or infectious disturbances       Closed fracture of multiple pubic rami [facility])  Assessment & Plan  - Recent admission for pubic fracture s/p fall  - Fall precautions  - Non operative management  - Follow up with ortho outpatient    Late onset Alzheimer's disease without behavioral disturbance ([facility] [address] )  Assessment & Plan  - Delirium precautions  - Promote appropriate sleep-wake cycle  - Avoid CNS altering meds when able  - PT/OT/ST    Benign essential hypertension  Assessment & Plan  - Permissive HTN at this time but no greater than       Thrombolytic Decision: Patient not a candidate  Unclear time of onset outside appropriate time window  and Recent significant trauma/stroke  Recommendations for outpatient neurological follow up have yet to be determined  Pending for discharge: Stroke workup    Case and treatment plan reviewed with attending neurologist, Dr Haile Spine  VTE Prophylaxis: sequential compression device / Heparin  Code Status: Level 3 DNR/DNI  POLST: There is no POLST form on file for this patient (pre-hospital)    Anticipated Length of Stay:  Patient will be admitted on an Inpatient basis with an anticipated length of stay of  2 midnights  Justification for Hospital Stay: Stroke workup    CHIEF COMPLAINT     Chief Complaint   Patient presents with   • STROKE Alert     Prehospital stroke alert  Last known well by staff at dinner time last night       Reason for Consult / Principal Problem: Stroke  Hx and PE limited by: Dementia  Patient last known well: Evening of 4/4/23- unclear exact time  Stroke alert called: 8:31 am  Neurology time of arrival: 8:35 am    [address] [place], [place] [address]shira Mendoza is a 80 y o  female  with pertinent history of GERD, HTN, recent left pubic fracture s/p fall, dementia, who presented for stroke-like symptoms  Patient [facility] from [facility]  Per history obtained from EMS, patient was last seen well yesterday evening around dinner time  This morning, staff attempted to get patient up into the shower and noted patient had left sided weakness and right facial droop  EMS was initiated  "Pre-hospital stroke alert was initiated  Of note, patient had recent admission 3/27-3/29 for left pubic rami fractures s/p fall  At baseline, patient is reported to be AAO x2       REVIEW OF SYSTEMS     Review of Systems   Unable to perform ROS: Dementia       PAST MEDICAL HISTORY     Past Medical History:   Diagnosis Date   • Anorexia     last assessed: 8/9/2013   • Closed fracture dislocation of right ankle joint 5/20/2019    Added automatically from request for surgery [id number]   • GERD (gastroesophageal reflux disease)    • Herpes zoster     last assessed: 9/11/2013   • Hypertension    • Lymphoma ([facility] [address] )     resolved: 1997   • Malignant melanoma of skin ([facility] [address] )     resolved: 2015   • Pelvic fracture ([facility] [address] )        Allergies:    Allergies   Allergen Reactions   • Contrast [Iodinated Contrast Media]      Other reaction(s): \"I get very cold\"   • Iodine - Food Allergy      Other reaction(s): Chills  Reaction Date: 02Aug2011;    • Sulfa Antibiotics Vomiting   • Omnipaque [Iohexol]        PAST SURGICAL HISTORY     Past Surgical History:   Procedure Laterality Date   • BREAST SURGERY      enlargement procedure   • BYPASS FEMORAL-POPLITEAL Left     onset: 8/31/2012   • CATARACT EXTRACTION Right 06/19/2013   • HYSTERECTOMY      resolved: 1970   • ORIF TIBIA & FIBULA FRACTURES Right 5/21/2019    Procedure: OPEN REDUCTION W/ INTERNAL FIXATION (ORIF) ANKLE;  Surgeon: Debo Hidalgo MD;  [facility]: [facility];  Service: Orthopedics   • ORIF TIBIA & FIBULA FRACTURES Right 6/4/2019    Procedure: Revision right ankle ORIF;  Surgeon: Debo Hidalgo MD;  [facility];  Service: Orthopedics   • OTHER SURGICAL HISTORY  05/30/2013    PTA Femoral-popliteal initial stenosis left Description: with mechanical thrombectomy, PTA and stent    • RI OPTX DSTL RADL I-ARTIC FX/EPIPHYSL SEP 3 FRAG Left 1/18/2017    Procedure: DISTAL RADIUS OPEN REDUCTION W/ INTERNAL FIXATION ;  Surgeon: Ashley Woods MD;  Location: [facility];  Service: " Orthopedics       SOCIAL & FAMILY HISTORY     Social History     Substance and Sexual Activity   Alcohol Use Yes   • Alcohol/week: 1 0 standard drink   • Types: 1 Glasses of wine per week    Comment: Per allscripts - social drinker      Substance and Sexual Activity   Alcohol Use Yes   • Alcohol/week: 1 0 standard drink   • Types: 1 Glasses of wine per week    Comment: Per allscripts - social drinker         Substance and Sexual Activity   Drug Use No     Social History     Tobacco Use   Smoking Status Never   Smokeless Tobacco Never       Marital Status:    Patient Pre-hospital Living Situation: [facility]      Family History:  Family History   Problem Relation Age of Onset   • Heart attack Mother    • Heart failure Father    • Other Sister         thyroid surgery           OBJECTIVE     Vitals:    23 0905 23 0920 23 0935 23 1035   BP: 160/70 125/61 151/67 134/63   Pulse: 81 79 72 75   Resp: 20 20 20 20   Temp: (!) 97 3 °F (36 3 °C) (!) 97 1 °F (36 2 °C)  (!) 97 4 °F (36 3 °C)   TempSrc: Oral Rectal  Oral   SpO2: 92% 94% 94% 97%   Weight:            Temperature:   Temp (24hrs), Av 3 °F (36 3 °C), Min:97 1 °F (36 2 °C), Max:97 4 °F (36 3 °C)    Temperature: (!) 97 4 °F (36 3 °C)    Intake & Output:  I/O     None          Weights:        Body mass index is 25 4 kg/m²  Weight (last 2 days)     Date/Time Weight    23 0842 67 1 (148)          Physical Exam  Vitals and nursing note reviewed  Constitutional:       General: She is not in acute distress  Appearance: She is not ill-appearing or diaphoretic  HENT:      Head: Normocephalic  Mouth/Throat:      Mouth: Mucous membranes are dry  Pharynx: Oropharynx is clear  Eyes:      General: No scleral icterus  Right eye: No discharge  Left eye: No discharge  Extraocular Movements: Extraocular movements intact        Conjunctiva/sclera: Conjunctivae normal    Cardiovascular:      Rate and Rhythm: Normal rate  Pulmonary:      Effort: Pulmonary effort is normal  No respiratory distress  Musculoskeletal:         General: Normal range of motion  Skin:     General: Skin is warm and dry  Coloration: Skin is not jaundiced or pale  Neurological:      Mental Status: She is alert  Coordination: Finger-nose-finger test: Right normal, unable to perform left due to weakness  Psychiatric:         Mood and Affect: Mood normal           Neurologic Exam     Mental Status   Level of consciousness: alert  Awake and alert  Able to state her name/ and that she is at [address] to name objects correctly  No dysarthria noted  Able to follow commands  Cranial Nerves   Decreased blink to threat on left  Inconsistent formal visual field testing  EOMs intact  Subtle left facial weakness  No gaze preference noted  Tongue midline  Facial sensation intact     Motor Exam   Muscle bulk: normal  Able to raise right UE up and hold antigravity without drift noted  Muscle contraction noted in left UE but unable to raise antigravity  Able to raise right LE up and hold antigravity without drift noted  Unable to raise left LE up on command but withdraws briskly to painful stimuli  Right UE 5/5 biceps, triceps, hand   Left UE 3/5 biceps, triceps, hand      Sensory Exam   Light touch normal    No extinction noted     Gait, Coordination, and Reflexes     Coordination   Finger-nose-finger test: Right normal, unable to perform left due to weakness  Tremor   Resting tremor: absent       NIHSS:  1a Level of Consciousness: 0 = Alert   1b  LOC Questions: 0 = Answers both correctly   1c  LOC Commands: 0 = Obeys both correctly   2  Best Gaze: 0 = Normal   3  Visual: 1 = Partial hemianopia   4  Facial Palsy: 1=Minor paralysis (flattened nasolabial fold, asymmetric on smiling)   5a  Motor Right Arm: 0=No drift, limb holds 90 (or 45) degrees for full 10 seconds   5b   Motor Left Arm: 3=No effort against gravity, limb falls   6a  Motor Right Le=No drift, limb holds 90 (or 45) degrees for full 10 seconds   6b  Motor Left Le=Some effort against gravity, limb cannot get to or maintain (if cured) 90 (or 45) degrees, drifts down to bed, but has some effort against gravity   7  Limb Ataxia:  0=Absent   8  Sensory: 0=Normal; no sensory loss   9  Best Language:  0=No aphasia, normal   10  Dysarthria: 0=Normal articulation   11  Extinction and Inattention (formerly Neglect): 0=No abnormality   Total Score: 7       Time NIHSS was completed: 8:51 am     Modified [place] Score:  4 (Moderately severe disability; unable to walk and attend to bodily needs without assistance)    LABORATORY DATA     Labs: I have personally reviewed pertinent reports  Results from last 7 days   Lab Units 23  0843   WBC Thousand/uL 7 19   HEMOGLOBIN g/dL 12 3   HEMATOCRIT % 38 0   PLATELETS Thousands/uL 317      Results from last 7 days   Lab Units 23  0843   SODIUM mmol/L 140   POTASSIUM mmol/L 3 9   CHLORIDE mmol/L 110*   CO2 mmol/L 29   BUN mg/dL 29*   CREATININE mg/dL 0 56*   CALCIUM mg/dL 8 6              Results from last 7 days   Lab Units 23  0924   INR  0 87   PTT seconds 20*               Micro:  No results found for: BLOODCX, Courtney Francis, [address]     Radiology Results: I have personally reviewed pertinent reports  and I have personally reviewed pertinent films in PACS    CTA stroke alert (head/neck)   Final Result by Cresencio Mohamud MD ( [id number])      No large vessel occlusion in head or neck  Severe stenosis in mildly hypoplastic left vertebral artery origin and V1 segment due to atherosclerotic disease  Severe focal stenosis in right PCA P3 segment likely due to atherosclerotic disease  Tiny focal outpouching in left ICA supraclinoid segment projecting inferiorly, could represent infundibulum or aneurysm        Negative CTA head and neck for dissection  Additional chronic/incidental findings as detailed above  Findings were directly discussed with Lolita Lewis at 8:54 AM                            Workstation performed: [id number]         CT stroke alert brain   Final Result by Ying Pennington MD (04/05 4417)      Beam hardening artifact from dental hardware limits evaluation of lower brainstem and lower cerebellum         - No acute intracranial abnormality  Findings were directly discussed with Lolita Lewis at 8:48 AM       Workstation performed: [id number]         MRI Inpatient Order    (Results Pending)       Other Diagnostic Testing: I have personally reviewed pertinent reports  ACTIVE MEDICATIONS     Current Facility-Administered Medications   Medication Dose Route Frequency   • aspirin rectal suppository 300 mg  300 mg Rectal Daily   • atorvastatin (LIPITOR) tablet 40 mg  40 mg Oral QPM   • heparin (porcine) subcutaneous injection 5,000 Units  5,000 Units Subcutaneous [facility] 62         HOME MEDICATIONS     Prior to Admission medications    Medication Sig Start Date End Date Taking?  Authorizing Provider   acetaminophen (TYLENOL) 325 mg tablet Take 2 tablets (650 mg total) by mouth every 6 (six) hours as needed for mild pain, headaches or fever 3/29/23   [place] Tripathi   aspirin (ECOTRIN LOW STRENGTH) 81 mg EC tablet Take 1 tablet (81 mg total) by mouth 2 (two) times a day for 21 days 3/28/23 4/18/23  Victorina Herron MD   enalapril (VASOTEC) 10 mg tablet TAKE ONE TABLET BY MOUTH ONCE EVERY DAY FOR HYPERTENSION 11/11/22   Erin Fontenot DO   gabapentin (NEURONTIN) 100 mg capsule Take 1 capsule (100 mg total) by mouth 3 (three) times a day 3/29/23   GEOR[place]IA Tripathi   lidocaine (LIDODERM) 5 % Apply 2 patches topically over 12 hours daily Remove & Discard patch within 12 hours or as directed by MD Do not start before March 30, 2023  3/30/23   GEOR[place]IA Tripathi   methocarbamol (ROBAXIN) 500 mg tablet Take 1 tablet (500 mg total) by mouth every 6 (six) hours 3/29/23   GEOR[place]IA Tripathi   oxyCODONE (ROXICODONE) 5 immediate release tablet Take 1 tablet (5 mg total) by mouth every 4 (four) hours as needed for moderate pain or severe pain for up to 10 days Max Daily Amount: 30 mg 3/29/23 4/8/23  GEOR[place]IA Tripathi   senna (SENOKOT) 8 6 mg Take 1 tablet (8 6 mg total) by mouth daily at bedtime 3/29/23   GEORGIA Tripathi   sertraline (Zoloft) 25 mg tablet Take 0 5 tablets (12 5 mg total) by mouth daily Do not start before March 30, 2023  3/30/23   GEORGIA Lacy       Total Critical Care time spent 36 minutes excluding procedures, teaching and family updates

## 2023-04-05 NOTE — ED ATTENDING ATTESTATION
4/5/2023  IRadha DO, saw and evaluated the patient  I have discussed the patient with the resident/non-physician practitioner and agree with the resident's/non-physician practitioner's findings, Plan of Care, and MDM as documented in the resident's/non-physician practitioner's note, except where noted  All available labs and Radiology studies were reviewed  I was present for key portions of any procedure(s) performed by the resident/non-physician practitioner and I was immediately available to provide assistance  At this point I agree with the current assessment done in the Emergency Department  I have conducted an independent evaluation of this patient a history and physical is as follows:    Patient is a 80-year-old with a history of hypertension, coronary disease, hypercholesterolemia, hypertriglyceridemia, dementia, transferred from her nursing facility  Per EMS, staff there noticed the patient went to bed normally last night, woke up this morning and was found to have left-sided arm and leg weakness  She is normally oriented to person and place only, is at her baseline  No reported history of trauma  Blood sugar was 101  transfer paperwork indicates patient is only on aspirin, no other anticoagulants    General:  Patient is well-appearing  Head:  Atraumatic  Eyes:  Conjunctiva pink, PERRL, EOMI  ENT:  Mucous membranes are moist  Neck:  Supple  Cardiac:  S1-S2, without murmurs  Lungs:  Clear to auscultation bilaterally  Abdomen:  Soft, nontender, normal bowel sounds, no CVA tenderness, no tympany, no rigidity, no guarding  Extremities:  Normal range of motion  Neurologic:  Awake, Fluent speech, no facial droop, slight decreased blink to threat on the left-hand side  2 out of 5 strength in the left leg, 3 out of 5 strength in the left arm, 5 out of 5 strength in the right arm and right leg  Normal sensation throughout the whole body    Skin:  Pink warm and dry  Psychiatric:  Alert, "memo, cooperative            ED Course  ED Course as of 04/05/23 0949   Wed Apr 05, 2023   [address] Derrick [facility] [place] [facility] NP Kael at bedside, assessing patient upon arrival   0949 ECG interpreted by me, sinus rhythm, rate of 86, sinus arrhythmia, no acute ischemic or infarctive changes, no change from March 27, 2023     Patient arrived, immediately evaluated myself and neurology team   In the computer as listed IV dye allergy, reaction list \"I get cold\"  The same reaction is listed in her transfer paperwork from the facility, given the potential benefits of identifying a large vessel occlusion, and the minimal side effect of her getting cold, decision was made to proceed with a CT angiogram head and neck per stroke protocol  Labs Reviewed   BASIC METABOLIC PANEL - Abnormal       Result Value Ref Range Status    Sodium 140  135 - 147 mmol/L Final    Potassium 3 9  3 5 - 5 3 mmol/L Final    Chloride 110 (*) 96 - 108 mmol/L Final    CO2 29  21 - 32 mmol/L Final    ANION GAP 1 (*) 4 - 13 mmol/L Final    BUN 29 (*) 5 - 25 mg/dL Final    Creatinine 0 56 (*) 0 60 - 1 30 mg/dL Final    Comment: Standardized to IDMS reference method    Glucose 96  65 - 140 mg/dL Final    Comment: Specimen collection should occur prior to Sulfasalazine administration due to the potential for falsely depressed results  Specimen collection should occur prior to Sulfapyridine administration due to the potential for falsely elevated results  If the patient is fasting, the ADA then defines impaired fasting glucose as > 100 mg/dL and diabetes as > or equal to 123 mg/dL      Calcium 8 6  8 3 - 10 1 mg/dL Final    eGFR 80  ml/min/1 73sq m Final    Narrative:     [place] guidelines for Chronic Kidney Disease (CKD):   •  Stage 1 with normal or high GFR (GFR > 90 mL/min/1 73 square meters)  •  Stage 2 Mild CKD (GFR = 60-89 mL/min/1 73 square meters)  •  Stage 3A Moderate CKD (GFR = 45-59 mL/min/1 73 square meters)  •  Stage 3B " "Moderate CKD (GFR = 30-44 mL/min/1 73 square meters)  •  Stage 4 Severe CKD (GFR = 15-29 mL/min/1 73 square meters)  •  Stage 5 End Stage CKD (GFR <15 mL/min/1 73 square meters)  Note: GFR calculation is accurate only with a steady state creatinine   CBC AND PLATELET - Normal    WBC 7 19  4 31 - 10 16 Thousand/uL Final    RBC 4 04  3 81 - 5 12 Million/uL Final    Hemoglobin 12 3  11 5 - 15 4 g/dL Final    Hematocrit 38 0  34 8 - 46 1 % Final    MCV 94  82 - 98 fL Final    MCH 30 4  26 8 - 34 3 pg Final    MCHC 32 4  31 4 - 37 4 g/dL Final    RDW 14 2  11 6 - 15 1 % Final    Platelets 010  786 - 390 Thousands/uL Final    MPV 11 2  8 9 - 12 7 fL Final   HS TROPONIN I 0HR - Normal    hs TnI 0hr 9  \"Refer to ACS Flowchart\"- see link ng/L Final    Comment:                                              Initial (time 0) result  If >=50 ng/L, Myocardial injury suggested ;  Type of myocardial injury and treatment strategy  to be determined  If 5-49 ng/L, a delta result at 2 hours and or 4 hours will be needed to further evaluate  If <4 ng/L, and chest pain has been >3 hours since onset, patient may qualify for discharge based on the HEART score in the ED  If <5 ng/L and <3hours since onset of chest pain, a delta result at 2 hours will be needed to further evaluate  HS Troponin 99th Percentile URL of a Health Population=12 ng/L with a 95% Confidence Interval of 8-18 ng/L  Second Troponin (time 2 hours)  If calculated delta >= 20 ng/L,  Myocardial injury suggested ; Type of myocardial injury and treatment strategy to be determined  If 5-49 ng/L and the calculated delta is 5-19 ng/L, consult medical service for evaluation  Continue evaluation for ischemia on ecg and other possible etiology and repeat hs troponin at 4 hours  If delta is <5 ng/L at 2 hours, consider discharge based on risk stratification via the HEART score (if in ED), or JUANITA risk score in IP/Observation      HS Troponin 99th Percentile URL of a Health " Population=12 ng/L with a 95% Confidence Interval of 8-18 ng/L  POCT GLUCOSE - Normal    POC Glucose 88  65 - 140 mg/dl Final   HS TROPONIN I 2HR       CTA stroke alert (head/neck)   Final Result      No large vessel occlusion in head or neck  Severe stenosis in mildly hypoplastic left vertebral artery origin and V1 segment due to atherosclerotic disease  Severe focal stenosis in right PCA P3 segment likely due to atherosclerotic disease  Tiny focal outpouching in left ICA supraclinoid segment projecting inferiorly, could represent infundibulum or aneurysm  Negative CTA head and neck for dissection  Additional chronic/incidental findings as detailed above  Findings were directly discussed with Leila Williamson at 8:54 AM                            Workstation performed: [id number]         CT stroke alert brain   Final Result      Beam hardening artifact from dental hardware limits evaluation of lower brainstem and lower cerebellum         - No acute intracranial abnormality  Findings were directly discussed with Leila Williamson at 8:48 AM       Workstation performed: [id number]           Neurology recommended admission to their service, no indication for acute thrombolytics or intervention    The patient was evaluated for sepsis in the emergency department  After that assessment, at the time of admission, no sepsis, severe sepsis, or septic shock was found  MEDICAL DECISION MAKING CODING    The differential diagnosis before testing included (but is not limited to) acute nonspecific unilateral weakness, and acute ischemic stroke, acute hemorrhagic stroke, acute intracranial hemorrhage which is a medical condition that poses a threat to life/function  Chronic conditions affecting care: As per HPI    COLLECTION AND INTERPRETATION OF DATA  Additional history obtained from: EMS  I reviewed prior external notes, including Mar 27 2023 ECG, which showed normal sinus rhythm      I ordered each unique test  Tests reviewed personally by me:  ECG: See my ED course  Labs: Reviewed by me  Imaging: I independently reviewed the head CT, and found no acute pathology      Discussion with other providers: Neurology staff      Treatment:  Patient admitted      Surgery  -I considered surgery may be necessary prior to completion of the work up but afterwards there is no indication for immediate surgery      Critical Care Time  Procedures

## 2023-04-05 NOTE — ASSESSMENT & PLAN NOTE
79 y/o female with GERD, HTN, recent left pubic fracture s/p fall, dementia, who presented as a pre-hospital stroke alert for left sided weakness and left facial droop  Patient was noted by nursing home staff this morning with new onset of previously mentioned symptoms  EMS was initiated  Pre-hospital stroke alert was called  BP en route per EMS was 200/118  BP on presentation 153/68  NIHSS 7  Patient was deemed not a TNK candidate  Unclear etiology at this time  Concern for acute infarct- will place on formal stroke pathway for further evaluation  · CT head: No acute intracranial abnormality  · CTA head and neck:  1  No large vessel occlusion in head or neck  2  Severe stenosis in mildly hypoplastic left vertebral artery origin and V1 segment due to atherosclerotic disease  3  Severe focal stenosis in right PCA P3 segment likely due to atherosclerotic disease  4  Tiny focal outpouching in left ICA supraclinoid segment projecting inferiorly, could represent infundibulum or aneurysm  5  Negative CTA head and neck for dissection  · MRI Brain: 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 and posterior right insula  Small acute/subacute posterior left frontal cortical infarct  No significant edema or mass effect  No hemorrhagic transformation  Cerebral atrophy and chronic microangiopathic change  · Cholesterol 250, , HbA1C 5 5  · Left ventricular cavity size is normal  Wall thickness is normal  The left ventricular ejection fraction is 60%  Systolic function is normal  Wall motion is normal  Diastolic function is mildly abnormal, consistent with grade I (abnormal) relaxation  The left atrium is mildly dilated    Right ventricular cavity size is normal  Systolic function is normal   There is aortic valve sclerosis  · VBS: mechanical soft diet with nectar thick liquids    Impression : 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 could be due to cardio embolic source  Patient TTE showed dilated L atrium  Even thought no afib noted in the hospital, likely will discharge with cardiology referral for zio patch/ loop recorder    Plan:  - Upon discharge, continue to take Aspirin 81mg twice a day for a total of 28 days (last day: April 24, 2023) for DVT ppx  Beginning April 25, 2023, take Aspirin 81mg once a day  - Start Lipitor 20mg daily   - Follow up with outpatient neurovascular team within 6-8 weeks for stroke management  - Ambulatory referral to cardiology for outpatient cardiac monitoring (Zio patch or loop recorder)  - Continue to work with PT/OT upon discharge

## 2023-04-05 NOTE — QUICK NOTE
Patient admitted to the Neurology primary service  I placed admitting orders and med rec done over the phone with [facility]  Code status confirmed by daughter Marvin Davis who is her POA  She was updated on plan for admission and workup  All questions were answered

## 2023-04-05 NOTE — ASSESSMENT & PLAN NOTE
- Normotension goal at this time  - lisinopril 10 mg daily crushed in puree  - Labetalol PRN for SBP >180  - Upon discharge, may continue home regiment as prior to this hospitalization

## 2023-04-05 NOTE — ED NOTES
Patient warmed with heated blankets and doctor made aware of low rectal temp     Liss Orlando  04/05/23 0951
- - -

## 2023-04-05 NOTE — ASSESSMENT & PLAN NOTE
- Recent admission for pubic fracture s/p fall  - Fall precautions  - Non operative management  - Follow up with ortho outpatient

## 2023-04-05 NOTE — ASSESSMENT & PLAN NOTE
- Delirium precautions  - Promote appropriate sleep-wake cycle  - Avoid CNS altering meds when able  - PT/OT/ST

## 2023-04-06 ENCOUNTER — APPOINTMENT (INPATIENT)
Dept: RADIOLOGY | Facility: HOSPITAL | Age: 88
End: 2023-04-06

## 2023-04-06 LAB
ALBUMIN SERPL BCP-MCNC: 2.6 G/DL (ref 3.5–5)
ALP SERPL-CCNC: 99 U/L (ref 46–116)
ALT SERPL W P-5'-P-CCNC: 20 U/L (ref 12–78)
ANION GAP SERPL CALCULATED.3IONS-SCNC: 4 MMOL/L (ref 4–13)
AST SERPL W P-5'-P-CCNC: 46 U/L (ref 5–45)
BILIRUB SERPL-MCNC: 0.68 MG/DL (ref 0.2–1)
BUN SERPL-MCNC: 18 MG/DL (ref 5–25)
CALCIUM ALBUM COR SERPL-MCNC: 10.2 MG/DL (ref 8.3–10.1)
CALCIUM SERPL-MCNC: 9.1 MG/DL (ref 8.3–10.1)
CHLORIDE SERPL-SCNC: 108 MMOL/L (ref 96–108)
CHOLEST SERPL-MCNC: 250 MG/DL
CO2 SERPL-SCNC: 25 MMOL/L (ref 21–32)
CREAT SERPL-MCNC: 0.44 MG/DL (ref 0.6–1.3)
ERYTHROCYTE [DISTWIDTH] IN BLOOD BY AUTOMATED COUNT: 14.1 % (ref 11.6–15.1)
EST. AVERAGE GLUCOSE BLD GHB EST-MCNC: 111 MG/DL
GFR SERPL CREATININE-BSD FRML MDRD: 86 ML/MIN/1.73SQ M
GLUCOSE SERPL-MCNC: 93 MG/DL (ref 65–140)
HBA1C MFR BLD: 5.5 %
HCT VFR BLD AUTO: 40.1 % (ref 34.8–46.1)
HDLC SERPL-MCNC: 45 MG/DL
HGB BLD-MCNC: 12.6 G/DL (ref 11.5–15.4)
LDLC SERPL CALC-MCNC: 178 MG/DL (ref 0–100)
MCH RBC QN AUTO: 30.2 PG (ref 26.8–34.3)
MCHC RBC AUTO-ENTMCNC: 31.4 G/DL (ref 31.4–37.4)
MCV RBC AUTO: 96 FL (ref 82–98)
PLATELET # BLD AUTO: 315 THOUSANDS/UL (ref 149–390)
PMV BLD AUTO: 12.7 FL (ref 8.9–12.7)
POTASSIUM SERPL-SCNC: 4.7 MMOL/L (ref 3.5–5.3)
PROT SERPL-MCNC: 6.9 G/DL (ref 6.4–8.4)
RBC # BLD AUTO: 4.17 MILLION/UL (ref 3.81–5.12)
SODIUM SERPL-SCNC: 137 MMOL/L (ref 135–147)
TRIGL SERPL-MCNC: 136 MG/DL
WBC # BLD AUTO: 6.76 THOUSAND/UL (ref 4.31–10.16)

## 2023-04-06 RX ORDER — LISINOPRIL 10 MG/1
10 TABLET ORAL DAILY
Status: DISCONTINUED | OUTPATIENT
Start: 2023-04-06 | End: 2023-04-07 | Stop reason: HOSPADM

## 2023-04-06 RX ORDER — ASPIRIN 81 MG/1
81 TABLET, CHEWABLE ORAL DAILY
Status: DISCONTINUED | OUTPATIENT
Start: 2023-04-06 | End: 2023-04-07 | Stop reason: HOSPADM

## 2023-04-06 RX ORDER — ATORVASTATIN CALCIUM 20 MG/1
20 TABLET, FILM COATED ORAL
Status: DISCONTINUED | OUTPATIENT
Start: 2023-04-06 | End: 2023-04-07 | Stop reason: HOSPADM

## 2023-04-06 RX ORDER — SERTRALINE HYDROCHLORIDE 25 MG/1
12.5 TABLET, FILM COATED ORAL DAILY
Status: DISCONTINUED | OUTPATIENT
Start: 2023-04-06 | End: 2023-04-07 | Stop reason: HOSPADM

## 2023-04-06 RX ADMIN — LISINOPRIL 10 MG: 10 TABLET ORAL at 09:34

## 2023-04-06 RX ADMIN — ASPIRIN 81 MG CHEWABLE TABLET 81 MG: 81 TABLET CHEWABLE at 09:34

## 2023-04-06 RX ADMIN — ATORVASTATIN CALCIUM 20 MG: 20 TABLET, FILM COATED ORAL at 17:09

## 2023-04-06 RX ADMIN — SERTRALINE 12.5 MG: 25 TABLET, FILM COATED ORAL at 09:34

## 2023-04-06 RX ADMIN — HEPARIN SODIUM 5000 UNITS: 5000 INJECTION INTRAVENOUS; SUBCUTANEOUS at 15:25

## 2023-04-06 RX ADMIN — HEPARIN SODIUM 5000 UNITS: 5000 INJECTION INTRAVENOUS; SUBCUTANEOUS at 21:14

## 2023-04-06 RX ADMIN — LIDOCAINE 5% 2 PATCH: 700 PATCH TOPICAL at 08:46

## 2023-04-06 RX ADMIN — HEPARIN SODIUM 5000 UNITS: 5000 INJECTION INTRAVENOUS; SUBCUTANEOUS at 05:06

## 2023-04-06 NOTE — QUICK NOTE
Updated patient's daughter, Mey Rubi, at bedside  Discussed findings of stroke on MRI, potential etiologies that caused the stroke, and potential discharge plans  Patient and her daughter were agreeable to the plan  All questions were answered

## 2023-04-06 NOTE — DISCHARGE SUMMARY
NEUROLOGY RESIDENCY - DISCHARGE SUMMARY     Name: Constantino Liang   Age & Sex: 80 y o  female   MRN: [id number]  [facility]   Encounter: [id number]    Discharging Resident Physician: Melany Gonzalez DO  Attending: Ken Turner DO  PCP: Roxy Ortiz DO  Admission Date: 4/5/2023  Discharge Date: 04/07/23    Constantino Liang will need follow up in 4-6 weeks with neurovascular ap or attending  She will not require outpatient neurological testing  ASSESSMENT & PLAN     * Stroke [facility])  Assessment & Plan  81 y/o female with GERD, HTN, recent left pubic fracture s/p fall, dementia, who presented as a pre-hospital stroke alert for left sided weakness and left facial droop  Patient was noted by nursing home staff this morning with new onset of previously mentioned symptoms  EMS was initiated  Pre-hospital stroke alert was called  BP en route per EMS was 200/118  BP on presentation 153/68  NIHSS 7  Patient was deemed not a TNK candidate  Unclear etiology at this time  Concern for acute infarct- will place on formal stroke pathway for further evaluation  · CT head: No acute intracranial abnormality  · CTA head and neck:  1  No large vessel occlusion in head or neck  2  Severe stenosis in mildly hypoplastic left vertebral artery origin and V1 segment due to atherosclerotic disease  3  Severe focal stenosis in right PCA P3 segment likely due to atherosclerotic disease  4  Tiny focal outpouching in left ICA supraclinoid segment projecting inferiorly, could represent infundibulum or aneurysm  5  Negative CTA head and neck for dissection  · MRI Brain: 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 and posterior right insula  Small acute/subacute posterior left frontal cortical infarct  No significant edema or mass effect  No hemorrhagic transformation  Cerebral atrophy and chronic microangiopathic change    · Cholesterol 250, , HbA1C 5 5  · Left ventricular cavity size is normal  Wall thickness is normal  The left ventricular ejection fraction is 60%  Systolic function is normal  Wall motion is normal  Diastolic function is mildly abnormal, consistent with grade I (abnormal) relaxation  The left atrium is mildly dilated  Right ventricular cavity size is normal  Systolic function is normal   There is aortic valve sclerosis  · VBS: mechanical soft diet with nectar thick liquids    Impression : 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 could be due to cardio embolic source  Patient TTE showed dilated L atrium  Even thought no afib noted in the hospital, likely will discharge with cardiology referral for zio patch/ loop recorder    Plan:  - Upon discharge, continue to take Aspirin 81mg twice a day for a total of 28 days (last day: April 24, 2023) for DVT ppx  Beginning April 25, 2023, take Aspirin 81mg once a day  - Start Lipitor 20mg daily   - Follow up with outpatient neurovascular team within 6-8 weeks for stroke management  - Ambulatory referral to cardiology for outpatient cardiac monitoring (Zio patch or loop recorder)  - Continue to work with PT/OT upon discharge  Benign essential hypertension  Assessment & Plan  - Normotension goal at this time  - lisinopril 10 mg daily crushed in puree  - Labetalol PRN for SBP >180  - Upon discharge, may continue home regiment as prior to this hospitalization      Closed fracture of multiple pubic rami ([facility] [address] )  Assessment & Plan  - Recent admission for pubic fracture s/p fall  - Fall precautions  - Non operative management  - Follow up with ortho outpatient    Late onset Alzheimer's disease without behavioral disturbance ([address] )  Assessment & Plan  - Delirium precautions  - Promote appropriate sleep-wake cycle  - Avoid CNS altering meds when able  - PT/OT/ST           Disposition:     [address] [facility] at [facility] with PT/OT rehabilitation    Reason for Admission: stroke-like symptoms     Consultations During Hospital Stay:  · PT/OT/Speech/ PM&R    Procedures Performed:     · Echo/ VBS    Significant Findings / Test Results:     · CT head: No acute intracranial abnormality  · CTA head and neck:  1  No large vessel occlusion in head or neck  2  Severe stenosis in mildly hypoplastic left vertebral artery origin and V1 segment due to atherosclerotic disease  3  Severe focal stenosis in right PCA P3 segment likely due to atherosclerotic disease  4  Tiny focal outpouching in left ICA supraclinoid segment projecting inferiorly, could represent infundibulum or aneurysm  5  Negative CTA head and neck for dissection  · MRI Brain: 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 and posterior right insula  Small acute/subacute posterior left frontal cortical infarct  No significant edema or mass effect  No hemorrhagic transformation  Cerebral atrophy and chronic microangiopathic change  · Echo: Left ventricular cavity size is normal  Wall thickness is normal  The left ventricular ejection fraction is 60%  Systolic function is normal  Wall motion is normal  Diastolic function is mildly abnormal, consistent with grade I (abnormal) relaxation  The left atrium is mildly dilated  Right ventricular cavity size is normal  Systolic function is normal   There is aortic valve sclerosis  · VBS: tolerates mechanical soft diet and nectar thick liquids    Incidental Findings:   · none    Test Results Pending at Discharge (will require follow up):   · none     Outpatient Tests Requested:  · none    Complications:  none    Hospital Course:     · Cecille Craig is a 80 y o  female patient who originally presented to the hospital on 4/5/2023 due to Left sided weakness and left sided facial droop  Stroke alert was called  BP per /118 and on arrival was 153/68  NIHSS was 7  CT head showed No acute intracranial abnormality   CTA head and neck: showed no LVO in head or neck  However, did show Severe stenosis in mildly hypoplastic left vertebral artery origin and V1 segment and Severe focal stenosis in right PCA P3 segment likely due to atherosclerotic disease  Tiny focal outpouching in left ICA supraclinoid segment projecting inferiorly, could represent infundibulum or aneurysm  CTA negative head and neck for dissection  Patient was deemed not a TNK candidate due to unclear time of last known normal  Patient was placed on stroke alert pathway  She was made NPO due to unclear dysphagia assessment  Patient received rectal aspirin due to being NPO  MRI brain was performed which showed 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 and posterior right insula  Small acute/subacute posterior left frontal cortical infarct  No significant edema or mass effect  No hemorrhagic transformation  Cerebral atrophy and chronic microangiopathic change  Patient also got a TTE which showed normal systolic function  EF 60%  Diastolic function mildly abnormal  Left atrium mildly dilated  Speech evaluated with a bedside swallow exam and remained NPO except for crushed meds in puree  Lipitor, oral aspirin, ACEI, and zoloft were restarted  VBS was performed which showed that the patient could tolerate mechanical soft diet with nectar thick liquids  PT recommended acute rehab  Patient will discharge to [facility] that has skilled nursing  Patient will discharge with cardiology referral outpatient  Patient should continue aspirin 81mg twice a day for a total of 28 days, last day April 24, 2023; afterwards, she should continue on ASA 81mg once a day  Patient deemed medically stable for discharge  Condition at Discharge: stable     Discharge Day Visit / Exam:     Subjective:  Patient seen and examined at bedside  Very sleepy but noted to not have slept well last night  Arousable with verbal stimuli  Complains of dizziness, recommended to monitor   Reports mild generalized "weakness  Denies any other complaints at this time  Vitals: Blood Pressure: 165/99 (04/07/23 0822)  Pulse: 85 (04/07/23 0824)  Temperature: 97 5 °F (36 4 °C) (04/07/23 0706)  Temp Source: Oral (04/05/23 1820)  Respirations: 18 (04/07/23 0706)  Height: 5' 4\" (162 6 cm) (04/05/23 1330)  Weight - Scale: 67 1 kg (148 lb) (04/05/23 1330)  SpO2: 96 % (04/07/23 0824)    Exam:     Physical Exam  Vitals and nursing note reviewed  Constitutional:       General: She is not in acute distress  Appearance: She is well-developed  She is not ill-appearing, toxic-appearing or diaphoretic  HENT:      Head: Normocephalic and atraumatic  Right Ear: External ear normal       Left Ear: External ear normal       Nose: Nose normal       Mouth/Throat:      Mouth: Mucous membranes are moist    Eyes:      General:         Right eye: No discharge  Left eye: No discharge  Extraocular Movements: Extraocular movements intact  Conjunctiva/sclera: Conjunctivae normal       Pupils: Pupils are equal, round, and reactive to light  Cardiovascular:      Rate and Rhythm: Normal rate and regular rhythm  Pulmonary:      Effort: Pulmonary effort is normal  No respiratory distress  Musculoskeletal:         General: No swelling  Normal range of motion  Cervical back: Normal range of motion and neck supple  Skin:     General: Skin is warm and dry  Neurological:      Mental Status: She is alert  Mental status is at baseline  Cranial Nerves: Cranial nerves 2-12 are intact  No cranial nerve deficit  Sensory: No sensory deficit  Motor: Weakness (L-sided) present  Coordination: Coordination normal  Finger-Nose-Finger Test normal       Deep Tendon Reflexes: Reflexes normal       Reflex Scores:       Tricep reflexes are 2+ on the right side and 2+ on the left side  Bicep reflexes are 2+ on the right side and 2+ on the left side         Brachioradialis reflexes are 2+ on the right side and 2+ on " the left side  Patellar reflexes are 2+ on the right side and 2+ on the left side  Achilles reflexes are 2+ on the right side and 2+ on the left side  Psychiatric:         Mood and Affect: Mood normal          Neurologic Exam     Mental Status   Disoriented to person  Disoriented to place  Oriented to time  Disoriented to year and month  Level of consciousness: arousable by verbal stimuli  Able to repeat  Decreased attention and concentration in setting of sleepiness  Mildly dysarthric speech  Cranial Nerves   Cranial nerves II through XII intact  CN II   Visual fields full to confrontation  Right visual field deficit: none  Left visual field deficit: none     CN III, IV, VI   Pupils are equal, round, and reactive to light  CN III: no CN III palsy  CN VI: no CN VI palsy  Nystagmus: none     CN V   Facial sensation intact  Right facial sensation deficit: none  Left facial sensation deficit: none    CN VII   Facial expression full, symmetric  Right facial weakness: none  Left facial weakness: none    CN VIII   CN VIII normal      CN IX, X   CN IX normal    CN X normal      CN XI   CN XI normal      CN XII   CN XII normal      Motor Exam   Overall muscle tone: normal  Right arm tone: normal  Left arm tone: normal  Left arm pronator drift: present  Right leg tone: normal  Left leg tone: normal  4+/5 RUE, 4-/5 LUE  4+/5 RLE, 3/5 LLE     Sensory Exam   Light touch normal      Gait, Coordination, and Reflexes     Coordination   Finger to nose coordination: normal    Reflexes   Right brachioradialis: 2+  Left brachioradialis: 2+  Right biceps: 2+  Left biceps: 2+  Right triceps: 2+  Left triceps: 2+  Right patellar: 2+  Left patellar: 2+  Right achilles: 2+  Left achilles: 2+  Right plantar: normal  Left plantar: upgoing    Discussion with Family: Plan discussed with patient's family on 4/6 as documented in Quick Note by Dr Judie Canaels   Attempted to contact patient's daughter today; however, was not able to reach her by phone  Discharge instructions/Information to patient and family:   See after visit summary for information provided to patient and family  Provisions for Follow-Up Care:  See after visit summary for information related to follow-up care and any pertinent home health orders  Planned Readmission: none    Discharge Statement:  I spent 30  minutes discharging the patient  This time was spent on the day of discharge  I had direct contact with the patient on the day of discharge  Greater than 50% of the total time was spent examining patient, answering all patient questions, arranging and discussing plan of care with patient as well as directly providing post-discharge instructions  Additional time then spent on discharge activities  Discharge Medications:  See after visit summary for reconciled discharge medications provided to patient and family        ** Please Note: This note has been constructed using a voice recognition system **      ==  DO Randell Knapp [facility] Neurology Res[facility], PGY-1

## 2023-04-06 NOTE — PROGRESS NOTES
NEUROLOGY RESIDENCY PROGRESS NOTE     Name: Meme Becker   Age & Sex: 80 y o  female   MRN: [id number]  Unit/Bed#: [facility] 729-01   Encounter: [id number]    Meme Becker will need follow up in 4-6 weeks with neurovascular ap or attending  She will not require outpatient neurological testing  Pending for discharge: PT/OT/VBS  Likely 24-48 hours    ASSESSMENT & PLAN     Stroke-like symptoms  Assessment & Plan  79 y/o female with GERD, HTN, recent left pubic fracture s/p fall, dementia, who presented as a pre-hospital stroke alert for left sided weakness and left facial droop  Patient was noted by nursing home staff this morning with new onset of previously mentioned symptoms  EMS was initiated  Pre-hospital stroke alert was called  BP en route per EMS was 200/118  BP on presentation 153/68  NIHSS 7  Patient was deemed not a TNK candidate  Unclear etiology at this time  Concern for acute infarct- will place on formal stroke pathway for further evaluation  CT head: No acute intracranial abnormality  CTA head and neck:  1  No large vessel occlusion in head or neck  2  Severe stenosis in mildly hypoplastic left vertebral artery origin and V1 segment due to atherosclerotic disease  3  Severe focal stenosis in right PCA P3 segment likely due to atherosclerotic disease  4  Tiny focal outpouching in left ICA supraclinoid segment projecting inferiorly, could represent infundibulum or aneurysm  5  Negative CTA head and neck for dissection  MRI Brain: 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 and posterior right insula  Small acute/subacute posterior left frontal cortical infarct  No significant edema or mass effect  No hemorrhagic transformation  Cerebral atrophy and chronic microangiopathic change  Cholesterol 250, , HbA1C 5 5  Left ventricular cavity size is normal  Wall thickness is normal  The left ventricular ejection fraction is 60%   Systolic function is normal  Wall motion is normal  Diastolic function is mildly abnormal, consistent with grade I (abnormal) relaxation  The left atrium is mildly dilated  Right ventricular cavity size is normal  Systolic function is normal   There is aortic valve sclerosis  Speech: NPO except meds         Plan:  - Stroke pathway  On aspirin 81 mg BID for DVT prophylaxis s/p pubic fracture-  Will place on aspirin 81 mg daily crushed in puree while getting heparin for DVT prophylaxis in hospital  Normotension, labetalol if SBP >180  home lisinopril 10 mg crushed in puree   Lipitor 20 mg crushed in puree  Continue telemetry  PT/OT/ST  Frequent neuro checks  Continue to monitor and notify neurology with any changes  - Medical management and supportive care  Correction of any metabolic or infectious disturbances  - Will get VBS to assess swallowing     Closed fracture of multiple pubic rami (HCC)  Assessment & Plan  - Recent admission for pubic fracture s/p fall  - Fall precautions  - Non operative management  - Follow up with ortho outpatient    Late onset Alzheimer's disease without behavioral disturbance ([facility] [address] )  Assessment & Plan  - Delirium precautions  - Promote appropriate sleep-wake cycle  - Avoid CNS altering meds when able  - PT/OT/ST    Benign essential hypertension  Assessment & Plan  - Normotension goal at this time  - lisinopril 10 mg daily crushed in puree  - Labetalol PRN for SBP >180            SUBJECTIVE     Patient was seen and examined  No acute events overnight  Patient denied any pain  She was A&O X1 on exam this morning but was A&Ox3 on reexamination early this afternoon  Patient denied any visual disturbance, headache, numbness/tingling  No complaints offered by the patient    Review of Systems   Reason unable to perform ROS: limited due to dementia  Constitutional: Negative for chills and fever  Eyes: Negative for pain  Respiratory: Negative for cough and shortness of breath      Cardiovascular: Negative for chest pain and palpitations  Gastrointestinal: Negative for abdominal pain and vomiting  Genitourinary: Negative for dysuria  Musculoskeletal: Negative for arthralgias and back pain  Neurological: Positive for weakness  Negative for syncope, speech difficulty, numbness and headaches  All other systems reviewed and are negative  OBJECTIVE     Patient ID: Solomon Siemens is a 80 y o  female  Vitals:    23 0434 23 0721 23 0846 23 0934   BP: 157/68 159/69 (!) 179/93 154/83   Pulse: 92 78 85    Resp:       Temp:  (!) 97 4 °F (36 3 °C)     TempSrc:       SpO2: 94% 94% 94%    Weight:       Height:          Temperature:   Temp (24hrs), Av 9 °F (36 6 °C), Min:97 4 °F (36 3 °C), Max:98 6 °F (37 °C)    Temperature: (!) 97 4 °F (36 3 °C)      Physical Exam  Vitals and nursing note reviewed  Constitutional:       General: She is not in acute distress  Appearance: She is well-developed  HENT:      Head: Normocephalic and atraumatic  Right Ear: External ear normal       Left Ear: External ear normal       Nose: No congestion or rhinorrhea  Mouth/Throat:      Mouth: Mucous membranes are dry  Pharynx: Oropharynx is clear  Eyes:      General: No scleral icterus  Extraocular Movements: Extraocular movements intact and EOM normal       Conjunctiva/sclera: Conjunctivae normal       Pupils: Pupils are equal, round, and reactive to light  Cardiovascular:      Rate and Rhythm: Normal rate and regular rhythm  Heart sounds: No murmur heard  Pulmonary:      Effort: Pulmonary effort is normal  No respiratory distress  Breath sounds: Normal breath sounds  Abdominal:      Palpations: Abdomen is soft  Tenderness: There is no abdominal tenderness  Musculoskeletal:         General: No swelling  Cervical back: Neck supple  Skin:     General: Skin is warm and dry  Capillary Refill: Capillary refill takes less than 2 seconds     Neurological:      Mental Status: She is alert  Deep Tendon Reflexes:      Reflex Scores:       Brachioradialis reflexes are 1+ on the right side  Patellar reflexes are 1+ on the right side and 1+ on the left side  Achilles reflexes are 1+ on the right side and 1+ on the left side  Psychiatric:         Mood and Affect: Mood normal          Speech: Speech normal          Behavior: Behavior normal           Neurologic Exam     Mental Status   Oriented to person  Disoriented to place  Disoriented to city and area  Disoriented to time  Disoriented to year, month and season  Speech: speech is normal   Level of consciousness: alert    Cranial Nerves     CN III, IV, VI   Pupils are equal, round, and reactive to light  Extraocular motions are normal      CN V   Facial sensation intact  CN VII   Left facial weakness: slight left sided droop  CN IX, X   Palate: symmetric    CN XI   Right trapezius strength: normal  Left trapezius strength: weak    CN XII   CN XII normal      Motor Exam   Right arm pronator drift: absent  Left arm pronator drift: present    Strength   Strength 5/5 except as noted  4/5  strength on Left side  4/5 LUE strength  3/5 LLE strength     Sensory Exam   Light touch normal      Gait, Coordination, and Reflexes     Tremor   Resting tremor: absent    Reflexes   Right brachioradialis: 1+  Left brachioradialis reflex grade: trace  Right patellar: 1+  Left patellar: 1+  Right achilles: 1+  Left achilles: 1+  Right plantar: normal  Left plantar: upgoing           LABORATORY DATA     Labs: I have personally reviewed pertinent reports      Results from last 7 days   Lab Units 04/06/23  0505 04/05/23  1502 04/05/23  0843   WBC Thousand/uL 6 76  --  7 19   HEMOGLOBIN g/dL 12 6  --  12 3   HEMATOCRIT % 40 1  --  38 0   PLATELETS Thousands/uL 315 381 317      Results from last 7 days   Lab Units 04/06/23  0505 04/05/23  0843   SODIUM mmol/L 137 140   POTASSIUM mmol/L 4 7 3 9   CHLORIDE mmol/L 108 110* CO2 mmol/L 25 29   BUN mg/dL 18 29*   CREATININE mg/dL 0 44* 0 56*   CALCIUM mg/dL 9 1 8 6   ALK PHOS U/L 99  --    ALT U/L 20  --    AST U/L 46*  --               Results from last 7 days   Lab Units 04/05/23  0924   INR  0 87   PTT seconds 20*               IMAGING & DIAGNOSTIC TESTING     Radiology Results: I have personally reviewed pertinent reports  MRI brain wo contrast   Final Result by Andrea Cox MD (04/05 0225)      1  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 and posterior right insula  Small acute/subacute posterior left frontal cortical infarct  No significant edema or mass effect  No hemorrhagic transformation  2   Cerebral atrophy and chronic microangiopathic change  Workstation performed: [id number]         CTA stroke alert (head/neck)   Final Result by Luis Alfredo Ng MD (04/05 1012)      No large vessel occlusion in head or neck  Severe stenosis in mildly hypoplastic left vertebral artery origin and V1 segment due to atherosclerotic disease  Severe focal stenosis in right PCA P3 segment likely due to atherosclerotic disease  Tiny focal outpouching in left ICA supraclinoid segment projecting inferiorly, could represent infundibulum or aneurysm  Negative CTA head and neck for dissection  Additional chronic/incidental findings as detailed above  Findings were directly discussed with Michelle Raines at 8:54 AM                            Workstation performed: [id number]         CT stroke alert brain   Final Result by Luis Alfredo Ng MD (04/05 3558)      Beam hardening artifact from dental hardware limits evaluation of lower brainstem and lower cerebellum         - No acute intracranial abnormality        Findings were directly discussed with Michelle Raines at 8:48 AM       Workstation performed: [id number]         FL barium swallow video w speech    (Results Pending)       Other Diagnostic Testing: I have personally reviewed pertinent reports  ACTIVE MEDICATIONS     Current Facility-Administered Medications   Medication Dose Route Frequency   • acetaminophen (TYLENOL) tablet 650 mg  650 mg Oral Q6H PRN   • aspirin chewable tablet 81 mg  81 mg Oral Daily   • atorvastatin (LIPITOR) tablet 20 mg  20 mg Oral Daily With Dinner   • heparin (porcine) subcutaneous injection 5,000 Units  5,000 Units Subcutaneous Q8H [facility]   • labetalol (NORMODYNE) injection 10 mg  10 mg Intravenous Q6H PRN   • lidocaine (LIDODERM) 5 % patch 2 patch  2 patch Topical Daily   • lisinopril (ZESTRIL) tablet 10 mg  10 mg Oral Daily   • oxyCODONE (ROXICODONE) IR tablet 5 mg  5 mg Oral Q6H PRN   • sertraline (ZOLOFT) tablet 12 5 mg  12 5 mg Oral Daily       Prior to Admission medications    Medication Sig Start Date End Date Taking?  Authorizing Provider   acetaminophen (TYLENOL) 325 mg tablet Take 2 tablets (650 mg total) by mouth every 6 (six) hours as needed for mild pain, headaches or fever 3/29/23   [place] Tripathi   aspirin (ECOTRIN LOW STRENGTH) 81 mg EC tablet Take 1 tablet (81 mg total) by mouth 2 (two) times a day for 21 days 3/28/23 4/18/23  Macie Arreaga MD   enalapril (VASOTEC) 10 mg tablet TAKE ONE TABLET BY MOUTH ONCE EVERY DAY FOR HYPERTENSION 11/11/22   Erin Fontenot DO   gabapentin (NEURONTIN) 100 mg capsule Take 1 capsule (100 mg total) by mouth 3 (three) times a day 3/29/23   [place] Tripathi   lidocaine (LIDODERM) 5 % Apply 2 patches topically over 12 hours daily Remove & Discard patch within 12 hours or as directed by MD Do not start before March 30, 2023  3/30/23   [place] Tripathi   methocarbamol (ROBAXIN) 500 mg tablet Take 1 tablet (500 mg total) by mouth every 6 (six) hours 3/29/23   [place] Tripathi   oxyCODONE (ROXICODONE) 5 immediate release tablet Take 1 tablet (5 mg total) by mouth every 4 (four) hours as needed for moderate pain or severe pain for up to 10 days Max Daily Amount: 30 mg 3/29/23 4/8/23  [place] Tripathi   senna (SENOKOT) 8 6 mg Take 1 tablet (8 6 mg total) by mouth daily at bedtime 3/29/23   [place]IA Tripathi   sertraline (Zoloft) 25 mg tablet Take 0 5 tablets (12 5 mg total) by mouth daily Do not start before March 30, 2023  3/30/23   [place] Rose         VTE Pharmacologic Prophylaxis: Heparin  VTE Mechanical Prophylaxis: sequential compression device    ==  DO Randell Thompson [facility] Neurology Residency, PGY-1

## 2023-04-06 NOTE — SPEECH THERAPY NOTE
"Speech Language/Pathology    Speech/Language Pathology Progress Note    Patient Name: Fahad Mejía  [id number] Date: 4/6/2023     Problem List  Active Problems:    Benign essential hypertension    Late onset Alzheimer's disease without behavioral disturbance ([address] )    Closed fracture of multiple pubic rami (HCC)    Stroke-like symptoms       Past Medical History  Past Medical History:   Diagnosis Date   • Anorexia     last assessed: 8/9/2013   • Closed fracture dislocation of right ankle joint 5/20/2019    Added automatically from request for surgery [id number]   • GERD (gastroesophageal reflux disease)    • Herpes zoster     last assessed: 9/11/2013   • Hypertension    • Lymphoma ([address] )     resolved: 1997   • Malignant melanoma of skin ([facility] [address] )     resolved: 2015   • Pelvic fracture ([facility] [address] )         Past Surgical History  Past Surgical History:   Procedure Laterality Date   • BREAST SURGERY      enlargement procedure   • BYPASS FEMORAL-POPLITEAL Left     onset: 8/31/2012   • CATARACT EXTRACTION Right 06/19/2013   • HYSTERECTOMY      resolved: 1970   • ORIF TIBIA & FIBULA FRACTURES Right 5/21/2019    Procedure: OPEN REDUCTION W/ INTERNAL FIXATION (ORIF) ANKLE;  Surgeon: Jessica Gifford MD;  Location: [facility];  Service: Orthopedics   • ORIF TIBIA & FIBULA FRACTURES Right 6/4/2019    Procedure: Revision right ankle ORIF;  Surgeon: Jessica Gifford MD;  Location: [facility];  Service: Orthopedics   • OTHER SURGICAL HISTORY  05/30/2013    PTA Femoral-popliteal initial stenosis left Description: with mechanical thrombectomy, PTA and stent    • MT OPTX DSTL RADL I-ARTIC FX/EPIPHYSL SEP 3 FRAG Left 1/18/2017    Procedure: DISTAL RADIUS OPEN REDUCTION W/ INTERNAL FIXATION ;  Surgeon: Raya Cho MD;  Location: [facility];  Service: Orthopedics         Subjective:  \"I don't know why I'm coughing\" Patient asleep, but wakes to verbal cues  Objective: The patient is seen for f/u dysphagia therapy at bedside following VBS   " "Daughter is present and educated on results  Patient requested \"cola\"  SLP thickens cola to nectar thick  The patient is educated on importance of taking small sips  She takes via straw  Oral control appears reduced  The patient has coughing episodes on 3/5 sips and intermittent throat clear  Patient tolerates better via tsp  Refused additional intake at this time  Assessment:  Patient with intermittent coughing on nectar thick at bedside  Plan/Recommendations:  Recommend mechanical soft diet and nectar thick liquids  Patient must take small sips and sit upright  Patient, family and nursing staff educated  Will f/u for ST to further assess        "

## 2023-04-06 NOTE — UTILIZATION REVIEW
Initial Clinical Review    Admission: Date/Time/Statement:   Admission Orders (From admission, onward)     Ordered        04/05/23 0942  Inpatient Admission  Once                      Orders Placed This Encounter   Procedures   • Inpatient Admission     Standing Status:   Standing     Number of Occurrences:   1     Order Specific Question:   Level of Care     Answer:   Med Surg [16]     Order Specific Question:   Estimated length of stay     Answer:   More than 2 Midnights     Order Specific Question:   Certification     Answer:   I certify that inpatient services are medically necessary for this patient for a duration of greater than two midnights  See H&P and MD Progress Notes for additional information about the patient's course of treatment  ED Arrival Information     Expected   -    Arrival   4/5/2023 08:38    Acuity   Emergent            Means of arrival   Ambulance    Escorted by   [facility]    Service   Neurology    Admission type   Emergency            Arrival complaint   stroke alert           Chief Complaint   Patient presents with   • STROKE Alert     Prehospital stroke alert  Last known well by staff at dinner time last night        Initial Presentation: 80 y o  female to ED [facility] presents for Stroke-like symptoms  Pt last seen well yesterday evening around dinner time  This morning, staff attempted to get her up into the shower and noted pt had left sided weakness and right facial droop  EMS was initiated  Pre-hospital stroke alert was initiated  Recent hospitalization from 3/27 and 3/29 for left pubic rami fractures S/p Fall  At baseline, pt is reported to be AAO x2  PMH for GERD, HTN, recent left pubic fracture s/p fall, dementia  Admit Inpatient level of care for Stroke-like symptoms  Stroke workup and treat  MRI Brain and Echo  Lipid Panel and HgbA1c  On aspirin 81 mg Bid  Currently NPO, place on aspirin 300 mg NC for now  Tele monitoring  Frequent neuro checks  Permissive HTN, labetalol if SBP >200  May hold off on starting statin at this time  On exam; unable to perform finger-nose-finger test to left due to weakness  Date: 4/6   Day 2:   Progress notes; MRI showed acute infarct nvolving posterior right gangliocapsular region and posterior right insula   Small acute/subacute posterior left frontal cortical infarct   No significant edema or mass effect   No hemorrhagic transformation   Cerebral atrophy and chronic microangiopathic changeNPO except meds  Continue aspirin 81 mg daily crushed in puree and heparin DVT  Continue tele monitoring  Frequent neuro checks  VBS to assess swallowing  Continue Lisinopril and Lipitor crushed in puree  Left facial drop present  Mildly dysarthric  Left upper extremity 3/5, left lower extremity 4/5  Upgoing toes on the left  On exam; Pt oriented to person, place and time  ED Triage Vitals   Temperature Pulse Respirations Blood Pressure SpO2   04/05/23 0905 04/05/23 0842 04/05/23 0842 04/05/23 0842 04/05/23 0842   (!) 97 3 °F (36 3 °C) 88 20 112/67 93 %      Temp Source Heart Rate Source Patient Position - Orthostatic VS BP Location FiO2 (%)   04/05/23 0905 04/05/23 0850 04/05/23 0850 -- --   Oral Monitor Lying        Pain Score       04/05/23 2001       No Pain          Wt Readings from Last 1 Encounters:   04/05/23 67 1 kg (148 lb)     Additional Vital Signs:   04/06/23 0934 -- -- -- 154/83 -- -- -- --   04/06/23 08:46:44 -- 85 -- 179/93   Abnormal  122 94 % -- --   04/06/23 07:21:09 97 4 °F (36 3 °C)   Abnormal  78 -- 159/69 99 94 % --      04/05/23 1430 98 3 °F (36 8 °C) 82 -- 167/75 -- 96 % None (Room air) --   04/05/23 1330 -- 85 -- 186/71   Abnormal  -- -- -- --   04/05/23 1230 98 °F (36 7 °C) 63 -- 182/77   Abnormal  -- 96 % --        Pertinent Labs/Diagnostic Test Results:   MRI brain wo contrast   Final Result by Armando Prabhakar MD (04/05 1725)      1    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 and posterior right insula  Small acute/subacute posterior left frontal cortical infarct  No significant edema or mass effect  No hemorrhagic transformation  2   Cerebral atrophy and chronic microangiopathic change  Workstation performed: [id number]         CTA stroke alert (head/neck)   Final Result by Luis Alfredo Ng MD (04/05 4726)      No large vessel occlusion in head or neck  Severe stenosis in mildly hypoplastic left vertebral artery origin and V1 segment due to atherosclerotic disease  Severe focal stenosis in right PCA P3 segment likely due to atherosclerotic disease  Tiny focal outpouching in left ICA supraclinoid segment projecting inferiorly, could represent infundibulum or aneurysm  Negative CTA head and neck for dissection  Additional chronic/incidental findings as detailed above  Findings were directly discussed with Michelle Raines at 8:54 AM                CT stroke alert brain   Final Result by Luis Alfredo Ng MD (04/05 2186)      Beam hardening artifact from dental hardware limits evaluation of lower brainstem and lower cerebellum         - No acute intracranial abnormality  4/5  Echo -  Left Ventricle: Left ventricular cavity size is normal  Wall thickness is normal  The left ventricular ejection fraction is 60%  Systolic function is normal  Wall motion is normal  Diastolic function is mildly abnormal, consistent with grade I (abnormal) relaxation  •  Right Ventricle: Right ventricular cavity size is normal  Systolic function is normal   •  Aortic Valve: There is aortic valve sclerosis      Results from last 7 days   Lab Units 04/05/23  0843   SARS-COV-2  Negative     Results from last 7 days   Lab Units 04/06/23  0505 04/05/23  1502 04/05/23  0843   WBC Thousand/uL 6 76  --  7 19   HEMOGLOBIN g/dL 12 6  --  12 3   HEMATOCRIT % 40 1  --  38 0   PLATELETS Thousands/uL 315 381 317         Results from last 7 days   Lab Units 04/06/23  0505 04/05/23  0843   SODIUM mmol/L 137 140   POTASSIUM mmol/L 4 7 3 9   CHLORIDE mmol/L 108 110*   CO2 mmol/L 25 29   ANION GAP mmol/L 4 1*   BUN mg/dL 18 29*   CREATININE mg/dL 0 44* 0 56*   EGFR ml/min/1 73sq m 86 80   CALCIUM mg/dL 9 1 8 6     Results from last 7 days   Lab Units 04/06/23  0505   AST U/L 46*   ALT U/L 20   ALK PHOS U/L 99   TOTAL PROTEIN g/dL 6 9   ALBUMIN g/dL 2 6*   TOTAL BILIRUBIN mg/dL 0 68     Results from last 7 days   Lab Units 04/05/23  0840   POC GLUCOSE mg/dl 88     Results from last 7 days   Lab Units 04/06/23  0505 04/05/23  0843   GLUCOSE RANDOM mg/dL 93 96         Results from last 7 days   Lab Units 04/06/23  0505   HEMOGLOBIN A1C % 5 5   EAG mg/dl 111       Results from last 7 days   Lab Units 04/05/23  1502 04/05/23  1059 04/05/23  0843   HS TNI 0HR ng/L  --   --  9   HS TNI 2HR ng/L  --  7  --    HSTNI D2 ng/L  --  -2  --    HS TNI 4HR ng/L 10  --   --    HSTNI D4 ng/L 1  --   --          Results from last 7 days   Lab Units 04/05/23  0924   PROTIME seconds 12 0   INR  0 87   PTT seconds 20*         Results from last 7 days   Lab Units 04/05/23  0843   INFLUENZA A PCR  Negative   INFLUENZA B PCR  Negative   RSV PCR  Negative       ED Treatment:   Medication Administration from 04/05/2023 0830 to 04/05/2023 1218       Date/Time Order Dose Route Action     04/05/2023 0848 EDT iohexol (OMNIPAQUE) 350 MG/ML injection (SINGLE-DOSE) 85 mL 85 mL Intravenous Given        Past Medical History:   Diagnosis Date   • Anorexia     last assessed: 8/9/2013   • Closed fracture dislocation of right ankle joint 5/20/2019    Added automatically from request for surgery [id number]   • GERD (gastroesophageal reflux disease)    • Herpes zoster     last assessed: 9/11/2013   • Hypertension    • Lymphoma ([facility] )     resolved: 1997   • Malignant melanoma of skin ([facility] )     resolved: 2015   • Pelvic fracture ([address] )      Present on Admission:  • Benign essential hypertension  • Late onset [facility]'s disease without behavioral disturbance ([facility] )  • Closed fracture of multiple pubic rami (HCC)      Admitting Diagnosis: HTN (hypertension) [I10]  Weakness [R53 1]  Stroke ([facility] [address] ) [I63 9]  Stroke-like symptoms [R29 90]  [facility] (NIH) Stroke Scale level of consciousness score 0, alert; keenly responsive [Z78 9]  Age/Sex: 80 y o  female     Admission Orders:  Scheduled Medications:  aspirin, 81 mg, Oral, Daily  atorvastatin, 20 mg, Oral, Daily With Dinner  heparin (porcine), 5,000 Units, Subcutaneous, Q8H [facility]  lidocaine, 2 patch, Topical, Daily  lisinopril, 10 mg, Oral, Daily  sertraline, 12 5 mg, Oral, Daily      Continuous IV Infusions: None     PRN Meds:  acetaminophen, 650 mg, Oral, Q6H PRN  labetalol, 10 mg, Intravenous, Q6H PRN  oxyCODONE, 5 mg, Oral, Q6H PRN      Neuro checks Every 1 hour x 4 hours, then every 2 hours x 8 hours, then every 4 hours x 72 hours  Speech language eval and treat  IP CONSULT TO PHYSICAL MEDICINE REHAB  IP CONSULT TO CASE MANAGEMENT  IP CONSULT TO NUTRITION SERVICES    Network Utilization Review Department  ATTENTION: Please call with any questions or concerns to [phone] and carefully listen to the prompts so that you are directed to the right person  All voicemails are confidential   [place] all requests for admission clinical reviews, approved or denied determinations and any other requests to dedicated fax number below belonging to the campus where the patient is receiving treatment   List of dedicated fax numbers for the Facilities:  [address] DENIALS (Administrative/Medical Necessity) [phone]   [address] (Maternity/NICU/Pediatrics) [phone]   [address] [phone]   [facility] [phone] 2[address] [facility] 150 [facility] [address] [place] [address] [address] [facility] [address] [facility] [address] [facility] [place] [address] 469 [facility] [phone]

## 2023-04-06 NOTE — PLAN OF CARE
"  Problem: PHYSICAL THERAPY ADULT  Goal: Performs mobility at highest level of function for planned discharge setting  See evaluation for individualized goals  Description: Treatment/Interventions: LE strengthening/ROM, Functional transfer training, Therapeutic exercise, Endurance training, Patient/family training, Equipment eval/education, Bed mobility, Gait training, Compensatory technique education, Spoke to nursing, OT (balance training)          See flowsheet documentation for full assessment, interventions and recommendations  Note: Prognosis: Good  Problem List: Decreased strength, Decreased endurance, Impaired balance, Decreased mobility, Decreased cognition, Impaired judgement, Decreased safety awareness, Decreased range of motion, Decreased coordination, Pain  Assessment: Pt is 80 y o  female seen for a high complexity PT evaluation s/p admit to [facility] on 4/5/2023  Pt presenting w/ LUE + LLE weakness  MRI brain: \"1  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 and posterior right insula  Small acute/subacute posterior left frontal cortical infarct  No significant edema or mass effect  No hemorrhagic transformation  2  cerebral atrophy and chronic microangiopathic change  \" Please see above for other active problem list / PMH  PT now consulted to assess functional mobility and needs for safe d/c planning  [place] [facility] [place] SNF within the past month per chart review); at baseline pt is Jazmin w/ ambulation w/ RW, although unclear how much assistance pt required  Currently pt requires ModAx1 for bed skills; ModAx1 + SBA of 2nd for functional transfers; ModAx1 + SBA of 2nd for limitd ambulation w/ RW   Pt presents functioning below baseline and w/ overall mobility deficits 2* to: decreased LE strength; pain in LLE w/ mobility; decreased endurance; impaired balance; gait deviations; impaired coordination; fatigue; impaired safety and " judgement; limited insight into current deficits; bed/chair alarms; multiple lines  These impairments place pt at risk for falls  Pt will continue to benefit from skilled PT interventions to address stated impairments; to maximize functional potential; for ongoing pt/ family training; and DME needs  PT is currently recommending rehab  PT Discharge Recommendation: Post acute rehabilitation services    See flowsheet documentation for full assessment

## 2023-04-06 NOTE — CONSULTS
spoke with patient and daughter at bedside  Daughter will assist patient with menu selection  Patient agreed to try Magic cup at lunch and Mightyshake at supper as po supplements - added per RD protocol Encouraged po intake    Nutrition education not indicated as meals provided by facility

## 2023-04-06 NOTE — PHYSICAL THERAPY NOTE
Physical Therapy Evaluation    Patient's Name: Andre Farr    Admitting Diagnosis  HTN (hypertension) [I10]  Weakness [R53 1]  Stroke ([address] ) [I63 9]  Stroke-like symptoms [R29 90]  [facility] (NIH) Stroke Scale level of consciousness score 0, alert; keenly responsive [Z78 9]    Problem List  Patient Active Problem List   Diagnosis    Atherosclerosis 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 ([address] )    Benign essential hypertension    Cataract    GERD without esophagitis    Hypercholesterolemia    Hypertriglyceridemia    Lymphoma in remission ([address] )    Anemia    Vitamin D deficiency    Ambulatory dysfunction    Orthopedic hardware present    Late onset Alzheimer's disease without behavioral disturbance ([address] )    Venous stasis dermatitis of both lower extremities    Anxiety    Diarrhea    Left hip pain    Fall    Closed fracture of multiple pubic rami ([address] )    Hypokalemia    Stroke-like symptoms       Past Medical History  Past Medical History:   Diagnosis Date    Anorexia     last assessed: 8/9/2013    Closed fracture dislocation of right ankle joint 5/20/2019    Added automatically from request for surgery [id number]    GERD (gastroesophageal reflux disease)     Herpes zoster     last assessed: 9/11/2013    Hypertension     Lymphoma ([address] )     resolved: 1997    Malignant melanoma of skin ([facility] )     resolved: 2015    Pelvic fracture [facility])        Past Surgical History  Past Surgical History:   Procedure Laterality Date    BREAST SURGERY      enlargement procedure    BYPASS FEMORAL-POPLITEAL Left     onset: 8/31/2012    CATARACT EXTRACTION Right 06/19/2013    HYSTERECTOMY      resolved: 1970    ORIF TIBIA & FIBULA FRACTURES Right 5/21/2019    Procedure: OPEN REDUCTION W/ INTERNAL FIXATION (ORIF) ANKLE;  Surgeon: Jonathan Aleman MD;  Location: [facility];  Service: Orthopedics    ORIF TIBIA & FIBULA FRACTURES Right 6/4/2019    Procedure: Revision right ankle ORIF;  Surgeon: Nhi Calloway MD Patricia;  Location: [facility];  Service: Orthopedics    OTHER SURGICAL HISTORY  05/30/2013    PTA Femoral-popliteal initial stenosis left Description: with mechanical thrombectomy, PTA and stent     OH OPTX DSTL RADL I-ARTIC FX/EPIPHYSL SEP 3 FRAG Left 1/18/2017    Procedure: DISTAL RADIUS OPEN REDUCTION W/ INTERNAL FIXATION ;  Surgeon: Ashley Woods MD;  Location: [facility];  Service: Orthopedics        04/06/23 0900   PT Last Visit   PT Visit Date 04/06/23   Note Type   Note type Evaluation  (+ treatment)   Pain Assessment   Pain Assessment Tool FLACC   Pain Location/Orientation Orientation: Left; Location: Leg   Hospital Pain Intervention(s) Emotional support;Repositioned  (RN gave pt pain patch)   Pain Rating: FLACC (Rest) - Face 0   Pain Rating: FLACC (Rest) - Legs 0   Pain Rating: FLACC (Rest) - Activity 0   Pain Rating: FLACC (Rest) - Cry 0   Pain Rating: FLACC (Rest) - Consolability 0   Score: FLACC (Rest) 0   Pain Rating: FLACC (Activity) - Face 1   Pain Rating: FLACC (Activity) - Legs 1   Pain Rating: FLACC (Activity) - Activity 1   Pain Rating: FLACC (Activity) - Cry 1   Pain Rating: FLACC (Activity) - Consolability 1   Score: FLACC (Activity) 5   Restrictions/Precautions   Weight Bearing Precautions Per Order Yes   LLE Weight Bearing Per Order (S)  WBAT  (recent pelvic fx)   Other Precautions Cognitive; Chair Alarm; Bed Alarm;Aspiration;Telemetry;Pain; Fall Risk   Home Living   Type of Home SNF  [facility])   Home Equipment Walker   Prior Function   Level of [address] with functional mobility; Needs assistance with ADLs; Needs assistance with IADLS  (Jazmin w/ functional mobility at baseline w/ RW; unclear whether pt was Jazmin after returning w/ L pelvic fx (On PT eval 3/28/23 pt was ModAx2 to take 2 steps))   Lives With Facility staff   Receives Help From   (facility staff)   Falls in the last 6 months   (at least 1 per chart review)   General   Family/Caregiver Present No Cognition   Overall Cognitive Status Impaired   Arousal/Participation Cooperative   Orientation Level Oriented to person;Disoriented to place; Disoriented to time;Disoriented to situation  (per EMR pt A&Ox2 at baseline; pt stated she was in [facility] on 2 separate occasions on eval, unable to state date or why she is in the hospital)   Memory Decreased recall of precautions;Decreased recall of recent events;Decreased short term memory;Decreased recall of biographical information;Decreased long term memory   Following Commands Follows one step commands with increased time or repetition   Comments pleasantly confused, baseline dementia per EMR   Subjective   Subjective Pt agreeable to mobilize  RLE Assessment   RLE Assessment   (grossly 4-/5)   LLE Assessment   LLE Assessment   (grossly 3-/5, ROM WFL)   Coordination   Movements are Fluid and Coordinated 0   Coordination and Movement Description impaired heel to shin BLE   Heel to Shin Impaired   Bed Mobility   Rolling R 3  Moderate assistance   Additional items Assist x 1; Increased time required;Verbal cues;LE management; Bedrails   Rolling L 3  Moderate assistance   Additional items Assist x 1;Bedrails; Increased time required;Verbal cues;LE management   Supine to Sit 3  Moderate assistance   Additional items Assist x 1;HOB elevated; Bedrails; Increased time required;Verbal cues;LE management   Sit to Supine Unable to assess   Additional Comments Pt greeted in supine  Transfers   Sit to Stand 3  Moderate assistance   Additional items Assist x 1; Increased time required;Verbal cues  (+ SBA of 2nd for safety)   Stand to Sit 3  Moderate assistance   Additional items Assist x 1; Increased time required;Verbal cues  (+ SBA of 2nd for safety)   Ambulation/Elevation   Gait pattern Excessively slow; Short stride;L Knee Aron;Decreased foot clearance   Gait Assistance 3  Moderate assist   Additional items Assist x 1;Verbal cues; Tactile cues  (+ SBA of 2nd for safety) "  Assistive Device Rolling walker   Distance 3' bed>chair   Ambulation/Elevation Additional Comments Pt w/ urinary incontinence during t/f  Balance   Static Sitting Fair   Dynamic Sitting Fair -   Static Standing Poor +   Dynamic Standing Poor   Ambulatory Poor  (HHA)   Endurance Deficit   Endurance Deficit Yes   Endurance Deficit Description fatigue, weakness   Activity Tolerance   Activity Tolerance Patient limited by fatigue  Melissa Nab incontinence)   Medical Staff Made Aware OT Annabel   Nurse Made Aware yes - cleared for therapy   Assessment   Prognosis Good   Problem List Decreased strength;Decreased endurance; Impaired balance;Decreased mobility; Decreased cognition; Impaired judgement;Decreased safety awareness;Decreased range of motion;Decreased coordination;Pain   Assessment Pt is 80 y o  female seen for a high complexity PT evaluation s/p admit to [facility] on 4/5/2023  Pt presenting w/ LUE + LLE weakness  MRI brain: \"1  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 and posterior right insula  Small acute/subacute posterior left frontal cortical infarct  No significant edema or mass effect  No hemorrhagic transformation  2  cerebral atrophy and chronic microangiopathic change  \" Please see above for other active problem list / PMH  PT now consulted to assess functional mobility and needs for safe d/c planning  Pt is a resident [facility] ([facility]oved [facility]-> SNF within the past month per chart review); at baseline pt is Jazmin w/ ambulation w/ RW, although unclear how much assistance pt required  Currently pt requires ModAx1 for bed skills; ModAx1 + SBA of 2nd for functional transfers; ModAx1 + SBA of 2nd for limitd ambulation w/ RW   Pt presents functioning below baseline and w/ overall mobility deficits 2* to: decreased LE strength; pain in LLE w/ mobility; decreased endurance; impaired balance; gait deviations; impaired coordination; fatigue; impaired safety and " judgement; limited insight into current deficits; bed/chair alarms; multiple lines  These impairments place pt at risk for falls  Pt will continue to benefit from skilled PT interventions to address stated impairments; to maximize functional potential; for ongoing pt/ family training; and DME needs  PT is currently recommending rehab  Goals   Patient Goals none expressed   STG Expiration Date 04/20/23   Short Term Goal #1 In 14 days pt will complete: 1) Bed mobility skills with CGA to facilitate safe return to previous living environment  2) Functional transfers with CGA to facilitate safe return to previous living environment  3) Ambulation with least restrictive ' w/ CGA without LOB for safe ambulation in home/community environment  4) Improve balance scores by 1 grade to decrease fall risk  5) Improve LE strength grades by 1 to increase independence w/ all functional mobility, transfers and gait  6) PT for ongoing pt and family education; DME needs and D/C planning to promote highest level of function in least restrictive environment  PT Treatment Day 0   Plan   Treatment/Interventions LE strengthening/ROM; Functional transfer training; Therapeutic exercise; Endurance training;Patient/family training;Equipment eval/education; Bed mobility;Gait training; Compensatory technique education;Spoke to nursing;OT  (balance training)   PT Frequency   (2-4x/wk)   Recommendation   PT Discharge Recommendation Post acute rehabilitation services   AM-PAC Basic Mobility Inpatient   Turning in Flat Bed Without Bedrails 2   Lying on Back to Sitting on Edge of Flat Bed Without Bedrails 2   Moving Bed to Chair 2   Standing Up From Chair Using Arms 2   Walk in Room 1   Climb 3-5 Stairs With Railing 1   Basic Mobility Inpatient Raw Score 10   Highest Level Of Mobility   JH-HLM Goal 4: Move to chair/commode   JH-HLM Achieved 4: Move to chair/commode   Additional Treatment Session   Start Time 0845   End Time 0900   Treatment Assessment Extensive time spent educating pt in rolling to either side for hygiene + linen changes  End of Consult   Patient Position at End of Consult Bedside chair;Bed/Chair alarm activated; All needs within reach  (on waffle cushion, SCD's activated)     Galen Lr, PT, DPT

## 2023-04-06 NOTE — PLAN OF CARE
Problem: Potential for Falls  Goal: Patient will remain free of falls  Description: INTERVENTIONS:  - Educate patient/family on patient safety including physical limitations  - Instruct patient to call for assistance with activity   - Consult OT/PT to assist with strengthening/mobility   - Keep Call bell within reach  - Keep bed low and locked with side rails adjusted as appropriate  - Keep care items and personal belongings within reach  - Initiate and maintain comfort rounds  - Make Fall Risk Sign visible to staff  - Offer Toileting every 2 Hours, in advance of need  - Initiate/Maintain bed alarm  - Obtain necessary fall risk management equipment: non skid socks   - Apply yellow socks and bracelet for high fall risk patients  - Consider moving patient to room near nurses station  Outcome: Progressing

## 2023-04-06 NOTE — CONSULTS
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  • Acute/subacute posterior left frontal cortical infarct   • Left hemiparesis   • Impaired mobility and self care  • Impaired cognition     Recommendations:  Rehabilitation Plan:  • Continue PT/OT (SLP) while on acute care  • Recommend sub-acute inpatient rehabilitation at this time as patient would not tolerate a more aggressive program    • Covid-19 Testing: [facility] inpatient rehabilitation units require testing within 48 hours of all potential admissions at this time  *Re-testing is NOT required for patients recovering from COVID-19 infection if isolation has been discontinued per CDC criteria  Medical Co-morbidities Plan:  · Hypertension   · GERD  · Recent left pubic fracture s/p fall  · Dementia   · DVT ppx: Thank you for this consultation  Do not hesitate to contact service with further questions  [place]  PM&R    I have spent a total time of 30 minutes on 04/06/23 in caring for this patient including Counseling / Coordination of care, Documenting in the medical record, Reviewing / ordering tests, medicine, procedures  , Obtaining or reviewing history   and Communicating with other healthcare professionals   History of Present Illness:  Namita Curtis is a 80 y o  female with a PMH of gerd, HTN, recent left pubic fracture s/p fall, dementia who presented to the [address] on 4/5/2023 from [facility] where she resides  She was noted by staff to have new onset of left sided weakness and right facial droop  EMS was called and stroke alert was initiated   She was hypertensive en rout with "/118  She was not a TNK candidate  CT of the head showed no acute intracranial abnormality  CTA showed no large vessel occlusion  Severe stenosis in mildly hypoplastic left vertebral artery origin and V1 segment due to atherosclerotic disease  Severe focal stenosis in right PCA P3 segment likely due to atherosclerotic disease  She was admitted on the stroke pathway  She was recently admitted from 3/27-3/29 after falling at [facility] and sustaining left superior pelvic rami fracture  There was no surgical intervention and she was d/c back to [facility] at that time  MRI on this admission showed acute infarct involving posterior right gangliocapsular region and posterior right insula  Small acute/subacute posterior left frontal cortical infarct  No significant edema or mass effect  No hemorrhage transformation  Cerebral atrophy and chronic microangiopathic change  PM&R are consulted for rehabilitation recommendations  The patient was seen in her room  She reports wanting to go to bed even though she is already in bed  She continues with left sided weakness  She currently denies any pain  Review of Systems: 10 point ROS negative except for what is noted in HPI    Function:  Prior level of function and living situation: The patient is a resident at [facility] skilled portion  She recently moved there a few weeks ago from the [facility] portion  She reports needing assistance with all ADLS  Current level of function:  Physical Therapy:  Moderate assist for bed mobility, transfers and ambulation going 3 feet from bed to chair  Occupational Therapy: Pending   Speech Therapy: Mechanical soft with nectar thick liquids       Physical Exam:  /83   Pulse 85   Temp (!) 97 4 °F (36 3 °C)   Resp 20   Ht 5' 4\" (1 626 m)   Wt 67 1 kg (148 lb)   LMP  (LMP Unknown)   SpO2 94%   BMI 25 40 kg/m²        Intake/Output Summary (Last 24 hours) at 4/6/2023 0958  Last data filed at 4/6/2023 0601  Gross per 24 " hour   Intake --   Output 400 ml   Net -400 ml       Body mass index is 25 4 kg/m²  Physical Exam  Constitutional:       General: She is not in acute distress  Appearance: She is not toxic-appearing  Comments: Frail, elderly appearing female    HENT:      Head: Normocephalic and atraumatic  Right Ear: External ear normal       Left Ear: External ear normal       Nose: Nose normal       Mouth/Throat:      Mouth: Mucous membranes are moist       Pharynx: Oropharynx is clear  Pulmonary:      Effort: Pulmonary effort is normal  No respiratory distress  Abdominal:      General: There is no distension  Musculoskeletal:      Comments: LUE: 4/5 throughout  LLE: HF 2/5, DF/PF 3/5  RUE: 5/5 throughout  RLE: HF 2/5, DF/PF 4/5   Skin:     Coloration: Skin is pale  Neurological:      Mental Status: She is alert  Comments: Oriented to person    Psychiatric:         Cognition and Memory: Cognition is impaired  Memory is impaired  Social History:    Social History     Socioeconomic History   • Marital status:      Spouse name: None   • Number of children: 2   • Years of education: None   • Highest education level: None   Occupational History   • None   Tobacco Use   • Smoking status: Never   • Smokeless tobacco: Never   Substance and Sexual Activity   • Alcohol use: Yes     Alcohol/week: 1 0 standard drink     Types: 1 Glasses of wine per week     Comment: Per allscripts - social drinker    • Drug use: No   • Sexual activity: None   Other Topics Concern   • None   Social History Narrative    Advance directive on file      Social Determinants of Health     Financial Resource Strain: Not on file   Food Insecurity: No Food Insecurity   • Worried About Running Out of Food in the Last Year: Never true   • Ran Out of Food in the Last Year: Never true   Transportation Needs: No Transportation Needs   • Lack of Transportation (Medical): No   • Lack of Transportation (Non-Medical):  No Physical Activity: Not on file   Stress: Not on file   Social Connections: Not on file   Intimate Partner Violence: Not on file   Housing Stability: Low Risk    • Unable to Pay for Housing in the Last Year: No   • Number of Places Lived in the Last Year: 2   • Unstable Housing in the Last Year: No        Family History:    Family History   Problem Relation Age of Onset   • Heart attack Mother    • Heart failure Father    • Other Sister         thyroid surgery         Medications:     Current Facility-Administered Medications:   •  acetaminophen (TYLENOL) tablet 650 mg, 650 mg, Oral, Q6H PRN, Kyaw Mcguire MD  •  aspirin chewable tablet 81 mg, 81 mg, Oral, Daily, [employer], DO, 81 mg at 04/06/23 0934  •  heparin (porcine) subcutaneous injection 5,000 Units, 5,000 Units, Subcutaneous, Q8H [address], 5,000 Units at 04/06/23 0506 **AND** [COMPLETED] Platelet count, , , Once, Kyaw Mcguire MD  •  labetalol (NORMODYNE) injection 10 mg, 10 mg, Intravenous, Q6H PRN, Kyaw Mcguire MD  •  lidocaine (LIDODERM) 5 % patch 2 patch, 2 patch, Topical, Daily, Kyaw Mcguire MD, 2 patch at 04/06/23 0846  •  lisinopril (ZESTRIL) tablet 10 mg, 10 mg, Oral, Daily, [employer], DO, 10 mg at 04/06/23 8850  •  oxyCODONE (ROXICODONE) IR tablet 5 mg, 5 mg, Oral, Q6H PRN, Kyaw Mcguire MD  •  sertraline (ZOLOFT) tablet 12 5 mg, 12 5 mg, Oral, Daily, [employer], DO, 12 5 mg at 04/06/23 5840    Past Medical History:     Past Medical History:   Diagnosis Date   • Anorexia     last assessed: 8/9/2013   • Closed fracture dislocation of right ankle joint 5/20/2019    Added automatically from request for surgery [id number]   • GERD (gastroesophageal reflux disease)    • Herpes zoster     last assessed: 9/11/2013   • Hypertension    • Lymphoma ([facility] [address] )     resolved: 1997   • Malignant melanoma of skin ([facility] [address] )     resolved: 2015   • Pelvic fracture ([facility] [address] )         Past Surgical History:     Past Surgical History: "  Procedure Laterality Date   • BREAST SURGERY      enlargement procedure   • BYPASS FEMORAL-POPLITEAL Left     onset: 8/31/2012   • CATARACT EXTRACTION Right 06/19/2013   • HYSTERECTOMY      resolved: 1970   • ORIF TIBIA & FIBULA FRACTURES Right 5/21/2019    Procedure: OPEN REDUCTION W/ INTERNAL FIXATION (ORIF) ANKLE;  Surgeon: Delores Thompson MD;  [facility];  Service: Orthopedics   • ORIF TIBIA & FIBULA FRACTURES Right 6/4/2019    Procedure: Revision right ankle ORIF;  Surgeon: Delores Thompson MD;  [facility]: [facility];  Service: Orthopedics   • OTHER SURGICAL HISTORY  05/30/2013    PTA Femoral-popliteal initial stenosis left Description: with mechanical thrombectomy, PTA and stent    • WI OPTX DSTL RADL I-ARTIC FX/EPIPHYSL SEP 3 FRAG Left 1/18/2017    Procedure: DISTAL RADIUS OPEN REDUCTION W/ INTERNAL FIXATION ;  Surgeon: Hilary Santizo MD;  [facility];  Service: Orthopedics         Allergies: Allergies   Allergen Reactions   • Contrast [Iodinated Contrast Media]      Other reaction(s): \"I get very cold\"   • Iodine - Food Allergy      Other reaction(s): Chills  Reaction Date: 02Aug2011;    • Sulfa Antibiotics Vomiting   • Omnipaque [Iohexol]            LABORATORY RESULTS:      Lab Results   Component Value Date    HGB 12 6 04/06/2023    HGB 10 7 (L) 01/15/2018    HCT 40 1 04/06/2023    HCT 36 2 01/15/2018    WBC 6 76 04/06/2023    WBC 5 5 01/15/2018     Lab Results   Component Value Date    BUN 18 04/06/2023    BUN 21 01/11/2019     10/14/2016    K 4 7 04/06/2023    K 4 8 01/11/2019     04/06/2023     01/11/2019    CREATININE 0 44 (L) 04/06/2023    CREATININE 0 62 10/14/2016     Lab Results   Component Value Date    PROTIME 12 0 04/05/2023    INR 0 87 04/05/2023        DIAGNOSTIC STUDIES: Reviewed  MRI brain wo contrast    Result Date: 4/5/2023  Impression: 1  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 and posterior right insula    Small " acute/subacute posterior left frontal cortical infarct  No significant edema or mass effect  No hemorrhagic transformation  2   Cerebral atrophy and chronic microangiopathic change  Workstation performed: [id number]     CT stroke alert brain    Result Date: 4/5/2023  Impression: Beam hardening artifact from dental hardware limits evaluation of lower brainstem and lower cerebellum   - No acute intracranial abnormality  Findings were directly discussed with Aly Mackenzie at 8:48 AM  Workstation performed: [id number]     CTA stroke alert (head/neck)    Result Date: 4/5/2023  Impression: No large vessel occlusion in head or neck  Severe stenosis in mildly hypoplastic left vertebral artery origin and V1 segment due to atherosclerotic disease  Severe focal stenosis in right PCA P3 segment likely due to atherosclerotic disease  Tiny focal outpouching in left ICA supraclinoid segment projecting inferiorly, could represent infundibulum or aneurysm  Negative CTA head and neck for dissection  Additional chronic/incidental findings as detailed above   Findings were directly discussed with Aly Mackenzie at 8:54 AM  Workstation performed: [id number]

## 2023-04-06 NOTE — PROCEDURES
Speech Pathology - Modified Barium Swallow Study    Patient Name: Zach Jarvis    DAPQZ'B Date: 4/6/2023     Problem List  Active Problems:    Benign essential hypertension    Late onset Alzheimer's disease without behavioral disturbance ([facility] [address] )    Closed fracture of multiple pubic rami (HCC)    Stroke-like symptoms      Past Medical History  Past Medical History:   Diagnosis Date   • Anorexia     last assessed: 8/9/2013   • Closed fracture dislocation of right ankle joint 5/20/2019    Added automatically from request for surgery [id number]   • GERD (gastroesophageal reflux disease)    • Herpes zoster     last assessed: 9/11/2013   • Hypertension    • Lymphoma ([facility] [address] )     resolved: 1997   • Malignant melanoma of skin ([facility] [address] )     resolved: 2015   • Pelvic fracture ([facility] [address] )        Past Surgical History  Past Surgical History:   Procedure Laterality Date   • BREAST SURGERY      enlargement procedure   • BYPASS FEMORAL-POPLITEAL Left     onset: 8/31/2012   • CATARACT EXTRACTION Right 06/19/2013   • HYSTERECTOMY      resolved: 1970   • ORIF TIBIA & FIBULA FRACTURES Right 5/21/2019    Procedure: OPEN REDUCTION W/ INTERNAL FIXATION (ORIF) ANKLE;  Surgeon: Waldo Keller MD;  [facility];  Service: Orthopedics   • ORIF TIBIA & FIBULA FRACTURES Right 6/4/2019    Procedure: Revision right ankle ORIF;  Surgeon: Waldo Keller MD;  [facility];  Service: Orthopedics   • OTHER SURGICAL HISTORY  05/30/2013    PTA Femoral-popliteal initial stenosis left Description: with mechanical thrombectomy, PTA and stent    • [place] OPTX DSTL RADL I-ARTIC FX/EPIPHYSL SEP 3 FRAG Left 1/18/2017    Procedure: DISTAL RADIUS OPEN REDUCTION W/ INTERNAL FIXATION ;  Surgeon: Kathleen Walsh MD;  [facility];  Service: Orthopedics       Assessment Summary:    Pt presents with mild oral and mild-moderate pharyngeal dyspahgia  characterized by min prolonged mastication time with regular cookie with intermittently prolonged transfer time with other consistencies  Swallow initiation time is delayed with spillage to the valleculae and pyriforms with thin liquids  Patient has reduced laryngeal vestibule closure with consistent penetration with thin liquids, and intermittent aspiration events  Patient does not have consistent cough response, but has throat clear x1 and cough x1  Pharyngeal constriction is adequate with no retention  Patient unable to transfer barium tablet  Brief scan of the esophagus is unremarkable  Note: Images are available for review in PACS as desired  Recommendations:   Recommended Diet: mechanically altered/level 2 diet and nectar thick liquids   Recommended Form of Medications: crushed with puree   Aspiration precautions and compensatory swallowing strategies: upright posture and small bites/sips  Consider referral to: none at this time    SLP Dysphagia therapy recommended: yes as able in acute care     Results Reviewed with: patient, RN, MD and family   Pt/Family Education: initiated  Pt and caregivers would benefit from/require continued education  Patient Stated Goal: none stated     Dysphagia Goals per SLP: pt will tolerate mechanical soft solids  with nectar thick liquids  without s/s of aspiration x1-3  Patient will participate in repeat VBS when appropriate  General Information;  Maria Elena Lucio is a 80 y o  female  with pertinent history of GERD, HTN, recent left pubic fracture s/p fall, dementia, who presented for stroke-like symptoms    Patient [facility] from [facility]  Per history obtained from EMS, patient was last seen well yesterday evening around dinner time  This morning, staff attempted to get patient up into the shower and noted patient had left sided weakness and right facial droop  EMS was initiated  Pre-hospital stroke alert was initiated    Of note, patient had recent admission 3/27-3/29 for left pubic rami fractures s/p fall  At baseline, patient is reported to be AAO x2      Current concerns for dysphagia include coughing with liquids at bedside  MBS was recommended to assess oropharyngeal stage swallowing skills at this time  Prior MBS: none     Oral Mechanism Exam  Facial: symmetrical  Labial: WFL  Lingual: WFL  Velum: symmetrical  Mandible: adequate ROM  Dentition: adequate  Vocal quality: clear/adequate   Volitional Cough: strong/productive   Respiratory Status: on RA      Pt was viewed sitting upright in the lateral position  Due to concerns for patient safety / patient refusal, trials provided deviated from the MBSImP Validated Protocol  Pt was given 5-mL thin liquid x2, 20-mL cup sip thin, 5-mL nectar thick, 20-mL cup sip nectar thick, 5-mL honey thick, 5-mL pudding and ½ cookie coated with 3-mL pudding  Pt was also given thin liquids by straw, as well as a barium tablet with thin liquid  Initial view observations/comments: adequate    8-Point Penetration-Aspiration Scale   Thin liquid 8 - Material enters the airway, passes below the vocal folds, and no effort is made to eject     Nectar thick liquid 5 - Material enters the airway, contacts the vocal folds, and is not ejected from the airway     Honey thick liquid 1 - Material does not enter the airway   Puree (pudding) 1 - Material does not enter the airway   Solid 1 - Material does not enter the airway     Strategies and Efficacy: n/a    Aspiration Response and Efficacy: throat clear x1, cough x1, sometimes silent    MBS IMP Rating    ORAL Impairment  Compinent 1--Lip Closure  Judged at any point during the swallow  0 - No labial escape    Component 2--Tongue Control During Bolus Hold  Judged on held liquid boluses only and prior to productive tongue movement  0 - Cohesive bolus between tongue to palatal seal    Component 3--Bolus Preparation/Mastication  Judged only during presentation of 1/2 shortbread cookie coated in pudding     1 - Slow prolonged chewing/mashing with complete re-collection    Component 4--Bolus Transport/Lingual Motion  Judged after first productive tongue movement for oral bolus transport  0 - Brisk tongue motion    Component 5--Oral Residue  Judged after first swallow or after the last swallow of the sequential swallow task  2 - Residue collection on oral structures   Location   C - Tongue    Component 6--Initiation of Pharyngeal Swallow  Judged at first movement of the brisk superior-anterior hyoid trajectory  3 - Bolus head in pyriforms      PHARYNGEAL Impairment  Component 7--Soft Palate Elevation  Judged during maximum displacement of soft palate  0 - No bolus between the soft palate (SP)/pharyngeal wall (PW)    Component 8--Laryngeal Elevation  Judged when epiglottis is in its most horizontal position  1 - Partial superior movement of thyroid cartilage/partial approximation of arytenoids to epiglottic petiole    Component 9--Anterior Hyoid Excursion  Judged at height of swallow/maximal anterior hyoid displacement  0 - Complete anterior movement    Component 10--Epiglottic Movement  Judged at height of swallow/maximal anterior hyoid displacement  0 - Complete inversion    Component 11--Laryngeal Vestibular Closure  Judged at height of swallow/maximal anterior hyoid displacement  1 - Incomplete; narrow column air/contrast in laryngeal vestibule    Component 12--Pharyngeal Stripping Wave  Judged during the full duration of the pharyngeal swallow  0 - Present - complete    Component 13--Pharyngeal Contraction  Judged in AP view at rest and throughout maximum movement of structures  0 - Complete    Component 14--Pharyngoesophageal Segment Opening  Judged during maximum distension of PES and throughout opening and closure  0 - Complete distension and complete duration; no obstruction of flow    Component 15--Tongue Base (TB) Retraction  Judged during maximum retraction of the tongue base    0 - No contrast between TB and posterior pharyngeal wall (PW)    Component 16--Pharyngeal Residue  Judged after first swallow or after the last swallow of the sequential swallow task    0 - Complete pharyngeal clearance       ESOPHAGEAL Impairment (AP view not assessed)  Component 17--Esophageal Clearance Upright Position  Judged in AP view during bolus transit through the oral cavity to the LES

## 2023-04-06 NOTE — OCCUPATIONAL THERAPY NOTE
Occupational Therapy Evaluation     Patient Name: Carlie Zaldivar  [id number] Date: 4/6/2023  Problem List  Active Problems:    Benign essential hypertension    Late onset Alzheimer's disease without behavioral disturbance ([facility] [address] )    Closed fracture of multiple pubic rami (HCC)    Stroke-like symptoms    Past Medical History  Past Medical History:   Diagnosis Date   • Anorexia     last assessed: 8/9/2013   • Closed fracture dislocation of right ankle joint 5/20/2019    Added automatically from request for surgery [id number]   • GERD (gastroesophageal reflux disease)    • Herpes zoster     last assessed: 9/11/2013   • Hypertension    • Lymphoma ([facility] [address] )     resolved: 1997   • Malignant melanoma of skin ([facility] [address] )     resolved: 2015   • Pelvic fracture ([facility] [address] )      Past Surgical History  Past Surgical History:   Procedure Laterality Date   • BREAST SURGERY      enlargement procedure   • BYPASS FEMORAL-POPLITEAL Left     onset: 8/31/2012   • CATARACT EXTRACTION Right 06/19/2013   • HYSTERECTOMY      resolved: 1970   • ORIF TIBIA & FIBULA FRACTURES Right 5/21/2019    Procedure: OPEN REDUCTION W/ INTERNAL FIXATION (ORIF) ANKLE;  Surgeon: Marv Perea MD;  [facility];  Service: Orthopedics   • ORIF TIBIA & FIBULA FRACTURES Right 6/4/2019    Procedure: Revision right ankle ORIF;  Surgeon: Marv Perea MD;  [facility];  Service: Orthopedics   • OTHER SURGICAL HISTORY  05/30/2013    PTA Femoral-popliteal initial stenosis left Description: with mechanical thrombectomy, PTA and stent    • [place] OPTX DSTL RADL I-ARTIC FX/EPIPHYSL SEP 3 FRAG Left 1/18/2017    Procedure: DISTAL RADIUS OPEN REDUCTION W/ INTERNAL FIXATION ;  Surgeon: Denilson Ogden MD;  [facility]: [facility];  Service: Orthopedics         04/06/23 0831   OT Last Visit   OT Visit Date 04/06/23   Note Type   Note type Evaluation   Pain Assessment   Pain Assessment Tool FLACC   Pain Location/Orientation Orientation: Left; Location: Leg   Hospital Pain Intervention(s) Repositioned; Ambulation/increased activity; Elevated; Emotional support; Rest   Pain Rating: FLACC (Rest) - Face 0   Pain Rating: FLACC (Rest) - Legs 0   Pain Rating: FLACC (Rest) - Activity 0   Pain Rating: FLACC (Rest) - Cry 0   Pain Rating: FLACC (Rest) - Consolability 0   Score: FLACC (Rest) 0   Pain Rating: FLACC (Activity) - Face 1   Pain Rating: FLACC (Activity) - Legs 1   Pain Rating: FLACC (Activity) - Activity 0   Pain Rating: FLACC (Activity) - Cry 0   Pain Rating: FLACC (Activity) - Consolability 0   Score: FLACC (Activity) 2   Restrictions/Precautions   Weight Bearing Precautions Per Order Yes   LLE Weight Bearing Per Order (S)  WBAT  (recent fall/ pelvice fracture - 3/27)   Other Precautions Cognitive; Chair Alarm; Bed Alarm; Fall Risk;Telemetry;Pain  (+Dementia, left UE/LE hemiplegia, Permissive HTN)   Home Living   Type of Home SNF  [facility]   Home Layout One level   Bathroom Shower/Tub Walk-in shower   Bathroom Toilet Raised   Bathroom Equipment Grab bars in shower; Shower chair; Toilet raiser;Grab bars around toilet   [address] Walker;Cane;Wheelchair-manual;Grab bars   Prior Function   Level of [place] Needs assistance with ADLs; Needs assistance with [address] staff   Receives Help From Family;Personal care attendant  (daughter)   IADLs Family/Friend/Other provides transportation; Family/Friend/Other provides meals; Family/Friend/Other provides medication management   Falls in the last 6 months 1 to 4  (at least one suffering pelvic fracture)   Vocational Retired   Lifestyle   Autonomy Assistance wtih ADL's/IaDL's, +RW wtih fucntional mobilty (unclear mobility status since pelvic fracture)   Reciprocal Relationships daughter , facility   Service to Others retired   Intrinsic Gratification [place]   General   Family/Caregiver Present No   ADL   Eating Assistance 5  Supervision/Setup   Eating Deficit Beverage management Grooming Assistance 3  Moderate Assistance   UB Bathing Assistance 3  Moderate Assistance   LB Bathing Assistance 2  Maximal Assistance   UB Dressing Assistance 3  Moderate Assistance   LB Dressing Assistance 2  Maximal Assistance   Toileting Assistance  1  Total Assistance   Bed Mobility   Rolling R 3  Moderate assistance   Additional items Assist x 1   Rolling L 3  Moderate assistance   Additional items Assist x 1   Supine to Sit 3  Moderate assistance   Additional items Assist x 1; Increased time required;LE management;Verbal cues   Sit to Supine Unable to assess   Additional Comments +cues to reach for bedrail to increase I/ease of transfers   Transfers   Sit to Stand 3  Moderate assistance   Additional items Assist x 1   Stand to Sit 3  Moderate assistance   Additional items Assist x 1   Additional Comments mod a x 1 and SBA of another with RW and safety/seqeuencing cues  Functional Mobility   Functional Mobility 3  Moderate assistance   Additional Comments mod a x 1 and SBA of another 2-3 small steps to chair +left leg pain   Additional items Rolling walker   Balance   Static Sitting Fair   Dynamic Sitting Fair -   Static Standing Poor +   Dynamic Standing Poor   Ambulatory Poor   Activity Tolerance   Activity Tolerance Patient limited by fatigue   Medical Staff Made Aware PT [facility] due to the patient's co-morbidities, clinically unstable presentation, and present impairments which are a regression from the patient's baseline      Nurse Made Aware RN cleared pt for therapy   RUE Assessment   RUE Assessment WFL-right hand dominant   LUE Assessment   LUE Assessment X  (left UE hemiplegia - strength shoulder 2/5, elbow 3/5,  3+/5, no glenohumeral subluxation noted) able to grasp RW and maintain during mobility tasks)   Hand Function   Gross Motor Coordination Impaired  (left>right)   Fine Motor Coordination Impaired  (left >right with finger to thumb sequencing)   Sensation   Light Touch No apparent deficits Proprioception   Proprioception Partial deficits in the LUE  (Motor lag to left UE)   Vision-Basic Assessment   Current Vision Wears glasses only for reading   Vision - Complex Assessment   Ocular Range of Motion Intact   Tracking Intact   Visual Fields (with testing WFL continue to assess- on admission +homonymous hemianopsia per neurology   Acuity Able to read normal print without difficulty   Additional Comments Pt reports no visual changes continue to assess  Cognition   Overall Cognitive Status Impaired-alzheimer's disease at baseline  Arousal/Participation Alert; Responsive; Cooperative   Attention Attends with cues to redirect   Orientation Level Oriented to person;Disoriented to place; Disoriented to time;Disoriented to situation   Memory Decreased recall of precautions;Decreased recall of recent events;Decreased short term memory   Following Commands Follows one step commands with increased time or repetition   Comments pt pleasantly confused, disoriented -able to state name/birthday -not place/date +impaired STM (within 5-10 minutes disoriented s/p re-orientation), followed one step commands, impaired safety/medical deficit awareness  Assessment   Limitation Decreased ADL status; Decreased UE strength;Decreased Safe judgement during ADL;Decreased UE ROM; Decreased cognition;Decreased endurance;Decreased sensation;Decreased fine motor control;Decreased high-level ADLs; Decreased self-care trans   Prognosis Fair   Assessment Pt is a 80 y o  female who was admitted to [facility] on 4/5/2023 with left sided weakness and left facial droop and now here with 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 and posterior right insula re: CTA head/neck  Small acute/subacute posterior left frontal cortical infarct    recent pubic rami fracture s/p fall +WBAT, late onset of alzheimer's disease without behavioral disturbance, HTn   Pt's problem list also includes PMH of  has a past medical "history of Anorexia, Closed fracture dislocation of right ankle joint (5/20/2019), GERD (gastroesophageal reflux disease), Herpes zoster, Hypertension, Lymphoma ([facility] [address] Malignant melanoma of skin ([facility] [address] ), and Pelvic fracture ([facility] [address] )    At baseline pt was completing assistance with ADL's/IADL's, +RW with functional mobility Pt lives at [facility]  Currently pt requires mod/max a , total assist with toileting for overall ADLS and mod a x 1 with RW for functional mobility/transfers  Pt currently presents with impairments in the following categories -difficulty performing ADLS, difficulty performing IADLS  and limited insight into deficits activity tolerance, endurance, standing balance/tolerance, sitting balance/tolerance, UE strength, UE ROM, FMC, GMC, memory, insight, safety , judgement , attention , sequencing  and communication  These impairments, as well as pt's fatigue, pain, (L) UE dysmetria , (L) hemiparesis and risk for falls  limit pt's ability to safely engage in all baseline areas of occupation, includingeating, grooming, bathing, dressing, toileting, functional mobility/transfers, community mobility, social participation  and leisure activities  From OT standpoint, recommend STR upon D/C  OT will continue to follow to address the below stated goals  Goals   Patient Goals \"eat\"   LT Time Frame 10-14   Long Term Goal #1 see goals below   Plan   Treatment Interventions ADL retraining;Functional transfer training;UE strengthening/ROM; Endurance training;Cognitive reorientation;Patient/family training;Equipment evaluation/education; Compensatory technique education; Energy conservation; Activityengagement;Continued evaluation   Goal Expiration Date 04/20/23   OT Treatment Day 1   OT Frequency 2-3x/wk   Recommendation   OT Discharge Recommendation Post acute rehabilitation services   AM-PAC Daily Activity Inpatient   Lower Body Dressing 2   Bathing 2   Toileting 1   Upper Body Dressing 2   Grooming 3   Eating " 3   Daily Activity Raw Score 13   Daily Activity Standardized Score (Calc for Raw Score >=11) 32 03   AM-PAC Applied Cognition Inpatient   Following a Speech/Presentation 1   Understanding Ordinary Conversation 4   Taking Medications 2   Remembering Where Things Are Placed or Put Away 2   Remembering List of 4-5 Errands 1   Taking Care of Complicated Tasks 1   Applied Cognition Raw Score 11   Applied Cognition Standardized Score 27 03   Additional Treatment Session   Start Time 7390   End Time 0901   Treatment Assessment Pt seen for an additional OT treatment session with focus on self care tasks including toileting bed level with rolling left and right, total assist with perineal care and linen changes, pt unaware of soiled linens/incontinent baseline  Pt with cognitive deficits/baseline alzheimer's dementia and has assistance at baseline at SNF  Will continue to follow to maximize functional I with all ADL's/mobility tasks  End of Consult   Patient Position at End of Consult Bedside chair;Bed/Chair alarm activated; All needs within reach   Nurse Communication Nurse aware of consult      Occupational Therapy Goals:    *S with bed mobility to engage in functional tasks  *Min a Adl's after setup with use of AE PRN  *S toileting and clothing management   *S functional mobility and transfers to/from all surfaces with Fair + dynamic balance and safety for participation in dynamic adls and iadl tasks   *Demonstrate good carryover with safe use of RW during functional tasks   *Increase activity tolerance to 25-30 minutes for participation in adls and enjoyable activities  *Demonstrate good carryover of pt/family education and training with good tolerance for increased safety and independence with ADL's/ADl's  *Pt will improve standing balance to 4-5 minutes with functional tasks to increase I with toileting/transfers    *Patient will demonstrate 100% carryover of energy conservation techniques t/o functional I/ADL/leisure tasks w/o cues s/p skilled education to increase endurance during functional tasks  *Pt will follow 100% simple one step verbal commands and be A/Ox4 consistently t/o use of external environmental cues w/ mod I  *Patient will follow multistep instructions with no cueing to increase cognitive function and independence with tasks  *Pt will participate in fine/gross motor coordination/strenthening/dexterity exercises to  B/L hands to Good in order to increase participation in functional activities  *Pt will improve B/L UE ROM 1/2 MMT via AROM/AAROM/PROM in all planes as tolerated in order to participate in functional activities      Joce Chavarria MOT, OTR/L

## 2023-04-06 NOTE — PROCEDURES
Video Swallow Study      Patient Name: Cierra Matthew  FTGAM'H Date: 4/6/2023        Past Medical History  Past Medical History:   Diagnosis Date   • Anorexia     last assessed: 8/9/2013   • Closed fracture dislocation of right ankle joint 5/20/2019    Added automatically from request for surgery [id number]   • GERD (gastroesophageal reflux disease)    • Herpes zoster     last assessed: 9/11/2013   • Hypertension    • Lymphoma ([facility] [address] )     resolved: 1997   • Malignant melanoma of skin ([facility] [address] )     resolved: 2015   • Pelvic fracture [facility])         Past Surgical History  Past Surgical History:   Procedure Laterality Date   • BREAST SURGERY      enlargement procedure   • BYPASS FEMORAL-POPLITEAL Left     onset: 8/31/2012   • CATARACT EXTRACTION Right 06/19/2013   • HYSTERECTOMY      resolved: 1970   • ORIF TIBIA & FIBULA FRACTURES Right 5/21/2019    Procedure: OPEN REDUCTION W/ INTERNAL FIXATION (ORIF) ANKLE;  Surgeon: Boone Aragon MD;  [facility];  Service: Orthopedics   • ORIF TIBIA & FIBULA FRACTURES Right 6/4/2019    Procedure: Revision right ankle ORIF;  Surgeon: Boone Aragon MD;  [facility];  Service: Orthopedics   • OTHER SURGICAL HISTORY  05/30/2013    PTA Femoral-popliteal initial stenosis left Description: with mechanical thrombectomy, PTA and stent    • [place] OPTX DSTL RADL I-ARTIC FX/EPIPHYSL SEP 3 FRAG Left 1/18/2017    Procedure: DISTAL RADIUS OPEN REDUCTION W/ INTERNAL FIXATION ;  Surgeon: Hailey Braun MD;  [facility];  Service: Orthopedics         General Information:         Oral Stage:                Pharyngeal Stage:                     Esophageal Stage:          Assessment Summary:       Diagnosis/Prognosis:            Recommendations:

## 2023-04-06 NOTE — PLAN OF CARE
Problem: OCCUPATIONAL THERAPY ADULT  Goal: Performs self-care activities at highest level of function for planned discharge setting  See evaluation for individualized goals  Description: Treatment Interventions: ADL retraining, Functional transfer training, UE strengthening/ROM, Endurance training, Cognitive reorientation, Patient/family training, Equipment evaluation/education, Compensatory technique education, Energy conservation, Activityengagement, Continued evaluation          See flowsheet documentation for full assessment, interventions and recommendations  4/6/2023 1519 by Devante Pérez OT  Note: Limitation: Decreased ADL status, Decreased UE strength, Decreased Safe judgement during ADL, Decreased UE ROM, Decreased cognition, Decreased endurance, Decreased sensation, Decreased fine motor control, Decreased high-level ADLs, Decreased self-care trans  Prognosis: Fair  Assessment: Pt is a 80 y o  female who was [facility] to [facility] on 4/5/2023 with left sided weakness and left facial droop and now here with 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 and posterior right insula re: CTA head/neck  Small acute/subacute posterior left frontal cortical infarct  recent pubic rami fracture s/p fall +WBAT, late onset of alzheimer's disease without behavioral disturbance, HTn   Pt's problem list also includes PMH of  has a past medical history of Anorexia, Closed fracture dislocation of right ankle joint (5/20/2019), GERD (gastroesophageal reflux disease), Herpes zoster, Hypertension, Lymphoma ([facility] [address] ), Malignant melanoma of skin ([facility] [address] ), and Pelvic fracture ([facility] [address] )    At baseline pt was completing assistance with ADL's/IADL's, +RW with functional mobility Pt lives at [facility]  Currently pt requires mod/max a , total assist with toileting for overall ADLS and mod a x 1 with RW for functional mobility/transfers   Pt currently presents with impairments in the following categories -difficulty performing ADLS, difficulty performing IADLS  and limited insight into deficits activity tolerance, endurance, standing balance/tolerance, sitting balance/tolerance, UE strength, UE ROM, FMC, GMC, memory, insight, safety , judgement , attention , sequencing  and communication  These impairments, as well as pt's fatigue, pain, (L) UE dysmetria , (L) hemiparesis and risk for falls  limit pt's ability to safely engage in all baseline areas of occupation, includingeating, grooming, bathing, dressing, toileting, functional mobility/transfers, community mobility, social participation  and leisure activities  From OT standpoint, recommend STR upon D/C  OT will continue to follow to address the below stated goals       OT Discharge Recommendation: Post acute rehabilitation services

## 2023-04-06 NOTE — PLAN OF CARE
Problem: Neurological Deficit  Goal: Neurological status is stable or improving  Description: Interventions:  - Monitor and assess patient's level of consciousness, motor function, sensory function, and level of assistance needed for ADLs  - Monitor and report changes from baseline  Collaborate with interdisciplinary team to initiate plan and implement interventions as ordered  - Provide and maintain a safe environment  - Consider seizure precautions  - Consider fall precautions  - Consider aspiration precautions  - Consider bleeding precautions  Outcome: Progressing     Problem: Potential for Aspiration  Goal: Non-ventilated patient's risk of aspiration is minimized  Description: Assess and monitor vital signs, respiratory status, and labs (WBC)  Monitor for signs of aspiration (tachypnea, cough, rales, wheezing, cyanosis, fever)  - Assess and monitor patient's ability to swallow  - Place patient up in chair to eat if possible  - HOB up at 90 degrees to eat if unable to get patient up into chair   - Supervise patient during oral intake  - Instruct patient/ family to take small bites  - Instruct patient/ family to take small single sips when taking liquids  - Follow patient-specific strategies generated by speech pathologist   Outcome: Progressing  Goal: Ventilated patient's risk of aspiration is minimized  Description: Assess and monitor vital signs, respiratory status, airway cuff pressure, and labs (WBC)  Monitor for signs of aspiration (tachypnea, cough, rales, wheezing, cyanosis, fever)  - Elevate head of bed 30 degrees if patient has tube feeding   - Monitor tube feeding    Outcome: Progressing

## 2023-04-07 VITALS
RESPIRATION RATE: 16 BRPM | OXYGEN SATURATION: 94 % | WEIGHT: 148 LBS | DIASTOLIC BLOOD PRESSURE: 64 MMHG | TEMPERATURE: 98.1 F | HEIGHT: 64 IN | BODY MASS INDEX: 25.27 KG/M2 | SYSTOLIC BLOOD PRESSURE: 132 MMHG | HEART RATE: 72 BPM

## 2023-04-07 PROBLEM — I63.9 STROKE (HCC): Status: ACTIVE | Noted: 2023-04-05

## 2023-04-07 LAB
ANION GAP SERPL CALCULATED.3IONS-SCNC: 5 MMOL/L (ref 4–13)
BUN SERPL-MCNC: 16 MG/DL (ref 5–25)
CALCIUM SERPL-MCNC: 8.9 MG/DL (ref 8.3–10.1)
CHLORIDE SERPL-SCNC: 106 MMOL/L (ref 96–108)
CO2 SERPL-SCNC: 27 MMOL/L (ref 21–32)
CREAT SERPL-MCNC: 0.44 MG/DL (ref 0.6–1.3)
ERYTHROCYTE [DISTWIDTH] IN BLOOD BY AUTOMATED COUNT: 13.9 % (ref 11.6–15.1)
GFR SERPL CREATININE-BSD FRML MDRD: 86 ML/MIN/1.73SQ M
GLUCOSE SERPL-MCNC: 107 MG/DL (ref 65–140)
HCT VFR BLD AUTO: 40.1 % (ref 34.8–46.1)
HGB BLD-MCNC: 12.9 G/DL (ref 11.5–15.4)
MCH RBC QN AUTO: 29.9 PG (ref 26.8–34.3)
MCHC RBC AUTO-ENTMCNC: 32.2 G/DL (ref 31.4–37.4)
MCV RBC AUTO: 93 FL (ref 82–98)
MRSA NOSE QL CULT: NORMAL
PLATELET # BLD AUTO: 354 THOUSANDS/UL (ref 149–390)
PMV BLD AUTO: 11.1 FL (ref 8.9–12.7)
POTASSIUM SERPL-SCNC: 3.5 MMOL/L (ref 3.5–5.3)
RBC # BLD AUTO: 4.31 MILLION/UL (ref 3.81–5.12)
SARS-COV-2 RNA RESP QL NAA+PROBE: NEGATIVE
SODIUM SERPL-SCNC: 138 MMOL/L (ref 135–147)
WBC # BLD AUTO: 6.75 THOUSAND/UL (ref 4.31–10.16)

## 2023-04-07 RX ORDER — ATORVASTATIN CALCIUM 20 MG/1
20 TABLET, FILM COATED ORAL
Qty: 30 TABLET | Refills: 2 | Status: SHIPPED | OUTPATIENT
Start: 2023-04-07

## 2023-04-07 RX ORDER — ASPIRIN 81 MG/1
81 TABLET ORAL DAILY
Qty: 30 TABLET | Refills: 2 | Status: SHIPPED | OUTPATIENT
Start: 2023-04-25

## 2023-04-07 RX ORDER — ASPIRIN 81 MG/1
81 TABLET ORAL 2 TIMES DAILY
Qty: 34 TABLET | Refills: 0 | Status: SHIPPED | OUTPATIENT
Start: 2023-04-07 | End: 2023-04-24

## 2023-04-07 RX ADMIN — ASPIRIN 81 MG CHEWABLE TABLET 81 MG: 81 TABLET CHEWABLE at 08:25

## 2023-04-07 RX ADMIN — LIDOCAINE 5% 2 PATCH: 700 PATCH TOPICAL at 08:27

## 2023-04-07 RX ADMIN — SERTRALINE 12.5 MG: 25 TABLET, FILM COATED ORAL at 08:25

## 2023-04-07 RX ADMIN — LISINOPRIL 10 MG: 10 TABLET ORAL at 08:25

## 2023-04-07 RX ADMIN — HEPARIN SODIUM 5000 UNITS: 5000 INJECTION INTRAVENOUS; SUBCUTANEOUS at 05:53

## 2023-04-07 NOTE — DISCHARGE INSTR - AVS FIRST PAGE
Dear Andre Farr,     It was our pleasure to care for you here at [facility]  It is our hope that we were always able to exceed the expected standards for your care during your stay  You were hospitalized due to acute stroke  You were cared for on the 7th floor by Ca Dial DO under the service of Naty Jensen DO with the [address] [place] [address] [facility] who covers for your primary care physician (PCP), Nallely Barton DO, while you were hospitalized  If you have any questions or concerns related to this hospitalization, you may contact us at [phone]  For follow up as well as any medication refills, we recommend that you follow up with your primary care physician  A registered nurse will reach out to you by phone within a few days after your discharge to answer any additional questions that you may have after going home  However, at this time we provide for you here, the most important instructions / recommendations at discharge:     Notable Medication Adjustments -   Continue taking Aspirin 81mg twice a day through April 24, 2023 for deep venous thrombosis (DVT) prevention  Beginning April 25, 2023, take Aspirin 81mg once a day  Start taking Lipitor 20mg once a day  Take all other home medications as prescribed prior to this hospitalization stay  Testing Required after Discharge -   None  Important follow up information -   Follow up with primary care physician within 1 week of hospital discharge  Follow up with vascular neurology for stroke management within 6-8 weeks  Follow up with orthopedics appointment as scheduled  Other Instructions -   If any acute concerns of sudden weakness, severe headache, speech difficulties, please go to your nearest emergency department for further evaluation and management    Please review this entire after visit summary as additional general instructions including medication list, appointments, activity, diet, any pertinent wound care, and other additional recommendations from your care team that may be provided for you        Sincerely,     Pedro Mcardle, DO

## 2023-04-07 NOTE — WOUND OSTOMY CARE
Consult Note - Wound   Mary Abdi 80 y o  female MRN: [id number]  Unit/Bed#: [facility] 729-01 Encounter: [id number]        History and Present Illness:  Patient is a 79 yo female that was admitted to [facility] for treatment of stroke like symptoms  Patient has a PMH of GERD, HTN, recent left pubic fracture s/p fall, dementia  Patient resides at [facility]  Patient is a min/moderate assist for turning and repositioning  Patient is incontinent of bowel and bladder  On assessment, patient is lying on regular mattress  Wound Care was consulted for skin care recommendations   Assessment Findings:   B/L heels are dry intact and leona with no skin loss or wounds present  Recommend preventative Hydraguard Cream and proper offloading/ repositioning  B/L sacro-buttocks is dry, intact, pink in color and blanches  No skin loss or wounds present  Recommend preventative hydragaurd to area due to incontinence  1  Left Forearm/ Wrist Skin Tear: irregular in shape area of partial thickness skin loss  Wound bed is beefy red and bleeding  Susie-wound is fragile  Scant sanguinous drainage noted  Recommend dermagran, abd, and ze wrap  2  Left Hip: unclear etiology: abrasion vs MASD  Wound be dis irregular in shape with partial thickness skin loss  Wound bed is beefy red in color  Susie-wound is fragile  Scant sanguinous drainage noted  Recommend allevyn foam dressing to area  No induration, fluctuance, odor, warmth/temperature differences, redness, or purulence noted to the above noted wounds and skin areas assessed  New dressings applied per orders listed below  Patient tolerated well- no s/s of non-verbal pain or discomfort observed during the encounter  Bedside nurse aware of plan of care  See flow sheets for more detailed assessment findings  Orders listed below and wound care will continue to follow, call or tiger text with questions       Skin Care Plan:  1-Left Forearm/Wrist Skin Tear: Cleanse area with NS and pat dry  Apply dermagran to wound bed, cover with an ABD and secure with ze wrap  Change every other day or PRN  2-Turn/reposition q2h or when medically stable for pressure re-distribution on skin   3-Elevate heels to offload pressure  4-Moisturize skin daily with skin nourishing cream  5-Ehob cushion in chair when out of bed  6-Preventative Hydraguard to bilateral heels and sacro-buttocks BID and PRN  7-Left Hip: Cleanse area with NS and pat dry  Apply small sacral allevyn foam dressing  Preet with T for Treatment and change every other day or PRN  Wounds:  Wound 03/27/23 Skin Tear Skin tear Arm Distal;Left;Posterior (Active)   Wound Image   04/07/23 0941   Wound Description Beefy red;Bleeding 04/07/23 0941   Susie-wound Assessment Fragile 04/07/23 0941   Wound Length (cm) 5 cm 04/07/23 0941   Wound Width (cm) 2 5 cm 04/07/23 0941   Wound Depth (cm) 0 1 cm 04/07/23 0941   Wound Surface Area (cm^2) 12 5 cm^2 04/07/23 0941   Wound Volume (cm^3) 1 25 cm^3 04/07/23 0941   Calculated Wound Volume (cm^3) 1 25 cm^3 04/07/23 0941   Drainage Amount Scant 04/07/23 0941   Drainage Description Sanguineous 04/07/23 0941   Non-staged Wound Description Partial thickness 04/07/23 0941   Treatments Cleansed;Irrigation with NSS;Site care 04/07/23 0941   Dressing Olivia Heal gauze;ABD;Dry dressing 04/07/23 0941   Dressing Changed New 04/07/23 0941   Patient Tolerance Tolerated well 04/07/23 0941   Dressing Status Clean;Dry; Intact 04/07/23 0941       Wound 04/07/23 MASD Hip Anterior; Left (Active)   Wound Image   04/07/23 1207   Wound Description Beefy red 04/07/23 1207   Susie-wound Assessment Fragile 04/07/23 1207   Wound Length (cm) 4 cm 04/07/23 1207   Wound Width (cm) 6 cm 04/07/23 1207   Wound Depth (cm) 0 1 cm 04/07/23 1207   Wound Surface Area (cm^2) 24 cm^2 04/07/23 1207   Wound Volume (cm^3) 2 4 cm^3 04/07/23 1207   Calculated Wound Volume (cm^3) 2 4 cm^3 04/07/23 1207   Drainage Amount Scant 04/07/23 1207   Drainage Description Sanguineous 04/07/23 1207   Non-staged Wound Description Partial thickness 04/07/23 1207   Treatments Cleansed;Site care 04/07/23 1207   Dressing Foam, Silicon (eg  Allevyn, etc) 04/07/23 1207   Wound packed? No 04/07/23 1207   Dressing Changed New 04/07/23 1207   Patient Tolerance Tolerated well 04/07/23 1207   Dressing Status Clean;Dry; Intact 04/07/23 1207               Nini English RN, BSN

## 2023-04-07 NOTE — CASE MANAGEMENT
Andrea Kareem 50 has received approved authorization from insurance: [address] [employer] P#  [phone] x  [phone]  Authorization received for: [facility]  Facility: [facility] #: [id number]   Start of Care: 04/07/2023  Next Review Date: 04/014/2023  Care Coordinator: Alley  P#: [phone] x  [phone]  Submit next review to: Fax  [phone]  Care Manager notified: Mike Diana

## 2023-04-07 NOTE — DISCHARGE INSTR - OTHER ORDERS
Skin Care Plan:  1-Left Forearm/Wrist Skin Tear: Cleanse area with NS and pat dry  Apply dermagran to wound bed, cover with an ABD and secure with ze wrap  Change every other day or PRN  2-Turn/reposition q2h or when medically stable for pressure re-distribution on skin   3-Elevate heels to offload pressure  4-Moisturize skin daily with skin nourishing cream  5-Ehob cushion in chair when out of bed  6-Preventative Hydraguard to bilateral heels and sacro-buttocks BID and PRN  7-Left Hip: Cleanse area with NS and pat dry  Apply small sacral allevyn foam dressing  Preet with T for Treatment and change every other day or PRN

## 2023-04-07 NOTE — SPEECH THERAPY NOTE
"Speech Language/Pathology    Speech/Language Pathology Progress Note    Patient Name: Timur Judge  MOXVB'G Date: 4/7/2023     Problem List  Active Problems:    Benign essential hypertension    Late onset Alzheimer's disease without behavioral disturbance ([facility] )    Closed fracture of multiple pubic rami (HCC)    Stroke-like symptoms       Past Medical History  Past Medical History:   Diagnosis Date   • Anorexia     last assessed: 8/9/2013   • Closed fracture dislocation of right ankle joint 5/20/2019    Added automatically from request for surgery [id number]   • GERD (gastroesophageal reflux disease)    • Herpes zoster     last assessed: 9/11/2013   • Hypertension    • Lymphoma ([facility] )     resolved: 1997   • Malignant melanoma of skin ([facility] )     resolved: 2015   • Pelvic fracture ([facility] )         Past Surgical History  Past Surgical History:   Procedure Laterality Date   • BREAST SURGERY      enlargement procedure   • BYPASS FEMORAL-POPLITEAL Left     onset: 8/31/2012   • CATARACT EXTRACTION Right 06/19/2013   • HYSTERECTOMY      resolved: 1970   • ORIF TIBIA & FIBULA FRACTURES Right 5/21/2019    Procedure: OPEN REDUCTION W/ INTERNAL FIXATION (ORIF) ANKLE;  Surgeon: Jaimee Roman MD;  Location: [facility];  Service: Orthopedics   • ORIF TIBIA & FIBULA FRACTURES Right 6/4/2019    Procedure: Revision right ankle ORIF;  Surgeon: Jaimee Roman MD;  Location: [facility];  Service: Orthopedics   • OTHER SURGICAL HISTORY  05/30/2013    PTA Femoral-popliteal initial stenosis left Description: with mechanical thrombectomy, PTA and stent    • VA OPTX DSTL RADL I-ARTIC FX/EPIPHYSL SEP 3 FRAG Left 1/18/2017    Procedure: DISTAL RADIUS OPEN REDUCTION W/ INTERNAL FIXATION ;  Surgeon: Cathy Woodruff MD;  Location: [facility];  Service: Orthopedics         Subjective:  Pt awake and alert  Breakfast tray at bedside  \"The people who order this stuff should really try it  It's not food  \"    Current Diet:  Dysphagia 2 w/ " NT    Objective:  Pt seen for dx dysphagia tx  Attempted pureed material from breakfast tray, pt immediately spits out 2/2 distaste  Pt takes bites of banana without difficulty  Timely and effective bolus break down and transfer  Trial of upgraded material (hard cookie) provided  Min reduced bite strength, pt prefers to break into pieces to place into oral cavity  Min prolonged mastication and oral organization  Timely transfers without residue  Straw sips of NT taken without overt difficulty  No overt s/s aspiration across solids or nectar thick at this time  Education reviewed on MBS findings/recommendations, and recommendation for upgrade of solids to dysphagia 3 though continuation of NT 2/2 h/o SILENT aspiration on recent MBS  Pt demonstrated understanding and denied questions/concerns, agreeable to ongoing modifications  Assessment:  Pt w/ min oral dysphagia, appropriate for upgrade to dysphagia 3 at this time  No overt s/s aspiration across trials today       Plan/Recommendations:  Dysphagia 3 w/ NT    Frequent/thorough oral care  ST to f/u as able and appropriate

## 2023-04-07 NOTE — CASE MANAGEMENT
Andrea Serna 50 received request for authorization from Care Manager    Authorization request for: SNF  Facility Name: [facility] NPI: [id number]   Facility MD: Dr Vicky Chacko: [id number]  Authorization initiated by contacting insurance: [facility]: [employer]   Pending Reference #: [id number]   Clinicals submitted via: [employer]

## 2023-04-07 NOTE — CASE MANAGEMENT
Case Management Discharge Planning Note    Patient name Zach Lyn [facility] 729/[facility] 756-35 MRN [id number]  : 4/10/1928 Date 2023       Current Admission Date: 2023  Current Admission Diagnosis:Stroke [facility])   Patient Active Problem List    Diagnosis Date Noted   • Stroke ([place] [address] ) 2023   • Hypokalemia 2023   • Closed fracture of multiple pubic rami ([place] [address] ) 2023   • Left hip pain 2023   • Fall 2023   • Diarrhea 08/15/2022   • Anxiety 2022   • Venous stasis dermatitis of both lower extremities 2019   • Late onset Alzheimer's disease without behavioral disturbance ([place] [address] ) 2019   • Orthopedic hardware present 2019   • Ambulatory dysfunction 2019   • Anemia 2017   • Hypertriglyceridemia 10/13/2015   • Lymphoma in remission ([place] [address] ) 2014   • GERD without esophagitis 2013   • Vitamin D deficiency 2013   • Atherosclerosis 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 ([place] [address] ) 2013   • Cataract 2013   • Hypercholesterolemia 2012   • Benign essential hypertension 2012      LOS (days): 2  Geometric Mean LOS (GMLOS) (days): 2 90  Days to GMLOS:0 7     OBJECTIVE:  Risk of Unplanned Readmission Score: 16 33         Current admission status: Inpatient   Preferred Pharmacy:   [address], [address] [facility] [address] [place] [address]  Phone: [phone] Fax: [phone]    Primary Care Provider: Estephanie Villegas DO    Primary Insurance: [facility] RE[facility]  Secondary Insurance:     DISCHARGE DETAILS:      Treatment Team Recommendation: [address] Return  Discharge Destination Plan[de-identified] [address] Return  Transport at Discharge : [address] by [employer] and Unit #):  [facility]  ETA of Transport (Date): 23  ETA of Transport (Time): 1400        Accompanied by: Alone           Family notified[de-identified] Phone message to [employer] [phone] left message pt is returning to [address] [place] [address] Name, [address] : [facility]  Receiving Facility/Agency Phone Number: [phone] x [phone]  Facility/Agency Fax Number: [phone]

## 2023-04-07 NOTE — PLAN OF CARE
Problem: Potential for Falls  Goal: Patient will remain free of falls  Description: INTERVENTIONS:  - Educate patient/family on patient safety including physical limitations  - Instruct patient to call for assistance with activity   - Consult OT/PT to assist with strengthening/mobility   - Keep Call bell within reach  - Keep bed low and locked with side rails adjusted as appropriate  - Keep care items and personal belongings within reach  - Initiate and maintain comfort rounds  - Make Fall Risk Sign visible to staff  - Offer Toileting every 2 Hours, in advance of need  - Initiate/Maintain bed alarm  - Apply yellow socks and bracelet for high fall risk patients  - Consider moving patient to room near nurses station  Outcome: Progressing     Problem: MOBILITY - ADULT  Goal: Maintain or return to baseline ADL function  Description: INTERVENTIONS:  -  Assess patient's ability to carry out ADLs; assess patient's baseline for ADL function and identify physical deficits which impact ability to perform ADLs (bathing, care of mouth/teeth, toileting, grooming, dressing, etc )  - Assess/evaluate cause of self-care deficits   - Assess range of motion  - Assess patient's mobility; develop plan if impaired  - Assess patient's need for assistive devices and provide as appropriate  - Encourage maximum independence but intervene and supervise when necessary  - Involve family in performance of ADLs  - Assess for home care needs following discharge   - Consider OT consult to assist with ADL evaluation and planning for discharge  - Provide patient education as appropriate  Outcome: Progressing  Goal: Maintains/Returns to pre admission functional level  Description: INTERVENTIONS:  - Perform BMAT or MOVE assessment daily    - Set and communicate daily mobility goal to care team and patient/family/caregiver     - Collaborate with rehabilitation services on mobility goals if consulted  - Reposition patient every 2 hours   -Out of bed to chair 2 times a day   - Out of bed for toileting  - Record patient progress and toleration of activity level   Outcome: Progressing     Problem: Neurological Deficit  Goal: Neurological status is stable or improving  Description: Interventions:  - Monitor and assess patient's level of consciousness, motor function, sensory function, and level of assistance needed for ADLs  - Monitor and report changes from baseline  Collaborate with interdisciplinary team to initiate plan and implement interventions as ordered  - Provide and maintain a safe environment  - Consider seizure precautions  - Consider fall precautions  - Consider aspiration precautions  - Consider bleeding precautions  Outcome: Progressing     Problem: Activity Intolerance/Impaired Mobility  Goal: Mobility/activity is maintained at optimum level for patient  Description: Interventions:  - Assess and monitor patient  barriers to mobility and need for assistive/adaptive devices  - Assess patient's emotional response to limitations  - Collaborate with interdisciplinary team and initiate plans and interventions as ordered  - Encourage independent activity per ability   - Maintain proper body alignment  - Perform active/passive rom as tolerated/ordered  - Plan activities to conserve energy   - Turn patient as appropriate  Outcome: Progressing     Problem: Potential for Aspiration  Goal: Non-ventilated patient's risk of aspiration is minimized  Description: Assess and monitor vital signs, respiratory status, and labs (WBC)  Monitor for signs of aspiration (tachypnea, cough, rales, wheezing, cyanosis, fever)  - Assess and monitor patient's ability to swallow  - Place patient up in chair to eat if possible  - HOB up at 90 degrees to eat if unable to get patient up into chair   - Supervise patient during oral intake  - Instruct patient/ family to take small bites  - Instruct patient/ family to take small single sips when taking liquids    - Follow patient-specific strategies generated by speech pathologist   Outcome: Progressing  Goal: Ventilated patient's risk of aspiration is minimized  Description: Assess and monitor vital signs, respiratory status, airway cuff pressure, and labs (WBC)  Monitor for signs of aspiration (tachypnea, cough, rales, wheezing, cyanosis, fever)  - Elevate head of bed 30 degrees if patient has tube feeding   - Monitor tube feeding  Outcome: Progressing     Problem: Nutrition  Goal: Nutrition/Hydration status is improving  Description: Monitor and assess patient's nutrition/hydration status for malnutrition (ex- brittle hair, bruises, dry skin, pale skin and conjunctiva, muscle wasting, smooth red tongue, and disorientation)  Collaborate with interdisciplinary team and initiate plan and interventions as ordered  Monitor patient's weight and dietary intake as ordered or per policy  Utilize nutrition screening tool and intervene per policy  Determine patient's food preferences and provide high-protein, high-caloric foods as appropriate  - Assist patient with eating   - Allow adequate time for meals   - Encourage patient to take dietary supplement as ordered  - Collaborate with clinical nutritionist   - Include patient/family/caregiver in decisions related to nutrition    Outcome: Progressing     Problem: Prexisting or High Potential for Compromised Skin Integrity  Goal: Skin integrity is maintained or improved  Description: INTERVENTIONS:  - Identify patients at risk for skin breakdown  - Assess and monitor skin integrity  - Assess and monitor nutrition and hydration status  - Monitor labs   - Assess for incontinence   - Turn and reposition patient  - Assist with mobility/ambulation  - Relieve pressure over bony prominences  - Avoid friction and shearing  - Provide appropriate hygiene as needed including keeping skin clean and dry  - Evaluate need for skin moisturizer/barrier cream  - Collaborate with interdisciplinary team   - Patient/family teaching  - Consider wound care consult   Outcome: Progressing     Problem: Nutrition/Hydration-ADULT  Goal: Nutrient/Hydration intake appropriate for improving, restoring or maintaining nutritional needs  Description: Monitor and assess patient's nutrition/hydration status for malnutrition  Collaborate with interdisciplinary team and initiate plan and interventions as ordered  Monitor patient's weight and dietary intake as ordered or per policy  Utilize nutrition screening tool and intervene as necessary  Determine patient's food preferences and provide high-protein, high-caloric foods as appropriate       INTERVENTIONS:  - Monitor oral intake, urinary output, labs, and treatment plans  - Assess nutrition and hydration status and recommend course of action  - Evaluate amount of meals eaten  - Assist patient with eating if necessary   - Allow adequate time for meals  - Recommend/ encourage appropriate diets, oral nutritional supplements, and vitamin/mineral supplements  - Order, calculate, and assess calorie counts as needed  - Recommend, monitor, and adjust tube feedings and TPN/PPN based on assessed needs  - Assess need for intravenous fluids  - Provide specific nutrition/hydration education as appropriate  - Include patient/family/caregiver in decisions related to nutrition  Outcome: Progressing

## 2023-04-07 NOTE — CASE MANAGEMENT
Case Management Discharge Planning Note    Patient name Cecille Ray  Location [facility] 729/[facility] 208-06 MRN [id number]  : 4/10/1928 Date 2023       Current Admission Date: 2023  Current Admission Diagnosis:Stroke-like symptoms   Patient Active Problem List    Diagnosis Date Noted   • Stroke-like symptoms 2023   • Hypokalemia 2023   • Closed fracture of multiple pubic rami [facility] ) 2023   • Left hip pain 2023   • Fall 2023   • Diarrhea 08/15/2022   • Anxiety 2022   • Venous stasis dermatitis of both lower extremities 2019   • Late onset Alzheimer's disease without behavioral disturbance ([facility] ) 2019   • Orthopedic hardware present 2019   • Ambulatory dysfunction 2019   • Anemia 2017   • Hypertriglyceridemia 10/13/2015   • Lymphoma in remission ([facility] ) 2014   • GERD without esophagitis 2013   • Vitamin D deficiency 2013   • Atherosclerosis 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 ([facility] ) 2013   • Cataract 2013   • Hypercholesterolemia 2012   • Benign essential hypertension 2012      LOS (days): 2  Geometric Mean LOS (GMLOS) (days): 2 90  Days to GMLOS:0 8     OBJECTIVE:  Risk of Unplanned Readmission Score: 16 29         Current admission status: Inpatient   Preferred Pharmacy:   [address], [address] [facility] [address] [address]  Phone: [phone] Fax: [phone]    Primary Care Provider: Reina Welch DO    Primary Insurance: [address] [place] [address] Avenue Number: [id number]

## 2023-04-07 NOTE — PLAN OF CARE
Problem: Potential for Falls  Goal: Patient will remain free of falls  Description: INTERVENTIONS:  - Educate patient/family on patient safety including physical limitations  - Instruct patient to call for assistance with activity   - Consult OT/PT to assist with strengthening/mobility   - Keep Call bell within reach  - Keep bed low and locked with side rails adjusted as appropriate  - Keep care items and personal belongings within reach  - Initiate and maintain comfort rounds  - Make Fall Risk Sign visible to staff  - Apply yellow socks and bracelet for high fall risk patients  - Consider moving patient to room near nurses station  Outcome: Progressing     Problem: MOBILITY - ADULT  Goal: Maintain or return to baseline ADL function  Description: INTERVENTIONS:  -  Assess patient's ability to carry out ADLs; assess patient's baseline for ADL function and identify physical deficits which impact ability to perform ADLs (bathing, care of mouth/teeth, toileting, grooming, dressing, etc )  - Assess/evaluate cause of self-care deficits   - Assess range of motion  - Assess patient's mobility; develop plan if impaired  - Assess patient's need for assistive devices and provide as appropriate  - Encourage maximum independence but intervene and supervise when necessary  - Involve family in performance of ADLs  - Assess for home care needs following discharge   - Consider OT consult to assist with ADL evaluation and planning for discharge  - Provide patient education as appropriate  Outcome: Progressing     Problem: Neurological Deficit  Goal: Neurological status is stable or improving  Description: Interventions:  - Monitor and assess patient's level of consciousness, motor function, sensory function, and level of assistance needed for ADLs  - Monitor and report changes from baseline  Collaborate with interdisciplinary team to initiate plan and implement interventions as ordered     - Provide and maintain a safe environment  - Consider seizure precautions  - Consider fall precautions  - Consider aspiration precautions  - Consider bleeding precautions    Outcome: Progressing

## 2023-04-11 NOTE — UTILIZATION REVIEW
NOTIFICATION OF ADMISSION DISCHARGE   This is a Notification of Discharge from [address]  Please be advised that this patient has been discharge from our facility  Below you will find the admission and discharge date and time including the patient’s disposition  UTILIZATION REVIEW CONTACT:  Mike Rosales  Utilization   Network Utilization Review Department  Phone: [phone] x carefully listen to the prompts  All voicemails are confidential   Email: [email or web address]     ADMISSION INFORMATION  PRESENTATION DATE: 4/5/2023  8:56 AM  OBERVATION ADMISSION DATE:   INPATIENT ADMISSION DATE: 4/5/23  9:42 AM   DISCHARGE DATE: 4/7/2023  2:03 PM   DISPOSITION:Non [facility] SNF/TCU/SNU    IMPORTANT INFORMATION:  Send all requests for admission clinical reviews, approved or denied determinations and any other requests to dedicated fax number below belonging to the campus where the patient is receiving treatment   List of dedicated fax numbers:  [address] DENIALS (Administrative/Medical Necessity) [phone]   [address] (Maternity/NICU/Pediatrics) [phone]   [facility] [phone]   [place] [address] [phone]   [address] [phone]   220 [facility] [phone]   90 [facility] [phone]   [address] [place] [address] 119 [phone]   [facility]  [phone]   4053 [facility] [phone]   412 [facility] [address] [facility] [phone]

## 2023-04-12 ENCOUNTER — NURSING HOME VISIT (OUTPATIENT)
Dept: GERIATRICS | Facility: OTHER | Age: 88
End: 2023-04-12

## 2023-04-12 DIAGNOSIS — R13.12 OROPHARYNGEAL DYSPHAGIA: ICD-10-CM

## 2023-04-12 DIAGNOSIS — G81.94 LEFT HEMIPARESIS (HCC): ICD-10-CM

## 2023-04-12 DIAGNOSIS — F01.54 VASCULAR DEMENTIA WITH ANXIETY, UNSPECIFIED DEMENTIA SEVERITY (HCC): ICD-10-CM

## 2023-04-12 DIAGNOSIS — J96.01 ACUTE RESPIRATORY FAILURE WITH HYPOXIA (HCC): ICD-10-CM

## 2023-04-12 DIAGNOSIS — I63.9 CEREBROVASCULAR ACCIDENT (CVA), UNSPECIFIED MECHANISM (HCC): Primary | ICD-10-CM

## 2023-04-12 DIAGNOSIS — R53.81 DEBILITY: ICD-10-CM

## 2023-04-12 DIAGNOSIS — I10 BENIGN ESSENTIAL HYPERTENSION: Chronic | ICD-10-CM

## 2023-04-12 DIAGNOSIS — S32.592D CLOSED FRACTURE OF MULTIPLE RAMI OF LEFT PUBIS WITH ROUTINE HEALING, SUBSEQUENT ENCOUNTER: ICD-10-CM

## 2023-04-13 NOTE — PROGRESS NOTES
"[facility]   [address] [facility], [place], [address]  History and Physical  POS: SNF-31    Records Reviewed include: 656 [facility] records    Chief Complaint/ Reason for Admission: Acute CVA with L hemiparesis    History of Present Illness:            80year old female admitted for SNF rehab following hospitalization at [facility]  Presented with acute onset left sided weakness  Evaluated by neurology with neuroimaging revealing acute/subacute in addition to chronic strokes (see MRI report below)  Continues with left side weakness  Fatigue, marginal PO intake and complaint of generalized pain with care reported by nursing  Patient with recent fall and pubic fractures  See A&P below for additional HPI         Allergies    Allergies   Allergen Reactions   • Contrast [Iodinated Contrast Media]      Other reaction(s): \"I get very cold\"   • Iodine - Food Allergy      Other reaction(s): Chills  Reaction Date: 02Aug2011;    • Sulfa Antibiotics Vomiting   • Omnipaque [Iohexol]        Past Medical History  Past Medical History:   Diagnosis Date   • Anorexia     last assessed: 8/9/2013   • Closed fracture dislocation of right ankle joint 5/20/2019    Added automatically from request for surgery [id number]   • GERD (gastroesophageal reflux disease)    • Herpes zoster     last assessed: 9/11/2013   • Hypertension    • Lymphoma ([facility] [address] )     resolved: 1997   • Malignant melanoma of skin ([facility] [address] )     resolved: 2015   • Pelvic fracture ([facility] [address] )         Past Surgical History:   Procedure Laterality Date   • BREAST SURGERY      enlargement procedure   • BYPASS FEMORAL-POPLITEAL Left     onset: 8/31/2012   • CATARACT EXTRACTION Right 06/19/2013   • HYSTERECTOMY      resolved: 1970   • ORIF TIBIA & FIBULA FRACTURES Right 5/21/2019    Procedure: OPEN REDUCTION W/ INTERNAL FIXATION (ORIF) ANKLE;  Surgeon: Justina Whittington MD;  Location: [facility];  Service: Orthopedics   • ORIF TIBIA & FIBULA FRACTURES Right 6/4/2019    " Procedure: Revision right ankle ORIF;  Surgeon: Brooklyn Mahan MD;  Location[facility];  Service: Orthopedics   • OTHER SURGICAL HISTORY  05/30/2013    PTA Femoral-popliteal initial stenosis left Description: with mechanical thrombectomy, PTA and stent    • OR OPTX DSTL RADL I-ARTIC FX/EPIPHYSL SEP 3 FRAG Left 1/18/2017    Procedure: DISTAL RADIUS OPEN REDUCTION W/ INTERNAL FIXATION ;  Surgeon: iRna Barriga MD;  Location: [facility];  Service: Orthopedics       Family History  Family History   Problem Relation Age of Onset   • Heart attack Mother    • Heart failure Father    • Other Sister         thyroid surgery       Social History  Social History     Tobacco Use   Smoking Status Never   Smokeless Tobacco Never      Social History     Substance and Sexual Activity   Alcohol Use Yes   • Alcohol/week: 1 0 standard drink   • Types: 1 Glasses of wine per week    Comment: Per allscripts - social drinker       Social History     Substance and Sexual Activity   Drug Use No      Physical Exam    Temp:98 2F BP:177/90 Pulse:88 Resp:20 O2 Sat:93% 2LNC    Physical Exam  Vitals and nursing note reviewed  Constitutional:       General: She is sleeping  She is not in acute distress  Appearance: She is not toxic-appearing or diaphoretic  HENT:      Head: Normocephalic and atraumatic  Right Ear: External ear normal       Left Ear: External ear normal       Nose: No rhinorrhea  Mouth/Throat:      Mouth: Mucous membranes are moist    Eyes:      General:         Right eye: No discharge  Left eye: No discharge  Pulmonary:      Effort: Pulmonary effort is normal  No respiratory distress  Musculoskeletal:      Cervical back: No rigidity  Skin:     Coloration: Skin is not jaundiced or pale  Neurological:      Mental Status: She is easily aroused  Motor: Weakness (L heimparesis) present  Psychiatric:         Behavior: Behavior is cooperative           Review of Systems:  Review of Systems Constitutional: Positive for activity change, appetite change and fatigue  Negative for fever  Musculoskeletal: Positive for arthralgias  Psychiatric/Behavioral: The patient is not nervous/anxious          List of Current Medications: Medication list reviewed and updated in Epic to reflect most current SNF orderrs    Labs/Diagnostics (reviewed by this provider): Hospital Paperwork  Lab Results   Component Value Date    SODIUM 138 04/07/2023    K 3 5 04/07/2023     04/07/2023    CO2 27 04/07/2023    BUN 16 04/07/2023    CREATININE 0 44 (L) 04/07/2023    GLUC 107 04/07/2023    CALCIUM 8 9 04/07/2023     Lab Results   Component Value Date    WBC 6 75 04/07/2023    HGB 12 9 04/07/2023    HCT 40 1 04/07/2023    MCV 93 04/07/2023     04/07/2023     Lab Results   Component Value Date    HGBA1C 5 5 04/06/2023       Imaging Reviewed:  Echo (4/5/23) EF 01%, grade 1 diastolic dysfunction  VBS (4/6/23) mild oral and mild-moderate pharyngeal dysphagia  MRI brain (4/5/23) acute infarct posterior R gangliocapsular region and posterior R insula; small acute/subacute left frontal lobe cortical infarct; chronic R centrum semiovale lacunar infarct; chronic R cerebellar infarct    Assessment/Plan:  80year old female with:    Stroke ([facility] [address] )  Acute right gangliocapsular and posterior right insular regions  Acute/subacute left frontal lobe cortical  With left hemiparesis  Continue aspirin, atorvastatin  Hypertensive management as outliend below    Benign essential hypertension  With SBP elevated in 170s morning of admission exam; otherwise trending 100s-140s  Goal <140/90; avoid hypotension  Continue enalapril; BMP ordered for 4/13    Acute respiratory failure with hypoxia (HCC)  Requiring [facility] at [facility]- titrate/wean to maintain sats >92%  Encourage deep breathing  Treatment of dysphagia as outlined  Consider CXR if no improvement    Closed fracture of multiple pubic rami (HCC)  S/p recent fall  Add scheduled acetaminophen 975mg TID  Continue topical lidocaine patch  Continue robaxin PRN- discontinue if no use    Oropharyngeal dysphagia  In setting of acute CVA  Speech therapy consult- evaluate and treat  Aspiration precautions  Modified diet    Dementia (HCC)  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 CVA  Supportive care; management of acute and chronic medical conditions as outlined  Delirium precautions  At risk for progressive cognitive decline in setting of acute stroke    Debility  Multifactorial; in setting of above  Admit to SNF for rehab with plan for transition back to LTC setting following completion  PT/OT consults placed- evaluate and treat  Supportive care, nutritional support, ADL support  Fall precautions  Management of acute and chronic medical conditions as outlined    Pain: Yes- reported with care  Rehab Potential:Fair  Patient Informed of Medical Condition: Yes  Patient is Capable of Understanding Their Right: Yes, limited in setting of cognitive impairment  Prognosis:Guarded  Discharge Plan: LTC  Surrogate Decision Maker: Daughter  Advanced Directives: Yes  Code status: DNR/DNI    Immunization History   Administered Date(s) Administered   • INFLUENZA 01/09/2015, 10/04/2018, 10/30/2019   • TD (adult) Preservative Free 01/13/2017   • Td (adult), adsorbed 01/13/2017     Erin [address], DO  4/12/23

## 2023-04-14 PROBLEM — R63.0 POOR APPETITE: Status: ACTIVE | Noted: 2023-04-14

## 2023-04-14 PROBLEM — R33.9 URINARY RETENTION: Status: ACTIVE | Noted: 2023-04-14

## 2023-04-17 PROBLEM — N39.0 E. COLI URINARY TRACT INFECTION: Status: ACTIVE | Noted: 2023-04-17

## 2023-04-17 PROBLEM — B96.20 E. COLI URINARY TRACT INFECTION: Status: ACTIVE | Noted: 2023-04-17

## 2023-04-21 ENCOUNTER — NURSING HOME VISIT (OUTPATIENT)
Dept: GERIATRICS | Facility: OTHER | Age: 88
End: 2023-04-21

## 2023-04-21 DIAGNOSIS — I63.9 CEREBROVASCULAR ACCIDENT (CVA), UNSPECIFIED MECHANISM (HCC): ICD-10-CM

## 2023-04-21 DIAGNOSIS — R63.0 POOR APPETITE: Primary | ICD-10-CM

## 2023-04-21 DIAGNOSIS — N39.0 E. COLI URINARY TRACT INFECTION: ICD-10-CM

## 2023-04-21 DIAGNOSIS — L89.152 SACRAL DECUBITUS ULCER, STAGE II (HCC): ICD-10-CM

## 2023-04-21 DIAGNOSIS — Z71.89 GOALS OF CARE, COUNSELING/DISCUSSION: ICD-10-CM

## 2023-04-21 DIAGNOSIS — S32.592D CLOSED FRACTURE OF MULTIPLE RAMI OF LEFT PUBIS WITH ROUTINE HEALING, SUBSEQUENT ENCOUNTER: ICD-10-CM

## 2023-04-21 DIAGNOSIS — B96.20 E. COLI URINARY TRACT INFECTION: ICD-10-CM

## 2023-04-21 NOTE — PROGRESS NOTES
Facility: [facility]  [address] [place] [address], [facility] [address]   POS: 31 (STR)  Progress Note    Chief Complaint/Reason for visit: STR follow up visit  Code status:  DNR/ Do not hospitalize  History of Present Illness: 80-year-old female seen and examined for STR follow up of acute and chronic medical conditions  Patient is participating with therapy using her right side since her left side is flaccid  She becomes more tired and sleepy as the day goes on  Received patient in bed  Patient was awake and answered appropriately to questions  Discussed goals of care with care with patient and she verbalized that she does not want to go to the hospital if her condition should worsen  Patient made aware that she can deteriorate rapidly without nutrition  I spoke to patient's daughter Mey Rubi via phone and informed her that patient's appetite and oral intake remains very poor and of her wish not to return to the hospital if her condition worsens  Mey Rubi agrees with her mother's decision  Past Medical History: unchanged from history and physical  Past Medical History:   Diagnosis Date   • Anorexia     last assessed: 8/9/2013   • Closed fracture dislocation of right ankle joint 5/20/2019    Added automatically from request for surgery [id number]   • GERD (gastroesophageal reflux disease)    • Herpes zoster     last assessed: 9/11/2013   • Hypertension    • Lymphoma ([facility] )     resolved: 1997   • Malignant melanoma of skin ([facility] )     resolved: 2015   • Pelvic fracture ([facility] )      Family History: unchanged from history and physical  Social History: unchanged from history and physical  Review of systems: As per review of medical illness, all other systems reviewed and negative  Medications: All medication and routine orders were reviewed and updated  Allergies: Reviewed and unchanged  Consults reviewed: Other  Labs/Diagnostics (reviewed by this provider): Copy in Chart    Imaging Reviewed: None today    Physical Exam  Weight: 132 6 lb  Temp: 98 2          BP: 134/70  Pulse: 74  Resp: 18      O2 Sat:   Constitutional: Normocephalic  Orientation:Person and Place     Physical Exam  Vitals and nursing note reviewed  Constitutional:       General: She is not in acute distress  Appearance: She is ill-appearing  She is not toxic-appearing or diaphoretic  HENT:      Head: Normocephalic  Nose: No congestion or rhinorrhea  Mouth/Throat:      Mouth: Mucous membranes are dry  Pharynx: No oropharyngeal exudate  Eyes:      General:         Right eye: No discharge  Left eye: No discharge  Extraocular Movements: Extraocular movements intact  Conjunctiva/sclera: Conjunctivae normal       Pupils: Pupils are equal, round, and reactive to light  Cardiovascular:      Rate and Rhythm: Normal rate  Pulses: Normal pulses  Pulmonary:      Effort: Pulmonary effort is normal  No respiratory distress  Breath sounds: Normal breath sounds  No wheezing, rhonchi or rales  Abdominal:      General: Bowel sounds are normal  There is no distension  Palpations: Abdomen is soft  Tenderness: There is no abdominal tenderness  There is no guarding  Genitourinary:     Comments: Indwelling urinary catheter intact and pain-free yellow urine  Musculoskeletal:      Right lower leg: No edema  Left lower leg: No edema  Comments: Moves all 4 extremities  Skin:     General: Skin is warm and dry  Capillary Refill: Capillary refill takes less than 2 seconds  Comments: Sacral ulcer stage II   Neurological:      Mental Status: She is alert  Mental status is at baseline  Motor: Weakness present  Comments: Alert and oriented to self, place, and current situation       Psychiatric:         Mood and Affect: Mood normal          Behavior: Behavior normal        Assessment/Plan:  28-year-old female with:    Poor appetite  Continues with poor oral intake  Patient states that she never feels hungry  Patient is at high risk for rapid deterioration in setting of recent hospitalization for stroke, deconditioning, and multiple comorbidities  Nursing to continue feeding patient  Nutritional supplements  Continue to monitor weights    Sacral decubitus ulcer, stage II (HCC)  Air mattress was ordered  Apply exuderm to sacral ulcer and change every 3 days and PRN as needed for soilage  Turn and reposition  Offloading  Nursing to monitor progress every week    Stroke [facility])  Recently hospitalized  Left side is flaccid  Continue supportive care at SNF for ADLs  Continue PT/OT/ST  Continue aspirin 81 mg for DVT prophylaxis in setting of recent pubic fracture until 4/24/2023 and decrease aspirin to 81 mg daily  Continue Lipitor  Follow-up with neurology    Closed fracture of multiple pubic rami (HCC)  S/p fall  Non-operative management as per orthopedics  Continue aspirin for DVT prophylaxis  Follow-up with orthopedics    Goals of care, counseling/discussion  Goals of care discussed with patient's daughter via phone  Patient's daughter's stated that she would like to abide by her mother's wishes  Patient states that she does not want to be hospitalized if her condition should deteriorate    E  coli urinary tract infection  Urine culture >100,000 Ecoli   Continue Keflex as ordered  Encourage p o  intake    This note was completed in part utilizing [address] [facility] one direct voice recognition software  Grammatical errors, random word insertion, spelling mistakes, and incomplete sentences may be an occasional consequence of the system secondary to software limitations, ambient noise and hardware issues  At the time of dictation, efforts were made to edit, clarify and/or correct errors  Please read the chart carefully and recognize, using context, where substitutions have occurred    If you have any questions or concerns about the context, text or information contained within the body of this dictation, please contact myself, the provider, for further clarification      Alter [address]  5/85/84395:90 PM

## 2023-04-24 ENCOUNTER — NURSING HOME VISIT (OUTPATIENT)
Dept: GERIATRICS | Facility: OTHER | Age: 88
End: 2023-04-24

## 2023-04-24 DIAGNOSIS — R33.9 URINARY RETENTION: ICD-10-CM

## 2023-04-24 DIAGNOSIS — I63.9 CEREBROVASCULAR ACCIDENT (CVA), UNSPECIFIED MECHANISM (HCC): ICD-10-CM

## 2023-04-24 DIAGNOSIS — E87.0 HYPERNATREMIA: Primary | ICD-10-CM

## 2023-04-24 DIAGNOSIS — R63.4 WEIGHT LOSS: ICD-10-CM

## 2023-04-24 DIAGNOSIS — B96.20 E. COLI URINARY TRACT INFECTION: ICD-10-CM

## 2023-04-24 DIAGNOSIS — N39.0 E. COLI URINARY TRACT INFECTION: ICD-10-CM

## 2023-04-24 PROBLEM — J96.01 ACUTE RESPIRATORY FAILURE WITH HYPOXIA (HCC): Status: ACTIVE | Noted: 2023-04-24

## 2023-04-24 PROBLEM — G81.94 LEFT HEMIPARESIS (HCC): Status: ACTIVE | Noted: 2023-04-24

## 2023-04-24 PROBLEM — R13.12 OROPHARYNGEAL DYSPHAGIA: Status: ACTIVE | Noted: 2023-04-24

## 2023-04-24 PROBLEM — F03.90 DEMENTIA (HCC): Status: ACTIVE | Noted: 2019-11-13

## 2023-04-24 PROBLEM — L89.152 SACRAL DECUBITUS ULCER, STAGE II (HCC): Status: ACTIVE | Noted: 2023-04-24

## 2023-04-24 PROBLEM — Z71.89 GOALS OF CARE, COUNSELING/DISCUSSION: Status: ACTIVE | Noted: 2023-04-24

## 2023-04-24 NOTE — ASSESSMENT & PLAN NOTE
Goals of care discussed with patient's daughter via phone  Patient's daughter's stated that she would like to abide by her mother's wishes    Patient states that she does not want to be hospitalized if her condition should deteriorate

## 2023-04-24 NOTE — ASSESSMENT & PLAN NOTE
With SBP elevated in 170s morning of admission exam; otherwise trending 100s-140s  Goal <140/90; avoid hypotension  Continue enalapril; BMP ordered for 4/13

## 2023-04-24 NOTE — ASSESSMENT & PLAN NOTE
Requiring oxygen at [facility]- titrate/wean to maintain sats >92%  Encourage deep breathing  Treatment of dysphagia as outlined  Consider CXR if no improvement

## 2023-04-24 NOTE — ASSESSMENT & PLAN NOTE
S/p fall  Non-operative management as per orthopedics  Continue aspirin for DVT prophylaxis  Follow-up with orthopedics

## 2023-04-24 NOTE — ASSESSMENT & PLAN NOTE
Multifactorial; in setting of above  Admit to SNF for rehab with plan for transition back to LTC setting following completion  PT/OT consults placed- evaluate and treat  Supportive care, nutritional support, ADL support  Fall precautions  Management of acute and chronic medical conditions as outlined

## 2023-04-24 NOTE — ASSESSMENT & PLAN NOTE
Continues with poor oral intake  Patient states that she never feels hungry  Patient is at high risk for rapid deterioration in setting of recent hospitalization for stroke, deconditioning, and multiple comorbidities  Nursing to continue feeding patient  Nutritional supplements  Continue to monitor weights

## 2023-04-24 NOTE — ASSESSMENT & PLAN NOTE
Urine culture >100,000 E   Coli  Patient will complete 7-day course of oral Keflex today  Afebrile and hemodynamically stable  No complaint of suprapubic pressure, tenderness, flank pain  Currently has indwelling urinary catheter for urinary retention

## 2023-04-24 NOTE — ASSESSMENT & PLAN NOTE
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

## 2023-04-24 NOTE — ASSESSMENT & PLAN NOTE
Required indwelling urinary catheter due to urinary retention likely in setting of neurogenic bladder from stroke  IUC places patient at risk for CAUTI and voiding trial ordered for 4/27/2023

## 2023-04-24 NOTE — ASSESSMENT & PLAN NOTE
Air mattress was ordered  Apply exuderm to sacral ulcer and change every 3 days and PRN as needed for soilage  Turn and reposition  Offloading  Nursing to monitor progress every week

## 2023-04-24 NOTE — ASSESSMENT & PLAN NOTE
Acute right gangliocapsular and posterior right insular regions  Acute/subacute left frontal lobe cortical  With left hemiparesis  Continue aspirin, atorvastatin  Hypertensive management as outliend below

## 2023-04-24 NOTE — PROGRESS NOTES
Facility: [facility]  [address] [place] [address], [address]  POS: 31 (STR)  Progress Note    Chief Complaint/Reason for visit: STR follow up visit  Code status: DNR  History of Present Illness: 49-year-old female seen and examined for STR follow up of acute and chronic medical conditions  Received patient resting in bed and appeared in no distress  Patient's nurse reported that patient's appetite remains poor  Patient has lost 6 pounds since 4/7/2023  With elevated sodium level today 152 due to poor oral intake  Patient's daughter was notified and agreed to D5W fluid via clysis  Patient agreeable to exercise, rolling and sitting EOB  Patient with increased alertness and participation in therapy as reported by therapist   Patient will complete course of antibiotic for UTI today  Past Medical History: unchanged from history and physical  Past Medical History:   Diagnosis Date   • Anorexia     last assessed: 8/9/2013   • Closed fracture dislocation of right ankle joint 5/20/2019    Added automatically from request for surgery [id number]   • GERD (gastroesophageal reflux disease)    • Herpes zoster     last assessed: 9/11/2013   • Hypertension    • Lymphoma ([facility] [address] )     resolved: 1997   • Malignant melanoma of skin ([facility] [address] )     resolved: 2015   • Pelvic fracture ([facility] [address] )      Family History: unchanged from history and physical  Social History: unchanged from history and physical  Review of systems: As per review of medical illness, all other systems reviewed and negative  Medications:  All medication and routine orders were reviewed and updated  Allergies: Reviewed and unchanged  Consults reviewed:PT, OT and Other  Labs/Diagnostics (reviewed by this provider): Copy in Chart paper chart    Imaging Reviewed: None today    Physical Exam    Weight: 132 6 pounds Temp:  97 8         BP: 112/68  Pulse: 62 Resp: 16      O2 Sat:  Constitutional: Pallor  Orientation:Person and Place     Physical Exam  Vitals and nursing note reviewed  Constitutional:       General: She is not in acute distress  Appearance: She is not toxic-appearing or diaphoretic  Comments: Frail and elderly female who appears with chronic illness  In no distress  HENT:      Nose: No congestion or rhinorrhea  Mouth/Throat:      Pharynx: No oropharyngeal exudate  Eyes:      General:         Right eye: No discharge  Left eye: No discharge  Extraocular Movements: Extraocular movements intact  Conjunctiva/sclera: Conjunctivae normal       Pupils: Pupils are equal, round, and reactive to light  Cardiovascular:      Rate and Rhythm: Normal rate  Pulses: Normal pulses  Pulmonary:      Effort: Pulmonary effort is normal  No respiratory distress  Breath sounds: Normal breath sounds  No wheezing, rhonchi or rales  Abdominal:      General: Bowel sounds are normal  There is no distension  Palpations: Abdomen is soft  Tenderness: There is no abdominal tenderness  There is no guarding  Musculoskeletal:      Cervical back: Neck supple  No rigidity  Right lower leg: No edema  Left lower leg: No edema  Comments: Moves all 4 extremities  Lymphadenopathy:      Cervical: No cervical adenopathy  Skin:     General: Skin is warm and dry  Capillary Refill: Capillary refill takes less than 2 seconds  Neurological:      Mental Status: She is alert  Mental status is at baseline  Psychiatric:         Mood and Affect: Mood normal          Behavior: Behavior normal          Thought Content: Thought content normal        Assessment/Plan:  17-year-old female with:    Hypernatremia  Sodium level 152 today in setting of poor oral intake  D5W ordered via clysis after discussing with patient's daughter  Encourage p o  intake  Recheck BMP in 1 week    Weight loss  Weight loss of 6 pounds noted since 4/7/2023    Weight loss likely multifactorial secondary to recent hospitalization, stroke, weakness, immobility, pubic Rami fracture, UTI  Nursing to continue assisting patient with all meals  Continue nutritional supplements  Monitor weights  See above plan    E  coli urinary tract infection  Urine culture >100,000 E  Coli  Patient will complete 7-day course of oral Keflex today  Afebrile and hemodynamically stable  No complaint of suprapubic pressure, tenderness, flank pain  Currently has indwelling urinary catheter for urinary retention    Urinary retention  Required indwelling urinary catheter due to urinary retention likely in setting of neurogenic bladder from stroke  IUC places patient at risk for CAUTI and voiding trial ordered for 4/27/2023    Stroke [facility])  With left hemiparesis  Continue supportive care at [facility]  Continue PT/OT/ST  Continue aspirin and Lipitor as ordered  Follow-up with neurology outpatient as scheduled on 5/4/2023    This note was completed in part utilizing [facility] [facility] [employer] direct voice recognition software  Grammatical errors, random word insertion, spelling mistakes, and incomplete sentences may be an occasional consequence of the system secondary to software limitations, ambient noise and hardware issues  At the time of dictation, efforts were made to edit, clarify and/or correct errors  Please read the chart carefully and recognize, using context, where substitutions have occurred  If you have any questions or concerns about the context, text or information contained within the body of this dictation, please contact myself, the provider, for further clarification      Corrine [address], [address]  4/29/10710:66 PM

## 2023-04-24 NOTE — ASSESSMENT & PLAN NOTE
Weight loss of 6 pounds noted since 4/7/2023    Weight loss likely multifactorial secondary to recent hospitalization, stroke, weakness, immobility, pubic Rami fracture, UTI  Nursing to continue assisting patient with all meals  Continue nutritional supplements  Monitor weights  See above plan

## 2023-04-24 NOTE — ASSESSMENT & PLAN NOTE
In setting of acute CVA  Speech therapy consult- evaluate and treat  Aspiration precautions  Modified diet

## 2023-04-24 NOTE — ASSESSMENT & PLAN NOTE
Sodium level 152 today in setting of poor oral intake  D5W ordered via clysis after discussing with patient's daughter  Encourage p o  intake  Recheck BMP in 1 week

## 2023-04-24 NOTE — ASSESSMENT & PLAN NOTE
S/p recent fall  Add scheduled acetaminophen 975mg TID  Continue topical lidocaine patch  Continue robaxin PRN- discontinue if no use

## 2023-04-24 NOTE — ASSESSMENT & PLAN NOTE
Recently hospitalized    Left side is flaccid  Continue supportive care at SNF for ADLs  Continue PT/OT/ST  Continue aspirin 81 mg for DVT prophylaxis in setting of recent pubic fracture until 4/24/2023 and decrease aspirin to 81 mg daily  Continue Lipitor  Follow-up with neurology

## 2023-04-24 NOTE — ASSESSMENT & PLAN NOTE
With left hemiparesis  Continue supportive care at SNF  Continue PT/OT/ST  Continue aspirin and Lipitor as ordered  Follow-up with neurology outpatient as scheduled on 5/4/2023

## 2023-04-26 ENCOUNTER — NURSING HOME VISIT (OUTPATIENT)
Dept: GERIATRICS | Facility: OTHER | Age: 88
End: 2023-04-26

## 2023-04-26 DIAGNOSIS — I63.9 CEREBROVASCULAR ACCIDENT (CVA), UNSPECIFIED MECHANISM (HCC): ICD-10-CM

## 2023-04-26 DIAGNOSIS — E87.0 HYPERNATREMIA: ICD-10-CM

## 2023-04-26 DIAGNOSIS — R33.9 URINARY RETENTION: ICD-10-CM

## 2023-04-26 DIAGNOSIS — R62.7 ADULT FAILURE TO THRIVE: Primary | ICD-10-CM

## 2023-04-26 NOTE — ASSESSMENT & PLAN NOTE
With left hemiparesis  Patient requires complete assist with ADLs  Continue PT/OT  Continue aspirin and atorvastatin

## 2023-04-26 NOTE — ASSESSMENT & PLAN NOTE
In setting of poor oral intake  D5W ordered via clysis x2 L  Encourage p o  fluids/food  Recheck BMP in 1 week

## 2023-04-26 NOTE — PROGRESS NOTES
Facility: [facility]  [address] [place] [address], [facility] [address]  POS: 31 (STR)  Progress Note    Chief Complaint/Reason for visit: STR follow up visit  Code status: DNI/DNR/DNH  History obtained from nursing staff, therapist, and EMR  History of Present Illness: 40-year-old female seen and examined for STR follow up of acute and chronic medical conditions  Patient reports that she feels tired all the time  Receiving D5W via clysis due to hypernatremia in setting of poor oral intake  Nursing reports that patient ate approximately 5 bites of food today  She continues to report that she does not feel hungry  We will start calorie count in a m for 3 days  With poor participation in therapy today as patient had difficulty staying awake  Past Medical History: unchanged from history and physical  Past Medical History:   Diagnosis Date   • Anorexia     last assessed: 8/9/2013   • Closed fracture dislocation of right ankle joint 5/20/2019    Added automatically from request for surgery [id number]   • GERD (gastroesophageal reflux disease)    • Herpes zoster     last assessed: 9/11/2013   • Hypertension    • Lymphoma ([facility] [address] )     resolved: 1997   • Malignant melanoma of skin ([facility] [address] )     resolved: 2015   • Pelvic fracture ([facility] [address] )      Family History: unchanged from history and physical  Social History: unchanged from history and physical  Review of systems: As per review of medical illness, all other systems reviewed and negative  Medications: All medication and routine orders were reviewed and updated  Allergies: Reviewed and unchanged  Consults reviewed:PT, OT and Other  Labs/Diagnostics (reviewed by this provider): Copy in Chart paper chart    Imaging Reviewed: None today    Physical Exam    Weight:  Temp: 96 2          BP: 135/80    Pulse: 80 Resp: 18       O2 Sat: 94% on room air  Constitutional: Pallor  Orientation:Person and Place     Physical Exam  Vitals and nursing note reviewed     Constitutional:       General: She is not in acute distress  Appearance: She is ill-appearing  She is not toxic-appearing or diaphoretic  HENT:      Head: Normocephalic  Nose: No congestion or rhinorrhea  Mouth/Throat:      Mouth: Mucous membranes are dry  Pharynx: No oropharyngeal exudate  Eyes:      General:         Right eye: No discharge  Left eye: No discharge  Extraocular Movements: Extraocular movements intact  Conjunctiva/sclera: Conjunctivae normal       Pupils: Pupils are equal, round, and reactive to light  Cardiovascular:      Rate and Rhythm: Normal rate  Pulses: Normal pulses  Pulmonary:      Effort: Pulmonary effort is normal  No respiratory distress  Breath sounds: No wheezing or rhonchi  Abdominal:      General: Bowel sounds are normal  There is no distension  Palpations: Abdomen is soft  Tenderness: There is no abdominal tenderness  There is no guarding  Genitourinary:     Comments: Indwelling urinary catheter intact and patent for yellow urine  Musculoskeletal:      Cervical back: Neck supple  No rigidity  Right lower leg: No edema  Left lower leg: No edema  Comments: Left hemiparesis  Lymphadenopathy:      Cervical: No cervical adenopathy  Skin:     General: Skin is warm and dry  Capillary Refill: Capillary refill takes less than 2 seconds  Comments: With sacral pressure ulcer stage II   Neurological:      Mental Status: She is alert  Mental status is at baseline  Comments: Alert and oriented to self, place and current situation  Psychiatric:         Mood and Affect: Mood normal          Behavior: Behavior normal          Thought Content: Thought content normal        Assessment/Plan:  44-year-old female with:    Adult failure to thrive  Multifactorial likely secondary to acute and chronic medical illnesses    With recent stroke  Continues with poor appetite, poor oral intake, and weight loss  Start calorie count tomorrow x3 days    Hypernatremia  In setting of poor oral intake  D5W ordered via clysis x2 L  Encourage p o  fluids/food  Recheck BMP in 1 week    Stroke [facility])  With left hemiparesis  Patient requires complete assist with ADLs  Continue PT/OT  Continue aspirin and atorvastatin    Urinary retention  Urinary retention likely in setting of neurogenic bladder from recent stroke/immobility  Voiding trial on 4/27/2023 indwelling urinary catheter places patient at high risk for catheter associated urinary tract infection  Orders are in place for voiding trial    This note was completed in part utilizing [address] [employer] voice recognition software  Grammatical errors, random word insertion, spelling mistakes, and incomplete sentences may be an occasional consequence of the system secondary to software limitations, ambient noise and hardware issues  At the time of dictation, efforts were made to edit, clarify and/or correct errors  Please read the chart carefully and recognize, using context, where substitutions have occurred  If you have any questions or concerns about the context, text or information contained within the body of this dictation, please contact myself, the provider, for further clarification      Jose Arias  4/44/29462:33 PM

## 2023-04-26 NOTE — ASSESSMENT & PLAN NOTE
Multifactorial likely secondary to acute and chronic medical illnesses    With recent stroke  Continues with poor appetite, poor oral intake, and weight loss  Start calorie count tomorrow x3 days

## 2023-04-26 NOTE — ASSESSMENT & PLAN NOTE
Urinary retention likely in setting of neurogenic bladder from recent stroke/immobility  Voiding trial on 4/27/2023 indwelling urinary catheter places patient at high risk for catheter associated urinary tract infection  Orders are in place for voiding trial

## 2023-04-28 ENCOUNTER — NURSING HOME VISIT (OUTPATIENT)
Dept: GERIATRICS | Facility: OTHER | Age: 88
End: 2023-04-28

## 2023-04-28 DIAGNOSIS — I63.9 CEREBROVASCULAR ACCIDENT (CVA), UNSPECIFIED MECHANISM (HCC): ICD-10-CM

## 2023-04-28 DIAGNOSIS — R33.9 URINARY RETENTION: ICD-10-CM

## 2023-04-28 DIAGNOSIS — R63.0 POOR APPETITE: ICD-10-CM

## 2023-04-28 DIAGNOSIS — R63.4 WEIGHT LOSS: ICD-10-CM

## 2023-04-28 DIAGNOSIS — R00.0 RAPID OR IRREGULAR HEARTBEAT: Primary | ICD-10-CM

## 2023-04-28 NOTE — PROGRESS NOTES
"Facility: [facility]  POS: 31 (STR)  Acute Med Note  Code status: DNI/DNR/DNH    Assessment/Plan:  80-year-old female with:    Rapid or irregular heartbeat  New onset rapid and irregular heart rate of 120s  No shortness of breath, chest pain, palpitations, dizziness, or lightheadedness  Patient would like to be treated at facility and does not want to go to the hospital   Discussed plan of care with patient's nurse  Order metoprolol 25 mg x 1 dose then 12 5 mg p o  every 12 hours; hold for heart rate <60  Patient received D5W x 2 liters via clysis for hypernatremia likely due to poor oral intake  Appetite remains very poor    Urinary retention  Urinary retention in setting of neurogenic bladder due to stroke  Patient failed voiding trial  Nursing will reinsert indwelling urinary catheter  IUC care as per protocol    Weight loss  Multifactorial  Weight loss of 8 6 pounds now since 3/14/2023 due to poor oral intake  Patient's daughter is aware of patient's poor oral intake and weight loss  Continue calorie count for total 3 days  Continue nutritional supplements   Nursing continues to feed patient with every meal    Poor appetite  See above plan    Stroke [facility])  With left hemiparesis and dysphagia  Continue supportive care  Continue PT/OT  Skin prevention breakdown measures in place by nursing staff  Continue aspirin and atorvastatin    Subjective: \"I feel tired and want to go to bed  \"     Patient ID: Carlie Zaldivar is a 80 y o  female  80-year-old female seen and examined at the request of nursing staff for concern of tachycardia  Patient's nurse reported that patient's heart rate is very rapid  Received patient in recliner chair  She was awake and wanted to go back to bed  She denied having shortness of breath, chest pain, or palpitations  Heart rate 120s and irregular  Patient stated that she does not want to go to the hospital and would like to be treated at facility    Informed patient's daughter " Olivia Chappell regarding clinical assessment findings and continued poor oral intake  Today is second day of calorie count  Patient's nurse also reports that patient failed voiding trial and will require indwelling urinary catheter  The following portions of the patient's history were reviewed and updated as appropriate: Allergies, current medications, Past Family history, past medical history, past social history, past surgical history, and problem list     Review of Systems   Constitutional: Positive for fatigue  Negative for chills and diaphoresis  Respiratory: Negative for shortness of breath  Cardiovascular: Negative for chest pain and palpitations  Gastrointestinal: Negative for abdominal pain  Neurological: Negative for dizziness, light-headedness and headaches  Objective:  BP: 125/92  Temp: 97 8   HR: 122-126  Resp: 18  O2 Sat: 97%RA     Physical Exam  Vitals and nursing note reviewed  Constitutional:       General: She is not in acute distress  Appearance: She is ill-appearing  She is not toxic-appearing or diaphoretic  Comments: Frail elderly female who appears weak and tired  HENT:      Head: Normocephalic  Mouth/Throat:      Pharynx: No oropharyngeal exudate  Eyes:      General:         Right eye: No discharge  Left eye: No discharge  Extraocular Movements: Extraocular movements intact  Conjunctiva/sclera: Conjunctivae normal    Cardiovascular:      Rate and Rhythm: Tachycardia present  Rhythm irregular  Pulses: Normal pulses  Pulmonary:      Effort: Pulmonary effort is normal  No respiratory distress  Abdominal:      General: Bowel sounds are normal  There is distension (Suprapubic area)  Palpations: Abdomen is soft  Musculoskeletal:      Cervical back: Neck supple  No rigidity  Right lower leg: No edema  Left lower leg: No edema  Comments: Left hemiparesis   Lymphadenopathy:      Cervical: No cervical adenopathy  Skin:     General: Skin is warm and dry  Capillary Refill: Capillary refill takes less than 2 seconds  Comments: With stage II sacral pressure ulcer not examined at time of visit   Neurological:      Mental Status: She is alert  Motor: Weakness present  Comments: Awake and oriented to self, place, and current situation  Psychiatric:         Behavior: Behavior normal       Comments: Flat affect         This note was completed in part utilizing with [employer] voice recognition software  Grammatical errors, random word insertion, spelling mistakes, and incomplete sentences may be an occasional consequence of the system secondary to software limitations, ambient noise and hardware issues  At the time of dictation, efforts were made to edit, clarify and/or correct errors  Please read the chart carefully and recognize, using context, where substitutions have occurred  If you have any questions or concerns about the context, text or information contained within the body of this dictation, please contact myself, the provider, for further clarification

## 2023-04-28 NOTE — ASSESSMENT & PLAN NOTE
Multifactorial  Weight loss of 8 6 pounds now since 3/14/2023 due to poor oral intake    Patient's daughter is aware of patient's poor oral intake and weight loss  Continue calorie count for total 3 days  Continue nutritional supplements   Nursing continues to feed patient with every meal

## 2023-04-28 NOTE — ASSESSMENT & PLAN NOTE
New onset rapid and irregular heart rate of 120s  No shortness of breath, chest pain, palpitations, dizziness, or lightheadedness  Patient would like to be treated at facility and does not want to go to the hospital   Discussed plan of care with patient's nurse  Order metoprolol 25 mg x 1 dose then 12 5 mg p o  every 12 hours; hold for heart rate <60  Patient received D5W x 2 liters via clysis for hypernatremia likely due to poor oral intake    Appetite remains very poor

## 2023-04-28 NOTE — ASSESSMENT & PLAN NOTE
Urinary retention in setting of neurogenic bladder due to stroke  Patient failed voiding trial  Nursing will reinsert indwelling urinary catheter  IUC care as per protocol

## 2023-04-28 NOTE — ASSESSMENT & PLAN NOTE
With left hemiparesis and dysphagia  Continue supportive care  Continue PT/OT  Skin prevention breakdown measures in place by nursing staff  Continue aspirin and atorvastatin

## 2023-05-02 ENCOUNTER — NURSING HOME VISIT (OUTPATIENT)
Dept: GERIATRICS | Facility: OTHER | Age: 88
End: 2023-05-02

## 2023-05-02 DIAGNOSIS — Z71.89 GOALS OF CARE, COUNSELING/DISCUSSION: ICD-10-CM

## 2023-05-02 DIAGNOSIS — R00.0 RAPID OR IRREGULAR HEARTBEAT: ICD-10-CM

## 2023-05-02 DIAGNOSIS — R62.7 ADULT FAILURE TO THRIVE: Primary | ICD-10-CM

## 2023-05-02 DIAGNOSIS — I63.9 CEREBROVASCULAR ACCIDENT (CVA), UNSPECIFIED MECHANISM (HCC): ICD-10-CM

## 2023-05-02 DIAGNOSIS — I10 BENIGN ESSENTIAL HYPERTENSION: Chronic | ICD-10-CM

## 2023-05-02 DIAGNOSIS — R63.4 WEIGHT LOSS: ICD-10-CM

## 2023-05-02 RX ORDER — METOPROLOL SUCCINATE 25 MG/1
12.5 TABLET, EXTENDED RELEASE ORAL 2 TIMES DAILY
COMMUNITY

## 2023-05-02 NOTE — ASSESSMENT & PLAN NOTE
With left hemiparesis and dysphagia  Patient states that she feels tired all the time; will wean patient off of sertraline which was discussed with patient's daughter  Continue supportive care at [facility]  Continue PT/OT; easily tires during therapy sessions  Continue fall precautions  Continue aspirin and atorvastatin

## 2023-05-02 NOTE — ASSESSMENT & PLAN NOTE
Likely atrial fibrillation  Patient is currently on aspirin 81 mg daily  Improved with metoprolol succinate 12 5 mg twice daily

## 2023-05-02 NOTE — ASSESSMENT & PLAN NOTE
Goals of care discussed with patient's daughter today    Patient's daughter states that she would like to abide by her mother's wishes,  does not want extraordinary measures,  and wants her mother comfortable  Will order comfort care measures and continue PT/OT as tolerated  Discussed plan of care with patient's nurse

## 2023-05-02 NOTE — PROGRESS NOTES
Facility: [facility]  [address] [place] [address], [address]  POS: 31 (STR)  Progress Note    Chief Complaint/Reason for visit: STR follow up visit  Code Status: DNI/DNR/do not hospitalize/comfort care  History of Present Illness: 71-year-old female seen and examined for STR follow up of acute and chronic medical conditions  Received patient resting in bed with her eyes closed  Patient responded to verbal stimuli  No new focal deficits noted  Patient reports that she feels very tired  Calorie count ordered due to poor oral intake which was completed and calculated by dietitian  Appetite and oral intake remains very poor  Spoke to patient's daughter regarding clinical assessment findings and plan of care  See A/P for additional information  Past Medical History: unchanged from history and physical  Past Medical History:   Diagnosis Date    Anorexia     last assessed: 8/9/2013    Closed fracture dislocation of right ankle joint 5/20/2019    Added automatically from request for surgery [id number]    GERD (gastroesophageal reflux disease)     Herpes zoster     last assessed: 9/11/2013    Hypertension     Lymphoma ([facility] [address] )     resolved: 1997    Malignant melanoma of skin ([facility] [address] )     resolved: 2015    Pelvic fracture ([facility] [address] )      Family History: unchanged from history and physical  Social History: unchanged from history and physical  Review of systems: As per review of medical illness, all other systems reviewed and negative  Medications: All medication and routine orders were reviewed and updated  Allergies: Reviewed and unchanged  Consults reviewed:PT, OT and Other  Labs/Diagnostics (reviewed by this provider): Copy in Chart    Imaging Reviewed: None today    Physical Exam  Weight:  Temp: 97 3         BP: 123/82  pulse: 82 resp: 20       O2 Sat: 92% on room air  Constitutional: Pallor  Orientation:Person and Place     Physical Exam  Vitals and nursing note reviewed     Constitutional:       General: She is not in acute distress  Appearance: She is ill-appearing  She is not toxic-appearing or diaphoretic  Comments: Frail elderly female who appears very weak and tired  HENT:      Nose: No congestion or rhinorrhea  Mouth/Throat:      Mouth: Mucous membranes are moist       Pharynx: No oropharyngeal exudate  Eyes:      General:         Right eye: No discharge  Left eye: No discharge  Extraocular Movements: Extraocular movements intact  Conjunctiva/sclera: Conjunctivae normal       Pupils: Pupils are equal, round, and reactive to light  Cardiovascular:      Rate and Rhythm: Normal rate  Pulses: Normal pulses  Pulmonary:      Effort: Pulmonary effort is normal  No respiratory distress  Breath sounds: Normal breath sounds  No wheezing, rhonchi or rales  Abdominal:      General: Bowel sounds are normal  There is no distension  Palpations: Abdomen is soft  Tenderness: There is no abdominal tenderness  There is no guarding  Genitourinary:     Comments: Indwelling urinary catheter intact and patent for dark dede urine  Musculoskeletal:      Cervical back: Neck supple  No rigidity  Right lower leg: No edema  Left lower leg: No edema  Comments: Left hemiparesis   Lymphadenopathy:      Cervical: No cervical adenopathy  Skin:     General: Skin is warm and dry  Capillary Refill: Capillary refill takes less than 2 seconds  Neurological:      Mental Status: She is alert  Comments: No new focal deficits   Psychiatric:         Mood and Affect: Mood normal          Behavior: Behavior normal          Thought Content:  Thought content normal        Assessment/Plan:  80-year-old female with:    Adult failure to thrive  Multifactorial likely secondary to acute and chronic medical conditions  With recent stroke  Patient states that she does not feel hungry  Recently received 2 L of D5W via clysis due to hypernatremia in setting of poor oral intake  Calorie count x3 days completed and calculated by dietitian  Patient is not eating sufficient amount of calories to sustain life and patient's daughter made aware  Day 1: 297 kcal/22g Protein; Day 2: 380 kcal/20g Protein; Day 3: 379kcal/13 g Protein  Continue to feed patient with all meals  Encourage p o  hydration    Rapid or irregular heartbeat  Likely atrial fibrillation  Patient is currently on aspirin 81 mg daily  Improved with metoprolol succinate 12 5 mg twice daily    Stroke [facility])  With left hemiparesis and dysphagia  Patient states that she feels tired all the time; will wean patient off of sertraline which was discussed with patient's daughter  Continue supportive care at [facility]  Continue PT/OT; easily tires during therapy sessions  Continue fall precautions  Continue aspirin and atorvastatin    Benign essential hypertension  SBPs 110s-130s  Continue enalapril    Weight loss  See plan under failure to thrive    Goals of care, counseling/discussion  Goals of care discussed with patient's daughter today  Patient's daughter states that she would like to abide by her mother's wishes,  does not want extraordinary measures,  and wants her mother comfortable  Will order comfort care measures and continue PT/OT as tolerated  Discussed plan of care with patient's nurse    This note was completed in part utilizing [facility] [facility] [employer] voice recognition software  Grammatical errors, random word insertion, spelling mistakes, and incomplete sentences may be an occasional consequence of the system secondary to software limitations, ambient noise and hardware issues  At the time of dictation, efforts were made to edit, clarify and/or correct errors  Please read the chart carefully and recognize, using context, where substitutions have occurred    If you have any questions or concerns about the context, text or information contained within the body of this dictation, please contact myself, the provider, for further clarification      [address]  1/4/08301:71 PM

## 2023-05-02 NOTE — ASSESSMENT & PLAN NOTE
Multifactorial likely secondary to acute and chronic medical conditions  With recent stroke  Patient states that she does not feel hungry  Calorie count x3 days completed and calculated by dietitian    Patient is not eating sufficient amount of calories to sustain life and patient's daughter made aware  Day 1: 297 kcal/22g Protein; Day 2: 380 kcal/20g Protein; Day 3: 379kcal/13 g Protein  Continue to feed patient with all meals  Encourage p o  hydration

## 2023-05-05 ENCOUNTER — NURSING HOME VISIT (OUTPATIENT)
Dept: GERIATRICS | Facility: OTHER | Age: 88
End: 2023-05-05

## 2023-05-05 DIAGNOSIS — N31.9 NEUROGENIC BLADDER: ICD-10-CM

## 2023-05-05 DIAGNOSIS — R62.7 ADULT FAILURE TO THRIVE: Primary | ICD-10-CM

## 2023-05-05 DIAGNOSIS — I63.9 CEREBROVASCULAR ACCIDENT (CVA), UNSPECIFIED MECHANISM (HCC): ICD-10-CM

## 2023-05-05 DIAGNOSIS — F01.54 VASCULAR DEMENTIA WITH ANXIETY, UNSPECIFIED DEMENTIA SEVERITY (HCC): ICD-10-CM

## 2023-05-05 DIAGNOSIS — R00.0 RAPID OR IRREGULAR HEARTBEAT: ICD-10-CM

## 2023-05-05 DIAGNOSIS — R63.0 POOR APPETITE: ICD-10-CM

## 2023-05-05 NOTE — ASSESSMENT & PLAN NOTE
At risk for progressive decline in setting of recent stroke  Continue supportive care with ADLs  No behavior issues reported   Continue delirium precautions  Maintain sleep/wake cycle

## 2023-05-05 NOTE — ASSESSMENT & PLAN NOTE
With rapid and irregular heart rate in the 120s on 4/28/2023 and started on metoprolol  EKG inpatient 4/5/2023 sinus rhythm with sinus arrhythmia with occasional premature ventricular complexes  LVEF 60% as per most recent echo 4/5/2023  Heart rate improved on metoprolol and trending 70s-80s

## 2023-05-05 NOTE — ASSESSMENT & PLAN NOTE
Failed voiding trial likely in setting of recent stroke and immobility  Continue indwelling urinary catheter  Continue IUC care as per protocol

## 2023-05-05 NOTE — ASSESSMENT & PLAN NOTE
With left hemiparesis and dysphagia  Sertraline may have contributed to fatigue; weaning off of sertraline

## 2023-05-05 NOTE — ASSESSMENT & PLAN NOTE
With continued poor appetite and poor oral intake   Recent calorie count revealed that patient is not eating enough to sustain life   With weight loss of 8 6 lb since 3/14/2023  Continue to feed patient with all meals/encourage po hydration

## 2023-05-09 ENCOUNTER — TELEPHONE (OUTPATIENT)
Dept: NEUROLOGY | Facility: CLINIC | Age: 88
End: 2023-05-09

## 2023-05-09 NOTE — TELEPHONE ENCOUNTER
Post CVA Discharge Follow Up  Hospitalization: 4/5/23-4/7/23    According to chart, patient discharged to [facility]  Upon review of chart, patient's daughter canceled follow up appointment on 5/4/23  Called facility, reached the   Requested to speak with the patient's nurse to obtain an update  She transferred the call to a line that is not accepting calls at this time  The line was then disconnected  Will attempt again at a later time

## 2023-05-17 NOTE — TELEPHONE ENCOUNTER
Post CVA Discharge Follow Up  Hospitalization: 4/5/23-4/7/23     According to chart, patient discharged to [facility]        Upon review of chart, patient's daughter canceled follow up appointment on 5/4/23       Called facility, reached the   Requested to speak with the patient's nurse to obtain an update  Spoke with Martin Mendoza, the patient's nurse  She denies any new or worsening stroke-like symptoms since discharge  Patient continues to have the following symptoms: left sided weakness  She is non ambulatory  Requires a wilfredo lift to be placed in her wheelchair  Reports patient is on room air and confirmed how the patient has a history of dementia  She reports how the patient recently went on hospice  Closing encounter due to transition to hospice

## 2023-05-30 ENCOUNTER — TELEPHONE (OUTPATIENT)
Dept: CARDIOLOGY CLINIC | Facility: CLINIC | Age: 88
End: 2023-05-30

## 2023-06-14 ENCOUNTER — TELEPHONE (OUTPATIENT)
Age: 88
End: 2023-06-14

## 2023-06-14 NOTE — TELEPHONE ENCOUNTER
Verónica Bates from [address] [place] [address] ([phone]) called for orders to take patient off all medications except comfort medication

## 2023-06-16 PROBLEM — B96.20 E. COLI URINARY TRACT INFECTION: Status: RESOLVED | Noted: 2023-04-17 | Resolved: 2023-06-16

## 2023-06-16 PROBLEM — N39.0 E. COLI URINARY TRACT INFECTION: Status: RESOLVED | Noted: 2023-04-17 | Resolved: 2023-06-16

## 2023-06-21 ENCOUNTER — NURSING HOME VISIT (OUTPATIENT)
Dept: GERIATRICS | Facility: OTHER | Age: 88
End: 2023-06-21
Payer: COMMERCIAL

## 2023-06-21 DIAGNOSIS — G81.94 LEFT HEMIPARESIS (HCC): ICD-10-CM

## 2023-06-21 DIAGNOSIS — I63.9 CEREBROVASCULAR ACCIDENT (CVA), UNSPECIFIED MECHANISM (HCC): ICD-10-CM

## 2023-06-21 DIAGNOSIS — R13.12 OROPHARYNGEAL DYSPHAGIA: ICD-10-CM

## 2023-06-21 DIAGNOSIS — I10 BENIGN ESSENTIAL HYPERTENSION: Primary | Chronic | ICD-10-CM

## 2023-06-21 DIAGNOSIS — R53.81 DEBILITY: ICD-10-CM

## 2023-06-21 DIAGNOSIS — F01.54 VASCULAR DEMENTIA WITH ANXIETY, UNSPECIFIED DEMENTIA SEVERITY (HCC): ICD-10-CM

## 2023-06-21 DIAGNOSIS — Z51.5 HOSPICE CARE PATIENT: ICD-10-CM

## 2023-06-21 PROCEDURE — 99309 SBSQ NF CARE MODERATE MDM 30: CPT | Performed by: FAMILY MEDICINE

## 2023-06-21 NOTE — PROGRESS NOTES
[facility] CHILDREN   [address] [facility], [place], [address]  Progress Note  POS: Nursing Facility/LTC-32  Hospice Patient    Chief Complaint/Reason for visit: Follow up of chronic medical conditions  History of Present Illness: 80year old female evaluated for routine follow up of chronic medical conditions  Continues on hospice care  Has required PRN morphine for pain  Review of systems: Review of Systems   Unable to perform ROS: Dementia   Musculoskeletal: Positive for arthralgias  Medications: Changes made- see written orders    Physical Exam    Temp:97 6F BP:110/70 Pulse:56 Resp:14 O2 Sat:93%RA    Physical Exam  Vitals and nursing note reviewed  Constitutional:       General: She is not in acute distress  Appearance: She is ill-appearing (chronically)  She is not toxic-appearing or diaphoretic  HENT:      Head: Normocephalic  Nose: No rhinorrhea  Mouth/Throat:      Mouth: Mucous membranes are dry  Eyes:      General:         Right eye: No discharge  Left eye: No discharge  Pulmonary:      Effort: Pulmonary effort is normal  No respiratory distress  Musculoskeletal:      Cervical back: No rigidity  Skin:     Coloration: Skin is not jaundiced or pale  Neurological:      Motor: Weakness (L hemiparesis) present  Psychiatric:         Behavior: Behavior is cooperative         Assessment/Plan:  80year old female with:    Benign essential hypertension  Goal <150/90  Enalapril and metoprolol discontinued; SBP trending in 110s    Stroke [facility])  Acute right gangliocapsular and posterior right insular regions- diagnosed April 2023  Acute/subacute left frontal lobe cortical  With left hemiparesis and dysphagia  With progressive decline, has transitioned to hospice care    Dementia [facility])  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

## 2023-06-29 PROBLEM — Z51.5 HOSPICE CARE PATIENT: Status: ACTIVE | Noted: 2023-06-29

## 2023-06-29 RX ORDER — LORAZEPAM 0.5 MG/1
0.5 TABLET ORAL EVERY 6 HOURS PRN
COMMUNITY
Start: 2023-05-15

## 2023-06-29 RX ORDER — MORPHINE SULFATE 20 MG/ML
5 SOLUTION ORAL
COMMUNITY
Start: 2023-05-15

## 2023-06-29 NOTE — ASSESSMENT & PLAN NOTE
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

## 2023-06-29 NOTE — ASSESSMENT & PLAN NOTE
Acute right gangliocapsular and posterior right insular regions- diagnosed April 2023  Acute/subacute left frontal lobe cortical  With left hemiparesis and dysphagia  With progressive decline, has transitioned to hospice care

## 2023-07-12 ENCOUNTER — NURSING HOME VISIT (OUTPATIENT)
Dept: GERIATRICS | Facility: OTHER | Age: 88
End: 2023-07-12
Payer: MEDICARE

## 2023-07-12 DIAGNOSIS — Z51.5 HOSPICE CARE PATIENT: ICD-10-CM

## 2023-07-12 DIAGNOSIS — I63.9 CEREBROVASCULAR ACCIDENT (CVA), UNSPECIFIED MECHANISM (HCC): Primary | ICD-10-CM

## 2023-07-12 DIAGNOSIS — N31.9 NEUROGENIC BLADDER: ICD-10-CM

## 2023-07-12 DIAGNOSIS — I10 BENIGN ESSENTIAL HYPERTENSION: Chronic | ICD-10-CM

## 2023-07-12 PROCEDURE — 99309 SBSQ NF CARE MODERATE MDM 30: CPT | Performed by: NURSE PRACTITIONER

## 2023-07-12 NOTE — ASSESSMENT & PLAN NOTE
No frequent vital signs as patient is under hospice care  Currently not on any anti-hypertensive medications

## 2023-07-12 NOTE — ASSESSMENT & PLAN NOTE
Diagnosed April 2023 with acute right gangliocapsular and posterior right insular regions  With residual left hemiparesis  Currently on hospice care

## 2023-07-12 NOTE — PROGRESS NOTES
Facility: [facility] CHILDREN  [address] [place] [address], Andrew SHEPHERD  POS: 28 (LTC)  Hospice Care Patient  Progress Note    Chief Complaint/Reason for visit: LTC follow up visit  Code Status:  DNR  History of Present Illness: 31-year-old female seen and examined for LTC follow-up of chronic medical conditions. Received patient resting in bed. She is ill-appearing and had her eyes closed, but responded to verbal stimuli. Hospice care continued. Hospice staff managing sacral wound. Appetite and oral intake remains very poor. Past Medical History: unchanged from history and physical  Past Medical History:   Diagnosis Date   • Anorexia     last assessed: 8/9/2013   • Closed fracture dislocation of right ankle joint 5/20/2019    Added automatically from request for surgery [id number]   • GERD (gastroesophageal reflux disease)    • Herpes zoster     last assessed: 9/11/2013   • Hypertension    • Lymphoma ([address])     resolved: 1997   • Malignant melanoma of skin ([address])     resolved: 2015   • Pelvic fracture ([address])      Family History: unchanged from history and physical  Social History: unchanged from history and physical  Review of systems: As per review of medical illness, all other systems reviewed and negative. Medications: All medication and routine orders were reviewed and updated  Allergies: Reviewed and unchanged  Consults reviewed:PT, OT and Other  Labs/Diagnostics (reviewed by this provider): Copy in Chart    Imaging Reviewed: None today    Physical Exam  No recent vital signs  Constitutional: Normocephalic  Orientation:Person     Physical Exam  Vitals and nursing note reviewed. Constitutional:       General: She is not in acute distress. Appearance: She is ill-appearing. She is not toxic-appearing or diaphoretic. HENT:      Head: Normocephalic. Nose: No congestion or rhinorrhea. Mouth/Throat:      Mouth: Mucous membranes are dry. Pharynx: No oropharyngeal exudate.    Eyes:      General: Right eye: No discharge. Left eye: No discharge. Comments: Eyes closed. Cardiovascular:      Rate and Rhythm: Normal rate. Pulses: Normal pulses. Pulmonary:      Effort: Pulmonary effort is normal. No respiratory distress. Abdominal:      General: Bowel sounds are normal. There is no distension. Palpations: Abdomen is soft. Tenderness: There is no abdominal tenderness. There is no guarding. Genitourinary:     Comments: IUC intact  Musculoskeletal:      Right lower leg: No edema. Left lower leg: No edema. Comments: Moves all 4 extremities. Skin:     General: Skin is warm and dry. Capillary Refill: Capillary refill takes less than 2 seconds. Neurological:      Mental Status: She is alert. Mental status is at baseline. Psychiatric:         Mood and Affect: Mood normal.         Behavior: Behavior normal.         Thought Content: Thought content normal.       Assessment/Plan:  27-year-old female with:    Stroke [facility])  Diagnosed April 2023 with acute right gangliocapsular and posterior right insular regions  With residual left hemiparesis  Currently on hospice care    Neurogenic bladder  With chronic IUC   Continue IUC care per protocol    Benign essential hypertension  No frequent vital signs as patient is under hospice care  Currently not on any anti-hypertensive medications     Hospice care patient  Continue hospice care with [facility] hospice    This note was completed in part utilizing [facility] direct voice recognition software. Grammatical errors, random word insertion, spelling mistakes, and incomplete sentences may be an occasional consequence of the system secondary to software limitations, ambient noise and hardware issues. At the time of dictation, efforts were made to edit, clarify and/or correct errors. Please read the chart carefully and recognize, using context, where substitutions have occurred.   If you have any questions or concerns about the context, text or information contained within the body of this dictation, please contact myself, the provider, for further clarification.     [address] Roshan Joanie  9/29/294469:65 PM

## 2023-07-26 NOTE — TELEPHONE ENCOUNTER
Vicky Fung from [facility] called to request a signature for Death Certificate. Pt passed this morning 7/26 at 9:13am. Case number: [id number].      If any questions please contact [phone]

## 2024-05-08 NOTE — PROGRESS NOTES
Orthopedics   Jamila Beltrán 80 y o  female MRN: [id number]  Unit/Bed#: CW3 339-01    Subjective:  80 y  o female post operative day 2 status post right Ankle ORIF  Pt doing well  Pain controlled  Patient denies any numbness tingling calf pain chest pain shortness of breath      Labs:  0   Lab Value Date/Time    HCT 36 8 05/23/2019 0559    HCT 40 3 05/22/2019 0455    HCT 38 4 05/21/2019 0440    HCT 36 2 01/15/2018 1024    HCT 36 5 11/29/2017 0948    HCT 39 3 10/14/2016 0859    HGB 11 8 05/23/2019 0559    HGB 12 7 05/22/2019 0455    HGB 12 4 05/21/2019 0440    HGB 10 7 (L) 01/15/2018 1024    HGB 10 7 (L) 11/29/2017 0948    HGB 12 8 10/14/2016 0859    INR 0 92 05/20/2019 2033    WBC 8 81 05/23/2019 0559    WBC 7 98 05/22/2019 0455    WBC 7 49 05/21/2019 0440    WBC 5 5 01/15/2018 1024    WBC 4 6 11/29/2017 0948    WBC 4 6 10/14/2016 0859       Meds:    Current Facility-Administered Medications:     acetaminophen (TYLENOL) tablet 975 mg, 975 mg, Oral, Q8H [facility], BALDOMERO Abbott-C, 975 mg at 05/23/19 0530    aspirin chewable tablet 81 mg, 81 mg, Oral, Once per day on Mon Wed Fri, BALDOMERO [employer]-C, 81 mg at 05/22/19 0831    bimatoprost (LUMIGAN) 0 01 % ophthalmic solution 1 drop, 1 drop, Both Eyes, HS, [employer], PA-C, 1 drop at 05/22/19 2150    calcium carbonate-vitamin D (OSCAL-D) 500 mg-200 units per tablet 1 tablet, 1 tablet, Oral, Daily With Breakfast, [employer], PA-BARON, 1 tablet at 05/22/19 0831    cholecalciferol (VITAMIN D3) tablet 3,000 Units, 3,000 Units, Oral, Daily, Javier Judd PA-C, 3,000 Units at 05/22/19 0831    enoxaparin (LOVENOX) subcutaneous injection 40 mg, 40 mg, Subcutaneous, Daily, [employer], PA-C, 40 mg at 05/22/19 3055    famotidine (PEPCID) tablet 40 mg, 40 mg, Oral, Daily, [employer], PA-C, 40 mg at 05/22/19 0831    hydrALAZINE (APRESOLINE) injection 5 mg, 5 mg, Intravenous, Q6H PRN, [employer], PA-C, 5 mg at 05/21/19 8837    HYDROmorphone (DILAUDID) injection 0 2 mg, 0 2 mg, Intravenous, Q4H PRN, Tracy Summers PA-C    lisinopril (ZESTRIL) tablet 10 mg, 10 mg, Oral, Daily, Brit Teran PA-C, 10 mg at 05/22/19 8645    magnesium oxide (MAG-OX) tablet 400 mg, 400 mg, Oral, Daily, Tracy Summers PA-C, 400 mg at 05/22/19 3073    ondansetron [facility]) injection 4 mg, 4 mg, Intravenous, Q6H PRN, Tracy Summers PA-C    oxyCODONE (ROXICODONE) IR tablet 2 5 mg, 2 5 mg, Oral, Q4H PRN, Tracy Summers PA-C    oxyCODONE (ROXICODONE) IR tablet 5 mg, 5 mg, Oral, Q4H PRN, Tracy Summers PA-C, 5 mg at 05/21/19 1521    polyethylene glycol (MIRALAX) packet 17 g, 17 g, Oral, Daily, rBit Teran PA-C, 17 g at 05/22/19 1254    pravastatin (PRAVACHOL) tablet 40 mg, 40 mg, Oral, Daily With Andressa Diaz PA-C, 40 mg at 05/22/19 1606    Blood Culture:   No results found for: BLOODCX    Wound Culture:   No results found for: WOUNDCULT    Ins and Outs:  I/O last 24 hours: In: 596 [P O :596]  Out: 425 [Urine:425]          Physical:  Vitals:    05/22/19 1457   BP: (!) 106/48   Pulse: 91   Resp: 20   Temp:    SpO2:      right lower extremityORIF  · Dressings clean Dry Intact  · 4/5 strength to EHL/FHL  · Sensation intact L1-S1  · +2 DP Pulse    _*_*_*_*_*_*_*_*_*_*_*_*_*_*_*_*_*_*_*_*_*_*_*_*_*_*_*_*_*_*_*_*_*_*_*_*_*_*_*_*_*    Assessment:   Post operative day 2 status post right Ankle ORIF  Doing well      Plan:  · Nonweight bearing right lower extremity  · Up out of bed  · Ice, Elevation to affected extremity  · Analgesics  · DVT prophylaxis  · Dispo: Ortho will follow  · Will continue to assess for acute blood loss anemia    Meme Mobley PA-C 2021

## 2025-04-15 NOTE — PROGRESS NOTES
Anesthesia Start and Stop Event Times       Date Time Event    4/15/2025 0914 Ready for Procedure     0928 Anesthesia Start     1009 Anesthesia Stop          Responsible Staff  04/15/25      Name Role Begin End    Moises Mckee III, M.D. Anesth 0928 1009          Overtime Reason:  no overtime (within assigned shift)    Comments:                                                           Facility: [facility]  [address] [place] [address], [facility] [place]   POS: 31 (STR)  Progress Note    Chief Complaint/Reason for visit: STR follow up visit  Code status: DNI/DNR/DNH  Additional information obtained from nursing staff, OT, and EMR  History of Present Illness: 71-year-old female seen and examined for STR follow up of acute and chronic medical conditions  Received patient seated in recliner chair with OT present  Patient was awake and very alert and talkative this morning  She was able to state her name, birthdate and place correctly  Nursing reports that her appetite remains poor and ate 5 bites of breakfast this morning  Weight loss of 8 6 pounds noted since 3/14/2023  Patient is at risk for deterioration due to poor oral intake, recent stroke, pressure injury, and deconditioning  Currently on comfort care  Patient is transitioning to LTC on 5/8/2023  Past Medical History: unchanged from history and physical  Past Medical History:   Diagnosis Date    Anorexia     last assessed: 8/9/2013    Closed fracture dislocation of right ankle joint 5/20/2019    Added automatically from request for surgery [id number]    GERD (gastroesophageal reflux disease)     Herpes zoster     last assessed: 9/11/2013    Hypertension     Lymphoma ([facility] [address] )     resolved: 1997    Malignant melanoma of skin ([facility] [address] )     resolved: 2015    Pelvic fracture ([facility] [address] )      Family History: unchanged from history and physical  Social History: unchanged from history and physical  Review of systems: As per review of medical illness, all other systems reviewed and negative  Medications:  All medication and routine orders were reviewed and updated  Allergies: Reviewed and unchanged  Consults reviewed:PT, OT and Other dietitian  Labs/Diagnostics (reviewed by this provider): Copy in Chart    Imaging Reviewed: None today    Physical Exam  Weight: 128 4 pounds Temp: 97 4         BP: 152/81  pulse: 88    resp: 18       Orientation:Person and Place Physical Exam  Vitals and nursing note reviewed  Constitutional:       General: She is not in acute distress  Appearance: She is ill-appearing  She is not toxic-appearing or diaphoretic  Comments: Frail elderly female who appears weak   HENT:      Nose: No congestion or rhinorrhea  Mouth/Throat:      Mouth: Mucous membranes are dry  Pharynx: No oropharyngeal exudate  Eyes:      General:         Right eye: No discharge  Left eye: No discharge  Extraocular Movements: Extraocular movements intact  Conjunctiva/sclera: Conjunctivae normal       Pupils: Pupils are equal, round, and reactive to light  Comments: Wears prescription glasses  Cardiovascular:      Rate and Rhythm: Normal rate  Pulses: Normal pulses  Pulmonary:      Effort: Pulmonary effort is normal  No respiratory distress  Breath sounds: Normal breath sounds  No wheezing, rhonchi or rales  Abdominal:      General: Bowel sounds are normal  There is no distension  Palpations: Abdomen is soft  Tenderness: There is no abdominal tenderness  There is no guarding  Musculoskeletal:      Cervical back: Neck supple  No rigidity  Right lower leg: No edema  Left lower leg: No edema  Comments: Left hemiparesis  Skin:     General: Skin is warm and dry  Capillary Refill: Capillary refill takes less than 2 seconds  Comments: Stage II pressure injury buttock   Neurological:      Mental Status: She is alert  Mental status is at baseline  Motor: Weakness present  Comments: Alert and oriented to self, place, and current situation at time of exam    Psychiatric:         Mood and Affect: Mood normal          Behavior: Behavior normal          Thought Content: Thought content normal        Assessment/Plan:  79-year-old female with:    Adult failure to thrive  With continued poor appetite and poor oral intake   Recent calorie count revealed that patient is not eating enough to sustain life   With weight loss of 8 6 lb since 3/14/2023  Continue to feed patient with all meals/encourage po hydration    Poor appetite  Consider mirtazapine if appetite continues to remain poor; need to discuss with patient's daughter    Stroke [facility])  With left hemiparesis and dysphagia  Sertraline may have contributed to fatigue; weaning off of sertraline    Rapid or irregular heartbeat  With rapid and irregular heart rate in the 120s on 4/28/2023 and started on metoprolol  EKG inpatient 4/5/2023 sinus rhythm with sinus arrhythmia with occasional premature ventricular complexes  LVEF 60% as per most recent echo 4/5/2023  Heart rate improved on metoprolol and trending 70s-80s    Neurogenic bladder  Failed voiding trial likely in setting of recent stroke and immobility  Continue indwelling urinary catheter  Continue IUC care as per protocol    Dementia ([facility] [address] )  At risk for progressive decline in setting of recent stroke  Continue supportive care with ADLs  No behavior issues reported   Continue delirium precautions  Maintain sleep/wake cycle    This note was completed in part utilizing [facility] direct voice recognition software  Grammatical errors, random word insertion, spelling mistakes, and incomplete sentences may be an occasional consequence of the system secondary to software limitations, ambient noise and hardware issues  At the time of dictation, efforts were made to edit, clarify and/or correct errors  Please read the chart carefully and recognize, using context, where substitutions have occurred  If you have any questions or concerns about the context, text or information contained within the body of this dictation, please contact myself, the provider, for further clarification      Corrine Urbina 79, [address]  1/9/081507:05 PM

## (undated) DEVICE — DRAPE C-ARMOUR

## (undated) DEVICE — SUT VICRYL 3-0 SH 27 IN J416H

## (undated) DEVICE — UNIVERSAL MAJOR EXTREMITY,KIT: Brand: CARDINAL HEALTH

## (undated) DEVICE — GLOVE INDICATOR PI UNDERGLOVE SZ 8.5 BLUE

## (undated) DEVICE — PADDING CAST 4 IN  COTTON STRL

## (undated) DEVICE — SILVER-COATED ANTIMICROBIAL BARRIER DRESSING: Brand: ACTICOAT   4" X 8"

## (undated) DEVICE — DRAPE EQUIPMENT RF WAND

## (undated) DEVICE — INTENDED FOR TISSUE SEPARATION, AND OTHER PROCEDURES THAT REQUIRE A SHARP SURGICAL BLADE TO PUNCTURE OR CUT.: Brand: BARD-PARKER SAFETY BLADES SIZE 15, STERILE

## (undated) DEVICE — CHLORAPREP HI-LITE 26ML ORANGE

## (undated) DEVICE — CUFF TOURNIQUET 30 X 4 IN QUICK CONNECT DISP 1BLA

## (undated) DEVICE — SUT ETHILON 2-0 FSLX 30 IN 1674H

## (undated) DEVICE — DRAPE C-ARM X-RAY

## (undated) DEVICE — SPONGE PVP SCRUB WING STERILE

## (undated) DEVICE — TUBING SUCTION 5MM X 12 FT

## (undated) DEVICE — BANDAGE, ESMARK LF STR 6"X9' (20/CS): Brand: CYPRESS

## (undated) DEVICE — 1.8MM DRILL BIT WITH DEPTH MARK/QC/110MM

## (undated) DEVICE — U-DRAPE: Brand: CONVERTORS

## (undated) DEVICE — GLOVE SRG BIOGEL 8.5

## (undated) DEVICE — ACE WRAP 4 IN STERILE

## (undated) DEVICE — ACE WRAP 4 IN UNSTERILE

## (undated) DEVICE — SUT VICRYL 2-0 CTB-1 36 IN JB945

## (undated) DEVICE — OCCLUSIVE GAUZE STRIP,3% BISMUTH TRIBROMOPHENATE IN PETROLATUM BLEND: Brand: XEROFORM

## (undated) DEVICE — PENCIL ELECTROSURG E-Z CLEAN -0035H

## (undated) DEVICE — DRESSING ADAPTIC STRIPS

## (undated) DEVICE — GLOVE INDICATOR PI UNDERGLOVE SZ 8 BLUE

## (undated) DEVICE — INTENDED FOR TISSUE SEPARATION, AND OTHER PROCEDURES THAT REQUIRE A SHARP SURGICAL BLADE TO PUNCTURE OR CUT.: Brand: BARD-PARKER SAFETY BLADES SIZE 10, STERILE

## (undated) DEVICE — PAD GROUNDING ADULT

## (undated) DEVICE — BULB SYRINGE,IRRIGATION WITH PROTECTIVE CAP: Brand: DOVER

## (undated) DEVICE — GAUZE SPONGES,16 PLY: Brand: CURITY

## (undated) DEVICE — ACE WRAP 6 IN UNSTERILE

## (undated) DEVICE — DISPOSABLE EQUIPMENT COVER: Brand: SMALL TOWEL DRAPE

## (undated) DEVICE — ABDOMINAL PAD: Brand: DERMACEA

## (undated) DEVICE — SPLINT 3 X 12 IN PRECUT SYNTHETIC

## (undated) DEVICE — CUFF TOURNIQUET 18 X 4 IN QUICK CONNECT DISP 1 BLADDER

## (undated) DEVICE — SUT VICRYL PLUS 0 CTB-1 27 IN VCPB260H

## (undated) DEVICE — PACK PLASTIC HAND PBDS

## (undated) DEVICE — KERLIX BANDAGE ROLL: Brand: KERLIX

## (undated) DEVICE — GLOVE SRG BIOGEL 7.5

## (undated) DEVICE — SUT MONOCRYL 5-0 P-3 18 IN Y493G